# Patient Record
Sex: FEMALE | Race: WHITE | NOT HISPANIC OR LATINO | Employment: OTHER | URBAN - METROPOLITAN AREA
[De-identification: names, ages, dates, MRNs, and addresses within clinical notes are randomized per-mention and may not be internally consistent; named-entity substitution may affect disease eponyms.]

---

## 2017-08-02 ENCOUNTER — OFFICE VISIT (OUTPATIENT)
Dept: INTERNAL MEDICINE | Facility: CLINIC | Age: 72
End: 2017-08-02
Payer: MEDICARE

## 2017-08-02 ENCOUNTER — TELEPHONE (OUTPATIENT)
Dept: FAMILY MEDICINE | Facility: CLINIC | Age: 72
End: 2017-08-02

## 2017-08-02 VITALS
HEART RATE: 83 BPM | OXYGEN SATURATION: 98 % | DIASTOLIC BLOOD PRESSURE: 80 MMHG | WEIGHT: 156 LBS | RESPIRATION RATE: 15 BRPM | TEMPERATURE: 99 F | HEIGHT: 61 IN | SYSTOLIC BLOOD PRESSURE: 130 MMHG | BODY MASS INDEX: 29.45 KG/M2

## 2017-08-02 DIAGNOSIS — F33.0 MAJOR DEPRESSIVE DISORDER, RECURRENT EPISODE, MILD: ICD-10-CM

## 2017-08-02 DIAGNOSIS — I70.0 ATHEROSCLEROSIS OF AORTA: ICD-10-CM

## 2017-08-02 DIAGNOSIS — E78.00 PURE HYPERCHOLESTEROLEMIA: Primary | ICD-10-CM

## 2017-08-02 DIAGNOSIS — Z01.419 WOMEN'S ANNUAL ROUTINE GYNECOLOGICAL EXAMINATION: ICD-10-CM

## 2017-08-02 DIAGNOSIS — I70.8 AORTO-ILIAC ATHEROSCLEROSIS: ICD-10-CM

## 2017-08-02 DIAGNOSIS — M50.30 DDD (DEGENERATIVE DISC DISEASE), CERVICAL: ICD-10-CM

## 2017-08-02 DIAGNOSIS — F41.9 ANXIETY: ICD-10-CM

## 2017-08-02 DIAGNOSIS — R73.03 PREDIABETES: ICD-10-CM

## 2017-08-02 DIAGNOSIS — Z00.00 ENCOUNTER FOR PREVENTIVE HEALTH EXAMINATION: ICD-10-CM

## 2017-08-02 DIAGNOSIS — I77.810 ASCENDING AORTA DILATATION: ICD-10-CM

## 2017-08-02 DIAGNOSIS — E66.09 NON MORBID OBESITY DUE TO EXCESS CALORIES: ICD-10-CM

## 2017-08-02 DIAGNOSIS — I10 ESSENTIAL HYPERTENSION: ICD-10-CM

## 2017-08-02 DIAGNOSIS — Z00.00 ANNUAL PHYSICAL EXAM: Primary | ICD-10-CM

## 2017-08-02 DIAGNOSIS — I70.0 AORTO-ILIAC ATHEROSCLEROSIS: ICD-10-CM

## 2017-08-02 DIAGNOSIS — R41.3 MEMORY CHANGES: ICD-10-CM

## 2017-08-02 PROBLEM — N89.8 VAGINAL DISCHARGE: Status: RESOLVED | Noted: 2017-08-02 | Resolved: 2017-08-02

## 2017-08-02 PROBLEM — N89.8 VAGINAL DISCHARGE: Status: ACTIVE | Noted: 2017-08-02

## 2017-08-02 PROCEDURE — 99499 UNLISTED E&M SERVICE: CPT | Mod: S$GLB,,, | Performed by: NURSE PRACTITIONER

## 2017-08-02 PROCEDURE — G0439 PPPS, SUBSEQ VISIT: HCPCS | Mod: S$GLB,,, | Performed by: NURSE PRACTITIONER

## 2017-08-02 PROCEDURE — 99999 PR PBB SHADOW E&M-EST. PATIENT-LVL V: CPT | Mod: PBBFAC,,, | Performed by: NURSE PRACTITIONER

## 2017-08-02 NOTE — TELEPHONE ENCOUNTER
----- Message from Carolina Jin sent at 8/2/2017 10:07 AM CDT -----  Contact: self/  Doctor appointment and lab have been scheduled.  Please link lab orders to the lab appointment.  Date of doctor appointment:  11/21/17  Physical or EP:  phys  Date of lab appointment:  11/14/17  Comments:

## 2017-08-02 NOTE — Clinical Note
Primary Care Providers: Ilia Gonzalez MD, MD (General)  Your patient was seen today for a HRA visit. Gap(s) in care (HEDIS gaps) have been identified during this visit that require additional testing and possible follow up.  Orders Placed This Encounter     Ambulatory Referral to Gynecology         Referral Priority:Routine         Referral Type:Consultation         Referral Reason:Specialty Services Required         Requested Specialty:Gynecology         Number of Visits Requested:1   . I have included a copy of my visit note; please review the note and feel free to contact me with any questions.   Thank you for allowing me to participate in the care of your patients. Bette Benjamin NP

## 2017-08-02 NOTE — PROGRESS NOTES
"Chikis Epstein presented for a  Medicare AWV and comprehensive Health Risk Assessment today. The following components were reviewed and updated:    · Medical history  · Family History  · Social history  · Allergies and Current Medications  · Health Risk Assessment  · Health Maintenance  · Care Team   External eye MD    ** See Completed Assessments for Annual Wellness Visit within the encounter summary.**       The following assessments were completed:  · Living Situation  · CAGE  · Depression Screening  · Timed Get Up and Go  · Whisper Test  · Cognitive Function Screening  · Nutrition Screening  · ADL Screening  · PAQ Screening    Vitals:    08/02/17 0843   BP: 130/80   Pulse: 83   Resp: 15   Temp: 98.6 °F (37 °C)   TempSrc: Oral   SpO2: 98%   Weight: 70.8 kg (156 lb)   Height: 5' 1" (1.549 m)     Body mass index is 29.48 kg/m².  Physical Exam   Constitutional: She is oriented to person, place, and time. She appears well-developed and well-nourished.   HENT:   Head: Normocephalic and atraumatic.   Cardiovascular: Normal rate, regular rhythm and normal heart sounds.    No murmur heard.  Pulmonary/Chest: Effort normal and breath sounds normal.   Abdominal: Soft. Bowel sounds are normal. There is no tenderness.   Musculoskeletal: She exhibits no edema.   Neurological: She is alert and oriented to person, place, and time.   Skin: Skin is warm and dry.   Psychiatric: She has a normal mood and affect. Her behavior is normal. Judgment and thought content normal.   Nursing note and vitals reviewed.    No memory impairment noted     Diagnoses and health risks identified today and associated recommendations/orders:    1. Pure hypercholesterolemia  Stable- followed by PCP    2. Prediabetes  Stable- followed by PCP    3. Non morbid obesity due to excess calories  CHronic with no recent weight loss.Reviewed current BMI & ideal BMI range. Encouraged weight loss,  diet and exercise. Followed by PCP.    4. Memory changes  Stable- " followed by PCP    5. Major depressive disorder, recurrent episode, mild  Stable- followed by PCP    6. Essential hypertension  Stable- followed by PCP    7. Atherosclerosis of aorta  Stable- followed by PCP    8. Ascending aorta dilatation  Stable- followed by PCP    9. Aorto-iliac atherosclerosis  Stable- followed by PCP    10. Anxiety  Stable- followed by PCP    11. DDD (degenerative disc disease), cervical  Stable- followed by PCP    12. Encounter for preventive health examination  Assessments completed  Preventative health recommendations reviewed     13. Women's annual routine gynecological examination  - Ambulatory Referral to Gynecology            Provided Chikis with a 5-10 year written screening schedule and personal prevention plan. Recommendations were developed using the USPSTF age appropriate recommendations. Education, counseling, and referrals were provided as needed. After Visit Summary printed and given to patient which includes a list of additional screenings\tests needed. DXA due. PPSV23 due. SHe requested to change back to Dr Gonzalez as her PCP. SHe will schedule appt to F/U on abd symptoms, DXA,PPSV23 due. Risk factor modification encouraged: weight loss, exercise & get active in activities with people, GYN referral to further evaluate presence of vag discharge & painful intercourse in past.    Return in about 1 year (around 8/2/2018) for HRA.    Bette Benjamin NP

## 2017-08-08 ENCOUNTER — OFFICE VISIT (OUTPATIENT)
Dept: FAMILY MEDICINE | Facility: CLINIC | Age: 72
End: 2017-08-08
Payer: MEDICARE

## 2017-08-08 VITALS
HEIGHT: 61 IN | WEIGHT: 158.31 LBS | TEMPERATURE: 98 F | SYSTOLIC BLOOD PRESSURE: 129 MMHG | DIASTOLIC BLOOD PRESSURE: 64 MMHG | BODY MASS INDEX: 29.89 KG/M2 | HEART RATE: 80 BPM

## 2017-08-08 DIAGNOSIS — R10.13 EPIGASTRIC PAIN: ICD-10-CM

## 2017-08-08 DIAGNOSIS — R10.11 RUQ PAIN: Primary | ICD-10-CM

## 2017-08-08 PROCEDURE — 1125F AMNT PAIN NOTED PAIN PRSNT: CPT | Mod: S$GLB,,, | Performed by: FAMILY MEDICINE

## 2017-08-08 PROCEDURE — 3008F BODY MASS INDEX DOCD: CPT | Mod: S$GLB,,, | Performed by: FAMILY MEDICINE

## 2017-08-08 PROCEDURE — 3078F DIAST BP <80 MM HG: CPT | Mod: S$GLB,,, | Performed by: FAMILY MEDICINE

## 2017-08-08 PROCEDURE — 99214 OFFICE O/P EST MOD 30 MIN: CPT | Mod: S$GLB,,, | Performed by: FAMILY MEDICINE

## 2017-08-08 PROCEDURE — 1159F MED LIST DOCD IN RCRD: CPT | Mod: S$GLB,,, | Performed by: FAMILY MEDICINE

## 2017-08-08 PROCEDURE — 99999 PR PBB SHADOW E&M-EST. PATIENT-LVL III: CPT | Mod: PBBFAC,,, | Performed by: FAMILY MEDICINE

## 2017-08-08 PROCEDURE — 3074F SYST BP LT 130 MM HG: CPT | Mod: S$GLB,,, | Performed by: FAMILY MEDICINE

## 2017-08-08 RX ORDER — PANTOPRAZOLE SODIUM 40 MG/1
40 TABLET, DELAYED RELEASE ORAL DAILY
Qty: 30 TABLET | Refills: 11 | Status: SHIPPED | OUTPATIENT
Start: 2017-08-08 | End: 2021-03-01 | Stop reason: SDUPTHER

## 2017-08-08 NOTE — PROGRESS NOTES
Subjective:       Patient ID: Chikis Epstein is a 71 y.o. female.    Chief Complaint: GI Problem (having symptoms prior to gallbladder removal)    Disclaimer: This note has been generated using voice-recognition software. There may be typographical errors that have been missed during proof-reading    72 yo presents for evaluation of abdominal distention and recurrent abdominal pain, present for over one year.  She underwent cholecystectomy on 3/16 for history of chronic cholelithiasis.  However, has continued with pain localized to epigastric area as well as RUQ pain, worse with eating.  Symptoms only minimally improved with TUMS.       Review of Systems   Constitutional: Negative for fever and unexpected weight change.   Gastrointestinal: Positive for abdominal distention and abdominal pain. Negative for blood in stool, nausea and vomiting.       Objective:      Physical Exam   Constitutional: She appears well-developed and well-nourished. No distress.   Abdominal: Soft. Bowel sounds are normal. She exhibits no distension and no mass. There is tenderness. There is no rebound.   Tenderness to deep palpation over epigastric area, RUQ, no rebound tenderness       Assessment:       1. RUQ pain    2. Epigastric pain        Plan:        1.  Abdominal US, rule out impacted duct stone  2.  Protonix 40mg qd  3.  Schedule EGD

## 2017-08-10 ENCOUNTER — HOSPITAL ENCOUNTER (OUTPATIENT)
Dept: RADIOLOGY | Facility: HOSPITAL | Age: 72
Discharge: HOME | End: 2017-08-10
Attending: FAMILY MEDICINE
Payer: MEDICARE

## 2017-08-10 DIAGNOSIS — R10.11 RUQ PAIN: ICD-10-CM

## 2017-08-10 PROCEDURE — 76700 US EXAM ABDOM COMPLETE: CPT | Mod: TC

## 2017-08-10 PROCEDURE — 76700 US EXAM ABDOM COMPLETE: CPT | Mod: 26,,, | Performed by: RADIOLOGY

## 2017-08-14 DIAGNOSIS — I10 ESSENTIAL HYPERTENSION: ICD-10-CM

## 2017-08-14 RX ORDER — HYDROCHLOROTHIAZIDE 25 MG/1
TABLET ORAL
Qty: 90 TABLET | Refills: 3 | Status: SHIPPED | OUTPATIENT
Start: 2017-08-14 | End: 2018-07-02 | Stop reason: SDUPTHER

## 2017-08-21 DIAGNOSIS — I10 ESSENTIAL HYPERTENSION: ICD-10-CM

## 2017-08-21 RX ORDER — AMLODIPINE BESYLATE 5 MG/1
TABLET ORAL
Qty: 90 TABLET | Refills: 3 | Status: SHIPPED | OUTPATIENT
Start: 2017-08-21 | End: 2018-08-20 | Stop reason: SDUPTHER

## 2017-08-28 ENCOUNTER — SURGERY (OUTPATIENT)
Age: 72
End: 2017-08-28

## 2017-08-28 ENCOUNTER — HOSPITAL ENCOUNTER (OUTPATIENT)
Facility: HOSPITAL | Age: 72
Discharge: HOME | End: 2017-08-28
Attending: INTERNAL MEDICINE | Admitting: INTERNAL MEDICINE
Payer: MEDICARE

## 2017-08-28 ENCOUNTER — ANESTHESIA (OUTPATIENT)
Dept: ENDOSCOPY | Facility: HOSPITAL | Age: 72
End: 2017-08-28
Payer: MEDICARE

## 2017-08-28 ENCOUNTER — ANESTHESIA EVENT (OUTPATIENT)
Dept: ENDOSCOPY | Facility: HOSPITAL | Age: 72
End: 2017-08-28
Payer: MEDICARE

## 2017-08-28 VITALS
HEART RATE: 66 BPM | SYSTOLIC BLOOD PRESSURE: 133 MMHG | RESPIRATION RATE: 18 BRPM | HEIGHT: 60 IN | WEIGHT: 155 LBS | DIASTOLIC BLOOD PRESSURE: 72 MMHG | OXYGEN SATURATION: 96 % | BODY MASS INDEX: 30.43 KG/M2 | TEMPERATURE: 97 F

## 2017-08-28 VITALS — RESPIRATION RATE: 20 BRPM

## 2017-08-28 DIAGNOSIS — R10.11 RUQ ABDOMINAL PAIN: Primary | ICD-10-CM

## 2017-08-28 PROCEDURE — 43239 EGD BIOPSY SINGLE/MULTIPLE: CPT | Mod: ,,, | Performed by: INTERNAL MEDICINE

## 2017-08-28 PROCEDURE — 27201012 HC FORCEPS, HOT/COLD, DISP: Performed by: INTERNAL MEDICINE

## 2017-08-28 PROCEDURE — 37000009 HC ANESTHESIA EA ADD 15 MINS: Performed by: INTERNAL MEDICINE

## 2017-08-28 PROCEDURE — 88305 TISSUE EXAM BY PATHOLOGIST: CPT | Performed by: PATHOLOGY

## 2017-08-28 PROCEDURE — 43239 EGD BIOPSY SINGLE/MULTIPLE: CPT | Performed by: INTERNAL MEDICINE

## 2017-08-28 PROCEDURE — 25000003 PHARM REV CODE 250: Performed by: INTERNAL MEDICINE

## 2017-08-28 PROCEDURE — 88305 TISSUE EXAM BY PATHOLOGIST: CPT | Mod: 26,,, | Performed by: PATHOLOGY

## 2017-08-28 PROCEDURE — 37000008 HC ANESTHESIA 1ST 15 MINUTES: Performed by: INTERNAL MEDICINE

## 2017-08-28 PROCEDURE — 63600175 PHARM REV CODE 636 W HCPCS: Performed by: NURSE ANESTHETIST, CERTIFIED REGISTERED

## 2017-08-28 PROCEDURE — D9220A PRA ANESTHESIA: Mod: ,,, | Performed by: ANESTHESIOLOGY

## 2017-08-28 RX ORDER — SODIUM CHLORIDE 9 MG/ML
INJECTION, SOLUTION INTRAVENOUS CONTINUOUS
Status: DISCONTINUED | OUTPATIENT
Start: 2017-08-28 | End: 2017-08-28 | Stop reason: HOSPADM

## 2017-08-28 RX ORDER — PROPOFOL 10 MG/ML
VIAL (ML) INTRAVENOUS
Status: DISCONTINUED | OUTPATIENT
Start: 2017-08-28 | End: 2017-08-28

## 2017-08-28 RX ORDER — LIDOCAINE HCL/PF 100 MG/5ML
SYRINGE (ML) INTRAVENOUS
Status: DISCONTINUED | OUTPATIENT
Start: 2017-08-28 | End: 2017-08-28

## 2017-08-28 RX ADMIN — PROPOFOL 50 MG: 10 INJECTION, EMULSION INTRAVENOUS at 12:08

## 2017-08-28 RX ADMIN — LIDOCAINE HYDROCHLORIDE 40 MG: 20 INJECTION, SOLUTION INTRAVENOUS at 12:08

## 2017-08-28 RX ADMIN — SODIUM CHLORIDE: 0.9 INJECTION, SOLUTION INTRAVENOUS at 11:08

## 2017-08-28 RX ADMIN — PROPOFOL 30 MG: 10 INJECTION, EMULSION INTRAVENOUS at 12:08

## 2017-08-28 NOTE — INTERVAL H&P NOTE
Short Stay Endoscopy History and Physical    PCP - Ilia Gonzalez MD    Procedure - EGD  Sedation: GA  ASA - per anesthesia  Mallampati - per anesthesia  History of Anesthesia problems - no  Family history Anesthesia problems -  no     HPI:  This is a 71 y.o. female here for evaluation of : Upper abdominal pain    Reflux - no  Dysphagia - no  Abdominal pain - yes  Diarrhea - no    ROS:  Constitutional: No fevers, chills, No weight loss  ENT: No allergies  CV: No chest pain  Pulm: No cough, No shortness of breath  Ophtho: No vision changes  GI: see HPI    Medical History:  has a past medical history of Anxiety; Aortic atherosclerosis; Back pain; Cataract; Hyperlipidemia (2016); Hypertension; MDD (major depressive disorder); Menopause; Non morbid obesity due to excess calories (2016); Personal history of kidney stones; and Type 2 diabetes mellitus without complication.    Surgical History:  has a past surgical history that includes Colonoscopy (); Kidney stone surgery; Total abdominal hysterectomy w/ bilateral salpingoophorectomy; Cataract extraction, bilateral; Eye surgery; Hysterectomy;  section; and Cholecystectomy.    Family History: family history includes Asthma in her mother; Cancer in her father and sister; Depression in her son and son; Heart disease in her sister.. Otherwise no colon cancer, inflammatory bowel disease, or GI malignancies.    Social History:  reports that she has never smoked. She has never used smokeless tobacco. She reports that she drinks alcohol. She reports that she does not use drugs.    Review of patient's allergies indicates:   Allergen Reactions    Ace inhibitors      Other reaction(s): Swelling       Medications:   Prescriptions Prior to Admission   Medication Sig Dispense Refill Last Dose    amlodipine (NORVASC) 5 MG tablet TAKE 1 TABLET ONE TIME DAILY 90 tablet 3 2017 at Unknown time    cetirizine (ZYRTEC) 10 MG tablet Take 10 mg by mouth as needed  for Allergies.   Past Week at Unknown time    conjugated estrogens (PREMARIN) vaginal cream Use topically as directed 30 g 6 Past Week at Unknown time    fluoxetine (PROZAC) 40 MG capsule TAKE 1 CAPSULE ONE TIME DAILY 90 capsule 3 8/27/2017 at Unknown time    hydrochlorothiazide (HYDRODIURIL) 25 MG tablet TAKE 1 TABLET ONE TIME DAILY 90 tablet 3 8/28/2017 at Unknown time    MULTIVIT-MINERALS/FOLIC ACID (CENTRUM MULTIGUMMIES ORAL) Take 2 each by mouth once daily.   8/27/2017 at Unknown time    pantoprazole (PROTONIX) 40 MG tablet Take 1 tablet (40 mg total) by mouth once daily. 30 tablet 11 8/27/2017 at Unknown time    pravastatin (PRAVACHOL) 40 MG tablet Take 1 tablet (40 mg total) by mouth once daily. 90 tablet 3 8/27/2017 at Unknown time    triamcinolone (NASACORT AQ) 55 mcg nasal inhaler 2 sprays by Nasal route once daily. 1 - 2 Aerosol, Spray Nasal Every day (Patient taking differently: 2 sprays by Nasal route as needed. 1 - 2 Aerosol, Spray Nasal Every day) 49.5 g 3 Past Month at Unknown time    losartan (COZAAR) 50 MG tablet Take 2 tablets (100 mg total) by mouth once daily. (Patient taking differently: Take 50 mg by mouth once daily. ) 60 tablet 11 Taking    naproxen (NAPROSYN) 500 MG tablet Take 500 mg by mouth 2 (two) times daily as needed.    Taking       Objective Findings:    Vital Signs: Per nursing notes.    Physical Exam:  General Appearance: Well appearing in no acute distress  Head:   Normocephalic, without obvious abnormality  Eyes:    No scleral icterus  Airway: Open  Neck: No restriction in mobility  Lungs: CTA bilaterally in anterior and posterior fields, no wheezes, no crackles.  Heart:  Regular rate and rhythm, S1, S2 normal, no murmurs heard  Abdomen: Soft, non tender, non distended      Labs:  Lab Results   Component Value Date    WBC 6.83 10/12/2016    HGB 13.6 10/12/2016    HCT 40.0 10/12/2016     10/12/2016    CHOL 209 (H) 10/12/2016    TRIG 76 10/12/2016    HDL 68  10/12/2016    ALT 17 10/12/2016    AST 26 10/12/2016     10/12/2016    K 3.8 10/12/2016     10/12/2016    CREATININE 0.8 10/12/2016    BUN 11 10/12/2016    CO2 26 10/12/2016    TSH 3.134 10/12/2016    INR 1.0 04/07/2012    HGBA1C 5.9 09/07/2016         I have explained the risks and benefits of endoscopy procedures to the patient including but not limited to bleeding, perforation, infection, and death.    Thank you so much for allowing me to participate in the care of Chikis Ochoa MD

## 2017-08-28 NOTE — H&P (VIEW-ONLY)
Subjective:       Patient ID: Chikis Epstein is a 71 y.o. female.    Chief Complaint: GI Problem (having symptoms prior to gallbladder removal)    Disclaimer: This note has been generated using voice-recognition software. There may be typographical errors that have been missed during proof-reading    70 yo presents for evaluation of abdominal distention and recurrent abdominal pain, present for over one year.  She underwent cholecystectomy on 3/16 for history of chronic cholelithiasis.  However, has continued with pain localized to epigastric area as well as RUQ pain, worse with eating.  Symptoms only minimally improved with TUMS.       Review of Systems   Constitutional: Negative for fever and unexpected weight change.   Gastrointestinal: Positive for abdominal distention and abdominal pain. Negative for blood in stool, nausea and vomiting.       Objective:      Physical Exam   Constitutional: She appears well-developed and well-nourished. No distress.   Abdominal: Soft. Bowel sounds are normal. She exhibits no distension and no mass. There is tenderness. There is no rebound.   Tenderness to deep palpation over epigastric area, RUQ, no rebound tenderness       Assessment:       1. RUQ pain    2. Epigastric pain        Plan:        1.  Abdominal US, rule out impacted duct stone  2.  Protonix 40mg qd  3.  Schedule EGD

## 2017-08-28 NOTE — ANESTHESIA PREPROCEDURE EVALUATION
08/28/2017  Chikis Epstein is a 71 y.o., female.    Anesthesia Evaluation    I have reviewed the Patient Summary Reports.    I have reviewed the Nursing Notes.   I have reviewed the Medications.     Review of Systems  Anesthesia Hx:  No problems with previous Anesthesia  History of prior surgery of interest to airway management or planning: Previous anesthesia: MAC Denies Family Hx of Anesthesia complications.   Denies Personal Hx of Anesthesia complications.   Social:  Non-Smoker, No Alcohol Use    Cardiovascular:   Exercise tolerance: good Hypertension, well controlled hyperlipidemia ECG has been reviewed. 1/2016  Normal sinus rhythm  Nonspecific ST abnormality  Abnormal ECG  When compared with ECG of 31-OCT-2012 13:00,  No significant change was found   Hepatic/GI:   GERD    Musculoskeletal:   Arthritis     Neurological:  Neurology Normal    Psych:   Psychiatric History depression          Physical Exam  General:  Well nourished    Airway/Jaw/Neck:  Airway Findings: Mouth Opening: Normal Tongue: Normal  Mallampati: III  Improves to III with phonation.  TM Distance: Normal, at least 6 cm        Eyes/Ears/Nose:  EYES/EARS/NOSE FINDINGS: Normal   Dental:  Dental Findings:    Chest/Lungs:  Chest/Lungs Clear    Heart/Vascular:  Heart Findings: Normal Heart murmur: negative    Abdomen:  Abdomen Findings: Normal      Mental Status:  Mental Status Findings:  Cooperative, Alert and Oriented         Anesthesia Plan  Type of Anesthesia, risks & benefits discussed:  Anesthesia Type:  general  Patient's Preference: general  Intra-op Monitoring Plan: standard ASA monitors  Intra-op Monitoring Plan Comments:   Post Op Pain Control Plan:   Post Op Pain Control Plan Comments:   Induction:   IV  Beta Blocker:  Patient is not currently on a Beta-Blocker (No further documentation required).       Informed Consent: Patient  understands risks and agrees with Anesthesia plan.  Questions answered. Anesthesia consent signed with patient.  ASA Score: 2     Day of Surgery Review of History & Physical:    H&P update referred to the surgeon.         Ready For Surgery From Anesthesia Perspective.

## 2017-08-28 NOTE — TRANSFER OF CARE
Anesthesia Transfer of Care Note    Patient: Chikis Epstein    Procedure(s) Performed: Procedure(s) (LRB):  ESOPHAGOGASTRODUODENOSCOPY (EGD) (N/A)    Patient location: PACU    Anesthesia Type: general    Transport from OR: Transported from OR on room air with adequate spontaneous ventilation    Post pain: adequate analgesia    Post assessment: no apparent anesthetic complications and tolerated procedure well    Post vital signs: stable    Level of consciousness: awake and alert    Nausea/Vomiting: no nausea/vomiting    Complications: none    Transfer of care protocol was followed      Last vitals: 08/28/17 1254  Visit Vitals  /65   Pulse 59   Temp 98.9   Resp 12   Ht 5' (1.524 m)   Wt 70.3 kg (155 lb)   SpO2 98%   Breastfeeding? No   BMI 30.27 kg/m²

## 2017-08-28 NOTE — PATIENT INSTRUCTIONS
Discharge Summary/Instructions after an Endoscopic Procedure  Patient Name: Chikis Epstein  Patient MRN: [id number]  Patient YOB: 1945  Monday, August 28, 2017  Jed Ochoa MD  RESTRICTIONS:  During your procedure today, you received medications for sedation.  These   medications may affect your judgment, balance and coordination.  Therefore,   for 24 hours, you have the following restrictions:   - DO NOT drive a car, operate machinery, make legal/financial decisions,   sign important papers or drink alcohol.    ACTIVITY:  The following day: return to full activity including work, except no heavy   lifting, straining or running for 3 days if polyps were removed.  DIET:  Eat and drink normally unless instructed otherwise.  TREATMENT FOR COMMON SIDE EFFECTS:  - Mild abdominal pain, belching, bloating or excessive gas: rest, eat   lightly and use a heating pad.  - Sore Throat: treat with throat lozenges and/or gargle with warm salt   water.  SYMPTOMS TO WATCH FOR AND REPORT TO YOUR PHYSICIAN:  1. Abdominal pain or bloating, other than gas cramps.  2. Chest pain.  3. Back pain.  4. Chills or fever occurring within 24 hours after the procedure.  5. Rectal bleeding, which would show as bright red, maroon, or black stools.   (A tablespoon of blood from the rectum is not serious, especially if   hemorrhoids are present.)  6. Vomiting.  7. Weakness or dizziness.  8. Because air was used during the procedure, expelling large amounts of air   from your rectum or belching is normal.  9. If a bowel prep was taken, you may not have a bowel movement for 1-3   days.  This is normal.  GO DIRECTLY TO THE EMERGENCY ROOM IF YOU HAVE ANY OF THE FOLLOWING:   Difficulty breathing  Chills and/or fever over 101 F   Persistent vomiting and/or vomiting blood   Severe abdominal pain   Severe chest pain   Black, tarry stools   Bleeding- more than one tablespoon  Your doctor recommends these additional instructions:  If any biopsies  were taken, your doctorâs clinic will call you in 1 to 2   weeks with any results.  You have a contact number available for emergencies.  The signs and symptoms   of potential delayed complications were discussed with you.  You may return   to normal activities tomorrow.  Written discharge instructions were   provided to you.   You are being discharged to home.   Resume your previous diet.   Continue your present medications.   We are waiting for your pathology results.   Do not take any aspirin, ibuprofen (including Advil, Motrin or Nuprin),   naproxen (including Aleve), or any other non-steroidal anti-inflammatory   drugs.  For questions, problems or results please call your physician - Jed Ochoa MD at Work:  [phone].  [facility], EMERGENCY ROOM PHONE NUMBER: [phone]  IF A COMPLICATION OR EMERGENCY SITUATION ARISES AND YOU ARE UNABLE TO REACH   YOUR PHYSICIAN - GO DIRECTLY TO THE EMERGENCY ROOM.  Jed Ochoa MD  8/28/2017 12:50:01 PM  This report has been verified and signed electronically.

## 2017-08-29 RX ORDER — FLUOXETINE HYDROCHLORIDE 40 MG/1
CAPSULE ORAL
Qty: 90 CAPSULE | Refills: 3 | Status: SHIPPED | OUTPATIENT
Start: 2017-08-29 | End: 2017-11-21 | Stop reason: SDUPTHER

## 2017-08-30 NOTE — ANESTHESIA POSTPROCEDURE EVALUATION
Anesthesia Post Evaluation    Patient: Chikis Epstein    Procedure(s) Performed: Procedure(s) (LRB):  ESOPHAGOGASTRODUODENOSCOPY (EGD) (N/A)    Final Anesthesia Type: general  Patient location during evaluation: GI PACU  Patient participation: Yes- Able to Participate  Level of consciousness: awake and alert, oriented and awake  Post-procedure vital signs: reviewed and stable  Pain management: adequate  Airway patency: patent  PONV status at discharge: No PONV  Anesthetic complications: no      Cardiovascular status: blood pressure returned to baseline and hemodynamically stable  Respiratory status: unassisted, spontaneous ventilation and room air  Hydration status: euvolemic  Follow-up not needed.        Visit Vitals  /72   Pulse 66   Temp 36.1 °C (97 °F)   Resp 18   Ht 5' (1.524 m)   Wt 70.3 kg (155 lb)   SpO2 96%   Breastfeeding? No   BMI 30.27 kg/m²       Pain/Sobia Score: No Data Recorded

## 2017-08-31 ENCOUNTER — TELEPHONE (OUTPATIENT)
Dept: GASTROENTEROLOGY | Facility: CLINIC | Age: 72
End: 2017-08-31

## 2017-08-31 NOTE — TELEPHONE ENCOUNTER
----- Message from Jed Ochoa MD sent at 8/31/2017  2:57 PM CDT -----  Please notify patient, the stomach biopsies did not reveal any H.pylori.

## 2017-09-08 DIAGNOSIS — I70.0 ATHEROSCLEROSIS OF AORTA: ICD-10-CM

## 2017-09-08 RX ORDER — PRAVASTATIN SODIUM 40 MG/1
TABLET ORAL
Qty: 90 TABLET | Refills: 3 | Status: SHIPPED | OUTPATIENT
Start: 2017-09-08 | End: 2018-08-27 | Stop reason: SDUPTHER

## 2017-11-14 ENCOUNTER — LAB VISIT (OUTPATIENT)
Dept: LAB | Facility: HOSPITAL | Age: 72
End: 2017-11-14
Attending: FAMILY MEDICINE
Payer: MEDICARE

## 2017-11-14 DIAGNOSIS — Z00.00 ANNUAL PHYSICAL EXAM: ICD-10-CM

## 2017-11-14 DIAGNOSIS — I10 ESSENTIAL HYPERTENSION: ICD-10-CM

## 2017-11-14 LAB
ALBUMIN SERPL BCP-MCNC: 3.6 G/DL
ALP SERPL-CCNC: 88 U/L
ALT SERPL W/O P-5'-P-CCNC: 17 U/L
ANION GAP SERPL CALC-SCNC: 7 MMOL/L
AST SERPL-CCNC: 26 U/L
BASOPHILS # BLD AUTO: 0.05 K/UL
BASOPHILS NFR BLD: 0.8 %
BILIRUB SERPL-MCNC: 0.5 MG/DL
BUN SERPL-MCNC: 13 MG/DL
CALCIUM SERPL-MCNC: 10.1 MG/DL
CHLORIDE SERPL-SCNC: 102 MMOL/L
CHOLEST SERPL-MCNC: 175 MG/DL
CHOLEST/HDLC SERPL: 3 {RATIO}
CO2 SERPL-SCNC: 31 MMOL/L
CREAT SERPL-MCNC: 0.8 MG/DL
DIFFERENTIAL METHOD: NORMAL
EOSINOPHIL # BLD AUTO: 0.2 K/UL
EOSINOPHIL NFR BLD: 3.5 %
ERYTHROCYTE [DISTWIDTH] IN BLOOD BY AUTOMATED COUNT: 12.5 %
EST. GFR  (AFRICAN AMERICAN): >60 ML/MIN/1.73 M^2
EST. GFR  (NON AFRICAN AMERICAN): >60 ML/MIN/1.73 M^2
GLUCOSE SERPL-MCNC: 114 MG/DL
HCT VFR BLD AUTO: 40.1 %
HDLC SERPL-MCNC: 59 MG/DL
HDLC SERPL: 33.7 %
HGB BLD-MCNC: 13.3 G/DL
IMM GRANULOCYTES # BLD AUTO: 0.03 K/UL
IMM GRANULOCYTES NFR BLD AUTO: 0.5 %
LDLC SERPL CALC-MCNC: 94.6 MG/DL
LYMPHOCYTES # BLD AUTO: 1.7 K/UL
LYMPHOCYTES NFR BLD: 25.1 %
MCH RBC QN AUTO: 30 PG
MCHC RBC AUTO-ENTMCNC: 33.2 G/DL
MCV RBC AUTO: 91 FL
MONOCYTES # BLD AUTO: 0.4 K/UL
MONOCYTES NFR BLD: 6.7 %
NEUTROPHILS # BLD AUTO: 4.2 K/UL
NEUTROPHILS NFR BLD: 63.4 %
NONHDLC SERPL-MCNC: 116 MG/DL
NRBC BLD-RTO: 0 /100 WBC
PLATELET # BLD AUTO: 275 K/UL
PMV BLD AUTO: 10.4 FL
POTASSIUM SERPL-SCNC: 4.2 MMOL/L
PROT SERPL-MCNC: 7 G/DL
RBC # BLD AUTO: 4.43 M/UL
SODIUM SERPL-SCNC: 140 MMOL/L
TRIGL SERPL-MCNC: 107 MG/DL
TSH SERPL DL<=0.005 MIU/L-ACNC: 2.84 UIU/ML
WBC # BLD AUTO: 6.58 K/UL

## 2017-11-14 PROCEDURE — 80061 LIPID PANEL: CPT

## 2017-11-14 PROCEDURE — 36415 COLL VENOUS BLD VENIPUNCTURE: CPT | Mod: PO

## 2017-11-14 PROCEDURE — 80053 COMPREHEN METABOLIC PANEL: CPT

## 2017-11-14 PROCEDURE — 85025 COMPLETE CBC W/AUTO DIFF WBC: CPT

## 2017-11-14 PROCEDURE — 84443 ASSAY THYROID STIM HORMONE: CPT

## 2017-11-21 ENCOUNTER — OFFICE VISIT (OUTPATIENT)
Dept: FAMILY MEDICINE | Facility: CLINIC | Age: 72
End: 2017-11-21
Payer: MEDICARE

## 2017-11-21 VITALS
DIASTOLIC BLOOD PRESSURE: 80 MMHG | SYSTOLIC BLOOD PRESSURE: 120 MMHG | HEIGHT: 61 IN | WEIGHT: 155.44 LBS | HEART RATE: 72 BPM | BODY MASS INDEX: 29.35 KG/M2 | TEMPERATURE: 98 F

## 2017-11-21 DIAGNOSIS — M81.0 OSTEOPOROSIS, UNSPECIFIED OSTEOPOROSIS TYPE, UNSPECIFIED PATHOLOGICAL FRACTURE PRESENCE: ICD-10-CM

## 2017-11-21 DIAGNOSIS — I10 ESSENTIAL HYPERTENSION: ICD-10-CM

## 2017-11-21 DIAGNOSIS — Z00.00 ROUTINE GENERAL MEDICAL EXAMINATION AT A HEALTH CARE FACILITY: Primary | ICD-10-CM

## 2017-11-21 DIAGNOSIS — Z12.39 SCREENING BREAST EXAMINATION: ICD-10-CM

## 2017-11-21 DIAGNOSIS — Z23 NEED FOR PROPHYLACTIC VACCINATION AND INOCULATION AGAINST INFLUENZA: ICD-10-CM

## 2017-11-21 LAB
BILIRUB UR QL STRIP: NEGATIVE
CLARITY UR REFRACT.AUTO: CLEAR
COLOR UR AUTO: NORMAL
GLUCOSE UR QL STRIP: NEGATIVE
HGB UR QL STRIP: NEGATIVE
KETONES UR QL STRIP: NEGATIVE
LEUKOCYTE ESTERASE UR QL STRIP: NEGATIVE
NITRITE UR QL STRIP: NEGATIVE
PH UR STRIP: 8 [PH] (ref 5–8)
PROT UR QL STRIP: NEGATIVE
SP GR UR STRIP: 1 (ref 1–1.03)
URN SPEC COLLECT METH UR: NORMAL
UROBILINOGEN UR STRIP-ACNC: NEGATIVE EU/DL

## 2017-11-21 PROCEDURE — 99999 PR PBB SHADOW E&M-EST. PATIENT-LVL III: CPT | Mod: PBBFAC,,, | Performed by: FAMILY MEDICINE

## 2017-11-21 PROCEDURE — 90662 IIV NO PRSV INCREASED AG IM: CPT | Mod: S$GLB,,, | Performed by: FAMILY MEDICINE

## 2017-11-21 PROCEDURE — 81003 URINALYSIS AUTO W/O SCOPE: CPT

## 2017-11-21 PROCEDURE — 99397 PER PM REEVAL EST PAT 65+ YR: CPT | Mod: S$GLB,,, | Performed by: FAMILY MEDICINE

## 2017-11-21 PROCEDURE — 99499 UNLISTED E&M SERVICE: CPT | Mod: S$GLB,,, | Performed by: FAMILY MEDICINE

## 2017-11-21 PROCEDURE — G0008 ADMIN INFLUENZA VIRUS VAC: HCPCS | Mod: S$GLB,,, | Performed by: FAMILY MEDICINE

## 2017-11-21 RX ORDER — FLUOXETINE HYDROCHLORIDE 20 MG/1
20 CAPSULE ORAL DAILY
Qty: 30 CAPSULE | Refills: 0 | Status: SHIPPED | OUTPATIENT
Start: 2017-11-21 | End: 2018-07-02

## 2017-11-21 NOTE — PROGRESS NOTES
Flu (High dose) vaccine given as ordered. Two patient identifiers used, allergies reviewed, & vaccine verified. Administered to right deltoid. Pt tolerated injection well; no swelling, redness, or bruising noted at injection site. Pt advised to remain in clinic 15 mins following injection for observation.

## 2017-11-21 NOTE — PATIENT INSTRUCTIONS
Start taking Prozac 20mg daily for 3-5 days, then start taking one capsule every other day for 5 days, then stop

## 2017-11-21 NOTE — PROGRESS NOTES
Subjective:       Patient ID: Chikis Epstein is a 72 y.o. female.    Chief Complaint: Annual Exam    Disclaimer: This note has been generated using voice-recognition software. There may be typographical errors that have been missed during proof-reading    71 yo presents today for routine exam, last exam one year ago  Immunizations:  UTD  Colonoscopy:  2014, normal      Review of Systems   Constitutional: Positive for fatigue. Negative for activity change, appetite change, chills, diaphoresis, fever and unexpected weight change.        Recurrent fatigue, ? Related to Prozac, would like to stop medication   HENT: Negative.  Negative for congestion, ear pain, hearing loss, rhinorrhea, sinus pressure, sore throat, tinnitus, trouble swallowing and voice change.    Eyes: Negative.  Negative for photophobia, pain and visual disturbance.   Respiratory: Negative.  Negative for cough, chest tightness and shortness of breath.    Cardiovascular: Negative.  Negative for chest pain, palpitations and leg swelling.   Gastrointestinal: Negative.  Negative for abdominal distention, abdominal pain, blood in stool, constipation, diarrhea, nausea and vomiting.   Genitourinary: Negative.  Negative for difficulty urinating, dysuria, frequency, hematuria and pelvic pain.        History of dyspareunia, vaginal atrophy; has not been using Premarin cream as previously prescribed   Musculoskeletal: Negative.  Negative for arthralgias, back pain, joint swelling, neck pain and neck stiffness.   Skin: Negative for color change, pallor and rash.   Neurological: Negative.  Negative for dizziness, tremors, speech difficulty, weakness, light-headedness, numbness and headaches.   Hematological: Negative for adenopathy. Does not bruise/bleed easily.   Psychiatric/Behavioral: Negative for agitation, confusion, dysphoric mood, hallucinations and sleep disturbance. The patient is not nervous/anxious.        Objective:      Physical Exam   Constitutional:  She is oriented to person, place, and time. She appears well-developed and well-nourished.   HENT:   Right Ear: Tympanic membrane and external ear normal.   Left Ear: Tympanic membrane and external ear normal.   Nose: Nose normal.   Mouth/Throat: Oropharynx is clear and moist.   Eyes: Conjunctivae and EOM are normal. Pupils are equal, round, and reactive to light.   Neck: Normal range of motion. Neck supple. No tracheal deviation present. No thyromegaly present.   Cardiovascular: Normal rate, regular rhythm, normal heart sounds and intact distal pulses.    Pulmonary/Chest: Effort normal. She has no wheezes. She has no rales. She exhibits no tenderness.   Abdominal: Soft. Bowel sounds are normal. She exhibits no distension and no mass. There is no rebound.   Genitourinary: No vaginal discharge found.   Genitourinary Comments: Breasts show no masses or nipple retraction, no axillary adenopathy     Musculoskeletal: Normal range of motion. She exhibits no edema or tenderness.   Lymphadenopathy:     She has no cervical adenopathy.   Neurological: She is alert and oriented to person, place, and time. She has normal reflexes. No cranial nerve deficit. Coordination normal.   Skin: Skin is warm and dry. No rash noted. No erythema.   Psychiatric: She has a normal mood and affect. Her behavior is normal.       Assessment:       1.  Physical exam  2.  Anxiety disorder  3.   Hyperetension    Plan:       1. Labs reviewed with pt  2.  UA, mammogram, BMD  3.  Continue present medications, taper Prozac, restart Premarin cream

## 2017-11-28 ENCOUNTER — HOSPITAL ENCOUNTER (OUTPATIENT)
Dept: RADIOLOGY | Facility: HOSPITAL | Age: 72
Discharge: HOME | End: 2017-11-28
Attending: FAMILY MEDICINE
Payer: MEDICARE

## 2017-11-28 DIAGNOSIS — Z12.39 SCREENING BREAST EXAMINATION: ICD-10-CM

## 2017-11-28 PROCEDURE — 77067 SCR MAMMO BI INCL CAD: CPT | Mod: 26,,, | Performed by: RADIOLOGY

## 2017-11-28 PROCEDURE — 77067 SCR MAMMO BI INCL CAD: CPT | Mod: TC,PO

## 2017-11-28 PROCEDURE — 77063 BREAST TOMOSYNTHESIS BI: CPT | Mod: 26,,, | Performed by: RADIOLOGY

## 2017-12-04 RX ORDER — LOSARTAN POTASSIUM 50 MG/1
TABLET ORAL
Qty: 180 TABLET | Refills: 3 | Status: SHIPPED | OUTPATIENT
Start: 2017-12-04 | End: 2018-11-27

## 2018-01-08 ENCOUNTER — OFFICE VISIT (OUTPATIENT)
Dept: URGENT CARE | Facility: CLINIC | Age: 73
End: 2018-01-08
Payer: MEDICARE

## 2018-01-08 VITALS
BODY MASS INDEX: 30.43 KG/M2 | RESPIRATION RATE: 19 BRPM | OXYGEN SATURATION: 97 % | WEIGHT: 155 LBS | HEART RATE: 75 BPM | HEIGHT: 60 IN | SYSTOLIC BLOOD PRESSURE: 160 MMHG | DIASTOLIC BLOOD PRESSURE: 81 MMHG | TEMPERATURE: 100 F

## 2018-01-08 DIAGNOSIS — J40 BRONCHITIS: Primary | ICD-10-CM

## 2018-01-08 LAB
CTP QC/QA: YES
FLUAV AG NPH QL: NEGATIVE
FLUBV AG NPH QL: NEGATIVE

## 2018-01-08 PROCEDURE — 99213 OFFICE O/P EST LOW 20 MIN: CPT | Mod: 25,S$GLB,, | Performed by: PHYSICIAN ASSISTANT

## 2018-01-08 PROCEDURE — 96372 THER/PROPH/DIAG INJ SC/IM: CPT | Mod: S$GLB,,, | Performed by: FAMILY MEDICINE

## 2018-01-08 PROCEDURE — 87804 INFLUENZA ASSAY W/OPTIC: CPT | Mod: QW,S$GLB,, | Performed by: PHYSICIAN ASSISTANT

## 2018-01-08 RX ORDER — BENZONATATE 100 MG/1
100 CAPSULE ORAL 3 TIMES DAILY PRN
Qty: 30 CAPSULE | Refills: 1 | Status: SHIPPED | OUTPATIENT
Start: 2018-01-08 | End: 2018-07-02

## 2018-01-08 RX ORDER — BETAMETHASONE SODIUM PHOSPHATE AND BETAMETHASONE ACETATE 3; 3 MG/ML; MG/ML
6 INJECTION, SUSPENSION INTRA-ARTICULAR; INTRALESIONAL; INTRAMUSCULAR; SOFT TISSUE
Status: COMPLETED | OUTPATIENT
Start: 2018-01-08 | End: 2018-01-08

## 2018-01-08 RX ORDER — FLUTICASONE PROPIONATE 50 MCG
1 SPRAY, SUSPENSION (ML) NASAL DAILY
Qty: 1 BOTTLE | Refills: 0 | Status: SHIPPED | OUTPATIENT
Start: 2018-01-08 | End: 2024-01-16

## 2018-01-08 RX ORDER — AZITHROMYCIN 250 MG/1
TABLET, FILM COATED ORAL
Qty: 6 TABLET | Refills: 0 | Status: SHIPPED | OUTPATIENT
Start: 2018-01-08 | End: 2018-07-02

## 2018-01-08 RX ADMIN — BETAMETHASONE SODIUM PHOSPHATE AND BETAMETHASONE ACETATE 6 MG: 3; 3 INJECTION, SUSPENSION INTRA-ARTICULAR; INTRALESIONAL; INTRAMUSCULAR; SOFT TISSUE at 01:01

## 2018-01-08 NOTE — PROGRESS NOTES
Subjective:       Patient ID: Chikis Epstein is a 72 y.o. female.    Vitals:  height is 5' (1.524 m) and weight is 70.3 kg (155 lb). Her temperature is 100.3 °F (37.9 °C). Her blood pressure is 160/81 (abnormal) and her pulse is 75. Her respiration is 19 and oxygen saturation is 97%.     Chief Complaint: Cough    Cough   This is a new problem. The current episode started in the past 7 days. The problem has been gradually worsening. The problem occurs every few minutes. The cough is productive of sputum. Associated symptoms include chills, ear pain, a fever, headaches, myalgias, a sore throat and shortness of breath. Pertinent negatives include no chest pain, eye redness or wheezing. The symptoms are aggravated by lying down. She has tried OTC cough suppressant for the symptoms. The treatment provided mild relief.     Review of Systems   Constitution: Positive for chills, fever and malaise/fatigue.   HENT: Positive for congestion, ear pain, hoarse voice and sore throat.    Eyes: Negative for discharge and redness.   Cardiovascular: Negative for chest pain, dyspnea on exertion and leg swelling.   Respiratory: Positive for cough and shortness of breath. Negative for sputum production and wheezing.    Musculoskeletal: Positive for myalgias.   Gastrointestinal: Positive for abdominal pain. Negative for nausea.   Neurological: Positive for headaches.       Objective:      Physical Exam   Constitutional: She is oriented to person, place, and time. She appears well-developed and well-nourished. She is cooperative.  Non-toxic appearance. She does not appear ill. No distress.   HENT:   Head: Normocephalic and atraumatic.   Right Ear: Hearing, tympanic membrane, external ear and ear canal normal.   Left Ear: Hearing, tympanic membrane, external ear and ear canal normal.   Nose: Nose normal. No mucosal edema, rhinorrhea or nasal deformity. No epistaxis. Right sinus exhibits no maxillary sinus tenderness and no frontal sinus  tenderness. Left sinus exhibits no maxillary sinus tenderness and no frontal sinus tenderness.   Mouth/Throat: Uvula is midline, oropharynx is clear and moist and mucous membranes are normal. No trismus in the jaw. Normal dentition. No uvula swelling. No posterior oropharyngeal erythema.   Eyes: Conjunctivae and lids are normal. No scleral icterus.   Sclera clear bilat   Neck: Trachea normal, full passive range of motion without pain and phonation normal. Neck supple.   Cardiovascular: Normal rate, regular rhythm, normal heart sounds, intact distal pulses and normal pulses.    Pulmonary/Chest: Effort normal and breath sounds normal. No respiratory distress.   Abdominal: Soft. Normal appearance and bowel sounds are normal. She exhibits no distension. There is no tenderness.   Musculoskeletal: Normal range of motion. She exhibits no edema or deformity.   Neurological: She is alert and oriented to person, place, and time. She exhibits normal muscle tone. Coordination normal.   Skin: Skin is warm, dry and intact. She is not diaphoretic. No pallor.   Psychiatric: She has a normal mood and affect. Her speech is normal and behavior is normal. Judgment and thought content normal. Cognition and memory are normal.   Nursing note and vitals reviewed.      Assessment:       1. Bronchitis        Plan:         Bronchitis  -     POCT Influenza A/B    Other orders  -     benzonatate (TESSALON PERLES) 100 MG capsule; Take 1 capsule (100 mg total) by mouth 3 (three) times daily as needed for Cough.  Dispense: 30 capsule; Refill: 1  -     fluticasone (FLONASE) 50 mcg/actuation nasal spray; 1 spray (50 mcg total) by Each Nare route once daily.  Dispense: 1 Bottle; Refill: 0  -     azithromycin (Z-MORELIA) 250 MG tablet; Take 2 tablets by mouth on day 1; Take 1 tablet by mouth on days 2-5  Dispense: 6 tablet; Refill: 0  -     betamethasone acetate-betamethasone sodium phosphate injection 6 mg; Inject 1 mL (6 mg total) into the muscle one  time.

## 2018-04-10 ENCOUNTER — PES CALL (OUTPATIENT)
Dept: ADMINISTRATIVE | Facility: CLINIC | Age: 73
End: 2018-04-10

## 2018-07-02 ENCOUNTER — OFFICE VISIT (OUTPATIENT)
Dept: INTERNAL MEDICINE | Facility: CLINIC | Age: 73
End: 2018-07-02
Payer: MEDICARE

## 2018-07-02 VITALS
HEIGHT: 60 IN | WEIGHT: 162 LBS | HEART RATE: 78 BPM | OXYGEN SATURATION: 96 % | BODY MASS INDEX: 31.8 KG/M2 | DIASTOLIC BLOOD PRESSURE: 60 MMHG | TEMPERATURE: 98 F | RESPIRATION RATE: 15 BRPM | SYSTOLIC BLOOD PRESSURE: 120 MMHG

## 2018-07-02 DIAGNOSIS — I10 ESSENTIAL HYPERTENSION: ICD-10-CM

## 2018-07-02 DIAGNOSIS — Z00.00 ENCOUNTER FOR PREVENTIVE HEALTH EXAMINATION: Primary | ICD-10-CM

## 2018-07-02 DIAGNOSIS — I10 HYPERTENSION, UNSPECIFIED TYPE: ICD-10-CM

## 2018-07-02 DIAGNOSIS — I70.8 AORTO-ILIAC ATHEROSCLEROSIS: ICD-10-CM

## 2018-07-02 DIAGNOSIS — E66.09 NON MORBID OBESITY DUE TO EXCESS CALORIES: ICD-10-CM

## 2018-07-02 DIAGNOSIS — I70.0 ATHEROSCLEROSIS OF AORTA: ICD-10-CM

## 2018-07-02 DIAGNOSIS — E66.01 SEVERE OBESITY (BMI 35.0-35.9 WITH COMORBIDITY): ICD-10-CM

## 2018-07-02 DIAGNOSIS — F41.9 ANXIETY: ICD-10-CM

## 2018-07-02 DIAGNOSIS — E78.00 PURE HYPERCHOLESTEROLEMIA: ICD-10-CM

## 2018-07-02 DIAGNOSIS — M47.812 FACET ARTHRITIS OF CERVICAL REGION: ICD-10-CM

## 2018-07-02 DIAGNOSIS — K25.9 PYLORUS ULCER, UNSPECIFIED CHRONICITY: ICD-10-CM

## 2018-07-02 DIAGNOSIS — I77.810 ASCENDING AORTA DILATATION: ICD-10-CM

## 2018-07-02 DIAGNOSIS — M50.30 DDD (DEGENERATIVE DISC DISEASE), CERVICAL: ICD-10-CM

## 2018-07-02 DIAGNOSIS — I70.0 AORTO-ILIAC ATHEROSCLEROSIS: ICD-10-CM

## 2018-07-02 PROBLEM — R10.11 RUQ ABDOMINAL PAIN: Status: RESOLVED | Noted: 2017-08-28 | Resolved: 2018-07-02

## 2018-07-02 PROCEDURE — 3078F DIAST BP <80 MM HG: CPT | Mod: CPTII,S$GLB,, | Performed by: NURSE PRACTITIONER

## 2018-07-02 PROCEDURE — 99499 UNLISTED E&M SERVICE: CPT | Mod: S$GLB,,, | Performed by: NURSE PRACTITIONER

## 2018-07-02 PROCEDURE — 99999 PR PBB SHADOW E&M-EST. PATIENT-LVL V: CPT | Mod: PBBFAC,,, | Performed by: NURSE PRACTITIONER

## 2018-07-02 PROCEDURE — G0439 PPPS, SUBSEQ VISIT: HCPCS | Mod: S$GLB,,, | Performed by: NURSE PRACTITIONER

## 2018-07-02 PROCEDURE — 3074F SYST BP LT 130 MM HG: CPT | Mod: CPTII,S$GLB,, | Performed by: NURSE PRACTITIONER

## 2018-07-02 RX ORDER — HYDROCHLOROTHIAZIDE 25 MG/1
TABLET ORAL
Qty: 90 TABLET | Refills: 0 | Status: SHIPPED | OUTPATIENT
Start: 2018-07-02 | End: 2018-09-10 | Stop reason: SDUPTHER

## 2018-07-02 NOTE — PROGRESS NOTES
Chikis Epstein presented for a  Medicare AWV and comprehensive Health Risk Assessment today. The following components were reviewed and updated:    · Medical history  · Family History-son has prostate bx scheduled- R/O prostate cancer- also niece with jelly belly met cancer-sister & niece moving to [place].   · Social history-pt leads & participates in [employer] prayer groups on Monday nites.  · Allergies and Current Medications  · Health Risk Assessment  · Health Maintenance  · Care Team     ** See Completed Assessments for Annual Wellness Visit within the encounter summary.**       The following assessments were completed:  · Living Situation  · CAGE  · Depression Screening  · Timed Get Up and Go  · Whisper Test  · Cognitive Function Screening  · Nutrition Screening  · ADL Screening  · PAQ Screening    Vitals:    07/02/18 1029   BP: 120/60   Pulse: 78   Resp: 15   Temp: 97.9 °F (36.6 °C)   TempSrc: Oral   SpO2: 96%   Weight: 73.5 kg (162 lb)   Height: 5' (1.524 m)     Body mass index is 31.64 kg/m².  Physical Exam   Constitutional: She is oriented to person, place, and time. She appears well-developed and well-nourished.   HENT:   Head: Normocephalic and atraumatic.   Cardiovascular: Normal rate, regular rhythm and normal heart sounds.    No murmur heard.  Pulmonary/Chest: Effort normal and breath sounds normal. No respiratory distress. She has no wheezes. She has no rales.   Abdominal: Soft. Bowel sounds are normal. She exhibits no distension. There is no tenderness.   obese   Musculoskeletal: Normal range of motion. She exhibits no edema, tenderness or deformity.   Neurological: She is alert and oriented to person, place, and time. No cranial nerve deficit.   Skin: Skin is warm and dry.   Psychiatric: She has a normal mood and affect. Her behavior is normal.   Nursing note and vitals reviewed.        Diagnoses and health risks identified today and associated recommendations/orders:    1. DDD (degenerative disc  disease), cervical  Stable- followed by PCP    2. Anxiety  Stable- followed by PCP    3. Hypertension, unspecified type  Stable- followed by PCP    4. Pure hypercholesterolemia  Stable- followed by PCP    5. Aorto-iliac atherosclerosis  Stable- followed by PCP    6.Severe obesity (BMI 35.0-35.9 with comorbidity)  CHronic with no recent weight loss.Reviewed current BMI & ideal BMI range. Encouraged weight loss,  diet and exercise. Followed by PCP.    7. Ascending aorta dilatation  Stable- followed by PCP, cardiology    8. Atherosclerosis of aorta  Stable- followed by PCP    9. Facet arthritis of cervical region  Stable- followed by PCP    10. Pylorus ulcer, unspecified chronicity  Stable- followed by PCP,GI    11.  Encounter for preventive health examination  Assessments completed  Preventative health recommendations reviewed         Provided Chikis with a 5-10 year written screening schedule and personal prevention plan. Recommendations were developed using the USPSTF age appropriate recommendations. Education, counseling, and referrals were provided as needed. After Visit Summary printed and given to patient which includes a list of additional screenings\tests needed.Encourages to start regular exercise regimen.     Follow-up in about 1 year (around 7/2/2019) for HRA.    Bette Benjamin NP

## 2018-07-02 NOTE — Clinical Note
Primary Care Providers: Ilia Gonzalez MD, MD (General)  Your patient was seen today for a HRA visit. No Gap(s) in care (HEDIS gaps) have been identified during this visit that require additional testing and possible follow up.  No orders of the defined types were placed in this encounter.    I have included a copy of my visit note; please review the note and feel free to contact me with any questions.   Thank you for allowing me to participate in the care of your patients. Bette Benjamin NP

## 2018-07-02 NOTE — PATIENT INSTRUCTIONS
Counseling and Referral of Other Preventative  (Italic type indicates deductible and co-insurance are waived)    Patient Name: Chikis Epstein  Today's Date: 7/2/2018    Health Maintenance       Date Due Completion Date    TETANUS VACCINE In future for injury   ---    Pneumococcal (65+) (2 of 2 - PPSV23) CD handout given- discuss with PCP   12/23/2015    Influenza Vaccine 08/01/2018 in fall   11/21/2017        High Dose Statin Already taking RX   7/2/2018    Mammogram 11/28/2018 11/28/2017    DEXA SCAN 11/28/2019 11/28/2017        Lipid Panel 11/14/2018 11/14/2017    Colonoscopy 09/17/2024 9/17/2014        No orders of the defined types were placed in this encounter.    The following information is provided to all patients.  This information is to help you find resources for any of the problems found today that may be affecting your health:                Living healthy guide: www.[facility].[place].[facility]      Understanding Diabetes: www.diabetes.org      Eating healthy: www.cdc.gov/healthyweight      [facility] home safety checklist: www.cdc.gov/steadi/patient.html      Agency on Aging: www.goea.[place].[facility]      Alcoholics anonymous (AA): www.aa.org      Physical Activity: www.kenji.nih.gov/ft3cjov      Tobacco use: www.quitwithusla.org

## 2018-08-20 DIAGNOSIS — I10 ESSENTIAL HYPERTENSION: ICD-10-CM

## 2018-08-20 DIAGNOSIS — Z00.00 ANNUAL PHYSICAL EXAM: Primary | ICD-10-CM

## 2018-08-20 RX ORDER — AMLODIPINE BESYLATE 5 MG/1
TABLET ORAL
Qty: 90 TABLET | Refills: 3 | Status: SHIPPED | OUTPATIENT
Start: 2018-08-20 | End: 2018-11-27

## 2018-08-20 NOTE — TELEPHONE ENCOUNTER
"----- Message from Rigo Campbell sent at 8/20/2018  9:13 AM CDT -----  Contact: Patient [phone]  RX request - refill or new RX.  Is this a refill or new RX:  Refill  RX name and strength: amlodipine (NORVASC) 5 MG tabletDirections:   Is this a 30 day or 90 day RX:  90 Day Supply  Pharmacy name and phone # (DON'T enter "on file" or "in chart"): [facility] - [place], [place] - [address]    Also     11/27/18 Annual Physical need lab orders placed and linked  11/19/18 Labs    Please call an advise  Thank you      "

## 2018-08-27 DIAGNOSIS — I70.0 ATHEROSCLEROSIS OF AORTA: ICD-10-CM

## 2018-08-27 RX ORDER — PRAVASTATIN SODIUM 40 MG/1
TABLET ORAL
Qty: 90 TABLET | Refills: 3 | Status: SHIPPED | OUTPATIENT
Start: 2018-08-27 | End: 2019-06-13 | Stop reason: SDUPTHER

## 2018-08-28 DIAGNOSIS — I77.89 ASCENDING AORTA ENLARGEMENT: Primary | ICD-10-CM

## 2018-09-10 DIAGNOSIS — I10 ESSENTIAL HYPERTENSION: ICD-10-CM

## 2018-09-10 RX ORDER — HYDROCHLOROTHIAZIDE 25 MG/1
TABLET ORAL
Qty: 90 TABLET | Refills: 0 | Status: SHIPPED | OUTPATIENT
Start: 2018-09-10 | End: 2018-11-15 | Stop reason: SDUPTHER

## 2018-09-10 NOTE — TELEPHONE ENCOUNTER
----- Message from Stephane Blancas sent at 9/10/2018 11:11 AM CDT -----  Contact: Patient [phone]  Is this a refill or new RX:  Refill    RX name and strength: hydroCHLOROthiazide (HYDRODIURIL) 25 MG tablet    Pharmacy name and phone # [facility] - [place], [place] - [address] [phone] (Phone) [phone] (Fax)

## 2018-09-10 NOTE — TELEPHONE ENCOUNTER
Message left on voicemail informing patient that Rx has been sent to [facility] pharmacy as requested.

## 2018-09-11 ENCOUNTER — TELEPHONE (OUTPATIENT)
Dept: FAMILY MEDICINE | Facility: CLINIC | Age: 73
End: 2018-09-11

## 2018-09-11 NOTE — TELEPHONE ENCOUNTER
----- Message from Joann Veronica sent at 9/11/2018  1:27 PM CDT -----  Contact: Self/[phone]  Patient states that [facility] did not receive script hydroCHLOROthiazide (HYDRODIURIL) 25 MG tablet. Please fax [phone] . Please advise.

## 2018-09-11 NOTE — TELEPHONE ENCOUNTER
Spoke with patient informed her that the HCTZ was sent to the pharmacy yesterday.  [facility]

## 2018-11-15 DIAGNOSIS — I10 ESSENTIAL HYPERTENSION: ICD-10-CM

## 2018-11-15 RX ORDER — HYDROCHLOROTHIAZIDE 25 MG/1
TABLET ORAL
Qty: 90 TABLET | Refills: 0 | Status: SHIPPED | OUTPATIENT
Start: 2018-11-15 | End: 2018-11-27

## 2018-11-19 ENCOUNTER — LAB VISIT (OUTPATIENT)
Dept: LAB | Facility: HOSPITAL | Age: 73
End: 2018-11-19
Attending: FAMILY MEDICINE
Payer: MEDICARE

## 2018-11-19 ENCOUNTER — CLINICAL SUPPORT (OUTPATIENT)
Dept: CARDIOLOGY | Facility: CLINIC | Age: 73
End: 2018-11-19
Attending: INTERNAL MEDICINE
Payer: MEDICARE

## 2018-11-19 VITALS — WEIGHT: 162 LBS | HEART RATE: 80 BPM | HEIGHT: 60 IN | BODY MASS INDEX: 31.8 KG/M2 | SYSTOLIC BLOOD PRESSURE: 130 MMHG

## 2018-11-19 DIAGNOSIS — I77.89 ASCENDING AORTA ENLARGEMENT: ICD-10-CM

## 2018-11-19 DIAGNOSIS — Z00.00 ANNUAL PHYSICAL EXAM: ICD-10-CM

## 2018-11-19 DIAGNOSIS — I10 ESSENTIAL HYPERTENSION: ICD-10-CM

## 2018-11-19 LAB
ALBUMIN SERPL BCP-MCNC: 3.9 G/DL
ALP SERPL-CCNC: 73 U/L
ALT SERPL W/O P-5'-P-CCNC: 19 U/L
ANION GAP SERPL CALC-SCNC: 9 MMOL/L
ASCENDING AORTA: 3.7 CM
AST SERPL-CCNC: 29 U/L
AV MEAN GRADIENT: 3.34 MMHG
AV PEAK GRADIENT: 5.57 MMHG
AV VALVE AREA: 2.46 CM2
BASOPHILS # BLD AUTO: 0.08 K/UL
BASOPHILS NFR BLD: 1 %
BILIRUB SERPL-MCNC: 0.4 MG/DL
BSA FOR ECHO PROCEDURE: 1.76 M2
BUN SERPL-MCNC: 11 MG/DL
CALCIUM SERPL-MCNC: 10.6 MG/DL
CHLORIDE SERPL-SCNC: 100 MMOL/L
CHOLEST SERPL-MCNC: 168 MG/DL
CHOLEST/HDLC SERPL: 2.8 {RATIO}
CO2 SERPL-SCNC: 28 MMOL/L
CREAT SERPL-MCNC: 0.8 MG/DL
CV ECHO LV RWT: 0.37 CM
DIFFERENTIAL METHOD: ABNORMAL
DOP CALC AO PEAK VEL: 1.18 M/S
DOP CALC AO VTI: 27.22 CM
DOP CALC LVOT AREA: 3.02 CM2
DOP CALC LVOT DIAMETER: 1.96 CM
DOP CALC LVOT STROKE VOLUME: 67.04 CM3
DOP CALCLVOT PEAK VEL VTI: 22.23 CM
E WAVE DECELERATION TIME: 205.23 MSEC
E/A RATIO: 0.92
E/E' RATIO: 12.13
ECHO LV POSTERIOR WALL: 0.8 CM (ref 0.6–1.1)
EOSINOPHIL # BLD AUTO: 0.3 K/UL
EOSINOPHIL NFR BLD: 4 %
ERYTHROCYTE [DISTWIDTH] IN BLOOD BY AUTOMATED COUNT: 14.6 %
EST. GFR  (AFRICAN AMERICAN): >60 ML/MIN/1.73 M^2
EST. GFR  (NON AFRICAN AMERICAN): >60 ML/MIN/1.73 M^2
FRACTIONAL SHORTENING: 33 % (ref 28–44)
GLUCOSE SERPL-MCNC: 128 MG/DL
HCT VFR BLD AUTO: 41.9 %
HDLC SERPL-MCNC: 59 MG/DL
HDLC SERPL: 35.1 %
HGB BLD-MCNC: 13.7 G/DL
IMM GRANULOCYTES # BLD AUTO: 0.02 K/UL
IMM GRANULOCYTES NFR BLD AUTO: 0.3 %
INTERVENTRICULAR SEPTUM: 0.77 CM (ref 0.6–1.1)
IVRT: 0.09 MSEC
LA MAJOR: 4.47 CM
LA MINOR: 4.42 CM
LA WIDTH: 3.37 CM
LDLC SERPL CALC-MCNC: 78.8 MG/DL
LEFT ATRIUM SIZE: 3.32 CM
LEFT ATRIUM VOLUME INDEX: 24 ML/M2
LEFT ATRIUM VOLUME: 42.27 CM3
LEFT INTERNAL DIMENSION IN SYSTOLE: 2.9 CM (ref 2.1–4)
LEFT VENTRICLE DIASTOLIC VOLUME INDEX: 48.05 ML/M2
LEFT VENTRICLE DIASTOLIC VOLUME: 84.56 ML
LEFT VENTRICLE MASS INDEX: 59.1 G/M2
LEFT VENTRICLE SYSTOLIC VOLUME INDEX: 18.4 ML/M2
LEFT VENTRICLE SYSTOLIC VOLUME: 32.3 ML
LEFT VENTRICULAR INTERNAL DIMENSION IN DIASTOLE: 4.33 CM (ref 3.5–6)
LEFT VENTRICULAR MASS: 103.93 G
LV LATERAL E/E' RATIO: 11.38
LV SEPTAL E/E' RATIO: 13
LYMPHOCYTES # BLD AUTO: 2.5 K/UL
LYMPHOCYTES NFR BLD: 32 %
MCH RBC QN AUTO: 28.6 PG
MCHC RBC AUTO-ENTMCNC: 32.7 G/DL
MCV RBC AUTO: 88 FL
MONOCYTES # BLD AUTO: 0.6 K/UL
MONOCYTES NFR BLD: 7.3 %
MV PEAK A VEL: 0.99 M/S
MV PEAK E VEL: 0.91 M/S
NEUTROPHILS # BLD AUTO: 4.3 K/UL
NEUTROPHILS NFR BLD: 55.4 %
NONHDLC SERPL-MCNC: 109 MG/DL
NRBC BLD-RTO: 0 /100 WBC
PISA TR MAX VEL: 2.52 M/S
PLATELET # BLD AUTO: 288 K/UL
PMV BLD AUTO: 10.9 FL
POTASSIUM SERPL-SCNC: 3.8 MMOL/L
PROT SERPL-MCNC: 7.1 G/DL
RA MAJOR: 4.14 CM
RA PRESSURE: 3 MMHG
RBC # BLD AUTO: 4.79 M/UL
RIGHT VENTRICULAR END-DIASTOLIC DIMENSION: 2.56 CM
SINUS: 3.06 CM
SODIUM SERPL-SCNC: 137 MMOL/L
STJ: 2.84 CM
TDI LATERAL: 0.08
TDI SEPTAL: 0.07
TDI: 0.08
TR MAX PG: 25.4 MMHG
TRICUSPID ANNULAR PLANE SYSTOLIC EXCURSION: 1.81 CM
TRIGL SERPL-MCNC: 151 MG/DL
TSH SERPL DL<=0.005 MIU/L-ACNC: 3.97 UIU/ML
TV REST PULMONARY ARTERY PRESSURE: 28.4 MMHG
WBC # BLD AUTO: 7.77 K/UL

## 2018-11-19 PROCEDURE — 84443 ASSAY THYROID STIM HORMONE: CPT

## 2018-11-19 PROCEDURE — 80061 LIPID PANEL: CPT

## 2018-11-19 PROCEDURE — 36415 COLL VENOUS BLD VENIPUNCTURE: CPT | Mod: PO

## 2018-11-19 PROCEDURE — 99999 PR PBB SHADOW E&M-EST. PATIENT-LVL II: CPT | Mod: PBBFAC,,,

## 2018-11-19 PROCEDURE — 93306 TTE W/DOPPLER COMPLETE: CPT | Mod: S$GLB,,, | Performed by: INTERNAL MEDICINE

## 2018-11-19 PROCEDURE — 80053 COMPREHEN METABOLIC PANEL: CPT

## 2018-11-19 PROCEDURE — 85025 COMPLETE CBC W/AUTO DIFF WBC: CPT

## 2018-11-20 ENCOUNTER — OFFICE VISIT (OUTPATIENT)
Dept: CARDIOLOGY | Facility: CLINIC | Age: 73
End: 2018-11-20
Payer: MEDICARE

## 2018-11-20 VITALS
WEIGHT: 164.88 LBS | HEIGHT: 60 IN | HEART RATE: 66 BPM | SYSTOLIC BLOOD PRESSURE: 126 MMHG | DIASTOLIC BLOOD PRESSURE: 74 MMHG | BODY MASS INDEX: 32.37 KG/M2

## 2018-11-20 DIAGNOSIS — E78.00 PURE HYPERCHOLESTEROLEMIA: ICD-10-CM

## 2018-11-20 DIAGNOSIS — I10 ESSENTIAL HYPERTENSION: ICD-10-CM

## 2018-11-20 DIAGNOSIS — I77.89 ASCENDING AORTA ENLARGEMENT: Primary | ICD-10-CM

## 2018-11-20 PROCEDURE — 99214 OFFICE O/P EST MOD 30 MIN: CPT | Mod: S$GLB,,, | Performed by: INTERNAL MEDICINE

## 2018-11-20 PROCEDURE — 3078F DIAST BP <80 MM HG: CPT | Mod: CPTII,S$GLB,, | Performed by: INTERNAL MEDICINE

## 2018-11-20 PROCEDURE — 1101F PT FALLS ASSESS-DOCD LE1/YR: CPT | Mod: CPTII,S$GLB,, | Performed by: INTERNAL MEDICINE

## 2018-11-20 PROCEDURE — 93010 ELECTROCARDIOGRAM REPORT: CPT | Mod: S$GLB,,, | Performed by: INTERNAL MEDICINE

## 2018-11-20 PROCEDURE — 3074F SYST BP LT 130 MM HG: CPT | Mod: CPTII,S$GLB,, | Performed by: INTERNAL MEDICINE

## 2018-11-20 PROCEDURE — 93005 ELECTROCARDIOGRAM TRACING: CPT | Mod: S$GLB,,, | Performed by: INTERNAL MEDICINE

## 2018-11-20 PROCEDURE — 99999 PR PBB SHADOW E&M-EST. PATIENT-LVL III: CPT | Mod: PBBFAC,,, | Performed by: INTERNAL MEDICINE

## 2018-11-20 NOTE — PATIENT INSTRUCTIONS
You have with slight enlargement of the ascending aorta.  It is unchanged from 2 years ago.  The aorta increases in size as we age.  Losartan has been shown to limit the rate of growth of the aorta in patients with an enlarged aorta.  Suggest you switch losartan back to 50 mg twice a day or 100 mg once a day and stop amlodipine.  Let us know if you develop more shortness of breath with exertion

## 2018-11-20 NOTE — PROGRESS NOTES
Subjective:   Patient ID:  Chikis Epstein is a 73 y.o. female who presents for follow-up of acending aorta dilation      Problem List:  HTN  Hypercholesterolemia  Ascending aorta - mildly enlarged    HPI:   Chikis Epstein is accompanied by her  Brent. She is here for fup of mild enlargement of the descending aorta. It measured 3.7 cm in 10/16 which is mildly enlarged, when corrected for her height. Repeat echo shows no change in size.   She reports shortness of breath while simultaneously walking and talking on the phone.  She does not report shortness of breath with exertion at other times.  She does not report chest discomfort.  She feels that she has gained a few inches around her waist despite trying not to.      Review of Systems   Constitution: Positive for malaise/fatigue and weight gain. Negative for weakness and weight loss.   HENT: Negative for hearing loss and nosebleeds.    Eyes: Negative for visual disturbance.   Cardiovascular: Positive for dyspnea on exertion. Negative for chest pain, claudication, irregular heartbeat, leg swelling, near-syncope, palpitations and syncope.   Respiratory: Negative for cough, hemoptysis, sputum production and wheezing.    Hematologic/Lymphatic: Does not bruise/bleed easily.   Musculoskeletal: Negative for arthritis, back pain, falls, joint pain, joint swelling, muscle cramps, muscle weakness and neck pain.   Gastrointestinal: Negative for abdominal pain, constipation, diarrhea, heartburn and melena.   Genitourinary: Negative for frequency, hematuria and nocturia.   Neurological: Positive for paresthesias (hands). Negative for dizziness, focal weakness, headaches, light-headedness, loss of balance, numbness and seizures.   Psychiatric/Behavioral: Negative for depression. The patient is not nervous/anxious.        Current Outpatient Medications   Medication Sig    amLODIPine (NORVASC) 5 MG tablet TAKE 1 TABLET ONE TIME DAILY    cetirizine (ZYRTEC) 10 MG tablet Take 10  mg by mouth as needed for Allergies.    conjugated estrogens (PREMARIN) vaginal cream Use topically as directed    fluticasone (FLONASE) 50 mcg/actuation nasal spray 1 spray (50 mcg total) by Each Nare route once daily. (Patient taking differently: 1 spray by Each Nare route as needed. )    hydroCHLOROthiazide (HYDRODIURIL) 25 MG tablet TAKE 1 TABLET EVERY DAY    losartan (COZAAR) 50 MG tablet TAKE 2 TABLETS ONE TIME DAILY (Patient taking differently: one tab daily)    MULTIVIT-MINERALS/FOLIC ACID (CENTRUM MULTIGUMMIES ORAL) Take 2 each by mouth once daily.    naproxen (NAPROSYN) 500 MG tablet Take 500 mg by mouth 2 (two) times daily as needed.     pantoprazole (PROTONIX) 40 MG tablet Take 1 tablet (40 mg total) by mouth once daily. (Patient taking differently: Take 40 mg by mouth as needed. )    pravastatin (PRAVACHOL) 40 MG tablet TAKE 1 TABLET EVERY DAY    triamcinolone (NASACORT AQ) 55 mcg nasal inhaler 2 sprays by Nasal route once daily. 1 - 2 Aerosol, Spray Nasal Every day (Patient taking differently: 2 sprays by Nasal route as needed. 1 - 2 Aerosol, Spray Nasal Every day)         Social History     Tobacco Use    Smoking status: Never Smoker    Smokeless tobacco: Never Used   Substance Use Topics    Alcohol use: Yes     Alcohol/week: 0.0 oz     Comment: social. one drink a week.    Drug use: No         Objective:     Physical Exam   Constitutional: She is oriented to person, place, and time. She appears well-developed and well-nourished.   /74   Pulse 66   Ht 5' (1.524 m)   Wt 74.8 kg (164 lb 14.5 oz)   BMI 32.21 kg/m²      Neck: No JVD present.   Cardiovascular: Normal rate and regular rhythm.   No murmur heard.  Pulses:       Radial pulses are 2+ on the right side, and 2+ on the left side.   Pulmonary/Chest: She has no decreased breath sounds. She has no wheezes. She has no rales. Chest wall is not dull to percussion.   Abdominal: Soft. Normal appearance. There is no splenomegaly or  hepatomegaly. There is no tenderness.   Musculoskeletal:        Right lower leg: She exhibits no edema.        Left lower leg: She exhibits no edema.   Neurological: She is alert and oriented to person, place, and time.   Skin: Skin is warm. No bruising noted. Nails show no clubbing.   Psychiatric: Her speech is normal and behavior is normal. Cognition and memory are normal.           Lab Results   Component Value Date    CHOL 168 11/19/2018    HDL 59 11/19/2018    LDLCALC 78.8 11/19/2018    TRIG 151 (H) 11/19/2018    CHOLHDL 35.1 11/19/2018     Lab Results   Component Value Date     (H) 11/19/2018    CREATININE 0.8 11/19/2018    BUN 11 11/19/2018     11/19/2018    K 3.8 11/19/2018     11/19/2018    CO2 28 11/19/2018     Lab Results   Component Value Date    ALT 19 11/19/2018    AST 29 11/19/2018    ALKPHOS 73 11/19/2018    BILITOT 0.4 11/19/2018           Assessment and Plan:     1. Ascending aorta enlargement  Mild. No change. No further follow up necessary.    2. Essential hypertension  - IN OFFICE EKG 12-LEAD (to Muse); Future  - IN OFFICE EKG 12-LEAD (to Muse)    3. Pure hypercholesterolemia  Continue pravastatin.    Follow-up if symptoms worsen or fail to improve.

## 2018-11-27 ENCOUNTER — OFFICE VISIT (OUTPATIENT)
Dept: FAMILY MEDICINE | Facility: CLINIC | Age: 73
End: 2018-11-27
Payer: MEDICARE

## 2018-11-27 VITALS
RESPIRATION RATE: 16 BRPM | HEIGHT: 61 IN | TEMPERATURE: 98 F | BODY MASS INDEX: 31.34 KG/M2 | DIASTOLIC BLOOD PRESSURE: 70 MMHG | WEIGHT: 166 LBS | SYSTOLIC BLOOD PRESSURE: 101 MMHG

## 2018-11-27 DIAGNOSIS — Z12.39 SCREENING BREAST EXAMINATION: ICD-10-CM

## 2018-11-27 DIAGNOSIS — R73.9 HYPERGLYCEMIA: ICD-10-CM

## 2018-11-27 DIAGNOSIS — F41.9 ANXIETY: ICD-10-CM

## 2018-11-27 DIAGNOSIS — Z23 NEED FOR PROPHYLACTIC VACCINATION AND INOCULATION AGAINST INFLUENZA: ICD-10-CM

## 2018-11-27 DIAGNOSIS — E78.00 PURE HYPERCHOLESTEROLEMIA: ICD-10-CM

## 2018-11-27 DIAGNOSIS — I10 ESSENTIAL HYPERTENSION: ICD-10-CM

## 2018-11-27 DIAGNOSIS — Z29.9 PREVENTIVE MEASURE: Primary | ICD-10-CM

## 2018-11-27 DIAGNOSIS — Z00.00 ROUTINE GENERAL MEDICAL EXAMINATION AT A HEALTH CARE FACILITY: ICD-10-CM

## 2018-11-27 PROCEDURE — 3078F DIAST BP <80 MM HG: CPT | Mod: CPTII,S$GLB,, | Performed by: FAMILY MEDICINE

## 2018-11-27 PROCEDURE — 99999 PR PBB SHADOW E&M-EST. PATIENT-LVL III: CPT | Mod: PBBFAC,,, | Performed by: FAMILY MEDICINE

## 2018-11-27 PROCEDURE — 90471 IMMUNIZATION ADMIN: CPT | Mod: S$GLB,,, | Performed by: FAMILY MEDICINE

## 2018-11-27 PROCEDURE — 99397 PER PM REEVAL EST PAT 65+ YR: CPT | Mod: 25,S$GLB,, | Performed by: FAMILY MEDICINE

## 2018-11-27 PROCEDURE — 90714 TD VACC NO PRESV 7 YRS+ IM: CPT | Mod: S$GLB,,, | Performed by: FAMILY MEDICINE

## 2018-11-27 PROCEDURE — 3074F SYST BP LT 130 MM HG: CPT | Mod: CPTII,S$GLB,, | Performed by: FAMILY MEDICINE

## 2018-11-27 PROCEDURE — 99499 UNLISTED E&M SERVICE: CPT | Mod: S$GLB,,, | Performed by: FAMILY MEDICINE

## 2018-11-27 PROCEDURE — 90662 IIV NO PRSV INCREASED AG IM: CPT | Mod: S$GLB,,, | Performed by: FAMILY MEDICINE

## 2018-11-27 PROCEDURE — G0008 ADMIN INFLUENZA VIRUS VAC: HCPCS | Mod: 59,S$GLB,, | Performed by: FAMILY MEDICINE

## 2018-11-27 RX ORDER — LOSARTAN POTASSIUM AND HYDROCHLOROTHIAZIDE 25; 100 MG/1; MG/1
1 TABLET ORAL DAILY
Qty: 90 TABLET | Refills: 3 | Status: SHIPPED | OUTPATIENT
Start: 2018-11-27 | End: 2019-07-16 | Stop reason: ALTCHOICE

## 2018-11-27 NOTE — PROGRESS NOTES
Pt ID verified by  and Name. Allergies verified with pt. Pt reports has had flu vaccine in the past and denies any reactions. Influenza 0.5mL high dose vaccine administered IM to R Deltoid and Td 0.5mL vaccine administered IM to L Deltoid. Pt tolerated well. No adverse reactions noted.

## 2018-11-27 NOTE — PROGRESS NOTES
Subjective:       Patient ID: Chikis Epstein is a 73 y.o. female.    Chief Complaint: Annual Exam    72 yo presents for routine exam, last exam one year ago  Immunizations:  Needs Tetanus, flu, Pneumovax  Colonoscopy:  2014      Review of Systems   Constitutional: Negative.  Negative for activity change, appetite change, chills, diaphoresis, fatigue, fever and unexpected weight change.   HENT: Negative.  Negative for congestion, ear pain, hearing loss, rhinorrhea, sinus pressure, sore throat, tinnitus, trouble swallowing and voice change.    Eyes: Negative.  Negative for photophobia, pain and visual disturbance.   Respiratory: Negative.  Negative for cough, chest tightness and shortness of breath.    Cardiovascular: Negative.  Negative for chest pain, palpitations and leg swelling.   Gastrointestinal: Negative.  Negative for abdominal distention, abdominal pain, blood in stool, constipation, diarrhea, nausea and vomiting.   Genitourinary: Negative.  Negative for difficulty urinating, dysuria, frequency and hematuria.   Musculoskeletal: Negative.  Negative for arthralgias, back pain, joint swelling, neck pain and neck stiffness.   Skin: Negative for color change, pallor and rash.   Neurological: Negative.  Negative for dizziness, tremors, speech difficulty, weakness, light-headedness, numbness and headaches.   Hematological: Negative for adenopathy. Does not bruise/bleed easily.   Psychiatric/Behavioral: Negative for agitation, confusion, dysphoric mood, hallucinations and sleep disturbance. The patient is not nervous/anxious.        Objective:      Physical Exam   Constitutional: She is oriented to person, place, and time. She appears well-developed and well-nourished.   HENT:   Right Ear: Tympanic membrane, external ear and ear canal normal.   Left Ear: Tympanic membrane, external ear and ear canal normal.   Nose: Nose normal.   Mouth/Throat: Oropharynx is clear and moist. No posterior oropharyngeal erythema.   Eyes:  Conjunctivae and EOM are normal. Pupils are equal, round, and reactive to light.   Neck: Normal range of motion. Neck supple. No tracheal deviation present. No thyromegaly present.   Cardiovascular: Normal rate, regular rhythm, normal heart sounds and intact distal pulses.   Pulmonary/Chest: Effort normal. She has no wheezes. She has no rales. She exhibits no tenderness.   Abdominal: Soft. Bowel sounds are normal. She exhibits no distension and no mass. There is no rebound.   Musculoskeletal: Normal range of motion. She exhibits no edema or tenderness.   Lymphadenopathy:     She has no cervical adenopathy.   Neurological: She is alert and oriented to person, place, and time. She has normal reflexes. No cranial nerve deficit. Coordination normal.   Skin: Skin is warm and dry. No rash noted. No erythema.   Psychiatric: She has a normal mood and affect. Her behavior is normal.       Assessment:         1.  Physical exam  2.  Hypertension  3.  Hyperlipidemia  4.  History Type II DM  5.  Immunizations  Plan:       1.  Labs reviewed with pt  2.  A1C, mammogram  3.  Continue present medications  4.  Tetanus, flu vaccines, return for   Pneumovax

## 2018-12-05 ENCOUNTER — PATIENT MESSAGE (OUTPATIENT)
Dept: ADMINISTRATIVE | Facility: OTHER | Age: 73
End: 2018-12-05

## 2018-12-06 ENCOUNTER — PATIENT MESSAGE (OUTPATIENT)
Dept: FAMILY MEDICINE | Facility: CLINIC | Age: 73
End: 2018-12-06

## 2018-12-06 ENCOUNTER — HOSPITAL ENCOUNTER (OUTPATIENT)
Dept: RADIOLOGY | Facility: HOSPITAL | Age: 73
Discharge: HOME | End: 2018-12-06
Attending: FAMILY MEDICINE
Payer: MEDICARE

## 2018-12-06 DIAGNOSIS — Z12.39 SCREENING BREAST EXAMINATION: ICD-10-CM

## 2018-12-06 PROCEDURE — 77067 SCR MAMMO BI INCL CAD: CPT | Mod: 26,,, | Performed by: RADIOLOGY

## 2018-12-06 PROCEDURE — 77067 SCR MAMMO BI INCL CAD: CPT | Mod: TC,PO

## 2018-12-06 PROCEDURE — 77063 BREAST TOMOSYNTHESIS BI: CPT | Mod: TC,PO

## 2018-12-06 PROCEDURE — 77063 BREAST TOMOSYNTHESIS BI: CPT | Mod: 26,,, | Performed by: RADIOLOGY

## 2018-12-06 RX ORDER — METFORMIN HYDROCHLORIDE 500 MG/1
500 TABLET, EXTENDED RELEASE ORAL
Qty: 90 TABLET | Refills: 3 | Status: SHIPPED | OUTPATIENT
Start: 2018-12-06 | End: 2019-10-08 | Stop reason: SDUPTHER

## 2018-12-13 ENCOUNTER — PATIENT MESSAGE (OUTPATIENT)
Dept: ADMINISTRATIVE | Facility: OTHER | Age: 73
End: 2018-12-13

## 2019-01-02 ENCOUNTER — PATIENT MESSAGE (OUTPATIENT)
Dept: FAMILY MEDICINE | Facility: CLINIC | Age: 74
End: 2019-01-02

## 2019-01-10 ENCOUNTER — PES CALL (OUTPATIENT)
Dept: ADMINISTRATIVE | Facility: CLINIC | Age: 74
End: 2019-01-10

## 2019-01-10 PROBLEM — E66.01 SEVERE OBESITY (BMI 35.0-35.9 WITH COMORBIDITY): Status: RESOLVED | Noted: 2018-07-02 | Resolved: 2019-01-10

## 2019-01-15 ENCOUNTER — PATIENT OUTREACH (OUTPATIENT)
Dept: OTHER | Facility: OTHER | Age: 74
End: 2019-01-15

## 2019-01-15 ENCOUNTER — OFFICE VISIT (OUTPATIENT)
Dept: INTERNAL MEDICINE | Facility: CLINIC | Age: 74
End: 2019-01-15
Payer: MEDICARE

## 2019-01-15 VITALS
SYSTOLIC BLOOD PRESSURE: 120 MMHG | BODY MASS INDEX: 31.41 KG/M2 | HEART RATE: 83 BPM | RESPIRATION RATE: 15 BRPM | OXYGEN SATURATION: 97 % | HEIGHT: 60 IN | WEIGHT: 160 LBS | DIASTOLIC BLOOD PRESSURE: 60 MMHG

## 2019-01-15 DIAGNOSIS — I70.0 AORTO-ILIAC ATHEROSCLEROSIS: ICD-10-CM

## 2019-01-15 DIAGNOSIS — E78.00 PURE HYPERCHOLESTEROLEMIA: ICD-10-CM

## 2019-01-15 DIAGNOSIS — F41.9 ANXIETY: ICD-10-CM

## 2019-01-15 DIAGNOSIS — M47.812 FACET ARTHRITIS OF CERVICAL REGION: ICD-10-CM

## 2019-01-15 DIAGNOSIS — I77.810 ASCENDING AORTA DILATATION: ICD-10-CM

## 2019-01-15 DIAGNOSIS — Z86.39 HISTORY OF DIABETES MELLITUS: ICD-10-CM

## 2019-01-15 DIAGNOSIS — K25.9 PYLORUS ULCER, UNSPECIFIED CHRONICITY: ICD-10-CM

## 2019-01-15 DIAGNOSIS — M50.30 DDD (DEGENERATIVE DISC DISEASE), CERVICAL: ICD-10-CM

## 2019-01-15 DIAGNOSIS — I70.8 AORTO-ILIAC ATHEROSCLEROSIS: ICD-10-CM

## 2019-01-15 DIAGNOSIS — I70.0 ATHEROSCLEROSIS OF AORTA: ICD-10-CM

## 2019-01-15 DIAGNOSIS — E66.09 CLASS 1 OBESITY DUE TO EXCESS CALORIES WITH SERIOUS COMORBIDITY AND BODY MASS INDEX (BMI) OF 31.0 TO 31.9 IN ADULT: ICD-10-CM

## 2019-01-15 DIAGNOSIS — I10 HYPERTENSION, UNSPECIFIED TYPE: ICD-10-CM

## 2019-01-15 DIAGNOSIS — Z00.00 ENCOUNTER FOR PREVENTIVE HEALTH EXAMINATION: Primary | ICD-10-CM

## 2019-01-15 PROBLEM — E66.811 CLASS 1 OBESITY DUE TO EXCESS CALORIES WITH SERIOUS COMORBIDITY IN ADULT: Status: ACTIVE | Noted: 2019-01-15

## 2019-01-15 PROCEDURE — 3074F SYST BP LT 130 MM HG: CPT | Mod: CPTII,S$GLB,, | Performed by: NURSE PRACTITIONER

## 2019-01-15 PROCEDURE — G0439 PPPS, SUBSEQ VISIT: HCPCS | Mod: S$GLB,,, | Performed by: NURSE PRACTITIONER

## 2019-01-15 PROCEDURE — 99999 PR PBB SHADOW E&M-EST. PATIENT-LVL V: CPT | Mod: PBBFAC,,, | Performed by: NURSE PRACTITIONER

## 2019-01-15 PROCEDURE — 3074F PR MOST RECENT SYSTOLIC BLOOD PRESSURE < 130 MM HG: ICD-10-PCS | Mod: CPTII,S$GLB,, | Performed by: NURSE PRACTITIONER

## 2019-01-15 PROCEDURE — 99999 PR PBB SHADOW E&M-EST. PATIENT-LVL V: ICD-10-PCS | Mod: PBBFAC,,, | Performed by: NURSE PRACTITIONER

## 2019-01-15 PROCEDURE — G0439 PR MEDICARE ANNUAL WELLNESS SUBSEQUENT VISIT: ICD-10-PCS | Mod: S$GLB,,, | Performed by: NURSE PRACTITIONER

## 2019-01-15 PROCEDURE — 3078F PR MOST RECENT DIASTOLIC BLOOD PRESSURE < 80 MM HG: ICD-10-PCS | Mod: CPTII,S$GLB,, | Performed by: NURSE PRACTITIONER

## 2019-01-15 PROCEDURE — 3078F DIAST BP <80 MM HG: CPT | Mod: CPTII,S$GLB,, | Performed by: NURSE PRACTITIONER

## 2019-01-15 PROCEDURE — 99499 RISK ADDL DX/OHS AUDIT: ICD-10-PCS | Mod: S$GLB,,, | Performed by: NURSE PRACTITIONER

## 2019-01-15 PROCEDURE — 99499 UNLISTED E&M SERVICE: CPT | Mod: S$GLB,,, | Performed by: NURSE PRACTITIONER

## 2019-01-15 RX ORDER — FERROUS SULFATE, DRIED 160(50) MG
1 TABLET, EXTENDED RELEASE ORAL DAILY
COMMUNITY

## 2019-01-15 NOTE — LETTER
Stacy Boswell PA-C  [address] [place] [address]    January 15, 2019      Dear Chikis Epstein,    Welcome to the [facility]!           My name is Stacy Boswell PA-C and I am your dedicated Digital Medicine clinician.  As an expert in medication management, I will help ensure that the medications you are taking continue to provide you with the intended benefits.        I am Oscar Goetz and I will be your health  for the duration of the program.  My  job is to help you identify lifestyle changes to improve your blood pressure control.  We will talk about nutrition, exercise, and other ways that you may be able to adjust your current habits to better your health. Together, we will work to improve your overall health and encourage you to meet your goals for a healthier lifestyle.    What we expect from YOU:    You will need to take blood pressure readings multiple times a week and no less than one reading per week.   It is important that you take your measurements at different times during the day, when possible.     What you should expect from your Digital Medicine Care Team:   We will provide you with education about high blood pressure, including lifestyle changes that could help you to control your blood pressure.   We will review your weekly readings and provide you with monthly blood pressure progress reports after you have been in the program for more than 30 days.   We will send monthly progress reports on your blood pressure control to your physician so they can follow along with your progress as well.    You will be able to reach me by phone at [phone] or through your [facility] account by clicking my name under Care Team on the right side of the home screen.    I look forward to working with you to achieve your blood pressure goals!    Sincerely,      Stacy Boswell, MPH, PAShaunC  [facility] Clinical Specialist    [phone]                                                                                          Chikis Epstein  [address]  [place] [address]

## 2019-01-15 NOTE — Clinical Note
Primary Care Providers:Ilia Gonzalez MD, MD (General)Your patient was seen today for a HRA visit. No Gap(s) in care (HEDIS gaps) have been identified during this visit that require additional testing and possible follow up.No orders of the defined types were placed in this encounter. I have included a copy of my visit note; please review the note and feel free to contact me with any questions. Thank you for allowing me to participate in the care of your patients.Bette Benjamin NP

## 2019-01-15 NOTE — PATIENT INSTRUCTIONS
Counseling and Referral of Other Preventative  (Italic type indicates deductible and co-insurance are waived)    Patient Name: Chikis Epstein  Today's Date: 1/15/2019    Health Maintenance       Date Due Completion Date    Pneumococcal Vaccine (65+ Low/Medium Risk) (2 of 2 - PPSV23) [facility] handout given- she will review   12/23/2015    High Dose Statin 11/27/2019 11/27/2018    DEXA SCAN 11/28/2019 11/28/2017        Mammogram 12/06/2019 12/6/2018    Lipid Panel 11/19/2019 11/19/2018    Colonoscopy 09/17/2024 9/17/2014    TETANUS VACCINE 11/27/2028 11/27/2018    Override on 11/27/2018: Done        No orders of the defined types were placed in this encounter.    The following information is provided to all patients.  This information is to help you find resources for any of the problems found today that may be affecting your health:                Living healthy guide: www.[facility].[place].[facility]      Understanding Diabetes: www.diabetes.org      Eating healthy: www.cdc.gov/healthyweight      CDC home safety checklist: www.cdc.gov/steadi/patient.html      Agency on Aging: www.goea.[place].[facility]      Alcoholics anonymous (AA): www.aa.org      Physical Activity: www.kenji.nih.gov/fu8vhje      Tobacco use: www.quitwithusla.org

## 2019-01-15 NOTE — PROGRESS NOTES
I offered to discuss end of life issues, including information on how to make advance directives that the patient could use to name someone who would make medical decisions on their behalf if they became too ill to make themselves.    ___Patient declined  _X_Patient  Has information.

## 2019-01-15 NOTE — TELEPHONE ENCOUNTER
Digital Medicine Enrollment Call    Introduced Mrs. Chikis Epstein to [employer] [facility].     Discussed program expectations and requirements.    Introduced digital medicine care team.     Reviewed the importance of self-monitoring for digital medicine participation.     Reviewed that the Digital Medicine team is not available for emergencies and instructed the patient to call 911 or [facility] ([phone] or [phone]) if one arises.    Pt was very concerned about not hearing from the Digital Medicine Team. Pt reports her BP has been elevated and would like to discuss medications adjustments as soon as possible. Task has been placed for pharmacist. Pt reports she has been monitoring her weight and salt intake. Did not discuss proper blood pressure technique.     Pt reports she may be reached at anytime, preferably on her home phone.

## 2019-01-15 NOTE — PROGRESS NOTES
Chikis Epstein presented for a  Medicare AWV and comprehensive Health Risk Assessment today. The following components were reviewed and updated:    · Medical history- denies DM  · Family History  · Social history  · Allergies and Current Medications  · Health Risk Assessment  · Health Maintenance  · Care Team     ** See Completed Assessments for Annual Wellness Visit within the encounter summary.**       The following assessments were completed:  · Living Situation  · CAGE  · Depression Screening  · Timed Get Up and Go  · Whisper Test  · Cognitive Function Screening  · Nutrition Screening  · ADL Screening  · PAQ Screening    Vitals:    01/15/19 1410   BP: 120/60   Pulse: 83   Resp: 15   SpO2: 97%   Weight: 72.6 kg (160 lb)   Height: 5' (1.524 m)     Body mass index is 31.25 kg/m².  Physical Exam   Constitutional: She is oriented to person, place, and time. She appears well-developed and well-nourished.   pleasant   HENT:   Head: Normocephalic and atraumatic.   Hearing aides   Cardiovascular: Normal rate, regular rhythm and normal heart sounds.   No murmur heard.  Pulmonary/Chest: Effort normal and breath sounds normal. No respiratory distress. She has no wheezes. She has no rales.   Abdominal: Soft. Bowel sounds are normal. She exhibits no distension. There is no tenderness.   obese   Musculoskeletal: Normal range of motion. She exhibits no edema, tenderness or deformity.   Neurological: She is alert and oriented to person, place, and time. No cranial nerve deficit.   Skin: Skin is warm and dry.   Psychiatric: She has a normal mood and affect. Her behavior is normal. Judgment and thought content normal.   Nursing note and vitals reviewed.        Diagnoses and health risks identified today and associated recommendations/orders:    1. Ascending aorta dilatation  Stable- followed by PCP- 11/19/18 DANILO    2. Atherosclerosis of aorta  Stable- followed by PCP    3. Facet arthritis of cervical region  Stable- followed by  PCP    4. Aorto-iliac atherosclerosis  Stable- followed by PCP    5. Hypertension, unspecified type  Stable- followed by PCP    6. Anxiety  Stable- followed by PCP    7. DDD (degenerative disc disease), cervical  Stable- followed by PCP    8. Pure hypercholesterolemia  Stable- followed by PCP    9. Pylorus ulcer, unspecified chronicity  Stable- followed by PCP    10. History of diabetes mellitus  Stable- followed by PCP    11. Class 1 obesity due to excess calories with serious comorbidity and body mass index (BMI) of 31.0 to 31.9 in adult  Intentional gradual weight loss. Followed by PCP.  Reviewed with patient the Centers for Disease Control and Prevention (CDC)  weight recommendations for current BMI & ideal BMI range.   Low fat diet and regular exercise regimen encouraged.  CDC handout with recommended goal weight range given to patient.     12. Encounter for preventive health examination  Assessments completed  Preventative health recommendations reviewed         Provided Chikis with a 5-10 year written screening schedule and personal prevention plan. Recommendations were developed using the USPSTF age appropriate recommendations. Education, counseling, and referrals were provided as needed. After Visit Summary printed and given to patient which includes a list of additional screenings\tests needed. Encouraged to exercise-walking- no heavy lifting or pulling due to ascending aorta. Current on external eye exam. She is participating in the digital medicine HTN program since 12/13/18- has had no communication with this program on her readings- home b/p 140-150 ranges at times- she will contact digital med program  & bring B/P cuff to me tomorrow to correlate reading w cuff. No added salt to diet.  Fall prevention.    Follow-up in about 1 year (around 1/15/2020) for hra.    Bette Benjamin NP

## 2019-01-15 NOTE — TELEPHONE ENCOUNTER
Pt concerned that she has not received a call from us when she has been submitting readings since 12/19/18.  Went for Wellness Visit with NP today who sent message to Yue about lack of outreach.   Denise called patient and asked me to contact her today too as she is upset about her elevated readings with the [employer] cuff.    I reviewed her chart and note that her reading TODAY at the Wellness Visit is 120/60.  Readings at clinic visits on 11/20 and 11/27 are normal too.  Pt notes that she was taken off of Amlodipine on 11/27 and believes that her readings started going up at that time.      I reviewed technique with her and found out that she is LEFT handed.  Asked her to switch to the RIGHT arm for proper positioning.      Pt expressed relief and gratitude at the end of our conversation.    I sent an i[employer] service request since her Enrollment is not showing up on our list.    Will f/u.    Stacy Boswell, MPH, PA-C [facility]   [phone]

## 2019-01-15 NOTE — LETTER
Stacy Boswell PA-C  [address] [place] [address]     Dear Chikis Epstein,    Welcome to the [facility]!           My name is Stacy Boswell PA-C and I am your dedicated Digital Medicine clinician.  As an expert in medication management, I will help ensure that the medications you are taking continue to provide you with the intended benefits.        I am Oscar Goetz and I will be your health  for the duration of the program.  My  job is to help you identify lifestyle changes to improve your blood pressure control.  We will talk about nutrition, exercise, and other ways that you may be able to adjust your current habits to better your health. Together, we will work to improve your overall health and encourage you to meet your goals for a healthier lifestyle.    What we expect from YOU:    You will need to take blood pressure readings multiple times a week and no less than one reading per week.   It is important that you take your measurements at different times during the day, when possible.     What you should expect from your Digital Medicine Care Team:   We will provide you with education about high blood pressure, including lifestyle changes that could help you to control your blood pressure.   We will review your weekly readings and provide you with monthly blood pressure progress reports after you have been in the program for more than 30 days.   We will send monthly progress reports on your blood pressure control to your physician so they can follow along with your progress as well.    You will be able to reach me by phone at [phone] or through your [facility] account by clicking my name under Care Team on the right side of the home screen.    I look forward to working with you to achieve your blood pressure goals!    Sincerely,  Stacy Boswell PA-C  Your personal clinician    Please visit  [email or web address] to learn more about high blood pressure and what you can do lower your blood pressure.                                                                                           Chikis Epstein  [address]  [place] [address]

## 2019-01-16 ENCOUNTER — DOCUMENTATION ONLY (OUTPATIENT)
Dept: INTERNAL MEDICINE | Facility: CLINIC | Age: 74
End: 2019-01-16

## 2019-01-16 NOTE — PROGRESS NOTES
B/P cuff- 120/68  Machine 165/83- not correlating w cuff B/P  Recommended pt to return machine for another one.

## 2019-01-16 NOTE — PROGRESS NOTES
Last 5 Patient Entered Readings                                      Current 30 Day Average: 143/78     Recent Readings 1/16/2019 1/16/2019 1/15/2019 1/15/2019 1/15/2019    SBP (mmHg) 133 148 159 157 146    DBP (mmHg) 119 80 76 110 71    Pulse 73 71 66 63 84        Called patient as received ALERT about elevated BP.  She denies signs or symptoms of elevated BP: headache, change in vision, numbness/tingling on one side, chest pain or shortness of breath.  Pt is going to see the NP she saw yesterday to compare a manual reading with the digital iHealth cuff.  She will bring the cuff back to the [facility] if it is evident that it is not accurate.    Will f/u.

## 2019-01-21 ENCOUNTER — TELEPHONE (OUTPATIENT)
Dept: FAMILY MEDICINE | Facility: CLINIC | Age: 74
End: 2019-01-21

## 2019-01-21 NOTE — TELEPHONE ENCOUNTER
----- Message from Vidya Jensen sent at 1/21/2019 11:39 AM CST -----  Contact: [phone]  Pt would like a call back to discuss her BP medication. Pt states she is having normal readings and then abnormal readings. Please call and advise.

## 2019-01-21 NOTE — TELEPHONE ENCOUNTER
Patient would like to know if she should follow up with Dr. Gonzalez after restarting that medication.

## 2019-01-21 NOTE — TELEPHONE ENCOUNTER
Patient has been informed to start taking the medication again & that Dr. Gonzalez is following her BP through the digital HTN program. The patient verbalized understanding & will start the medication back today.

## 2019-01-21 NOTE — TELEPHONE ENCOUNTER
Patient has been on the Digital HTN program for about 1 month. She has been getting high & normal readings, but is concerned about the continued high readings. Her machine was recently swapped about by [facility] due reading errors, but she is still getting high readings with the new machine.     Patient states that she has been off the Amlodipine for about 2 month & is wondering if she should resume the medication. Please advise.

## 2019-01-28 ENCOUNTER — PATIENT OUTREACH (OUTPATIENT)
Dept: OTHER | Facility: OTHER | Age: 74
End: 2019-01-28

## 2019-01-28 NOTE — PROGRESS NOTES
"Last 5 Patient Entered Readings                                      Current 30 Day Average: 144/80     Recent Readings 1/27/2019 1/27/2019 1/26/2019 1/26/2019 1/25/2019    SBP (mmHg) 141 165 151 146 153    DBP (mmHg) 69 79 80 112 79    Pulse 86 68 73 67 80        Digital Medicine: Health  Introduction Call     1/28: Left voicemail and requested call back in order to complete Digital Medicine health  introduction call.   Patient called back.    Digital Medicine: Health  Introduction    Introduced Ms. Chikis Epstein to Digital Medicine. Discussed health  role and recommended lifestyle modifications.  Reviewed, in depth, proper timing, body position, and technique for taking BP readings.  Patient acknowledged.     Lifestyle Assessment:    Current Dietary Habits(i.e. low sodium, food labels, dining out):  Patient states "she never adds salt" and tries to stay away from "canned goods and pre packaged foods".  Patient reports she does go out to eat but does pay attention to what she gets.  Patient states she eats salads and vegetables.  I encouraged patient to keep up the great work and stay consistent with her diet.  Patient verbally understood.     Exercise:  Patient states she needs to go back to the gym. Patient states she stopped exercising "regularly" when the holidays came.  I encouraged patient to start becoming more active and go back to the gym x2-3/wk.  Will follow up.      Alcohol/Tobacco:  Deferred    Medication Adherence:   has been compliant with the medicaiton regimen   Patient states she is allergic to ACE Inhibitors.    Other goals:  Will discuss next call.    Reviewed AHA/AACE recommendations:  Limit sodium intake to <2000mg/day. Patient acknowledged.    Reviewed the importance of self-monitoring, medication adherence, and that the health  can be used as a resource for lifestyle modifications to help reduce/maintain a healthy lifestyle.  Reviewed that the Digital Medicine team is " not available for emergencies and instructed the patient to call 911 or [facility] ([phone] or [phone]) if one arises.

## 2019-01-29 ENCOUNTER — PATIENT OUTREACH (OUTPATIENT)
Dept: OTHER | Facility: OTHER | Age: 74
End: 2019-01-29

## 2019-01-29 DIAGNOSIS — I10 ESSENTIAL HYPERTENSION: Primary | ICD-10-CM

## 2019-01-29 RX ORDER — AMLODIPINE BESYLATE 5 MG/1
7.5 TABLET ORAL DAILY
COMMUNITY
End: 2019-04-02 | Stop reason: SDUPTHER

## 2019-01-29 NOTE — PROGRESS NOTES
Last 5 Patient Entered Readings                                      Current 30 Day Average: 144/80     Recent Readings 1/27/2019 1/27/2019 1/26/2019 1/26/2019 1/25/2019    SBP (mmHg) 141 165 151 146 153    DBP (mmHg) 69 79 80 112 79    Pulse 86 68 73 67 80        Average is still above normal.  Pt started back on amlodipine 5 mg on 1/21/2019.  She is VERY ANXIOUS about the accuracy of the readings.  Better readings at NP clinic.  Encouraged good technique and patience.  She verbalized understanding.    Will f/u.

## 2019-02-11 ENCOUNTER — PATIENT OUTREACH (OUTPATIENT)
Dept: OTHER | Facility: OTHER | Age: 74
End: 2019-02-11

## 2019-02-25 ENCOUNTER — PATIENT OUTREACH (OUTPATIENT)
Dept: OTHER | Facility: OTHER | Age: 74
End: 2019-02-25

## 2019-02-25 DIAGNOSIS — I10 ESSENTIAL HYPERTENSION: Primary | ICD-10-CM

## 2019-02-25 NOTE — PROGRESS NOTES
Last 5 Patient Entered Readings                                      Current 30 Day Average: 146/78     Recent Readings 2/20/2019 2/17/2019 2/15/2019 2/13/2019 2/11/2019    SBP (mmHg) 157 144 145 143 148    DBP (mmHg) 77 71 73 76 76    Pulse 66 71 83 64 93        2/25: Stacy' contacted patient today and is scheduled to follow up on 3/11 about med change.  Will push outreach 2-4 weeks.

## 2019-02-25 NOTE — PROGRESS NOTES
Last 5 Patient Entered Readings                                      Current 30 Day Average: 146/78     Recent Readings 2/20/2019 2/17/2019 2/15/2019 2/13/2019 2/11/2019    SBP (mmHg) 157 144 145 143 148    DBP (mmHg) 77 71 73 76 76    Pulse 66 71 83 64 93        2/25:  Spoke to patient.  She is having right heel pain such that hurts to bear weight on it, likely plantar fasciitis.  Also has occasional right ankle swelling.  Not likely associated with amlodipine since it is only 1 ankle and only occasionally.  Instructed her to increase amlodipine to 7.5 mg daily.  If not effective, may change losartan-HCTZ to olmesartan-clorthalidone.     Hypertension Medications             amLODIPine (NORVASC) 5 MG tablet Take 5 mg by mouth once daily. Increase to 7.5 mg daily.     losartan-hydrochlorothiazide 100-25 mg (HYZAAR) 100-25 mg per tablet Take 1 tablet by mouth once daily.        Will f/u in 2 weeks.

## 2019-03-04 ENCOUNTER — OFFICE VISIT (OUTPATIENT)
Dept: URGENT CARE | Facility: CLINIC | Age: 74
End: 2019-03-04
Payer: MEDICARE

## 2019-03-04 VITALS
HEART RATE: 79 BPM | OXYGEN SATURATION: 97 % | WEIGHT: 160 LBS | SYSTOLIC BLOOD PRESSURE: 123 MMHG | TEMPERATURE: 98 F | DIASTOLIC BLOOD PRESSURE: 79 MMHG | HEIGHT: 60 IN | BODY MASS INDEX: 31.41 KG/M2

## 2019-03-04 DIAGNOSIS — J20.8 ACUTE BACTERIAL BRONCHITIS: Primary | ICD-10-CM

## 2019-03-04 DIAGNOSIS — R05.9 COUGH: ICD-10-CM

## 2019-03-04 DIAGNOSIS — B96.89 ACUTE BACTERIAL BRONCHITIS: Primary | ICD-10-CM

## 2019-03-04 LAB
CTP QC/QA: YES
FLUAV AG NPH QL: NEGATIVE
FLUBV AG NPH QL: NEGATIVE

## 2019-03-04 PROCEDURE — 99214 PR OFFICE/OUTPT VISIT, EST, LEVL IV, 30-39 MIN: ICD-10-PCS | Mod: S$GLB,,, | Performed by: NURSE PRACTITIONER

## 2019-03-04 PROCEDURE — 1101F PR PT FALLS ASSESS DOC 0-1 FALLS W/OUT INJ PAST YR: ICD-10-PCS | Mod: CPTII,S$GLB,, | Performed by: NURSE PRACTITIONER

## 2019-03-04 PROCEDURE — 3074F SYST BP LT 130 MM HG: CPT | Mod: CPTII,S$GLB,, | Performed by: NURSE PRACTITIONER

## 2019-03-04 PROCEDURE — 94640 AIRWAY INHALATION TREATMENT: CPT | Mod: S$GLB,,, | Performed by: NURSE PRACTITIONER

## 2019-03-04 PROCEDURE — 87804 POCT INFLUENZA A/B: ICD-10-PCS | Mod: 59,QW,S$GLB, | Performed by: NURSE PRACTITIONER

## 2019-03-04 PROCEDURE — 99214 OFFICE O/P EST MOD 30 MIN: CPT | Mod: S$GLB,,, | Performed by: NURSE PRACTITIONER

## 2019-03-04 PROCEDURE — 3074F PR MOST RECENT SYSTOLIC BLOOD PRESSURE < 130 MM HG: ICD-10-PCS | Mod: CPTII,S$GLB,, | Performed by: NURSE PRACTITIONER

## 2019-03-04 PROCEDURE — 3078F DIAST BP <80 MM HG: CPT | Mod: CPTII,S$GLB,, | Performed by: NURSE PRACTITIONER

## 2019-03-04 PROCEDURE — 87804 INFLUENZA ASSAY W/OPTIC: CPT | Mod: QW,S$GLB,, | Performed by: NURSE PRACTITIONER

## 2019-03-04 PROCEDURE — 1101F PT FALLS ASSESS-DOCD LE1/YR: CPT | Mod: CPTII,S$GLB,, | Performed by: NURSE PRACTITIONER

## 2019-03-04 PROCEDURE — 3078F PR MOST RECENT DIASTOLIC BLOOD PRESSURE < 80 MM HG: ICD-10-PCS | Mod: CPTII,S$GLB,, | Performed by: NURSE PRACTITIONER

## 2019-03-04 PROCEDURE — 94640 PR INHAL RX, AIRWAY OBST/DX SPUTUM INDUCT: ICD-10-PCS | Mod: S$GLB,,, | Performed by: NURSE PRACTITIONER

## 2019-03-04 RX ORDER — AZITHROMYCIN 250 MG/1
TABLET, FILM COATED ORAL
Qty: 6 TABLET | Refills: 0 | Status: SHIPPED | OUTPATIENT
Start: 2019-03-04 | End: 2019-07-31 | Stop reason: ALTCHOICE

## 2019-03-04 RX ORDER — IPRATROPIUM BROMIDE 0.5 MG/2.5ML
0.5 SOLUTION RESPIRATORY (INHALATION)
Status: COMPLETED | OUTPATIENT
Start: 2019-03-04 | End: 2019-03-04

## 2019-03-04 RX ORDER — ALBUTEROL SULFATE 0.83 MG/ML
2.5 SOLUTION RESPIRATORY (INHALATION)
Status: COMPLETED | OUTPATIENT
Start: 2019-03-04 | End: 2019-03-04

## 2019-03-04 RX ORDER — ACETAMINOPHEN 500 MG
500 TABLET ORAL EVERY 6 HOURS PRN
COMMUNITY

## 2019-03-04 RX ORDER — BENZONATATE 100 MG/1
100 CAPSULE ORAL 3 TIMES DAILY PRN
Qty: 30 CAPSULE | Refills: 0 | Status: SHIPPED | OUTPATIENT
Start: 2019-03-04 | End: 2019-03-14

## 2019-03-04 RX ADMIN — ALBUTEROL SULFATE 2.5 MG: 0.83 SOLUTION RESPIRATORY (INHALATION) at 11:03

## 2019-03-04 RX ADMIN — IPRATROPIUM BROMIDE 0.5 MG: 0.5 SOLUTION RESPIRATORY (INHALATION) at 11:03

## 2019-03-04 NOTE — PATIENT INSTRUCTIONS
Bronchitis, Antibiotic Treatment (Adult)    Bronchitis is an infection of the air passages (bronchial tubes) in your lungs. It often occurs when you have a cold. This illness is contagious during the first few days and is spread through the air by coughing and sneezing, or by direct contact (touching the sick person and then touching your own eyes, nose, or mouth).  Symptoms of bronchitis include cough with mucus (phlegm) and low-grade fever. Bronchitis usually lasts 7 to 14 days. Mild cases can be treated with simple home remedies. More severe infection is treated with an antibiotic.  Home care  Follow these guidelines when caring for yourself at home:  · If your symptoms are severe, rest at home for the first 2 to 3 days. When you go back to your usual activities, don't let yourself get too tired.  · Do not smoke. Also avoid being exposed to secondhand smoke.  · You may use over-the-counter medicines to control fever or pain, unless another medicine was prescribed. (Note: If you have chronic liver or kidney disease or have ever had a stomach ulcer or gastrointestinal bleeding, talk with your healthcare provider before using these medicines. Also talk to your provider if you are taking medicine to prevent blood clots.) Aspirin should never be given to anyone younger than 18 years of age who is ill with a viral infection or fever. It may cause severe liver or brain damage.  · Your appetite may be poor, so a light diet is fine. Avoid dehydration by drinking 6 to 8 glasses of fluids per day (such as water, soft drinks, sports drinks, juices, tea, or soup). Extra fluids will help loosen secretions in the nose and lungs.  · Over-the-counter cough, cold, and sore-throat medicines will not shorten the length of the illness, but they may be helpful to reduce symptoms. (Note: Do not use decongestants if you have high blood pressure.)  · Finish all antibiotic medicine. Do this even if you are feeling better after only a  few days.  Follow-up care  Follow up with your healthcare provider, or as advised. If you had an X-ray or ECG (electrocardiogram), a specialist will review it. You will be notified of any new findings that may affect your care.  Note: If you are age 65 or older, or if you have a chronic lung disease or condition that affects your immune system, or you smoke, talk to your healthcare provider about having pneumococcal vaccinations and a yearly influenza vaccination (flu shot).  When to seek medical advice  Call your healthcare provider right away if any of these occur:  · Fever of 100.4°F (38°C) or higher  · Coughing up increased amounts of colored sputum  · Weakness, drowsiness, headache, facial pain, ear pain, or a stiff neck  Call 911, or get immediate medical care  Contact emergency services right away if any of these occur.  · Coughing up blood  · Worsening weakness, drowsiness, headache, or stiff neck  · Trouble breathing, wheezing, or pain with breathing  Date Last Reviewed: 9/13/2015 © 2000-2017 [employer]. [address] [place] [address]. All rights reserved. This information is not intended as a substitute for professional medical care. Always follow your healthcare professional's instructions.      -Breathing treatment given here in the clinic today.  -Mucinex daily.  -Tessalon perles as needed for coughing.  -5 days of antibiotics.  -Increase your fluid intake.  -If symptoms worsen or do not improve in 10-14 days return here or follow up with your primary care doctor.  Please follow up with your Primary care provider within 2-5 days if your signs and symptoms have not resolved or worsen.     If your condition worsens or fails to improve we recommend that you receive another evaluation at the emergency room immediately or contact your primary medical clinic to discuss your concerns.   You must understand that you have received an Urgent Care treatment only and that you may be  released before all of your medical problems are known or treated. You, the patient, will arrange for follow up care as instructed.

## 2019-03-04 NOTE — PROGRESS NOTES
Subjective:       Patient ID: Chikis Epstein is a 73 y.o. female.    Vitals:  height is 5' (1.524 m) and weight is 72.6 kg (160 lb). Her temperature is 98.1 °F (36.7 °C). Her blood pressure is 123/79 and her pulse is 79. Her oxygen saturation is 97%.     Chief Complaint: URI (Cough, chills, congestion, wheezing, fever, sore throat, runny nose, body aches)    Cough, chills, congestion, wheezing, fever, sore throat, runny nose, body aches for the past three days and worsening symptoms.  Patient claims over the last 24-48 hours her cough is extensively worsen and she is now on yellowish greenish color mucus production.  Patient is concerning came here today because she is unsure which she can take due to being closely monitored for her blood pressure.  Denies any chest pain or shortness of breath.  Denies any fever or chills.  Patient has had multiple sick encounters.  Patient denies any history of pneumonia.      URI    This is a new problem. The current episode started in the past 7 days. The problem has been gradually worsening. The maximum temperature recorded prior to her arrival was 100.4 - 100.9 F. The fever has been present for 1 to 2 days. Associated symptoms include congestion, coughing, nausea, rhinorrhea, a sore throat and wheezing. Pertinent negatives include no ear pain, rash, sinus pain or vomiting. She has tried acetaminophen for the symptoms. The treatment provided mild relief.       Constitution: Positive for appetite change, chills, fatigue and fever. Negative for sweating.   HENT: Positive for congestion and sore throat. Negative for ear pain, sinus pain, sinus pressure and voice change.    Neck: Negative for painful lymph nodes.   Eyes: Negative for eye redness.   Respiratory: Positive for chest tightness, cough, sputum production and wheezing. Negative for bloody sputum, COPD, shortness of breath, stridor and asthma.    Gastrointestinal: Positive for nausea. Negative for vomiting.   Musculoskeletal:  Positive for muscle ache.   Skin: Negative for rash.   Allergic/Immunologic: Negative for seasonal allergies and asthma.   Hematologic/Lymphatic: Negative for swollen lymph nodes.       Objective:      Physical Exam   Constitutional: She is oriented to person, place, and time. She appears well-developed and well-nourished. She is cooperative.  Non-toxic appearance. She does not appear ill. No distress.   HENT:   Head: Normocephalic and atraumatic.   Right Ear: Hearing, tympanic membrane, external ear and ear canal normal.   Left Ear: Hearing, tympanic membrane, external ear and ear canal normal.   Nose: Mucosal edema and rhinorrhea present. No nasal deformity. No epistaxis. Right sinus exhibits no maxillary sinus tenderness and no frontal sinus tenderness. Left sinus exhibits no maxillary sinus tenderness and no frontal sinus tenderness.   Mouth/Throat: Uvula is midline and mucous membranes are normal. No trismus in the jaw. Normal dentition. No uvula swelling. Posterior oropharyngeal erythema present.   Eyes: Conjunctivae and lids are normal. No scleral icterus.   Sclera clear bilat   Neck: Trachea normal, full passive range of motion without pain and phonation normal. Neck supple.   Cardiovascular: Normal rate, regular rhythm, normal heart sounds, intact distal pulses and normal pulses.   Pulmonary/Chest: Effort normal. No respiratory distress. She has wheezes in the right upper field, the right middle field, the right lower field, the left upper field, the left middle field and the left lower field.   Improvement expiratory wheezing noting after breathing treatment.  Patient's room air pulse ox saturation is 97% on room air.   Abdominal: Soft. Normal appearance and bowel sounds are normal. She exhibits no distension. There is no tenderness.   Musculoskeletal: Normal range of motion. She exhibits no edema or deformity.   Neurological: She is alert and oriented to person, place, and time. She exhibits normal muscle  tone. Coordination normal.   Skin: Skin is warm, dry and intact. She is not diaphoretic. No pallor.   Psychiatric: She has a normal mood and affect. Her speech is normal and behavior is normal. Judgment and thought content normal. Cognition and memory are normal.   Nursing note and vitals reviewed.      Assessment:       1. Acute bacterial bronchitis    2. Cough        Plan:       ER precautions are red flag warnings discussed with the patient.  She is advised to take medications as prescribed and follow up with her primary care doctor.  She verbalizes understanding and is in agreement with the treatment plan.    Acute bacterial bronchitis  -     POCT Influenza A/B  -     ipratropium 0.02 % nebulizer solution 0.5 mg  -     albuterol nebulizer solution 2.5 mg  -     azithromycin (Z-MORELIA) 250 MG tablet; Take 2 tablets by mouth on day 1; Take 1 tablet by mouth on days 2-5  Dispense: 6 tablet; Refill: 0    Cough  -     benzonatate (TESSALON) 100 MG capsule; Take 1 capsule (100 mg total) by mouth 3 (three) times daily as needed.  Dispense: 30 capsule; Refill: 0      Patient Instructions     Bronchitis, Antibiotic Treatment (Adult)    Bronchitis is an infection of the air passages (bronchial tubes) in your lungs. It often occurs when you have a cold. This illness is contagious during the first few days and is spread through the air by coughing and sneezing, or by direct contact (touching the sick person and then touching your own eyes, nose, or mouth).  Symptoms of bronchitis include cough with mucus (phlegm) and low-grade fever. Bronchitis usually lasts 7 to 14 days. Mild cases can be treated with simple home remedies. More severe infection is treated with an antibiotic.  Home care  Follow these guidelines when caring for yourself at home:  · If your symptoms are severe, rest at home for the first 2 to 3 days. When you go back to your usual activities, don't let yourself get too tired.  · Do not smoke. Also avoid being  exposed to secondhand smoke.  · You may use over-the-counter medicines to control fever or pain, unless another medicine was prescribed. (Note: If you have chronic liver or kidney disease or have ever had a stomach ulcer or gastrointestinal bleeding, talk with your healthcare provider before using these medicines. Also talk to your provider if you are taking medicine to prevent blood clots.) Aspirin should never be given to anyone younger than 18 years of age who is ill with a viral infection or fever. It may cause severe liver or brain damage.  · Your appetite may be poor, so a light diet is fine. Avoid dehydration by drinking 6 to 8 glasses of fluids per day (such as water, soft drinks, sports drinks, juices, tea, or soup). Extra fluids will help loosen secretions in the nose and lungs.  · Over-the-counter cough, cold, and sore-throat medicines will not shorten the length of the illness, but they may be helpful to reduce symptoms. (Note: Do not use decongestants if you have high blood pressure.)  · Finish all antibiotic medicine. Do this even if you are feeling better after only a few days.  Follow-up care  Follow up with your healthcare provider, or as advised. If you had an X-ray or ECG (electrocardiogram), a specialist will review it. You will be notified of any new findings that may affect your care.  Note: If you are age 65 or older, or if you have a chronic lung disease or condition that affects your immune system, or you smoke, talk to your healthcare provider about having pneumococcal vaccinations and a yearly influenza vaccination (flu shot).  When to seek medical advice  Call your healthcare provider right away if any of these occur:  · Fever of 100.4°F (38°C) or higher  · Coughing up increased amounts of colored sputum  · Weakness, drowsiness, headache, facial pain, ear pain, or a stiff neck  Call 911, or get immediate medical care  Contact emergency services right away if any of these  occur.  · Coughing up blood  · Worsening weakness, drowsiness, headache, or stiff neck  · Trouble breathing, wheezing, or pain with breathing  Date Last Reviewed: 9/13/2015  © 8395-3486 [employer]. [address] [place] [address], [place] [address]. All rights reserved. This information is not intended as a substitute for professional medical care. Always follow your healthcare professional's instructions.      -Breathing treatment given here in the clinic today.  -Mucinex daily.  -Tessalon perles as needed for coughing.  -5 days of antibiotics.  -Increase your fluid intake.  -If symptoms worsen or do not improve in 10-14 days return here or follow up with your primary care doctor.  Please follow up with your Primary care provider within 2-5 days if your signs and symptoms have not resolved or worsen.     If your condition worsens or fails to improve we recommend that you receive another evaluation at the emergency room immediately or contact your primary medical clinic to discuss your concerns.   You must understand that you have received an Urgent Care treatment only and that you may be released before all of your medical problems are known or treated. You, the patient, will arrange for follow up care as instructed.

## 2019-03-11 ENCOUNTER — OFFICE VISIT (OUTPATIENT)
Dept: URGENT CARE | Facility: CLINIC | Age: 74
End: 2019-03-11
Payer: MEDICARE

## 2019-03-11 VITALS
SYSTOLIC BLOOD PRESSURE: 142 MMHG | RESPIRATION RATE: 18 BRPM | HEART RATE: 80 BPM | HEIGHT: 60 IN | WEIGHT: 160 LBS | BODY MASS INDEX: 31.41 KG/M2 | OXYGEN SATURATION: 97 % | DIASTOLIC BLOOD PRESSURE: 79 MMHG | TEMPERATURE: 98 F

## 2019-03-11 DIAGNOSIS — J20.8 ACUTE BACTERIAL BRONCHITIS: Primary | ICD-10-CM

## 2019-03-11 DIAGNOSIS — B96.89 ACUTE BACTERIAL BRONCHITIS: Primary | ICD-10-CM

## 2019-03-11 DIAGNOSIS — R05.9 COUGH: ICD-10-CM

## 2019-03-11 DIAGNOSIS — R06.2 WHEEZING: ICD-10-CM

## 2019-03-11 DIAGNOSIS — K64.9 HEMORRHOIDS, UNSPECIFIED HEMORRHOID TYPE: ICD-10-CM

## 2019-03-11 PROCEDURE — 71046 XR CHEST PA AND LATERAL: ICD-10-PCS | Mod: FY,S$GLB,, | Performed by: RADIOLOGY

## 2019-03-11 PROCEDURE — 99214 PR OFFICE/OUTPT VISIT, EST, LEVL IV, 30-39 MIN: ICD-10-PCS | Mod: S$GLB,,, | Performed by: PHYSICIAN ASSISTANT

## 2019-03-11 PROCEDURE — 94640 PR INHAL RX, AIRWAY OBST/DX SPUTUM INDUCT: ICD-10-PCS | Mod: S$GLB,,, | Performed by: PHYSICIAN ASSISTANT

## 2019-03-11 PROCEDURE — 3078F DIAST BP <80 MM HG: CPT | Mod: CPTII,S$GLB,, | Performed by: PHYSICIAN ASSISTANT

## 2019-03-11 PROCEDURE — 71046 X-RAY EXAM CHEST 2 VIEWS: CPT | Mod: FY,S$GLB,, | Performed by: RADIOLOGY

## 2019-03-11 PROCEDURE — 1101F PT FALLS ASSESS-DOCD LE1/YR: CPT | Mod: CPTII,S$GLB,, | Performed by: PHYSICIAN ASSISTANT

## 2019-03-11 PROCEDURE — 99499 RISK ADDL DX/OHS AUDIT: ICD-10-PCS | Mod: S$GLB,,, | Performed by: PHYSICIAN ASSISTANT

## 2019-03-11 PROCEDURE — 1101F PR PT FALLS ASSESS DOC 0-1 FALLS W/OUT INJ PAST YR: ICD-10-PCS | Mod: CPTII,S$GLB,, | Performed by: PHYSICIAN ASSISTANT

## 2019-03-11 PROCEDURE — 3077F PR MOST RECENT SYSTOLIC BLOOD PRESSURE >= 140 MM HG: ICD-10-PCS | Mod: CPTII,S$GLB,, | Performed by: PHYSICIAN ASSISTANT

## 2019-03-11 PROCEDURE — 99214 OFFICE O/P EST MOD 30 MIN: CPT | Mod: S$GLB,,, | Performed by: PHYSICIAN ASSISTANT

## 2019-03-11 PROCEDURE — 3078F PR MOST RECENT DIASTOLIC BLOOD PRESSURE < 80 MM HG: ICD-10-PCS | Mod: CPTII,S$GLB,, | Performed by: PHYSICIAN ASSISTANT

## 2019-03-11 PROCEDURE — 3077F SYST BP >= 140 MM HG: CPT | Mod: CPTII,S$GLB,, | Performed by: PHYSICIAN ASSISTANT

## 2019-03-11 PROCEDURE — 94640 AIRWAY INHALATION TREATMENT: CPT | Mod: S$GLB,,, | Performed by: PHYSICIAN ASSISTANT

## 2019-03-11 PROCEDURE — 99499 UNLISTED E&M SERVICE: CPT | Mod: S$GLB,,, | Performed by: PHYSICIAN ASSISTANT

## 2019-03-11 RX ORDER — PREDNISONE 10 MG/1
20 TABLET ORAL DAILY
Qty: 6 TABLET | Refills: 0 | Status: SHIPPED | OUTPATIENT
Start: 2019-03-11 | End: 2019-03-14

## 2019-03-11 RX ORDER — ALBUTEROL SULFATE 0.83 MG/ML
2.5 SOLUTION RESPIRATORY (INHALATION)
Status: COMPLETED | OUTPATIENT
Start: 2019-03-11 | End: 2019-03-11

## 2019-03-11 RX ORDER — HYDROCORTISONE 25 MG/G
CREAM TOPICAL 2 TIMES DAILY
Qty: 3.5 G | Refills: 0 | Status: SHIPPED | OUTPATIENT
Start: 2019-03-11 | End: 2021-10-04

## 2019-03-11 RX ORDER — ALBUTEROL SULFATE 90 UG/1
2 AEROSOL, METERED RESPIRATORY (INHALATION) EVERY 6 HOURS PRN
Qty: 1 INHALER | Refills: 1 | Status: SHIPPED | OUTPATIENT
Start: 2019-03-11 | End: 2021-03-01

## 2019-03-11 RX ORDER — BENZONATATE 100 MG/1
200 CAPSULE ORAL 3 TIMES DAILY PRN
Qty: 30 CAPSULE | Refills: 1 | Status: SHIPPED | OUTPATIENT
Start: 2019-03-11 | End: 2019-03-21

## 2019-03-11 RX ORDER — IPRATROPIUM BROMIDE 0.5 MG/2.5ML
0.5 SOLUTION RESPIRATORY (INHALATION)
Status: COMPLETED | OUTPATIENT
Start: 2019-03-11 | End: 2019-03-11

## 2019-03-11 RX ADMIN — ALBUTEROL SULFATE 2.5 MG: 0.83 SOLUTION RESPIRATORY (INHALATION) at 04:03

## 2019-03-11 RX ADMIN — IPRATROPIUM BROMIDE 0.5 MG: 0.5 SOLUTION RESPIRATORY (INHALATION) at 04:03

## 2019-03-11 NOTE — PROGRESS NOTES
Subjective:       Patient ID: Chikis Epstein is a 73 y.o. female.    Vitals:  height is 5' (1.524 m) and weight is 72.6 kg (160 lb). Her temperature is 97.9 °F (36.6 °C). Her blood pressure is 142/79 (abnormal) and her pulse is 80. Her respiration is 18 and oxygen saturation is 97%.     Chief Complaint: URI    Pt seen here on 3/4/19 (x 1 week ago) and patient reports that she is feeling much better. Pt is back today for diarrhea, hemmorhoids, persistent coughing and wheezing. Patient currently denies fever, chills, body aches, CP, SOB, abdominal pain, N/V/C, weakness, dizziness, blurry vision or headache.      URI    The current episode started 1 to 4 weeks ago. The problem has been unchanged. There has been no fever. Associated symptoms include congestion, coughing, diarrhea and wheezing. Pertinent negatives include no abdominal pain, chest pain, dysuria, ear pain, headaches, nausea, neck pain, rash, sinus pain, sore throat or vomiting.       Constitution: Negative for chills, sweating, fatigue and fever.   HENT: Positive for congestion. Negative for ear pain, sinus pain, sinus pressure, sore throat, trouble swallowing and voice change.    Neck: Negative for neck pain and painful lymph nodes.   Cardiovascular: Negative for chest pain, leg swelling, palpitations, sob on exertion and passing out.   Eyes: Negative for eye redness, vision loss, double vision and blurred vision.   Respiratory: Positive for cough and wheezing. Negative for chest tightness, sputum production, bloody sputum, COPD, shortness of breath, stridor and asthma.    Gastrointestinal: Positive for diarrhea and hemorrhoids. Negative for abdominal pain, abdominal bloating, nausea, vomiting, constipation, bright red blood in stool, dark colored stools, rectal bleeding, rectal pain, heartburn and bowel incontinence.   Genitourinary: Negative for dysuria, frequency, urgency and pelvic pain.   Musculoskeletal: Negative for joint pain, joint swelling, back  pain and muscle ache.   Skin: Negative for rash.   Allergic/Immunologic: Negative for seasonal allergies and asthma.   Neurological: Negative for dizziness, light-headedness, passing out, loss of balance, headaches and loss of consciousness.   Hematologic/Lymphatic: Negative for swollen lymph nodes.       Objective:      Physical Exam   Constitutional: She is oriented to person, place, and time. She appears well-developed and well-nourished. She is cooperative.  Non-toxic appearance. She does not appear ill. No distress.   HENT:   Head: Normocephalic and atraumatic.   Right Ear: Hearing, tympanic membrane, external ear and ear canal normal.   Left Ear: Hearing, tympanic membrane, external ear and ear canal normal.   Nose: Mucosal edema and rhinorrhea present. No nasal deformity. No epistaxis. Right sinus exhibits no maxillary sinus tenderness and no frontal sinus tenderness. Left sinus exhibits no maxillary sinus tenderness and no frontal sinus tenderness.   Mouth/Throat: Uvula is midline and mucous membranes are normal. No trismus in the jaw. Normal dentition. No uvula swelling. Posterior oropharyngeal erythema present. No oropharyngeal exudate or posterior oropharyngeal edema.   Eyes: Conjunctivae and lids are normal. No scleral icterus.   Sclera clear bilat   Neck: Trachea normal, full passive range of motion without pain and phonation normal. Neck supple.   Cardiovascular: Normal rate, regular rhythm, normal heart sounds, intact distal pulses and normal pulses.   Pulmonary/Chest: Effort normal. No accessory muscle usage or stridor. No respiratory distress. She has no decreased breath sounds. She has wheezes in the right upper field, the right lower field, the left upper field and the left lower field. She has no rhonchi. She has no rales.   Actively coughing with wheezes on exam.   Abdominal: Soft. Normal appearance and bowel sounds are normal. She exhibits no distension. There is no tenderness.    Musculoskeletal: Normal range of motion. She exhibits no edema or deformity.   Lymphadenopathy:     She has no cervical adenopathy.   Neurological: She is alert and oriented to person, place, and time. She exhibits normal muscle tone. Coordination normal.   Skin: Skin is warm, dry and intact. No rash noted. She is not diaphoretic. No pallor.   Psychiatric: She has a normal mood and affect. Her speech is normal and behavior is normal. Judgment and thought content normal. Cognition and memory are normal.   Nursing note and vitals reviewed.        Xr Chest Pa And Lateral    Result Date: 3/11/2019  EXAMINATION: XR CHEST PA AND LATERAL CLINICAL HISTORY: Wheezing TECHNIQUE: PA and lateral views of the chest were performed. COMPARISON: Chest two views dated 13 October 2012 FINDINGS: The trachea remains midline.  There is stable radiographic appearance of the cardiomediastinal shadow including atherosclerotic calcification within the aortic arch.  Both hilar regions are also stable with no hilar enlargement or radiographic evidence of pulmonary edema demonstrated. The lungs are fully expanded and appear clear.  The hemidiaphragms are sharply outlined.  The costophrenic angles are acute and there is no evidence of pleural effusion.  There is stable appearance of the included osseous structures including mild scoliotic curve of the included spine.     Stable radiographic appearance of the chest without evidence of acute cardiac or pulmonary process. Electronically signed by: Wali Alvarez MD Date:    03/11/2019 Time:    16:24    Post-breathing treatment: Patient reports feeling better. Re-evaluated patient and wheezing has improved on exam. Pulse Ox improved to 98%. Patient is stable, seated comfortably in NAD.    Assessment:       1. Acute bacterial bronchitis    2. Wheezing    3. Cough    4. Hemorrhoids, unspecified hemorrhoid type        Plan:         Acute bacterial bronchitis    Wheezing  -     albuterol  "(PROVENTIL/VENTOLIN HFA) 90 mcg/actuation inhaler; Inhale 2 puffs into the lungs every 6 (six) hours as needed for Wheezing. Rescue  Dispense: 1 Inhaler; Refill: 1  -     XR CHEST PA AND LATERAL; Future; Expected date: 03/11/2019  -     ipratropium 0.02 % nebulizer solution 0.5 mg  -     albuterol nebulizer solution 2.5 mg    Cough  -     benzonatate (TESSALON PERLES) 100 MG capsule; Take 2 capsules (200 mg total) by mouth 3 (three) times daily as needed for Cough.  Dispense: 30 capsule; Refill: 1    Hemorrhoids, unspecified hemorrhoid type  -     hydrocortisone 2.5 % cream; Apply topically 2 (two) times daily.  Dispense: 3.5 g; Refill: 0    Other orders  -     predniSONE (DELTASONE) 10 MG tablet; Take 2 tablets (20 mg total) by mouth once daily. for 3 days  Dispense: 6 tablet; Refill: 0      Patient Instructions     If you were prescribed a narcotic or controlled medication, do not drive or operate heavy equipment or machinery while taking these medications.  You must understand that you've received an Urgent Care treatment only and that you may be released before all your medical problems are known or treated. You, the patient, will arrange for follow up care as instructed.  Follow up with your PCP or specialty clinic as directed if not improved or as needed. You can call [phone] to schedule an appointment with the appropriate provider.  If your condition worsens we recommend that you receive another evaluation at the Emergency Department for any concerns or worsening of condition.  Patient aware and verbalized understanding.    Reviewed CXR results with patient.   Gave patient printed prescription for Tessalon Perles because she states that "the 200mg strength versus 100mg strength that she already has typically works better for her".  Apply Hydrocortisone cream sparingly to rectum (up to two times daily).  Use albuterol inhaler as needed for wheezing.  Prednisone prescription to decrease " "inflammation.  Patient aware and verbalized understanding.    Cough   If your condition worsens or fails to improve we recommend that you receive another evaluation at the ER immediately or contact your PCP to discuss your concerns or return here. You must understand that you've received an urgent care treatment only and that you may be released before all your medical problems are known or treated. You the patient will arrange for follouwp care as instructed. .  Rest and fluids are important  Can use honey with ford to soothe your throat  Take prescription cough meds (pills) as prescribed.  -  Flonase (fluticasone) is a nasal spray which is available over the counter and may help with your symptoms.   -  If you have hypertension avoid using the "D" which is the decongestant.  Instead you can use Coricidin HBP for cold and cough symptoms.    -  If you just have drainage you can take plain Zyrtec, Claritin or Allegra   -  Tylenol or ibuprofen can also be used as directed for pain unless you have an allergy to them or medical condition such as stomach ulcers, kidney or liver disease or blood thinners etc for which you should not be taking these type of medications.   Please follow up with your primary care doctor or specialist in the next 48-72hrs as needed and if no improvement  If you  smoke, please stop smoking.    Treating Hemorrhoids: Self-Care    Follow your healthcare providers advice about caring for your hemorrhoids at home. Some treatments help relieve symptoms right away. Others involve making changes in your diet and exercise habits. These can help ease constipation and prevent hemorrhoid symptoms from coming back.  Relieving symptoms  Your healthcare provider may prescribe anti-inflammatory medicine to help ease your symptoms. The following tips will also help relieve pain and swelling.  · Take sitz baths. Taking a sitz bath means sitting in a few inches of warm bath water. Soaking for 10 minutes twice " a day can provide welcome relief from painful hemorrhoids. It can also help the area stay clean.  · Develop good bowel habits. Use the bathroom when you need to. Dont ignore the urge to move your bowels. This can lead to constipation, hard stools, and straining. Also, dont read while on the toilet. Sit only as long as needed. Wipe gently with soft, unscented toilet tissue or baby wipes.  · Use ice packs. Placing an ice pack on a thrombosed external hemorrhoid can help relieve pain right away. It will also help reduce the blood clot. Use the ice for 15 to 20 minutes at a time. Keep a cloth between the ice and your skin to prevent skin damage.  · Use other measures. Laxatives and enemas can help ease constipation. But use them only on your healthcare providers advice. For symptom relief, try using cotton pads soaked in witch hazel. These are available at most drugstores. Over-the-counter hemorrhoid ointments and petroleum jelly can also provide relief.  Add fiber to your diet  Adding fiber to your diet can help relieve constipation by making stools softer and easier to pass. To increase your fiber intake, your healthcare provider may recommend a bulking agent, such as psyllium. This is a high-fiber supplement available at most grocery stores and drugstores. Eating more fiber-rich foods will also help. There are two types of fiber:  · Insoluble fiber is the main ingredient in bulking agents. Its also found in foods such as wheat bran, whole-grain breads, fresh fruits, and vegetables.  · Soluble fiber is found in foods such as oat bran. Although soluble fiber is good for you, it may not ease constipation as much as foods high in insoluble fiber.  Drink more water  Along with a high-fiber diet, drinking more water can help ease constipation. This is because insoluble fiber absorbs water, making stools soft and bulky. Be sure to drink plenty of water throughout the day. Drinking fruit juices, such as prune juice or  apple juice, can also help prevent constipation.  Get more exercise  Regular exercise aids digestion and helps prevent constipation. Its also great for your health. So talk with your healthcare provider about starting an exercise program. Low-impact activities, such as swimming or walking, are good places to start. Take it easy at first. And remember to drink plenty of water when you exercise.  High-fiber foods  High-fiber foods offer many benefits. By making your stools softer, they help heal and prevent swollen hemorrhoids. They may also help reduce the risk of colon and rectal cancer. Best of all, theyre usually low in calories and taste great. Here are some examples of fiber-rich foods.  · Whole grains, such as wheat bran, corn bran, and brown rice.  · Vegetables, especially carrots, broccoli, cabbage, and peas.  · Fruits, such as apples, bananas, raisins, peaches, and pears.  · Nuts and legumes, especially peanuts, lentils, and kidney beans.  Easy ways to add fiber  The tips below offer some simple ways to add more high-fiber foods to your meals.  · Start your day with a high-fiber breakfast. Eat a wheat bran cereal along with a sliced banana. Or, try peanut butter on whole-wheat toast.  · Eat carrot sticks for snacks. Theyre easy to prepare, taste great, and are low in calories.  · Use whole-grain breads instead of white bread for sandwiches.  · Eat fruits for treats. Try an apple and some raisins instead of a candy bar.   Date Last Reviewed: 7/1/2016  © 0656-2775 [employer]. [address] [place] [address], [place] [address]. All rights reserved. This information is not intended as a substitute for professional medical care. Always follow your healthcare professional's instructions.    What Is Acute Bronchitis?  Acute bronchitis is when the airways in your lungs (bronchial tubes) become red and swollen (inflamed). It is usually caused by a viral infection. But it can also occur because of a bacteria  or allergen. Symptoms include a cough that produces yellow or greenish mucus and can last for days or sometimes weeks.  Inside healthy lungs    Air travels in and out of the lungs through the airways. The linings of these airways produce sticky mucus. This mucus traps particles that enter the lungs. Tiny structures called cilia then sweep the particles out of the airways.     Healthy airway: Airways are normally open. Air moves in and out easily.      Healthy cilia: Tiny, hairlike cilia sweep mucus and particles up and out of the airways.   Lungs with bronchitis  Bronchitis often occurs with a cold or the flu virus. The airways become inflamed (red and swollen). There is a deep hacking cough from the extra mucus. Other symptoms may include:  · Wheezing  · Chest discomfort  · Shortness of breath  · Mild fever  A second infection, this time due to bacteria, may then occur. And airways irritated by allergens or smoke are more likely to get infected.        Inflamed airway: Inflammation and extra mucus narrow the airway, causing shortness of breath.      Impaired cilia: Extra mucus impairs cilia, causing congestion and wheezing. Smoking makes the problem worse.   Making a diagnosis  A physical exam, health history, and certain tests help your healthcare provider make the diagnosis.  Health history  Your healthcare provider will ask you about your symptoms.  The exam  Your provider listens to your chest for signs of congestion. He or she may also check your ears, nose, and throat.  Possible tests  · A sputum test for bacteria. This requires a sample of mucus from your lungs.  · A nasal or throat swab. This tests to see if you have a bacterial infection.  · A chest X-ray. This is done if your healthcare provider thinks you have pneumonia.  · Tests to check for an underlying condition. Other tests may be done to check for things such as allergies, asthma, or COPD (chronic obstructive pulmonary disease). You may need to see  a specialist for more lung function testing.  Treating a cough  The main treatment for bronchitis is easing symptoms. Avoiding smoke, allergens, and other things that trigger coughing can often help. If the infection is bacterial, you may be given antibiotics. During the illness, it's important to get plenty of sleep. To ease symptoms:  · Dont smoke. Also avoid secondhand smoke.  · Use a humidifier. Or try breathing in steam from a hot shower. This may help loosen mucus.  · Drink a lot of water and juice. They can soothe the throat and may help thin mucus.  · Sit up or use extra pillows when in bed. This helps to lessen coughing and congestion.  · Ask your provider about using medicine. Ask about using cough medicine, pain and fever medicine, or a decongestant.  Antibiotics  Most cases of bronchitis are caused by cold or flu viruses. They dont need antibiotics to treat them, even if your mucus is thick and green or yellow. Antibiotics dont treat viral illness and antibiotics have not been shown to have any benefit in cases of acute bronchitis. Taking antibiotics when they are not needed increases your risk of getting an infection later that is antibiotic-resistant. Antibiotics can also cause severe cases of diarrhea that require other antibiotics to treat.  It is important that you accept your healthcare provider's opinion to not use antibiotics. Your provider will prescribe antibiotics if the infection is caused by bacteria. If they are prescribed:  · Take all of the medicine. Take the medicine until it is used up, even if symptoms have improved. If you dont, the bronchitis may come back.  · Take the medicines as directed. For instance, some medicines should be taken with food.  · Ask about side effects. Ask your provider or pharmacist what side effects are common, and what to do about them.  Follow-up care  You should see your provider again in 2 to 3 weeks. By this time, symptoms should have improved. An  infection that lasts longer may mean you have a more serious problem.  Prevention  · Avoid tobacco smoke. If you smoke, quit. Stay away from smoky places. Ask friends and family not to smoke around you, or in your home or car.  · Get checked for allergies.  · Ask your provider about getting a yearly flu shot. Also ask about pneumococcal or pneumonia shots.  · Wash your hands often. This helps reduce the chance of picking up viruses that cause colds and flu.  Call your healthcare provider if:  · Symptoms worsen, or you have new symptoms  · Breathing problems worsen or  become severe  · Symptoms dont get better within a week, or within 3 days of taking antibiotics   Date Last Reviewed: 2/1/2017  © 0365-3606 [employer]. [address] [place] [address], [place] [address]. All rights reserved. This information is not intended as a substitute for professional medical care. Always follow your healthcare professional's instructions.

## 2019-03-11 NOTE — PATIENT INSTRUCTIONS
"If you were prescribed a narcotic or controlled medication, do not drive or operate heavy equipment or machinery while taking these medications.  You must understand that you've received an Urgent Care treatment only and that you may be released before all your medical problems are known or treated. You, the patient, will arrange for follow up care as instructed.  Follow up with your PCP or specialty clinic as directed if not improved or as needed. You can call [phone] to schedule an appointment with the appropriate provider.  If your condition worsens we recommend that you receive another evaluation at the Emergency Department for any concerns or worsening of condition.  Patient aware and verbalized understanding.    Reviewed CXR results with patient.   Gave patient printed prescription for Tessalon Perles because she states that "the 200mg strength versus 100mg strength that she already has typically works better for her".  Apply Hydrocortisone cream sparingly to rectum (up to two times daily).  Use albuterol inhaler as needed for wheezing.  Prednisone prescription to decrease inflammation.  Patient aware and verbalized understanding.    Cough   If your condition worsens or fails to improve we recommend that you receive another evaluation at the ER immediately or contact your PCP to discuss your concerns or return here. You must understand that you've received an urgent care treatment only and that you may be released before all your medical problems are known or treated. You the patient will arrange for follouwp care as instructed. .  Rest and fluids are important  Can use honey with ford to soothe your throat  Take prescription cough meds (pills) as prescribed.  -  Flonase (fluticasone) is a nasal spray which is available over the counter and may help with your symptoms.   -  If you have hypertension avoid using the "D" which is the decongestant.  Instead you can use Coricidin HBP for cold and cough " symptoms.    -  If you just have drainage you can take plain Zyrtec, Claritin or Allegra   -  Tylenol or ibuprofen can also be used as directed for pain unless you have an allergy to them or medical condition such as stomach ulcers, kidney or liver disease or blood thinners etc for which you should not be taking these type of medications.   Please follow up with your primary care doctor or specialist in the next 48-72hrs as needed and if no improvement  If you  smoke, please stop smoking.    Treating Hemorrhoids: Self-Care    Follow your healthcare providers advice about caring for your hemorrhoids at home. Some treatments help relieve symptoms right away. Others involve making changes in your diet and exercise habits. These can help ease constipation and prevent hemorrhoid symptoms from coming back.  Relieving symptoms  Your healthcare provider may prescribe anti-inflammatory medicine to help ease your symptoms. The following tips will also help relieve pain and swelling.  · Take sitz baths. Taking a sitz bath means sitting in a few inches of warm bath water. Soaking for 10 minutes twice a day can provide welcome relief from painful hemorrhoids. It can also help the area stay clean.  · Develop good bowel habits. Use the bathroom when you need to. Dont ignore the urge to move your bowels. This can lead to constipation, hard stools, and straining. Also, dont read while on the toilet. Sit only as long as needed. Wipe gently with soft, unscented toilet tissue or baby wipes.  · Use ice packs. Placing an ice pack on a thrombosed external hemorrhoid can help relieve pain right away. It will also help reduce the blood clot. Use the ice for 15 to 20 minutes at a time. Keep a cloth between the ice and your skin to prevent skin damage.  · Use other measures. Laxatives and enemas can help ease constipation. But use them only on your healthcare providers advice. For symptom relief, try using cotton pads soaked in witch  melanie. These are available at most drugstores. Over-the-counter hemorrhoid ointments and petroleum jelly can also provide relief.  Add fiber to your diet  Adding fiber to your diet can help relieve constipation by making stools softer and easier to pass. To increase your fiber intake, your healthcare provider may recommend a bulking agent, such as psyllium. This is a high-fiber supplement available at most grocery stores and drugstores. Eating more fiber-rich foods will also help. There are two types of fiber:  · Insoluble fiber is the main ingredient in bulking agents. Its also found in foods such as wheat bran, whole-grain breads, fresh fruits, and vegetables.  · Soluble fiber is found in foods such as oat bran. Although soluble fiber is good for you, it may not ease constipation as much as foods high in insoluble fiber.  Drink more water  Along with a high-fiber diet, drinking more water can help ease constipation. This is because insoluble fiber absorbs water, making stools soft and bulky. Be sure to drink plenty of water throughout the day. Drinking fruit juices, such as prune juice or apple juice, can also help prevent constipation.  Get more exercise  Regular exercise aids digestion and helps prevent constipation. Its also great for your health. So talk with your healthcare provider about starting an exercise program. Low-impact activities, such as swimming or walking, are good places to start. Take it easy at first. And remember to drink plenty of water when you exercise.  High-fiber foods  High-fiber foods offer many benefits. By making your stools softer, they help heal and prevent swollen hemorrhoids. They may also help reduce the risk of colon and rectal cancer. Best of all, theyre usually low in calories and taste great. Here are some examples of fiber-rich foods.  · Whole grains, such as wheat bran, corn bran, and brown rice.  · Vegetables, especially carrots, broccoli, cabbage, and  peas.  · Fruits, such as apples, bananas, raisins, peaches, and pears.  · Nuts and legumes, especially peanuts, lentils, and kidney beans.  Easy ways to add fiber  The tips below offer some simple ways to add more high-fiber foods to your meals.  · Start your day with a high-fiber breakfast. Eat a wheat bran cereal along with a sliced banana. Or, try peanut butter on whole-wheat toast.  · Eat carrot sticks for snacks. Theyre easy to prepare, taste great, and are low in calories.  · Use whole-grain breads instead of white bread for sandwiches.  · Eat fruits for treats. Try an apple and some raisins instead of a candy bar.   Date Last Reviewed: 7/1/2016  © 0251-6435 [employer]. [address] [place] [address]. All rights reserved. This information is not intended as a substitute for professional medical care. Always follow your healthcare professional's instructions.    What Is Acute Bronchitis?  Acute bronchitis is when the airways in your lungs (bronchial tubes) become red and swollen (inflamed). It is usually caused by a viral infection. But it can also occur because of a bacteria or allergen. Symptoms include a cough that produces yellow or greenish mucus and can last for days or sometimes weeks.  Inside healthy lungs    Air travels in and out of the lungs through the airways. The linings of these airways produce sticky mucus. This mucus traps particles that enter the lungs. Tiny structures called cilia then sweep the particles out of the airways.     Healthy airway: Airways are normally open. Air moves in and out easily.      Healthy cilia: Tiny, hairlike cilia sweep mucus and particles up and out of the airways.   Lungs with bronchitis  Bronchitis often occurs with a cold or the flu virus. The airways become inflamed (red and swollen). There is a deep hacking cough from the extra mucus. Other symptoms may include:  · Wheezing  · Chest discomfort  · Shortness of breath  · Mild fever  A  second infection, this time due to bacteria, may then occur. And airways irritated by allergens or smoke are more likely to get infected.        Inflamed airway: Inflammation and extra mucus narrow the airway, causing shortness of breath.      Impaired cilia: Extra mucus impairs cilia, causing congestion and wheezing. Smoking makes the problem worse.   Making a diagnosis  A physical exam, health history, and certain tests help your healthcare provider make the diagnosis.  Health history  Your healthcare provider will ask you about your symptoms.  The exam  Your provider listens to your chest for signs of congestion. He or she may also check your ears, nose, and throat.  Possible tests  · A sputum test for bacteria. This requires a sample of mucus from your lungs.  · A nasal or throat swab. This tests to see if you have a bacterial infection.  · A chest X-ray. This is done if your healthcare provider thinks you have pneumonia.  · Tests to check for an underlying condition. Other tests may be done to check for things such as allergies, asthma, or COPD (chronic obstructive pulmonary disease). You may need to see a specialist for more lung function testing.  Treating a cough  The main treatment for bronchitis is easing symptoms. Avoiding smoke, allergens, and other things that trigger coughing can often help. If the infection is bacterial, you may be given antibiotics. During the illness, it's important to get plenty of sleep. To ease symptoms:  · Dont smoke. Also avoid secondhand smoke.  · Use a humidifier. Or try breathing in steam from a hot shower. This may help loosen mucus.  · Drink a lot of water and juice. They can soothe the throat and may help thin mucus.  · Sit up or use extra pillows when in bed. This helps to lessen coughing and congestion.  · Ask your provider about using medicine. Ask about using cough medicine, pain and fever medicine, or a decongestant.  Antibiotics  Most cases of bronchitis are caused  by cold or flu viruses. They dont need antibiotics to treat them, even if your mucus is thick and green or yellow. Antibiotics dont treat viral illness and antibiotics have not been shown to have any benefit in cases of acute bronchitis. Taking antibiotics when they are not needed increases your risk of getting an infection later that is antibiotic-resistant. Antibiotics can also cause severe cases of diarrhea that require other antibiotics to treat.  It is important that you accept your healthcare provider's opinion to not use antibiotics. Your provider will prescribe antibiotics if the infection is caused by bacteria. If they are prescribed:  · Take all of the medicine. Take the medicine until it is used up, even if symptoms have improved. If you dont, the bronchitis may come back.  · Take the medicines as directed. For instance, some medicines should be taken with food.  · Ask about side effects. Ask your provider or pharmacist what side effects are common, and what to do about them.  Follow-up care  You should see your provider again in 2 to 3 weeks. By this time, symptoms should have improved. An infection that lasts longer may mean you have a more serious problem.  Prevention  · Avoid tobacco smoke. If you smoke, quit. Stay away from smoky places. Ask friends and family not to smoke around you, or in your home or car.  · Get checked for allergies.  · Ask your provider about getting a yearly flu shot. Also ask about pneumococcal or pneumonia shots.  · Wash your hands often. This helps reduce the chance of picking up viruses that cause colds and flu.  Call your healthcare provider if:  · Symptoms worsen, or you have new symptoms  · Breathing problems worsen or  become severe  · Symptoms dont get better within a week, or within 3 days of taking antibiotics   Date Last Reviewed: 2/1/2017  © 0116-9931 [employer]. [address] [place] [address], [place] [address]. All rights reserved. This information is  not intended as a substitute for professional medical care. Always follow your healthcare professional's instructions.

## 2019-03-12 ENCOUNTER — PATIENT OUTREACH (OUTPATIENT)
Dept: OTHER | Facility: OTHER | Age: 74
End: 2019-03-12

## 2019-03-12 DIAGNOSIS — I10 ESSENTIAL HYPERTENSION: Primary | ICD-10-CM

## 2019-03-12 NOTE — PROGRESS NOTES
Last 5 Patient Entered Readings                                      Current 30 Day Average: 144/74     Recent Readings 3/12/2019 3/8/2019 3/7/2019 3/3/2019 3/3/2019    SBP (mmHg) 146 135 130 157 130    DBP (mmHg) 69 74 74 72 74    Pulse 93 77 58 74 85          3/12: Spoke to patient. She is acutely ill at present. No med changes planned. Will f/u and consider increasing Amlodipine to 10 mg if needed.      Hypertension Medications             amLODIPine (NORVASC) 5 MG tablet Take 7.5 mg by mouth once daily.     losartan-hydrochlorothiazide 100-25 mg (HYZAAR) 100-25 mg per tablet Take 1 tablet by mouth once daily.

## 2019-03-18 ENCOUNTER — PATIENT OUTREACH (OUTPATIENT)
Dept: OTHER | Facility: OTHER | Age: 74
End: 2019-03-18

## 2019-03-18 NOTE — PROGRESS NOTES
Last 5 Patient Entered Readings                                      Current 30 Day Average: 143/73     Recent Readings 3/14/2019 3/12/2019 3/8/2019 3/7/2019 3/3/2019    SBP (mmHg) 142 146 135 130 157    DBP (mmHg) 75 69 74 74 72    Pulse 66 93 77 58 74          Digital Medicine: Health  Follow Up    3/18: Left voicemail to follow up with Ms. Chikis Epstein.  Current BP average 143/73 mmHg is not at goal, <130/80 mmHg.

## 2019-03-20 NOTE — PROGRESS NOTES
"Last 5 Patient Entered Readings                                      Current 30 Day Average: 143/74     Recent Readings 3/20/2019 3/14/2019 3/12/2019 3/8/2019 3/7/2019    SBP (mmHg) 126 142 146 135 130    DBP (mmHg) 75 75 69 74 74    Pulse 80 66 93 77 58        Digital Medicine: Health  Follow Up    Patient reports she has been sick for two and a half weeks.  Patient reports she is starting to feel better.   Encounter consisted of talking about patient "working the [place]" and her son going through radiation and sister having mouth cancer.  Patient is grateful for her care team monitoring her and states "she is comfortable knowing that we are here for her".      Lifestyle Modifications:    1.Dietary Modifications (Sodium intake <2,000mg/day, food labels, dining out):   Patient reports she has been watching her diet and especially has been watching her sugars.      2.Physical Activity:   Patient reports there always seems to be something that gets in the way of her going to her "free gym" whether it be sickness, back pain, or family problems.     3.Medication Therapy:   Patient has been compliant with the medication regimen.  Confirmed that patient started new medication regimen as prescribed by Digital Medicine Clinician.    4.Patient has the following medication side effects/concerns: None  (Frequency/Alleviating factors/Precipitating factors, etc.)     Follow up with Ms. Chikis Epstein completed. No further questions or concerns. Will continue to follow up to achieve health goals.  Patient is currently not at goal, 143/74 mmHg does exceed <130/80 mmHg.      "

## 2019-04-01 ENCOUNTER — TELEPHONE (OUTPATIENT)
Dept: FAMILY MEDICINE | Facility: CLINIC | Age: 74
End: 2019-04-01

## 2019-04-01 DIAGNOSIS — Z86.39 HISTORY OF DIABETES MELLITUS: Primary | ICD-10-CM

## 2019-04-01 NOTE — TELEPHONE ENCOUNTER
----- Message from Sindy Tariq sent at 4/1/2019 10:54 AM CDT -----  Contact: pt [phone]  Pt called in regards to having labs done for 3 month follow up.  Please advise

## 2019-04-02 ENCOUNTER — PATIENT OUTREACH (OUTPATIENT)
Dept: OTHER | Facility: OTHER | Age: 74
End: 2019-04-02
Payer: MEDICARE

## 2019-04-02 DIAGNOSIS — I10 ESSENTIAL HYPERTENSION: Primary | ICD-10-CM

## 2019-04-02 PROCEDURE — 99499 UNLISTED E&M SERVICE: CPT | Mod: ,,, | Performed by: PHYSICIAN ASSISTANT

## 2019-04-02 PROCEDURE — 99499 RISK ADDL DX/OHS AUDIT: ICD-10-PCS | Mod: ,,, | Performed by: PHYSICIAN ASSISTANT

## 2019-04-02 RX ORDER — AMLODIPINE BESYLATE 10 MG/1
10 TABLET ORAL DAILY
Qty: 90 TABLET | Refills: 3 | Status: SHIPPED | OUTPATIENT
Start: 2019-04-02 | End: 2021-02-03 | Stop reason: SDUPTHER

## 2019-04-02 NOTE — PROGRESS NOTES
Last 5 Patient Entered Readings                                      Current 30 Day Average: 139/75     Recent Readings 4/1/2019 3/29/2019 3/28/2019 3/28/2019 3/27/2019    SBP (mmHg) 145 137 144 161 137    DBP (mmHg) 78 79 78 87 71    Pulse 67 83 87 95 64          4/2: Spoke to patient.  Average is above goal.  Pt has sister with end-stage cancer and son being treated for prostate cancer.  Denies symptoms of elevated blood pressure: severe HA, change in vision or speech, numbness/tingling/weakness on one side of body, CP, SOB.     Increasing amlodipine to 10 mg daily.  She had been well-controlled on amlodipine in the past and she requested being taken off of it.  Restarted lower dose in January and titrating up.      Hypertension Medications             amLODIPine (NORVASC) 10 MG tablet Take 1 tablet (10 mg total) by mouth once daily.    losartan-hydrochlorothiazide 100-25 mg (HYZAAR) 100-25 mg per tablet Take 1 tablet by mouth once daily.        F/U in 2 weeks.

## 2019-04-08 ENCOUNTER — LAB VISIT (OUTPATIENT)
Dept: LAB | Facility: HOSPITAL | Age: 74
End: 2019-04-08
Attending: FAMILY MEDICINE
Payer: MEDICARE

## 2019-04-08 DIAGNOSIS — Z86.39 HISTORY OF DIABETES MELLITUS: ICD-10-CM

## 2019-04-08 LAB
ANION GAP SERPL CALC-SCNC: 9 MMOL/L (ref 8–16)
BUN SERPL-MCNC: 13 MG/DL (ref 8–23)
CALCIUM SERPL-MCNC: 11 MG/DL (ref 8.7–10.5)
CHLORIDE SERPL-SCNC: 101 MMOL/L (ref 95–110)
CO2 SERPL-SCNC: 29 MMOL/L (ref 23–29)
CREAT SERPL-MCNC: 0.8 MG/DL (ref 0.5–1.4)
EST. GFR  (AFRICAN AMERICAN): >60 ML/MIN/1.73 M^2
EST. GFR  (NON AFRICAN AMERICAN): >60 ML/MIN/1.73 M^2
ESTIMATED AVG GLUCOSE: 134 MG/DL (ref 68–131)
GLUCOSE SERPL-MCNC: 85 MG/DL (ref 70–110)
HBA1C MFR BLD HPLC: 6.3 % (ref 4–5.6)
POTASSIUM SERPL-SCNC: 3.8 MMOL/L (ref 3.5–5.1)
SODIUM SERPL-SCNC: 139 MMOL/L (ref 136–145)

## 2019-04-08 PROCEDURE — 83036 HEMOGLOBIN GLYCOSYLATED A1C: CPT | Mod: HCNC

## 2019-04-08 PROCEDURE — 80048 BASIC METABOLIC PNL TOTAL CA: CPT | Mod: HCNC

## 2019-04-08 PROCEDURE — 36415 COLL VENOUS BLD VENIPUNCTURE: CPT | Mod: HCNC,PO

## 2019-04-08 RX ORDER — AMITRIPTYLINE HYDROCHLORIDE 10 MG/1
10 TABLET, FILM COATED ORAL NIGHTLY
Qty: 90 TABLET | Refills: 3 | Status: SHIPPED | OUTPATIENT
Start: 2019-04-08 | End: 2021-03-01

## 2019-04-16 ENCOUNTER — PATIENT OUTREACH (OUTPATIENT)
Dept: OTHER | Facility: OTHER | Age: 74
End: 2019-04-16

## 2019-04-16 NOTE — PROGRESS NOTES
Last 5 Patient Entered Readings                                      Current 30 Day Average: 137/77     Recent Readings 4/10/2019 4/6/2019 4/4/2019 4/1/2019 3/29/2019    SBP (mmHg) 130 141 144 145 137    DBP (mmHg) 78 78 80 78 79    Pulse 72 86 66 67 83          4/17: Spoke to patient.  She states she is doing better.  Sister (65 years old) is still refusing treatment for her cancer in [place]. Her son is doing well with his radiation treatments for prostate cancer.  She confirms that she is taking increased dose of Amlodipine.  Reading 4/10 was good. Going on relaxing trip on 4/22 - 28 - family reunion.  States that she will bring her cuff but may not take any readings there.      Hypertension Medications             amLODIPine (NORVASC) 10 MG tablet Take 1 tablet (10 mg total) by mouth once daily.    losartan-hydrochlorothiazide 100-25 mg (HYZAAR) 100-25 mg per tablet Take 1 tablet by mouth once daily.

## 2019-04-24 NOTE — PROGRESS NOTES
Last 5 Patient Entered Readings                                      Current 30 Day Average: 141/78     Recent Readings 4/19/2019 4/14/2019 4/10/2019 4/6/2019 4/4/2019    SBP (mmHg) 149 146 130 141 144    DBP (mmHg) 77 76 78 78 80    Pulse 64 69 72 86 66        4/24: Per PA note, patient is on vacation from 4/22-4/28.  Will push outreach 2-3 weeks.

## 2019-04-30 ENCOUNTER — PATIENT OUTREACH (OUTPATIENT)
Dept: OTHER | Facility: OTHER | Age: 74
End: 2019-04-30

## 2019-04-30 DIAGNOSIS — I10 ESSENTIAL HYPERTENSION: Primary | ICD-10-CM

## 2019-04-30 RX ORDER — HYDROCHLOROTHIAZIDE 25 MG/1
25 TABLET ORAL DAILY PRN
COMMUNITY
End: 2019-07-31 | Stop reason: ALTCHOICE

## 2019-04-30 NOTE — PROGRESS NOTES
Last 5 Patient Entered Readings                                      Current 30 Day Average: 144/78     Recent Readings 4/30/2019 4/29/2019 4/19/2019 4/14/2019 4/10/2019    SBP (mmHg) 147 148 149 146 130    DBP (mmHg) 82 75 77 76 78    Pulse 70 83 64 69 72          4/30: Patient called me.  She just returned from [place] where she took no readings.  Had significant leg swelling while there and took extra HCTZ 25 mg a few days.  Readings higher now as she anticipates sister arriving at her home to stay for a few days before she moves back to [place].  Pt is anxious about her visit. Denies symptoms of elevated blood pressure: severe HA, change in vision or speech, numbness/tingling/weakness on one side of body, CP, SOB.       Hypertension Medications             amLODIPine (NORVASC) 10 MG tablet Take 1 tablet (10 mg total) by mouth once daily.    losartan-hydrochlorothiazide 100-25 mg (HYZAAR) 100-25 mg per tablet Take 1 tablet by mouth once daily.

## 2019-05-15 ENCOUNTER — PATIENT OUTREACH (OUTPATIENT)
Dept: OTHER | Facility: OTHER | Age: 74
End: 2019-05-15

## 2019-05-15 NOTE — PROGRESS NOTES
Last 5 Patient Entered Readings                                      Current 30 Day Average: 141/77     Recent Readings 5/13/2019 5/6/2019 5/3/2019 4/30/2019 4/29/2019    SBP (mmHg) 148 130 122 147 148    DBP (mmHg) 75 76 78 82 75    Pulse 77 76 84 70 83        5/15: PA spoke with patient today is schedule to follow up regarding changing some medications.  Will push outreach back.

## 2019-05-15 NOTE — PROGRESS NOTES
Last 5 Patient Entered Readings                                      Current 30 Day Average: 141/77     Recent Readings 5/13/2019 5/6/2019 5/3/2019 4/30/2019 4/29/2019    SBP (mmHg) 148 130 122 147 148    DBP (mmHg) 75 76 78 82 75    Pulse 77 76 84 70 83          5/15: Spoke to patient.  She is extremely anxious about her sister with cancer living in [place] now. She will be going out there to help get her settled with hospice and private sitters.  The sister's daughter, the patient's niece, is the primary caregiver now and the patient is worried about her.  Can switch losartan/HCTZ to ??valsartan and chlorthalidone for better control but patient not in right frame of mind to consider that today.      Hypertension Medications             amLODIPine (NORVASC) 10 MG tablet Take 1 tablet (10 mg total) by mouth once daily.    hydroCHLOROthiazide (HYDRODIURIL) 25 MG tablet Take 25 mg by mouth daily as needed for Other. Takes as needed for edema    losartan-hydrochlorothiazide 100-25 mg (HYZAAR) 100-25 mg per tablet Take 1 tablet by mouth once daily.

## 2019-06-07 ENCOUNTER — PATIENT OUTREACH (OUTPATIENT)
Dept: OTHER | Facility: OTHER | Age: 74
End: 2019-06-07

## 2019-06-07 NOTE — PROGRESS NOTES
"Last 5 Patient Entered Readings                                      Current 30 Day Average: 138/74     Recent Readings 6/5/2019 5/29/2019 5/26/2019 5/23/2019 5/13/2019    SBP (mmHg) 141 137 135 128 148    DBP (mmHg) 72 71 72 78 75    Pulse 63 89 67 86 77        Digital Medicine: Health  Follow Up    Patient reports "things have been a lot more comfortable" and she is in a better place now.    Patient reports she was on the road in April and May and things have settled down and everything has been arranged with her sister.  Patient reports her son was also in radiation and everything has settled down with that as well.    Gave encouragement to patient.     Lifestyle Modifications:    1.Dietary Modifications (Sodium intake <2,000mg/day, food labels, dining out):   Deferred    2.Physical Activity:   Deferred    3.Medication Therapy:   Patient has been compliant with the medication regimen.    4.Patient has the following medication side effects/concerns: None  (Frequency/Alleviating factors/Precipitating factors, etc.)     Follow up with Ms. Chikis Epstein completed. No further questions or concerns. Will continue to follow up to achieve health goals.  Patient is currently not at goal, 138/74 mmHg does exceed <130/80 mmHg.      "

## 2019-06-13 DIAGNOSIS — I70.0 ATHEROSCLEROSIS OF AORTA: ICD-10-CM

## 2019-06-13 RX ORDER — PRAVASTATIN SODIUM 40 MG/1
TABLET ORAL
Qty: 90 TABLET | Refills: 3 | Status: SHIPPED | OUTPATIENT
Start: 2019-06-13 | End: 2020-07-30 | Stop reason: SDUPTHER

## 2019-06-21 ENCOUNTER — PATIENT OUTREACH (OUTPATIENT)
Dept: OTHER | Facility: OTHER | Age: 74
End: 2019-06-21

## 2019-06-21 DIAGNOSIS — I10 ESSENTIAL HYPERTENSION: Primary | ICD-10-CM

## 2019-06-21 RX ORDER — CANDESARTAN 16 MG/1
16 TABLET ORAL DAILY
Qty: 14 TABLET | Refills: 0 | Status: SHIPPED | OUTPATIENT
Start: 2019-06-21 | End: 2019-07-02 | Stop reason: SDUPTHER

## 2019-06-21 NOTE — PROGRESS NOTES
Last 5 Patient Entered Readings                                      Current 30 Day Average: 138/74     Recent Readings 6/19/2019 6/11/2019 6/5/2019 5/29/2019 5/26/2019    SBP (mmHg) 145 140 141 137 135    DBP (mmHg) 72 76 72 71 72    Pulse 64 68 63 89 67          6/21: Spoke to patient.  She states that her sister is still on hospice but pt is back home.    Not at goal and pt is frustrated bc she feels she is monitoring her diet carefully.  Trial of Candesartan 16 mg daily to replace Losartan/HCTZ.  Last labs showed hypercalcemia so using caution with diuretics.      Hypertension Medications             amLODIPine (NORVASC) 10 MG tablet Take 1 tablet (10 mg total) by mouth once daily.    hydroCHLOROthiazide (HYDRODIURIL) 25 MG tablet Take 25 mg by mouth daily as needed for Other. Takes as needed for edema    losartan-hydrochlorothiazide 100-25 mg (HYZAAR) 100-25 mg per tablet Take 1 tablet by mouth once daily. HOLD while starting candesartan.

## 2019-07-02 ENCOUNTER — PATIENT OUTREACH (OUTPATIENT)
Dept: OTHER | Facility: OTHER | Age: 74
End: 2019-07-02

## 2019-07-02 DIAGNOSIS — I10 ESSENTIAL HYPERTENSION: Primary | ICD-10-CM

## 2019-07-02 RX ORDER — CANDESARTAN 16 MG/1
16 TABLET ORAL DAILY
Qty: 14 TABLET | Refills: 0 | Status: SHIPPED | OUTPATIENT
Start: 2019-07-02 | End: 2019-07-16 | Stop reason: ALTCHOICE

## 2019-07-02 NOTE — PROGRESS NOTES
Last 5 Patient Entered Readings                                      Current 30 Day Average: 141/75     Recent Readings 7/1/2019 6/29/2019 6/29/2019 6/29/2019 6/26/2019    SBP (mmHg) 138 139 149 168 155    DBP (mmHg) 78 77 75 81 72    Pulse 65 70 60 63 63          7/2: Spoke to patient. States doing OK with Candesartan except that her ankles started swelling without the HCTZ.  When she resumed that, pressure is decreasing.  Pt c/o fatigue in her legs with any physical exertion.  She has a dx of aorto-iliac atherosclerosis.  I advised her to contact her PCP for an appt for him to evaluate her.  She states that her SBP is in the 1teens after exercising at the gym.     Can increase to 32 mg daily for better BP control.     Hypertension Medications             amLODIPine (NORVASC) 10 MG tablet Take 1 tablet (10 mg total) by mouth once daily.    candesartan (ATACAND) 16 MG tablet Take 1 tablet (16 mg total) by mouth once daily. for 14 days    hydroCHLOROthiazide (HYDRODIURIL) 25 MG tablet Take 25 mg by mouth daily as needed for Other. Takes as needed for edema    losartan-hydrochlorothiazide 100-25 mg (HYZAAR) 100-25 mg per tablet Take 1 tablet by mouth once daily.

## 2019-07-16 ENCOUNTER — PATIENT OUTREACH (OUTPATIENT)
Dept: OTHER | Facility: OTHER | Age: 74
End: 2019-07-16

## 2019-07-16 DIAGNOSIS — I10 ESSENTIAL HYPERTENSION: ICD-10-CM

## 2019-07-16 RX ORDER — CANDESARTAN CILEXETIL AND HYDROCHLOROTHIAZIDE 16; 12.5 MG/1; MG/1
1 TABLET ORAL DAILY
Qty: 90 TABLET | Refills: 3 | Status: SHIPPED | OUTPATIENT
Start: 2019-07-16 | End: 2019-07-25

## 2019-07-16 NOTE — PROGRESS NOTES
Last 5 Patient Entered Readings                                      Current 30 Day Average: 140/74     Recent Readings 7/15/2019 7/12/2019 7/9/2019 7/5/2019 7/1/2019    SBP (mmHg) 123 145 145 139 138    DBP (mmHg) 67 80 74 68 78    Pulse 81 88 59 67 65          7/16: Spoke to patient.  She states her emotions affect her BP. Her younger sister is dying of cancer and son being treated for prostate cancer.  Average and readings improved with Candesartan so sent Rx for Candesartan + HCTZ to [facility]. She will continue to use the extra HCTZ as needed for significant edema.      Hypertension Medications             amLODIPine (NORVASC) 10 MG tablet Take 1 tablet (10 mg total) by mouth once daily.    candesartan-hydrochlorothiazide (ATACAND HCT) 16-12.5 mg per tablet Take 1 tablet by mouth once daily.    hydroCHLOROthiazide (HYDRODIURIL) 25 MG tablet Take 25 mg by mouth daily as needed for Other. Takes as needed for edema

## 2019-07-22 ENCOUNTER — TELEPHONE (OUTPATIENT)
Dept: PRIMARY CARE CLINIC | Facility: CLINIC | Age: 74
End: 2019-07-22

## 2019-07-22 NOTE — TELEPHONE ENCOUNTER
----- Message from Archana Carreno sent at 7/22/2019  2:51 PM CDT -----  Contact: self   Pharmacy is calling to clarify an RX.  RX name:  candesartan-hydrochlorothiazide (ATACAND HCT) 16-12.5 mg   What do they need to clarify:  [facility] mail order pharmacy is waiting for the doctor's approval. Fax number is [phone]  Comments:

## 2019-07-22 NOTE — TELEPHONE ENCOUNTER
"Spoke with [facility].  Waiting on a response from authorizing provider Stacy Boswell.  Ok to dispense Candesartan/HCTZ with allergy info on file [employer] "swelling".  "

## 2019-07-23 ENCOUNTER — TELEPHONE (OUTPATIENT)
Dept: PRIMARY CARE CLINIC | Facility: CLINIC | Age: 74
End: 2019-07-23

## 2019-07-23 RX ORDER — HYDROCHLOROTHIAZIDE 12.5 MG/1
12.5 CAPSULE ORAL DAILY
Qty: 90 CAPSULE | Refills: 3 | Status: SHIPPED | OUTPATIENT
Start: 2019-07-23 | End: 2019-07-25

## 2019-07-23 RX ORDER — CANDESARTAN 16 MG/1
16 TABLET ORAL DAILY
Qty: 90 TABLET | Refills: 3 | Status: SHIPPED | OUTPATIENT
Start: 2019-07-23 | End: 2019-07-25

## 2019-07-23 NOTE — TELEPHONE ENCOUNTER
Pt has spoken with insurance.  Ibersartan/HCTZ 150/12.5 mg covered or separate RXs for Candersartan 16 mg and HCTZ 12.5 mg. Separate Rxs would be a zero copay for 90 days.

## 2019-07-23 NOTE — TELEPHONE ENCOUNTER
----- Message from Archana Carreno sent at 7/23/2019 10:20 AM CDT -----  Contact: self   Patient is calling about her Rx for the candesartan-hydrochlorothiazide (ATACAND HCT) 16-12.5 mg per tablet. Patient states this medication is in a higher tier and she was charged over $130 for the prescription. Please call & advise

## 2019-07-25 RX ORDER — VALSARTAN AND HYDROCHLOROTHIAZIDE 80; 12.5 MG/1; MG/1
1 TABLET, FILM COATED ORAL DAILY
Qty: 90 TABLET | Refills: 3 | Status: SHIPPED | OUTPATIENT
Start: 2019-07-25 | End: 2019-09-30

## 2019-07-25 NOTE — TELEPHONE ENCOUNTER
Rec'd call from April with Andrea [phone] Ext [phone]. Pt was confused.   Any form of Candesartan  will still be $131 copay.    Pt will pay zero copay for:    Ibersartan/HCTZ (combination)  Valsartan/HCTZ (conbination)  Or  Separate RXs for Olmesartan and HCTZ

## 2019-07-31 ENCOUNTER — OFFICE VISIT (OUTPATIENT)
Dept: PRIMARY CARE CLINIC | Facility: CLINIC | Age: 74
End: 2019-07-31
Payer: MEDICARE

## 2019-07-31 VITALS
TEMPERATURE: 99 F | SYSTOLIC BLOOD PRESSURE: 118 MMHG | WEIGHT: 157.63 LBS | DIASTOLIC BLOOD PRESSURE: 70 MMHG | HEIGHT: 60 IN | BODY MASS INDEX: 30.95 KG/M2 | RESPIRATION RATE: 16 BRPM

## 2019-07-31 DIAGNOSIS — F41.1 GAD (GENERALIZED ANXIETY DISORDER): Primary | ICD-10-CM

## 2019-07-31 PROCEDURE — 99214 OFFICE O/P EST MOD 30 MIN: CPT | Mod: HCNC,S$GLB,, | Performed by: FAMILY MEDICINE

## 2019-07-31 PROCEDURE — 3074F SYST BP LT 130 MM HG: CPT | Mod: HCNC,CPTII,S$GLB, | Performed by: FAMILY MEDICINE

## 2019-07-31 PROCEDURE — 99214 PR OFFICE/OUTPT VISIT, EST, LEVL IV, 30-39 MIN: ICD-10-PCS | Mod: HCNC,S$GLB,, | Performed by: FAMILY MEDICINE

## 2019-07-31 PROCEDURE — 3078F PR MOST RECENT DIASTOLIC BLOOD PRESSURE < 80 MM HG: ICD-10-PCS | Mod: HCNC,CPTII,S$GLB, | Performed by: FAMILY MEDICINE

## 2019-07-31 PROCEDURE — 99999 PR PBB SHADOW E&M-EST. PATIENT-LVL III: CPT | Mod: PBBFAC,HCNC,, | Performed by: FAMILY MEDICINE

## 2019-07-31 PROCEDURE — 3074F PR MOST RECENT SYSTOLIC BLOOD PRESSURE < 130 MM HG: ICD-10-PCS | Mod: HCNC,CPTII,S$GLB, | Performed by: FAMILY MEDICINE

## 2019-07-31 PROCEDURE — 99999 PR PBB SHADOW E&M-EST. PATIENT-LVL III: ICD-10-PCS | Mod: PBBFAC,HCNC,, | Performed by: FAMILY MEDICINE

## 2019-07-31 PROCEDURE — 3078F DIAST BP <80 MM HG: CPT | Mod: HCNC,CPTII,S$GLB, | Performed by: FAMILY MEDICINE

## 2019-07-31 PROCEDURE — 1101F PR PT FALLS ASSESS DOC 0-1 FALLS W/OUT INJ PAST YR: ICD-10-PCS | Mod: HCNC,CPTII,S$GLB, | Performed by: FAMILY MEDICINE

## 2019-07-31 PROCEDURE — 1101F PT FALLS ASSESS-DOCD LE1/YR: CPT | Mod: HCNC,CPTII,S$GLB, | Performed by: FAMILY MEDICINE

## 2019-07-31 RX ORDER — FLUOXETINE HYDROCHLORIDE 40 MG/1
40 CAPSULE ORAL DAILY
Qty: 90 CAPSULE | Refills: 3 | Status: SHIPPED | OUTPATIENT
Start: 2019-07-31 | End: 2020-06-05 | Stop reason: SDUPTHER

## 2019-07-31 NOTE — PROGRESS NOTES
Subjective:       Patient ID: Chikis Epstein is a 73 y.o. female.    Chief Complaint: Emotional Dysregulation (anxiety) and Hypertension    72 yo presents with  today for discussion of recurrent symptoms of anxiety.  She had done well in the past while on Fluoxetine 40mg daily, but stopped medication about one year ago.  Recently, multiple family problems, including sister dying of cancer, son diagnosed with prostate cancer and recent death of her dog have increased anxiety symptoms.  She denies symptoms of depression.  No suicidal ideation.  She has had a history of insomnia, but this is managed with prn [facility]    Review of Systems   Constitutional: Negative for activity change, fatigue and unexpected weight change.   Psychiatric/Behavioral: Negative for agitation, behavioral problems, confusion, decreased concentration, dysphoric mood, hallucinations, self-injury, sleep disturbance and suicidal ideas. The patient is nervous/anxious.        Objective:      Physical Exam   Constitutional: She appears well-developed and well-nourished. No distress.   Cardiovascular: Normal rate and regular rhythm.   No murmur heard.  Pulmonary/Chest: Effort normal and breath sounds normal. She has no wheezes. She has no rales.   Psychiatric: Her speech is normal and behavior is normal. Judgment and thought content normal. Her mood appears anxious. Her affect is not angry, not labile and not inappropriate. Cognition and memory are normal. She does not exhibit a depressed mood. She expresses no suicidal ideation. She expresses no suicidal plans.       Assessment:       ALEXANDER   Plan:       1.  Restart Prozac 40mg qd  2.  F/u 3-4 weeks

## 2019-08-02 ENCOUNTER — PATIENT OUTREACH (OUTPATIENT)
Dept: OTHER | Facility: OTHER | Age: 74
End: 2019-08-02

## 2019-08-02 NOTE — PROGRESS NOTES
Last 5 Patient Entered Readings                                      Current 30 Day Average: 138/74     Recent Readings 8/1/2019 7/30/2019 7/30/2019 7/23/2019 7/19/2019    SBP (mmHg) 132 146 146 140 131    DBP (mmHg) 67 78 78 76 74    Pulse 87 69 69 70 82        Digital Medicine: Health  Follow Up    Patient reports she has been going through a lot of emotions lately.  Patient reports she had to put her dog down last week.  Patient reports she has been working with her doctor.  Patient was grateful for the call.     Lifestyle Modifications:    1.Dietary Modifications (Sodium intake <2,000mg/day, food labels, dining out):   Deferred    2.Physical Activity:   Deferred    3.Medication Therapy:   Patient has been compliant with the medication regimen.  Confirmed patient has started new medication regimen.  Patient reports she is taking Valsartan in place of Candesartan due to cost of medication.     4.Patient has the following medication side effects/concerns: None  (Frequency/Alleviating factors/Precipitating factors, etc.)     Follow up with Ms. Chikis Epstein completed. No further questions or concerns. Will continue to follow up to achieve health goals.  Patient is currently not at goal, 138/74 mmHg does exceed <130/80 mmHg.

## 2019-08-14 ENCOUNTER — PATIENT OUTREACH (OUTPATIENT)
Dept: ADMINISTRATIVE | Facility: HOSPITAL | Age: 74
End: 2019-08-14

## 2019-08-27 ENCOUNTER — PATIENT OUTREACH (OUTPATIENT)
Dept: OTHER | Facility: OTHER | Age: 74
End: 2019-08-27
Payer: MEDICARE

## 2019-08-27 PROCEDURE — 99499 RISK ADDL DX/OHS AUDIT: ICD-10-PCS | Mod: ,,, | Performed by: PHYSICIAN ASSISTANT

## 2019-08-27 PROCEDURE — 99499 UNLISTED E&M SERVICE: CPT | Mod: ,,, | Performed by: PHYSICIAN ASSISTANT

## 2019-08-27 NOTE — PROGRESS NOTES
Last 5 Patient Entered Readings                                      Current 30 Day Average: 145/76     Recent Readings 2019    SBP (mmHg) 143 155 155 166 166    DBP (mmHg) 75 75 75 85 80    Pulse 63 63 63 65 64          : Spoke to pt. Not at goal.  Notes that sister  of cancer (was on hospice) and had to put her 14 yo dog down.  Good news though that her son who was treated for prostate cancer is cancer free!  No med changes today as pt still grieving her losses.  Seeing Dr. Gonzalez tomorrow for f/u after starting Prozac.  F/u again in 4-6 weeks.      Hypertension Medications             amLODIPine (NORVASC) 10 MG tablet Take 1 tablet (10 mg total) by mouth once daily.    valsartan-hydrochlorothiazide (DIOVAN-HCT) 80-12.5 mg per tablet Take 1 tablet by mouth once daily.

## 2019-08-28 ENCOUNTER — PATIENT MESSAGE (OUTPATIENT)
Dept: ADMINISTRATIVE | Facility: OTHER | Age: 74
End: 2019-08-28

## 2019-08-28 ENCOUNTER — OFFICE VISIT (OUTPATIENT)
Dept: PRIMARY CARE CLINIC | Facility: CLINIC | Age: 74
End: 2019-08-28
Payer: MEDICARE

## 2019-08-28 VITALS
HEIGHT: 60 IN | WEIGHT: 155.19 LBS | HEART RATE: 72 BPM | DIASTOLIC BLOOD PRESSURE: 70 MMHG | BODY MASS INDEX: 30.47 KG/M2 | SYSTOLIC BLOOD PRESSURE: 100 MMHG | TEMPERATURE: 99 F

## 2019-08-28 DIAGNOSIS — F41.9 ANXIETY: ICD-10-CM

## 2019-08-28 DIAGNOSIS — I10 ESSENTIAL HYPERTENSION: Primary | ICD-10-CM

## 2019-08-28 DIAGNOSIS — F43.23 ADJUSTMENT DISORDER WITH MIXED ANXIETY AND DEPRESSED MOOD: ICD-10-CM

## 2019-08-28 PROCEDURE — 99999 PR PBB SHADOW E&M-EST. PATIENT-LVL III: CPT | Mod: PBBFAC,HCNC,, | Performed by: FAMILY MEDICINE

## 2019-08-28 PROCEDURE — 99999 PR PBB SHADOW E&M-EST. PATIENT-LVL III: ICD-10-PCS | Mod: PBBFAC,HCNC,, | Performed by: FAMILY MEDICINE

## 2019-08-28 PROCEDURE — 1101F PR PT FALLS ASSESS DOC 0-1 FALLS W/OUT INJ PAST YR: ICD-10-PCS | Mod: HCNC,CPTII,S$GLB, | Performed by: FAMILY MEDICINE

## 2019-08-28 PROCEDURE — 99214 OFFICE O/P EST MOD 30 MIN: CPT | Mod: HCNC,S$GLB,, | Performed by: FAMILY MEDICINE

## 2019-08-28 PROCEDURE — 1101F PT FALLS ASSESS-DOCD LE1/YR: CPT | Mod: HCNC,CPTII,S$GLB, | Performed by: FAMILY MEDICINE

## 2019-08-28 PROCEDURE — 3078F PR MOST RECENT DIASTOLIC BLOOD PRESSURE < 80 MM HG: ICD-10-PCS | Mod: HCNC,CPTII,S$GLB, | Performed by: FAMILY MEDICINE

## 2019-08-28 PROCEDURE — 3078F DIAST BP <80 MM HG: CPT | Mod: HCNC,CPTII,S$GLB, | Performed by: FAMILY MEDICINE

## 2019-08-28 PROCEDURE — 99214 PR OFFICE/OUTPT VISIT, EST, LEVL IV, 30-39 MIN: ICD-10-PCS | Mod: HCNC,S$GLB,, | Performed by: FAMILY MEDICINE

## 2019-08-28 PROCEDURE — 3074F SYST BP LT 130 MM HG: CPT | Mod: HCNC,CPTII,S$GLB, | Performed by: FAMILY MEDICINE

## 2019-08-28 PROCEDURE — 3074F PR MOST RECENT SYSTOLIC BLOOD PRESSURE < 130 MM HG: ICD-10-PCS | Mod: HCNC,CPTII,S$GLB, | Performed by: FAMILY MEDICINE

## 2019-08-28 RX ORDER — LORAZEPAM 0.5 MG/1
0.5 TABLET ORAL EVERY 12 HOURS PRN
Qty: 30 TABLET | Refills: 1 | Status: SHIPPED | OUTPATIENT
Start: 2019-08-28 | End: 2021-03-01 | Stop reason: SDUPTHER

## 2019-08-28 NOTE — PROGRESS NOTES
Subjective:       Patient ID: Chikis Epstein is a 73 y.o. female.    Chief Complaint: Medication Management and Sinus Problem (congestion)    74 yo presents today with  for follow up of anxiety disorder.  She was last seen about 3 weeks and started Prozac, 40mg dose without any noted side effects.  However, they had to travel to [place] to attend to pt's sister who was terminally ill and  this past week. She has had frequent crying spells and anxiety in spite of the Prozac, although she thinks it may have helped prior to her sister's demise.    She is also here for follow up of hypertension after changing medications.  systolics have been persistently elevated     Review of Systems   Constitutional: Negative for activity change, fatigue and unexpected weight change.   Respiratory: Negative for chest tightness and shortness of breath.    Cardiovascular: Negative for chest pain, palpitations and leg swelling.   Neurological: Negative for dizziness, weakness, light-headedness and headaches.   Psychiatric/Behavioral: Positive for sleep disturbance. Negative for agitation, confusion, self-injury and suicidal ideas. The patient is nervous/anxious. The patient is not hyperactive.        Objective:      Physical Exam   Constitutional: She appears well-developed and well-nourished. No distress.   Neck: Normal range of motion. No JVD present. No thyromegaly present.   Cardiovascular: Normal rate and regular rhythm.   No murmur heard.  Pulmonary/Chest: Effort normal and breath sounds normal. She has no wheezes. She has no rales.   Musculoskeletal: She exhibits no edema.   Lymphadenopathy:     She has no cervical adenopathy.   Psychiatric: Her speech is normal and behavior is normal. Judgment and thought content normal. Her mood appears anxious. Cognition and memory are normal. She exhibits a depressed mood.       Assessment:       1.  Hypertension  2.  ALEXANDER  3.  Situational disturbance  Plan:       1.  Continue  present dose of prozac  2.  Short course of Ativan prn  3.  BP in office today is normal, pt to bring in cuff for calibration  4.  F/u one month

## 2019-09-16 ENCOUNTER — PATIENT OUTREACH (OUTPATIENT)
Dept: ADMINISTRATIVE | Facility: HOSPITAL | Age: 74
End: 2019-09-16

## 2019-09-20 DIAGNOSIS — N95.2 ATROPHIC VAGINITIS: ICD-10-CM

## 2019-09-20 NOTE — TELEPHONE ENCOUNTER
----- Message from Karmen Hilario sent at 9/20/2019  9:18 AM CDT -----  Contact: Patient [phone]  Prescription Request:     Name of medication: conjugated estrogens (PREMARIN) vaginal cream    Reason for request: Refill    Pharmacy: [facility] - [facility] [id number]    Please advise.    Thank You

## 2019-09-23 ENCOUNTER — TELEPHONE (OUTPATIENT)
Dept: PRIMARY CARE CLINIC | Facility: CLINIC | Age: 74
End: 2019-09-23

## 2019-09-23 NOTE — TELEPHONE ENCOUNTER
Called pt and confirmed that prescription for Premarin frequency is once a week.  Called and gave prescription frequency to Pharmacy. Pharmacy will process prescription refill.

## 2019-09-26 ENCOUNTER — PATIENT OUTREACH (OUTPATIENT)
Dept: OTHER | Facility: OTHER | Age: 74
End: 2019-09-26

## 2019-09-26 NOTE — PROGRESS NOTES
Digital Medicine: Clinician Follow-Up    : LM for patient. BP was 100/70 at PCP office on .  Got a new cuff and readings improving.     : Pt called to report that she saw her PCP today. Readings between [facility] cuff and their BP machine were > 30 points different (systolic).  PCP suggested that she go back to [facility] for exchange but she refuses as that would be her 4th machine.  She has an [employer] cuff at home that she will use for comparison for next 2 weeks.     The history is provided by the patient. No  was used.     Follow Up  Follow-up reason(s): reading review      Readings are trending up   Medication Change: new med  PCP increased Valsartan to 160 mg and d/c'ed HCTZ.                   Medication Adherence:   She misses doses: never        INTERVENTION(S)  encouragement/support    PCP stopped HCTZ and increased Valsartan.  Pt started on fluoxetine for anxiety and depression, worse since sister  of cancer.        There are no preventive care reminders to display for this patient.    Last 5 Patient Entered Readings                                      Current 30 Day Average: 147/74     Recent Readings 2019    SBP (mmHg) 174 161 159 133 149    DBP (mmHg) 71 73 76 73 72    Pulse 69 62 64 74 77           Hypertension Medications             amLODIPine (NORVASC) 10 MG tablet Take 1 tablet (10 mg total) by mouth once daily.    valsartan (DIOVAN) 160 MG tablet Take 1 tablet (160 mg total) by mouth once daily.

## 2019-09-30 ENCOUNTER — OFFICE VISIT (OUTPATIENT)
Dept: PRIMARY CARE CLINIC | Facility: CLINIC | Age: 74
End: 2019-09-30
Payer: MEDICARE

## 2019-09-30 VITALS
BODY MASS INDEX: 29.86 KG/M2 | SYSTOLIC BLOOD PRESSURE: 140 MMHG | WEIGHT: 152.13 LBS | TEMPERATURE: 99 F | HEIGHT: 60 IN | HEART RATE: 69 BPM | DIASTOLIC BLOOD PRESSURE: 63 MMHG

## 2019-09-30 DIAGNOSIS — I10 ESSENTIAL HYPERTENSION: Primary | ICD-10-CM

## 2019-09-30 DIAGNOSIS — F41.9 ANXIETY: ICD-10-CM

## 2019-09-30 PROCEDURE — 99999 PR PBB SHADOW E&M-EST. PATIENT-LVL III: ICD-10-PCS | Mod: PBBFAC,HCNC,, | Performed by: FAMILY MEDICINE

## 2019-09-30 PROCEDURE — 3078F PR MOST RECENT DIASTOLIC BLOOD PRESSURE < 80 MM HG: ICD-10-PCS | Mod: HCNC,CPTII,S$GLB, | Performed by: FAMILY MEDICINE

## 2019-09-30 PROCEDURE — 99999 PR PBB SHADOW E&M-EST. PATIENT-LVL III: CPT | Mod: PBBFAC,HCNC,, | Performed by: FAMILY MEDICINE

## 2019-09-30 PROCEDURE — 99214 OFFICE O/P EST MOD 30 MIN: CPT | Mod: HCNC,S$GLB,, | Performed by: FAMILY MEDICINE

## 2019-09-30 PROCEDURE — 3078F DIAST BP <80 MM HG: CPT | Mod: HCNC,CPTII,S$GLB, | Performed by: FAMILY MEDICINE

## 2019-09-30 PROCEDURE — 3077F PR MOST RECENT SYSTOLIC BLOOD PRESSURE >= 140 MM HG: ICD-10-PCS | Mod: HCNC,CPTII,S$GLB, | Performed by: FAMILY MEDICINE

## 2019-09-30 PROCEDURE — 99214 PR OFFICE/OUTPT VISIT, EST, LEVL IV, 30-39 MIN: ICD-10-PCS | Mod: HCNC,S$GLB,, | Performed by: FAMILY MEDICINE

## 2019-09-30 PROCEDURE — 3077F SYST BP >= 140 MM HG: CPT | Mod: HCNC,CPTII,S$GLB, | Performed by: FAMILY MEDICINE

## 2019-09-30 PROCEDURE — 1101F PR PT FALLS ASSESS DOC 0-1 FALLS W/OUT INJ PAST YR: ICD-10-PCS | Mod: HCNC,CPTII,S$GLB, | Performed by: FAMILY MEDICINE

## 2019-09-30 PROCEDURE — 1101F PT FALLS ASSESS-DOCD LE1/YR: CPT | Mod: HCNC,CPTII,S$GLB, | Performed by: FAMILY MEDICINE

## 2019-09-30 RX ORDER — VALSARTAN 160 MG/1
160 TABLET ORAL DAILY
Qty: 90 TABLET | Refills: 3 | Status: SHIPPED | OUTPATIENT
Start: 2019-09-30 | End: 2020-04-29 | Stop reason: SDUPTHER

## 2019-09-30 NOTE — PROGRESS NOTES
Subjective:       Patient ID: Chikis Epstein is a 73 y.o. female.    Chief Complaint: Hypertension    74 yo presents today for follow up of anxiety disorder and hypertension.  She is doing well on present dose of Prozac without side effects.  Has not needed Ativan  She is also here for f/u of hypertension.  Brought cuff today from our Digital [facility] Program.  Testing of cuff shows significant discrepancy in systolic pressure, hers shows systolic of 174, with our cuff 136, normal diastolics    Review of Systems   Constitutional: Negative for activity change, fatigue and unexpected weight change.   Respiratory: Negative for chest tightness, shortness of breath and wheezing.    Cardiovascular: Negative for chest pain, palpitations and leg swelling.   Neurological: Positive for dizziness.        Occasional postural dizziness, last few minutes then resolves   Psychiatric/Behavioral: Negative for agitation, behavioral problems, confusion, dysphoric mood and sleep disturbance. The patient is nervous/anxious.        Objective:      Physical Exam   Constitutional: She appears well-developed and well-nourished. No distress.   Neck: Normal range of motion. No JVD present. No thyromegaly present.   Cardiovascular: Normal rate and regular rhythm.   No murmur heard.  Pulmonary/Chest: Effort normal and breath sounds normal. She has no wheezes. She has no rales.   Musculoskeletal: She exhibits no edema.   Lymphadenopathy:     She has no cervical adenopathy.   Psychiatric: She has a normal mood and affect. Her behavior is normal. Judgment and thought content normal.       Assessment:       1.  Hypertension  2.  anxiety  Plan:       1.  She will need new BP equipment  2.  Stop Valsartan/HCT  3.  Continue Amlodipine  4.  Valsartan 160mg  5.  F/u one month

## 2019-10-04 ENCOUNTER — PATIENT OUTREACH (OUTPATIENT)
Dept: OTHER | Facility: OTHER | Age: 74
End: 2019-10-04

## 2019-10-04 NOTE — PROGRESS NOTES
"Digital Medicine: Health  Follow-Up    Patient reports things have "finally calmed down" in her life.  Patient reports she is "feeling ok".  Patient is waiting on her new medication that is on the way.         Follow Up  Follow-up reason(s): reading review    Patient reports she will be comparing readings at home with her [facility] cuff.         Diet:   Patient reports eating or drinking the following: fresh vegetables    Patient reports she continues "to eat her veggies".      INTERVENTION(S)  encouragement/support      There are no preventive care reminders to display for this patient.    Last 5 Patient Entered Readings                                      Current 30 Day Average: 150/73     Recent Readings 9/30/2019 9/29/2019 9/29/2019 9/21/2019 9/16/2019    SBP (mmHg) 174 161 159 133 149    DBP (mmHg) 71 73 76 73 72    Pulse 69 62 64 74 77                "

## 2019-10-08 RX ORDER — METFORMIN HYDROCHLORIDE 500 MG/1
500 TABLET, EXTENDED RELEASE ORAL
Qty: 90 TABLET | Refills: 3 | Status: SHIPPED | OUTPATIENT
Start: 2019-10-08 | End: 2020-11-04 | Stop reason: SDUPTHER

## 2019-11-07 ENCOUNTER — PATIENT OUTREACH (OUTPATIENT)
Dept: OTHER | Facility: OTHER | Age: 74
End: 2019-11-07

## 2019-11-11 ENCOUNTER — PATIENT OUTREACH (OUTPATIENT)
Dept: OTHER | Facility: OTHER | Age: 74
End: 2019-11-11

## 2019-11-11 DIAGNOSIS — I10 ESSENTIAL HYPERTENSION: Primary | ICD-10-CM

## 2019-11-11 NOTE — PROGRESS NOTES
"Digital Medicine: Clinician Follow-Up    11/11: Spoke to patient. Readings are fluctuating.  States that she feels "ok" but is still grieving the loss of her sister to cancer.      The history is provided by the patient.     Follow Up  Follow-up reason(s): reading review      Readings are trending up       INTERVENTION(S)  encouragement/support    PLAN  additional monitoring needed    Pt still grieving the loss of her sister to cancer.  States that she feels like a "blob" at times.  States that reading are always lower with her Omron cuff.  Has replaced [facility] cuff 3 or 4 times so not likely to do that again.  Will enter readings in manually.        There are no preventive care reminders to display for this patient.    Last 5 Patient Entered Readings                                      Current 30 Day Average: 143/78     Recent Readings 11/11/2019 11/11/2019 10/22/2019 10/18/2019 10/8/2019    SBP (mmHg) 148 159 137 145 145    DBP (mmHg) 77 80 77 78 68    Pulse 64 66 70 66 72             Hypertension Medications             amLODIPine (NORVASC) 10 MG tablet Take 1 tablet (10 mg total) by mouth once daily.    valsartan (DIOVAN) 160 MG tablet Take 1 tablet (160 mg total) by mouth once daily.                             Medication Adherence Screening   She misses doses: never      "

## 2019-11-29 NOTE — PROGRESS NOTES
"Digital Medicine: Health  Follow-Up    Patient reports she was doing "very well" and has just been busy.  Patient reports she has her annual check up in a few weeks.   Patient denies any other concerns at this time.           Follow Up  Follow-up reason(s): reading review      Readings are missing.   patient reminder needed.  Patient reports she has been busy and has "been feeling good" so she has not been feeling like she needed to take as many readings.  Patient reports she will submit a reading later tonight.  Patient knows to submit regular weekly readings.        INTERVENTION(S)  encouragement/support and denied further coaching    PLAN  additional monitoring needed and continue monitoring      There are no preventive care reminders to display for this patient.    Last 5 Patient Entered Readings                                      Current 30 Day Average: 146/78     Recent Readings 11/20/2019 11/11/2019 11/11/2019 10/22/2019 10/18/2019    SBP (mmHg) 143 148 159 137 145    DBP (mmHg) 78 77 80 77 78    Pulse 80 64 66 70 66        "

## 2019-12-10 ENCOUNTER — OFFICE VISIT (OUTPATIENT)
Dept: PRIMARY CARE CLINIC | Facility: CLINIC | Age: 74
End: 2019-12-10
Payer: MEDICARE

## 2019-12-10 ENCOUNTER — IMMUNIZATION (OUTPATIENT)
Dept: PHARMACY | Facility: CLINIC | Age: 74
End: 2019-12-10

## 2019-12-10 ENCOUNTER — IMMUNIZATION (OUTPATIENT)
Dept: PHARMACY | Facility: CLINIC | Age: 74
End: 2019-12-10
Payer: MEDICARE

## 2019-12-10 VITALS
TEMPERATURE: 99 F | DIASTOLIC BLOOD PRESSURE: 70 MMHG | SYSTOLIC BLOOD PRESSURE: 134 MMHG | HEART RATE: 76 BPM | HEIGHT: 60 IN | BODY MASS INDEX: 26.15 KG/M2 | WEIGHT: 133.19 LBS

## 2019-12-10 DIAGNOSIS — E78.00 PURE HYPERCHOLESTEROLEMIA: ICD-10-CM

## 2019-12-10 DIAGNOSIS — R73.03 PRE-DIABETES: ICD-10-CM

## 2019-12-10 DIAGNOSIS — Z12.31 SCREENING MAMMOGRAM FOR HIGH-RISK PATIENT: ICD-10-CM

## 2019-12-10 DIAGNOSIS — I10 ESSENTIAL HYPERTENSION: ICD-10-CM

## 2019-12-10 DIAGNOSIS — Z00.00 ROUTINE GENERAL MEDICAL EXAMINATION AT A HEALTH CARE FACILITY: Primary | ICD-10-CM

## 2019-12-10 PROCEDURE — 3078F PR MOST RECENT DIASTOLIC BLOOD PRESSURE < 80 MM HG: ICD-10-PCS | Mod: HCNC,CPTII,S$GLB, | Performed by: FAMILY MEDICINE

## 2019-12-10 PROCEDURE — 99999 PR PBB SHADOW E&M-EST. PATIENT-LVL III: ICD-10-PCS | Mod: PBBFAC,HCNC,, | Performed by: FAMILY MEDICINE

## 2019-12-10 PROCEDURE — 99499 UNLISTED E&M SERVICE: CPT | Mod: S$GLB,,, | Performed by: FAMILY MEDICINE

## 2019-12-10 PROCEDURE — 3075F SYST BP GE 130 - 139MM HG: CPT | Mod: HCNC,CPTII,S$GLB, | Performed by: FAMILY MEDICINE

## 2019-12-10 PROCEDURE — 3075F PR MOST RECENT SYSTOLIC BLOOD PRESS GE 130-139MM HG: ICD-10-PCS | Mod: HCNC,CPTII,S$GLB, | Performed by: FAMILY MEDICINE

## 2019-12-10 PROCEDURE — 99397 PR PREVENTIVE VISIT,EST,65 & OVER: ICD-10-PCS | Mod: HCNC,S$GLB,, | Performed by: FAMILY MEDICINE

## 2019-12-10 PROCEDURE — 99999 PR PBB SHADOW E&M-EST. PATIENT-LVL III: CPT | Mod: PBBFAC,HCNC,, | Performed by: FAMILY MEDICINE

## 2019-12-10 PROCEDURE — 99499 RISK ADDL DX/OHS AUDIT: ICD-10-PCS | Mod: S$GLB,,, | Performed by: FAMILY MEDICINE

## 2019-12-10 PROCEDURE — 99397 PER PM REEVAL EST PAT 65+ YR: CPT | Mod: HCNC,S$GLB,, | Performed by: FAMILY MEDICINE

## 2019-12-10 PROCEDURE — 3078F DIAST BP <80 MM HG: CPT | Mod: HCNC,CPTII,S$GLB, | Performed by: FAMILY MEDICINE

## 2019-12-10 NOTE — PROGRESS NOTES
Subjective:       Patient ID: Chikis Epstein is a 74 y.o. female.    Chief Complaint: Annual Exam    75 yo presents for routine exam, no complaints.  Under increased stress recently, family member (cousin) has terminal cancer    Review of Systems   Constitutional: Negative.  Negative for activity change, appetite change, chills, diaphoresis, fatigue, fever and unexpected weight change.   HENT: Negative.  Negative for congestion, ear pain, hearing loss, rhinorrhea, sinus pressure, sore throat, tinnitus, trouble swallowing and voice change.    Eyes: Negative.  Negative for photophobia, pain and visual disturbance.   Respiratory: Negative.  Negative for cough, chest tightness and shortness of breath.    Cardiovascular: Negative.  Negative for chest pain, palpitations and leg swelling.   Gastrointestinal: Negative.  Negative for abdominal distention, abdominal pain, blood in stool, constipation, diarrhea, nausea and vomiting.   Endocrine: Negative for polydipsia, polyphagia and polyuria.   Genitourinary: Negative.  Negative for difficulty urinating, dysuria, frequency, hematuria, menstrual problem, pelvic pain, vaginal bleeding and vaginal discharge.   Musculoskeletal: Negative.  Negative for arthralgias, back pain, joint swelling, neck pain and neck stiffness.   Skin: Negative for color change, pallor and rash.   Neurological: Negative.  Negative for dizziness, tremors, speech difficulty, weakness, light-headedness, numbness and headaches.   Hematological: Negative for adenopathy. Does not bruise/bleed easily.   Psychiatric/Behavioral: Negative for agitation, confusion, dysphoric mood, hallucinations and sleep disturbance. The patient is not nervous/anxious.        Objective:      Physical Exam   Constitutional: She is oriented to person, place, and time. She appears well-developed and well-nourished.   HENT:   Right Ear: Tympanic membrane, external ear and ear canal normal.   Left Ear: Tympanic membrane, external ear  and ear canal normal.   Nose: Nose normal.   Mouth/Throat: Oropharynx is clear and moist. No posterior oropharyngeal erythema.   Eyes: Pupils are equal, round, and reactive to light. Conjunctivae and EOM are normal.   Neck: Normal range of motion. Neck supple. No tracheal deviation present. No thyromegaly present.   Cardiovascular: Normal rate, regular rhythm, normal heart sounds and intact distal pulses.   Pulmonary/Chest: Effort normal. She has no wheezes. She has no rales. She exhibits no tenderness.   Abdominal: Soft. Bowel sounds are normal. She exhibits no distension and no mass. There is no rebound.   Musculoskeletal: Normal range of motion. She exhibits no edema or tenderness.   Lymphadenopathy:     She has no cervical adenopathy.   Neurological: She is alert and oriented to person, place, and time. She has normal reflexes. No cranial nerve deficit. Coordination normal.   Skin: Skin is warm and dry. No rash noted. No erythema.   Psychiatric: She has a normal mood and affect. Her behavior is normal.       Assessment:       1. Routine general medical examination at a health care facility    2. Pre-diabetes    3. Essential hypertension    4. Pure hypercholesterolemia    5. Screening mammogram for high-risk patient        Plan:       1.  Return for fasting labs  2.  Mammogram  3.  Fluzone, Pneumovax, Shingrix  4.  Continue present medications

## 2019-12-13 ENCOUNTER — LAB VISIT (OUTPATIENT)
Dept: LAB | Facility: HOSPITAL | Age: 74
End: 2019-12-13
Attending: FAMILY MEDICINE
Payer: MEDICARE

## 2019-12-13 DIAGNOSIS — R73.03 PRE-DIABETES: ICD-10-CM

## 2019-12-13 DIAGNOSIS — E78.00 PURE HYPERCHOLESTEROLEMIA: ICD-10-CM

## 2019-12-13 DIAGNOSIS — I10 ESSENTIAL HYPERTENSION: ICD-10-CM

## 2019-12-13 LAB
ALBUMIN SERPL BCP-MCNC: 4 G/DL (ref 3.5–5.2)
ALP SERPL-CCNC: 86 U/L (ref 55–135)
ALT SERPL W/O P-5'-P-CCNC: 24 U/L (ref 10–44)
ANION GAP SERPL CALC-SCNC: 11 MMOL/L (ref 8–16)
AST SERPL-CCNC: 33 U/L (ref 10–40)
BASOPHILS # BLD AUTO: 0.08 K/UL (ref 0–0.2)
BASOPHILS NFR BLD: 1.1 % (ref 0–1.9)
BILIRUB SERPL-MCNC: 0.4 MG/DL (ref 0.1–1)
BUN SERPL-MCNC: 15 MG/DL (ref 8–23)
CALCIUM SERPL-MCNC: 10.2 MG/DL (ref 8.7–10.5)
CHLORIDE SERPL-SCNC: 105 MMOL/L (ref 95–110)
CHOLEST SERPL-MCNC: 180 MG/DL (ref 120–199)
CHOLEST/HDLC SERPL: 2.6 {RATIO} (ref 2–5)
CO2 SERPL-SCNC: 24 MMOL/L (ref 23–29)
CREAT SERPL-MCNC: 0.8 MG/DL (ref 0.5–1.4)
DIFFERENTIAL METHOD: NORMAL
EOSINOPHIL # BLD AUTO: 0.2 K/UL (ref 0–0.5)
EOSINOPHIL NFR BLD: 2.4 % (ref 0–8)
ERYTHROCYTE [DISTWIDTH] IN BLOOD BY AUTOMATED COUNT: 12.7 % (ref 11.5–14.5)
EST. GFR  (AFRICAN AMERICAN): >60 ML/MIN/1.73 M^2
EST. GFR  (NON AFRICAN AMERICAN): >60 ML/MIN/1.73 M^2
ESTIMATED AVG GLUCOSE: 126 MG/DL (ref 68–131)
GLUCOSE SERPL-MCNC: 111 MG/DL (ref 70–110)
HBA1C MFR BLD HPLC: 6 % (ref 4–5.6)
HCT VFR BLD AUTO: 41.3 % (ref 37–48.5)
HDLC SERPL-MCNC: 69 MG/DL (ref 40–75)
HDLC SERPL: 38.3 % (ref 20–50)
HGB BLD-MCNC: 13.3 G/DL (ref 12–16)
IMM GRANULOCYTES # BLD AUTO: 0.03 K/UL (ref 0–0.04)
IMM GRANULOCYTES NFR BLD AUTO: 0.4 % (ref 0–0.5)
LDLC SERPL CALC-MCNC: 94.8 MG/DL (ref 63–159)
LYMPHOCYTES # BLD AUTO: 1.5 K/UL (ref 1–4.8)
LYMPHOCYTES NFR BLD: 21.3 % (ref 18–48)
MCH RBC QN AUTO: 30.1 PG (ref 27–31)
MCHC RBC AUTO-ENTMCNC: 32.2 G/DL (ref 32–36)
MCV RBC AUTO: 93 FL (ref 82–98)
MONOCYTES # BLD AUTO: 0.5 K/UL (ref 0.3–1)
MONOCYTES NFR BLD: 7 % (ref 4–15)
NEUTROPHILS # BLD AUTO: 4.8 K/UL (ref 1.8–7.7)
NEUTROPHILS NFR BLD: 67.8 % (ref 38–73)
NONHDLC SERPL-MCNC: 111 MG/DL
NRBC BLD-RTO: 0 /100 WBC
PLATELET # BLD AUTO: 288 K/UL (ref 150–350)
PMV BLD AUTO: 10.8 FL (ref 9.2–12.9)
POTASSIUM SERPL-SCNC: 4.3 MMOL/L (ref 3.5–5.1)
PROT SERPL-MCNC: 7.3 G/DL (ref 6–8.4)
RBC # BLD AUTO: 4.42 M/UL (ref 4–5.4)
SODIUM SERPL-SCNC: 140 MMOL/L (ref 136–145)
TRIGL SERPL-MCNC: 81 MG/DL (ref 30–150)
TSH SERPL DL<=0.005 MIU/L-ACNC: 2.34 UIU/ML (ref 0.4–4)
WBC # BLD AUTO: 7.12 K/UL (ref 3.9–12.7)

## 2019-12-13 PROCEDURE — 80061 LIPID PANEL: CPT | Mod: HCNC

## 2019-12-13 PROCEDURE — 83036 HEMOGLOBIN GLYCOSYLATED A1C: CPT | Mod: HCNC

## 2019-12-13 PROCEDURE — 84443 ASSAY THYROID STIM HORMONE: CPT | Mod: HCNC

## 2019-12-13 PROCEDURE — 80053 COMPREHEN METABOLIC PANEL: CPT | Mod: HCNC

## 2019-12-13 PROCEDURE — 85025 COMPLETE CBC W/AUTO DIFF WBC: CPT | Mod: HCNC

## 2019-12-13 PROCEDURE — 36415 COLL VENOUS BLD VENIPUNCTURE: CPT | Mod: HCNC,PO

## 2019-12-27 ENCOUNTER — HOSPITAL ENCOUNTER (OUTPATIENT)
Dept: RADIOLOGY | Facility: HOSPITAL | Age: 74
Discharge: HOME | End: 2019-12-27
Attending: FAMILY MEDICINE
Payer: MEDICARE

## 2019-12-27 DIAGNOSIS — Z12.31 SCREENING MAMMOGRAM FOR HIGH-RISK PATIENT: ICD-10-CM

## 2019-12-27 PROCEDURE — 77067 SCR MAMMO BI INCL CAD: CPT | Mod: 26,HCNC,, | Performed by: RADIOLOGY

## 2019-12-27 PROCEDURE — 77063 MAMMO DIGITAL SCREENING BILAT WITH TOMOSYNTHESIS_CAD: ICD-10-PCS | Mod: 26,HCNC,, | Performed by: RADIOLOGY

## 2019-12-27 PROCEDURE — 77067 SCR MAMMO BI INCL CAD: CPT | Mod: TC,HCNC,PO

## 2019-12-27 PROCEDURE — 77063 BREAST TOMOSYNTHESIS BI: CPT | Mod: 26,HCNC,, | Performed by: RADIOLOGY

## 2019-12-27 PROCEDURE — 77067 MAMMO DIGITAL SCREENING BILAT WITH TOMOSYNTHESIS_CAD: ICD-10-PCS | Mod: 26,HCNC,, | Performed by: RADIOLOGY

## 2020-01-09 NOTE — PROGRESS NOTES
"01/09/2020  Reviewed chart.  Pt notes in her Comments that she is "out of town" 1/5 through 1/10 or 1/12.  Postpone my call 1 week.  Saw MD on 12/10 and BP was 134/70 there.  Notes when she uses [facility] cuff that readings are always lower.    Last 5 Patient Entered Readings                                      Current 30 Day Average: 147/72     Recent Readings 1/4/2020 12/30/2019 12/28/2019 12/28/2019 12/22/2019    SBP (mmHg) 141 147 143 148 154    DBP (mmHg) 65 71 71 75 75    Pulse 74 78 63 65 82          "

## 2020-01-16 ENCOUNTER — PATIENT OUTREACH (OUTPATIENT)
Dept: OTHER | Facility: OTHER | Age: 75
End: 2020-01-16

## 2020-01-16 NOTE — PROGRESS NOTES
"Digital Medicine: Clinician Follow-Up    2020  Spoke to patient.  States that she had a "rough road" these past few months: several deaths, surgeries in family.  Traveled to [facility] to visit the niece of her sister who . That niece had surgery for a cancerous tumor which was removed and report is that she is cancer-free.  Patient is recovering from all of this but still has a cousin on hospice who is expected to die this week.  She is the wife of her cousin she considered her brother who  last month.      The history is provided by the patient. No  was used.     Follow Up  Follow-up reason(s): reading review          INTERVENTION(S)  encouragement/support    PLAN  continue monitoring    No med changes today.  Pt uses Omron cuff too.  Can increase Valsartan in future if needed.          There are no preventive care reminders to display for this patient.    Last 5 Patient Entered Readings                                      Current 30 Day Average: 140/70     Recent Readings 1/15/2020 2020 2020 2019 2019    SBP (mmHg) 141 111 141 147 143    DBP (mmHg) 66 66 65 71 71    Pulse 79 92 74 78 63             Hypertension Medications             amLODIPine (NORVASC) 10 MG tablet Take 1 tablet (10 mg total) by mouth once daily.    valsartan (DIOVAN) 160 MG tablet Take 1 tablet (160 mg total) by mouth once daily.                 Screenings  "

## 2020-02-13 ENCOUNTER — PES CALL (OUTPATIENT)
Dept: ADMINISTRATIVE | Facility: CLINIC | Age: 75
End: 2020-02-13

## 2020-02-14 ENCOUNTER — PATIENT OUTREACH (OUTPATIENT)
Dept: OTHER | Facility: OTHER | Age: 75
End: 2020-02-14

## 2020-02-28 NOTE — PROGRESS NOTES
"Digital Medicine: Health  Follow-Up    Patient denies any other concerns at this time.           Follow Up  Follow-up reason(s): reading review      Readings are trending up due to lifestyle change.    Patient attributes recent elevation to family being in town for [place] and has not been as strict on her diet.  Patient reports she has been "feeling good" though and her stress level is finally back down from all of her family problems from last year.       INTERVENTION(S)  recommended diet modifications and encouragement/support    PLAN  continue monitoring      There are no preventive care reminders to display for this patient.    Last 5 Patient Entered Readings                                      Current 30 Day Average: 156/77     Recent Readings 2/24/2020 2/13/2020 2/13/2020 1/15/2020 1/13/2020    SBP (mmHg) 164 147 150 141 111    DBP (mmHg) 81 74 75 66 66    Pulse 66 73 72 79 92            Diet Screening       Patient reports her dietary changes have "changed a bit" and she has not been as strict.  Encouraged patient to continue to focus on salt restriction and start getting back into her routine.     "

## 2020-03-13 ENCOUNTER — PATIENT OUTREACH (OUTPATIENT)
Dept: OTHER | Facility: OTHER | Age: 75
End: 2020-03-13

## 2020-03-13 NOTE — PROGRESS NOTES
"Digital Medicine: Clinician Follow-Up    03/13/2020  Spoke to patient and asked for more readings.  She states she is feeling "fine" so she is not worried about the blood pressure.  Has Annual Wellness Check-up on 3/17 and will bring cuff in for [id number] comparison.  Also has Omron cuff at home and uses that at times.      The history is provided by the patient. No  was used.     Follow Up  Follow-up reason(s): reading review      Readings are missing.   patient reminder needed.      INTERVENTION(S)  encouragement/support    PLAN  additional monitoring needed    Pt feels she is in a "good place" so not ready for medication change.  Will review her chart in 2 weeks and determine when f/u needed.            Last 5 Patient Entered Readings                                      Current 30 Day Average: 151/74     Recent Readings 3/5/2020 3/5/2020 2/24/2020 2/13/2020 2/13/2020    SBP (mmHg) 141 141 164 147 150    DBP (mmHg) 70 69 81 74 75    Pulse 69 74 66 73 72             Hypertension Medications             amLODIPine (NORVASC) 10 MG tablet Take 1 tablet (10 mg total) by mouth once daily.    valsartan (DIOVAN) 160 MG tablet Take 1 tablet (160 mg total) by mouth once daily.                             Medication Adherence Screening   She did not miss a dose this month.    "

## 2020-03-13 NOTE — PROGRESS NOTES
Digital Medicine: Clinician Follow-Up    3/13/2020          Intervention/Plan    There are no preventive care reminders to display for this patient.    Last 5 Patient Entered Readings                                      Current 30 Day Average: 151/74     Recent Readings 3/5/2020 3/5/2020 2/24/2020 2/13/2020 2/13/2020    SBP (mmHg) 141 141 164 147 150    DBP (mmHg) 70 69 81 74 75    Pulse 69 74 66 73 72             Hypertension Medications             amLODIPine (NORVASC) 10 MG tablet Take 1 tablet (10 mg total) by mouth once daily.    valsartan (DIOVAN) 160 MG tablet Take 1 tablet (160 mg total) by mouth once daily.                 Screenings   Mother's Blood Type:  B+ Infant's Blood Type: B+/Ciara negative. T bili 5 on 10/11 and phototherapy initiated.     10/17 T bili 5.5 down from 6.4   10/18 decreased bili to 5.2 on single phototherapy; below recommended light level. Phototherapy discontinued.  10/19 T/D bili 6.3/0.5, slight rebound. Borderline.   10/22 T/D bili 6.6/0.8 stable on full feeds.  Plan: Follow clinically.

## 2020-03-16 ENCOUNTER — TELEPHONE (OUTPATIENT)
Dept: INTERNAL MEDICINE | Facility: CLINIC | Age: 75
End: 2020-03-16

## 2020-03-17 ENCOUNTER — DOCUMENTATION ONLY (OUTPATIENT)
Dept: INTERNAL MEDICINE | Facility: CLINIC | Age: 75
End: 2020-03-17

## 2020-03-17 NOTE — PROGRESS NOTES
Patient rescheduled her appt & her 's appt due to corona virus concerns- requested several months in future to reschedule.

## 2020-03-27 NOTE — PROGRESS NOTES
03/27/2020  Reviewed chart.  Pt's 3/17 clinic appt cancelled bc of COVID-19.  Average slightly improved.  Can increase Valsartan to 320 if needed.      Last 5 Patient Entered Readings                                      Current 30 Day Average: 146/73     Recent Readings 3/25/2020 3/25/2020 3/20/2020 3/13/2020 3/13/2020    SBP (mmHg) 142 153 161 140 157    DBP (mmHg) 76 77 75 65 76    Pulse 69 71 65 65 65

## 2020-04-15 ENCOUNTER — PATIENT OUTREACH (OUTPATIENT)
Dept: OTHER | Facility: OTHER | Age: 75
End: 2020-04-15

## 2020-04-15 NOTE — PROGRESS NOTES
Digital Medicine: Clinician Follow-Up    04/15/2020  LM for pt. Ask if she will increase Valsartan to BID dosing. Pt returned my call.  States that she is feeling tired and wonders whether her BP medication is contributing to it.  I explained that her readings do not suggest that she should feel sluggish.  Agrees to take an extra half of Valsartan 160 (80 mg) daily.      The history is provided by the patient. No  was used.     Follow Up  Follow-up reason(s): reading review      Readings are trending up       INTERVENTION(S)  recommended med change and encouragement/support    PLAN  patient amenable to changes and continue monitoring    Pt agrees to take an extra 80 mg of Valsartan nightly.           Last 5 Patient Entered Readings                                      Current 30 Day Average: 149/74     Recent Readings 4/8/2020 4/8/2020 4/1/2020 3/25/2020 3/25/2020    SBP (mmHg) 151 162 143 142 153    DBP (mmHg) 69 74 72 76 77    Pulse 72 69 70 69 71             Hypertension Medications             amLODIPine (NORVASC) 10 MG tablet Take 1 tablet (10 mg total) by mouth once daily.    valsartan (DIOVAN) 160 MG tablet Take 1 tablet (160 mg total) by mouth once daily.                             Medication Adherence Screening   She did not miss a dose this month.

## 2020-04-29 ENCOUNTER — PATIENT OUTREACH (OUTPATIENT)
Dept: OTHER | Facility: OTHER | Age: 75
End: 2020-04-29

## 2020-04-29 DIAGNOSIS — I10 ESSENTIAL HYPERTENSION: Primary | ICD-10-CM

## 2020-04-29 RX ORDER — VALSARTAN 160 MG/1
240 TABLET ORAL DAILY
Qty: 135 TABLET | Refills: 3 | Status: SHIPPED | OUTPATIENT
Start: 2020-04-29 | End: 2021-02-15

## 2020-04-29 NOTE — PROGRESS NOTES
Digital Medicine: Clinician Follow-Up    04/29/2020 Spoke to patient whose average and readings improved on increased dose of Valsartan. Pt gets better readings with Omron cuff so entering those manually. States not feeling any more sluggish since med increase.  States all family is well.      The history is provided by the patient. No  was used.     Follow Up  Follow-up reason(s): reading review and medication change follow-up      Readings are trending down   Patient started new medication.    Is patient tolerating med change?:  Yes      INTERVENTION(S)  encouragement/support    PLAN  continue monitoring    Pt will continue on Valsartan 160 qAM and 80 qPM.  Updated Medication List for [employer] refill.  Check chart in 1 month.           Last 5 Patient Entered Readings                                      Current 30 Day Average: 145/73     Recent Readings 4/28/2020 4/28/2020 4/25/2020 4/22/2020 4/20/2020    SBP (mmHg) 134 157 155 146 147    DBP (mmHg) 74 68 78 73 77    Pulse 69 75 68 63 63             Hypertension Medications             amLODIPine (NORVASC) 10 MG tablet Take 1 tablet (10 mg total) by mouth once daily.    valsartan (DIOVAN) 160 MG tablet Take 1 tablet (160 mg total) by mouth once daily.                             Medication Adherence Screening   She did not miss a dose this month.

## 2020-05-07 ENCOUNTER — PATIENT OUTREACH (OUTPATIENT)
Dept: OTHER | Facility: OTHER | Age: 75
End: 2020-05-07

## 2020-05-07 NOTE — PROGRESS NOTES
"Digital Medicine: Health  Follow-Up    Patient reports "she is anxious to get out".  Encouraged patient to continue to follow the government orders.  Patient denies any other concerns at this time.           Follow Up  Follow-up reason(s): reading review      Patient reports she is doing "well overall".       INTERVENTION(S)  encouragement/support and denied further coaching    PLAN  continue monitoring      There are no preventive care reminders to display for this patient.    Last 5 Patient Entered Readings                                      Current 30 Day Average: 145/73     Recent Readings 5/3/2020 4/28/2020 4/28/2020 4/25/2020 4/22/2020    SBP (mmHg) 144 134 157 155 146    DBP (mmHg) 73 74 68 78 73    Pulse 65 69 75 68 63            Diet Screening   No change to diet.      Physical Activity Screening   No change to exercise routine.        Medication Adherence Screening       Confirmed that patient started new medication regimen as prescribed by Digital Medicine Clinician.      "

## 2020-05-26 ENCOUNTER — IMMUNIZATION (OUTPATIENT)
Dept: PHARMACY | Facility: CLINIC | Age: 75
End: 2020-05-26
Payer: MEDICARE

## 2020-05-26 NOTE — PROGRESS NOTES
05/26/2020  Reviewed chart.  Average improved. Postpone my f/u call.      Last 5 Patient Entered Readings                                      Current 30 Day Average: 142/71     Recent Readings 5/20/2020 5/13/2020 5/9/2020 5/3/2020 4/28/2020    SBP (mmHg) 149 138 147 144 134    DBP (mmHg) 72 70 71 73 74    Pulse 66 80 81 65 69

## 2020-06-22 ENCOUNTER — PATIENT OUTREACH (OUTPATIENT)
Dept: OTHER | Facility: OTHER | Age: 75
End: 2020-06-22

## 2020-06-22 NOTE — PROGRESS NOTES
Digital Medicine: Clinician Follow-Up    06/22/2020 Spoke to patient.  Average close to goal.  States having occasional night sweats which she has not had in the past.  She agrees to increase Valsartan to 1 whole tablet twice daily.  She states that she would like to take a trip this summer but is still leery about traveling bc of Covid 19.      The history is provided by the patient. No  was used.   Follow Up  Follow-up reason(s): reading review and medication change      Readings are trending down   Medication Change: dose increase        INTERVENTION(S)  recommended med change and encouragement/support    PLAN  patient amenable to changes and continue monitoring    Spent a lot of time listening to patient and offered encouragement.  Will do 2 week trial of increased Valsartan.            Last 5 Patient Entered Readings                                      Current 30 Day Average: 135/71     Recent Readings 6/17/2020 6/8/2020 5/31/2020 5/20/2020 5/13/2020    SBP (mmHg) 132 139 134 149 138    DBP (mmHg) 72 74 67 72 70    Pulse 74 74 72 66 80             Hypertension Medications             amLODIPine (NORVASC) 10 MG tablet Take 1 tablet (10 mg total) by mouth once daily.    valsartan (DIOVAN) 160 MG tablet Take 1.5 tablets (240 mg total) by mouth once daily. 1 tablet qAM and 1/2 tablet qPM.  *Increase to 1 tablet twice daily.                              Medication Adherence Screening   She did not miss a dose this month.

## 2020-07-01 ENCOUNTER — OFFICE VISIT (OUTPATIENT)
Dept: INTERNAL MEDICINE | Facility: CLINIC | Age: 75
End: 2020-07-01
Payer: MEDICARE

## 2020-07-01 VITALS
HEIGHT: 60 IN | BODY MASS INDEX: 30.64 KG/M2 | SYSTOLIC BLOOD PRESSURE: 122 MMHG | RESPIRATION RATE: 15 BRPM | OXYGEN SATURATION: 97 % | WEIGHT: 156.06 LBS | HEART RATE: 76 BPM | DIASTOLIC BLOOD PRESSURE: 63 MMHG

## 2020-07-01 DIAGNOSIS — I70.0 AORTO-ILIAC ATHEROSCLEROSIS: ICD-10-CM

## 2020-07-01 DIAGNOSIS — Z13.31 POSITIVE DEPRESSION SCREENING: ICD-10-CM

## 2020-07-01 DIAGNOSIS — Z00.00 ENCOUNTER FOR PREVENTIVE HEALTH EXAMINATION: Primary | ICD-10-CM

## 2020-07-01 DIAGNOSIS — H90.3 SENSORINEURAL HEARING LOSS (SNHL) OF BOTH EARS: ICD-10-CM

## 2020-07-01 DIAGNOSIS — M50.30 DDD (DEGENERATIVE DISC DISEASE), CERVICAL: ICD-10-CM

## 2020-07-01 DIAGNOSIS — M46.92 UNSPECIFIED INFLAMMATORY SPONDYLOPATHY, CERVICAL REGION: ICD-10-CM

## 2020-07-01 DIAGNOSIS — I77.810 ASCENDING AORTA DILATATION: ICD-10-CM

## 2020-07-01 DIAGNOSIS — I70.8 AORTO-ILIAC ATHEROSCLEROSIS: ICD-10-CM

## 2020-07-01 DIAGNOSIS — Z86.39 HISTORY OF DIABETES MELLITUS: ICD-10-CM

## 2020-07-01 DIAGNOSIS — I10 HYPERTENSION, UNSPECIFIED TYPE: ICD-10-CM

## 2020-07-01 DIAGNOSIS — I70.0 ATHEROSCLEROSIS OF AORTA: ICD-10-CM

## 2020-07-01 DIAGNOSIS — R26.9 ABNORMALITY OF GAIT AND MOBILITY: ICD-10-CM

## 2020-07-01 DIAGNOSIS — M47.812 FACET ARTHRITIS OF CERVICAL REGION: ICD-10-CM

## 2020-07-01 DIAGNOSIS — F41.9 ANXIETY: ICD-10-CM

## 2020-07-01 DIAGNOSIS — K25.9 PYLORUS ULCER, UNSPECIFIED CHRONICITY: ICD-10-CM

## 2020-07-01 DIAGNOSIS — E78.00 PURE HYPERCHOLESTEROLEMIA: ICD-10-CM

## 2020-07-01 DIAGNOSIS — E66.09 CLASS 1 OBESITY DUE TO EXCESS CALORIES WITH SERIOUS COMORBIDITY AND BODY MASS INDEX (BMI) OF 30.0 TO 30.9 IN ADULT: ICD-10-CM

## 2020-07-01 PROCEDURE — 99999 PR PBB SHADOW E&M-EST. PATIENT-LVL V: ICD-10-PCS | Mod: PBBFAC,HCNC,, | Performed by: NURSE PRACTITIONER

## 2020-07-01 PROCEDURE — G0439 PR MEDICARE ANNUAL WELLNESS SUBSEQUENT VISIT: ICD-10-PCS | Mod: HCNC,S$GLB,, | Performed by: NURSE PRACTITIONER

## 2020-07-01 PROCEDURE — 3074F SYST BP LT 130 MM HG: CPT | Mod: HCNC,CPTII,S$GLB, | Performed by: NURSE PRACTITIONER

## 2020-07-01 PROCEDURE — 99999 PR PBB SHADOW E&M-EST. PATIENT-LVL V: CPT | Mod: PBBFAC,HCNC,, | Performed by: NURSE PRACTITIONER

## 2020-07-01 PROCEDURE — 3078F PR MOST RECENT DIASTOLIC BLOOD PRESSURE < 80 MM HG: ICD-10-PCS | Mod: HCNC,CPTII,S$GLB, | Performed by: NURSE PRACTITIONER

## 2020-07-01 PROCEDURE — G0439 PPPS, SUBSEQ VISIT: HCPCS | Mod: HCNC,S$GLB,, | Performed by: NURSE PRACTITIONER

## 2020-07-01 PROCEDURE — 3078F DIAST BP <80 MM HG: CPT | Mod: HCNC,CPTII,S$GLB, | Performed by: NURSE PRACTITIONER

## 2020-07-01 PROCEDURE — 99499 RISK ADDL DX/OHS AUDIT: ICD-10-PCS | Mod: HCNC,S$GLB,, | Performed by: NURSE PRACTITIONER

## 2020-07-01 PROCEDURE — 3074F PR MOST RECENT SYSTOLIC BLOOD PRESSURE < 130 MM HG: ICD-10-PCS | Mod: HCNC,CPTII,S$GLB, | Performed by: NURSE PRACTITIONER

## 2020-07-01 PROCEDURE — 99499 UNLISTED E&M SERVICE: CPT | Mod: HCNC,S$GLB,, | Performed by: NURSE PRACTITIONER

## 2020-07-01 NOTE — PATIENT INSTRUCTIONS
Counseling and Referral of Other Preventative  (Italic type indicates deductible and co-insurance are waived)    Patient Name: Chikis Epstein  Today's Date: 7/1/2020    Health Maintenance       Date Due Completion Date    Pneumococcal Vaccine (65+ Low/Medium Risk) (2 of 2 - PPSV23) deferred-covid 19 pandemic 12/23/2015    Influenza Vaccine (1) 09/01/2020   12/10/2019 (Done)    Override on 12/10/2019: Done    DEXA SCAN 11/28/2020 11/28/2017        High Dose Statin 12/10/2020   12/10/2019    Mammogram 12/27/2020 12/27/2019    Colorectal Cancer Screening 09/17/2024 9/17/2014    Lipid Panel 12/13/2020 12/13/2019    TETANUS VACCINE 11/27/2028 11/27/2018    Override on 11/27/2018: Done      Annual eye- due    Cardiology appt- or PCP- eevaluate B/P & dilation /2 d echo    Digital B/P  - cuff 156/88  Manual B/P cuff 124/68- not correlating- return for new digital cuff    Positive depression screen- follow up with PCP                No orders of the defined types were placed in this encounter.    The following information is provided to all patients.  This information is to help you find resources for any of the problems found today that may be affecting your health:                Living healthy guide: www.[facility].[place].gov      Understanding Diabetes: www.diabetes.org      Eating healthy: www.cdc.gov/healthyweight      CDC home safety checklist: www.cdc.gov/steadi/patient.html      Agency on Aging: www.goea.[place].gov      Alcoholics anonymous (AA): www.aa.org      Physical Activity: www.kenji.nih.gov/ia0luha      Tobacco use: www.quitwithusla.org

## 2020-07-01 NOTE — PROGRESS NOTES
Chikis Epstein presented for a  Medicare AWV and comprehensive Health Risk Assessment today. The following components were reviewed and updated:    · Medical history  · Family History  · Social history  · Allergies and Current Medications  · Health Risk Assessment  · Health Maintenance  · Care Team     ** See Completed Assessments for Annual Wellness Visit within the encounter summary.**       The following assessments were completed:  · Living Situation  · CAGE  · Depression Screening  · Timed Get Up and Go  · Whisper Test  · Cognitive Function Screening  · Nutrition Screening  · ADL Screening  · PAQ Screening    Vitals:    20 1206   BP: 122/63   BP Location: Right arm   Patient Position: Sitting   BP Method: Medium (Manual)   Pulse: 76   Resp: 15   SpO2: 97%   Weight: 70.8 kg (156 lb 1.4 oz)   Height: 5' (1.524 m)     Body mass index is 30.48 kg/m².  Physical Exam  Vitals signs and nursing note reviewed.   Constitutional:       Appearance: She is well-developed.   HENT:      Head: Normocephalic.      Ears:      Comments: Hearing aides  Cardiovascular:      Rate and Rhythm: Normal rate and regular rhythm.      Heart sounds: Normal heart sounds. No murmur.   Pulmonary:      Effort: Pulmonary effort is normal.      Breath sounds: Normal breath sounds.   Abdominal:      General: Bowel sounds are normal.      Palpations: Abdomen is soft. There is no mass.      Comments: obese   Musculoskeletal: Normal range of motion.   Neurological:      Mental Status: She is alert and oriented to person, place, and time.      Motor: No abnormal muscle tone.           Lab Results   Component Value Date    HGBA1C 6.0 (H) 2019    CHOL 180 2019    HDL 69 2019    LDLCALC 94.8 2019    TRIG 81 2019    CHOLHDL 38.3 2019      Results for orders placed in visit on 17   DXA Bone Density Spine And Hip    Narrative : 1945 ORDERING PHYSICIAN: Area,L LOCATION: [facility]    HISTORY: 72  y/o female with no hx of fractures. She had surgical menopause at 39y/o. She is taking Calcium and 1,000 units of Vit D supplements. She has a hx of Kidney Stones. She does not exercise or smoke.    TECHNIQUE: Bone Mineral Density performed using Hologic Horizon A (125132C) reveals good positioning of lumbar spine and hip.    BONE MINERAL DENSITY RESULTS:  Lumbar Spine: Lumbar bone mineral density L1-L4 is 0.984g/cm2, which is a t-score of -0.6. The z-score is 1.7.    Total Hip: The total hip bone mineral density is 0.946g/cm2.  The t-score is 0.0, and the z-score is 1.6.  Femoral neck BMD is 0.779g/cm2 and the t-score is -0.6.    COMPARISONS:  Date   Location  BMD  T-score  01/20/12  L-spine  1.048  0.0     Total Hip  1.002  0.5    Impression  Normal BMD of lumbar spine and hip.  Decrease in hip (-5.6%) and lumbar spine (-6.1%) BMD since prior study.          Recommendations:  1) Adequate calcium and Vitamin D therapy  2) Appropriate exercise  3) Consider repeat BMD in 4 years    EXPLANATION OF RESULTS:  The t-score compares this results to the bone density of a 25 year old of the same gender. The z-score compares this result to the average bone density to people of the same age and gender. The amounts indicate the number of standard deviations above or below the mean.  * Osteoporosis is generally defined as having a t-score between less than -2.5.  * Osteopenia is generally defined as having a t-score between -1 and -2.5.  * The normal range is generally defined as having a t-score between -1 to 1.      Electronically signed by: MELANIA GRANDE MD  Date:     12/04/17  Time:    08:11      Results for orders placed during the hospital encounter of 09/03/14   Mammo Digital Screening Bilat with CAD    Narrative The present examination has been compared to prior imaging studies.    BILATERAL MAMMOGRAM FINDINGS:  The breast tissue is heterogeneously dense.  This may lower the sensitivity of  mammography.    No  significant abnormalities are seen.  There are no significant changes from  the prior study.    These images  were processed to produce digital images analyzed for potential  abnormalities.      Impression There is no mammographic evidence of malignancy.    Mammogram in 1 year is recommended.    ACR BI-RADS Category 1: Negative      VADIM STOREY M.D.  09/03/2014       Diagnoses and health risks identified today and associated recommendations/orders:    1. History of diabetes mellitus  Stable- followed by PCP    2. Pylorus ulcer, unspecified chronicity  Stable- followed by PCP    3. Facet arthritis of cervical region  Stable- followed by PCP    4. Atherosclerosis of aorta  Stable- followed by PCP    5. Ascending aorta dilatation  Stable- followed by PCP-last 2 d echo 2018    6. Aorto-iliac atherosclerosis  Stable- followed by PCP    7. Pure hypercholesterolemia  Stable- followed by PCP    8. DDD (degenerative disc disease), cervical  Stable- followed by PCP    9. Anxiety  Stable- followed by PCP    10. Hypertension, unspecified type  Stable- followed by PCP, HTN digital med program    11. Unspecified inflammatory spondylopathy, cervical region  Stable- followed by PCP    12. Abnormality of gait and mobility  Stable- followed by PCP    13. Encounter for preventive health examination  Here for Health Risk Assessment/Annual Wellness Visit.  Health maintenance reviewed and updated. Follow up in one year.       14. Class 1 obesity due to excess calories with serious comorbidity and body mass index (BMI) of 30.0 to 30.9 in adult  Chronic. Followed by PCP.   Centers for Disease Control and Prevention (CDC)  weight recommendations for current BMI & ideal BMI range discussed with patient.  Recommended  Low fat DM diet, regular exercise encouraged q d. covid 19 prevention during pandemic- home exercise regimen preferred.    15. Positive depression screening  Stable- followed by PCP-    Provided Chikis with a 5-10 year  written screening schedule and personal prevention plan. Recommendations were developed using the USPSTF age appropriate recommendations. Education, counseling, and referrals were provided as needed. After Visit Summary printed and given to patient which includes a list of additional screenings\tests needed. + depression screen- referred back to PCP fro reevaluation-states has had deaths in family, covid 19 etc. Denies suicide thoughts. Here w . Digital B/P cuff reading higher than manual in clinic today: digital 156/88 my manual B/P 124/68- not correlating with digital med cuff. Advised pt to call digital med dept to get new cuff. CLAIRE to diet. Last 2 d echo in 2018 for ascending aorta dilation- recommended F.U with PCP or cards.C/o not feeling right- asked her to keep daily diary of symptoms & discuss w PCP.  Fall & covid 19 prevention  precautions. Does not monitor blod sugar at home.    Follow up in about 1 year (around 7/1/2021) for PCP.    Bette Benjamin NP I offered to discuss end of life issues, including information on how to make advance directives that the patient could use to name someone who would make medical decisions on their behalf if they became too ill to make themselves.    ___Patient declined  _X_Patient is interested, I provided paper work and offered to discuss.

## 2020-07-06 ENCOUNTER — PATIENT OUTREACH (OUTPATIENT)
Dept: OTHER | Facility: OTHER | Age: 75
End: 2020-07-06

## 2020-07-06 NOTE — PROGRESS NOTES
Digital Medicine: Clinician Follow-Up    07/06/2020 Spoke to patient.  Reading on 7/1 at PCP office was 122/63.  She resumed the previous Valsartan dosing of 1 tablet in the morning and 1/2 tablet in the evening.  States still feeling occasionally sluggish. Asked her to take BP reading at that time.     The history is provided by the patient. No  was used.   Follow Up  Follow-up reason(s): reading review and medication change follow-up      Readings are trending up due to [employer] does not give accurate readings.        INTERVENTION(S)  encouragement/support    PLAN  continue monitoring    Asked pt to take readings with Omron cuff at home and enter manually. She states she will do that.  F/U in 1 month.           Last 5 Patient Entered Readings                                      Current 30 Day Average: 143/76     Recent Readings 7/1/2020 6/29/2020 6/29/2020 6/24/2020 6/24/2020    SBP (mmHg) 156 147 148 142 134    DBP (mmHg) 88 76 74 72 73    Pulse 72 70 70 77 67             Hypertension Medications             amLODIPine (NORVASC) 10 MG tablet Take 1 tablet (10 mg total) by mouth once daily.    valsartan (DIOVAN) 160 MG tablet Take 1.5 tablets (240 mg total) by mouth once daily. 1 tablet qAM and 1/2 tablet qPM.                             Medication Adherence Screening   She did not miss a dose this month.

## 2020-07-28 ENCOUNTER — PATIENT OUTREACH (OUTPATIENT)
Dept: ADMINISTRATIVE | Facility: OTHER | Age: 75
End: 2020-07-28

## 2020-07-28 NOTE — PROGRESS NOTES
Requested updates within Care Everywhere.  Patient's chart was reviewed for overdue MARILIN topics.  Immunizations reconciled.    Orders placed:n/a  Tasked appts:n/a  Labs Linked:n/a

## 2020-07-30 DIAGNOSIS — I70.0 ATHEROSCLEROSIS OF AORTA: ICD-10-CM

## 2020-07-30 RX ORDER — PRAVASTATIN SODIUM 40 MG/1
40 TABLET ORAL DAILY
Qty: 90 TABLET | Refills: 3 | Status: SHIPPED | OUTPATIENT
Start: 2020-07-30 | End: 2021-06-25

## 2020-08-03 ENCOUNTER — PATIENT OUTREACH (OUTPATIENT)
Dept: OTHER | Facility: OTHER | Age: 75
End: 2020-08-03

## 2020-08-03 NOTE — PROGRESS NOTES
Digital Medicine: Health  Follow-Up    The history is provided by the patient.                   Diet-no change to diet    No change to diet.      Additional diet details:    Physical Activity-no change to routine  No change to exercise routine.     Medication Adherence-Medication adherence was assessed.        Substance, Sleep, Stress-No change  stress-  Details:  Intervention(s):    Sleep-  Details:  Intervention(s):    Alcohol -  Details:  Intervention(s):    Tobacco-  Details:  Intervention(s):        PLAN  Continue current diet/physical activity routine:    Patient did not express questions or concerns and patient has contact information if needed.    Explained the importance of self-monitoring and medication adherence. Encouraged the patient to communicate with their health  for lifestyle modifications to help improve or maintain a healthy lifestyle.        There are no preventive care reminders to display for this patient.    Last 5 Patient Entered Readings                                      Current 30 Day Average: 133/70     Recent Readings 7/25/2020 7/23/2020 7/8/2020 7/1/2020 6/29/2020    SBP (mmHg) 125 135 140 156 147    DBP (mmHg) 66 69 76 88 76    Pulse 84 77 60 72 70

## 2020-08-03 NOTE — PROGRESS NOTES
Digital Medicine: Clinician Follow-Up    08/03/2020 Spoke to patient at goal for Program. States feeling sluggish at times.  Changing goal for BP to 140/90 as this is a recurrent complaint for her.  States she was diagnosed with glaucoma but getting a second opinion.     The history is provided by the patient.   Follow-up reason(s): routine follow up.     Readings are trending down     Patient is experiencing signs/symptoms of hypotension.  Patient is not experiencing signs/symptoms of hypertension.   feels sluggish at lower readings         Last 5 Patient Entered Readings                                      Current 30 Day Average: 133/70     Recent Readings 7/25/2020 7/23/2020 7/8/2020 7/1/2020 6/29/2020    SBP (mmHg) 125 135 140 156 147    DBP (mmHg) 66 69 76 88 76    Pulse 84 77 60 72 70             Screenings    ASSESSMENT(S)  Patients BP average is 133/70 mmHg, which is at goal. Patient's BP goal is less than or equal to 130/80 per 2017 ACC/AHA Hypertension Guidelines.      PLAN  Continue current therapy:    Patient did not express questions or concerns and patient has contact information if needed.      Reminded patient that [facility] is no longer her Health  and that Tomi will contact her in the next few weeks.            Hypertension Medications             amLODIPine (NORVASC) 10 MG tablet Take 1 tablet (10 mg total) by mouth once daily.    valsartan (DIOVAN) 160 MG tablet Take 1.5 tablets (240 mg total) by mouth once daily. 1 tablet qAM and 1/2 tablet qPM.

## 2020-08-25 ENCOUNTER — OFFICE VISIT (OUTPATIENT)
Dept: OPTOMETRY | Facility: CLINIC | Age: 75
End: 2020-08-25
Payer: MEDICARE

## 2020-08-25 DIAGNOSIS — H40.023 OPEN ANGLE WITH BORDERLINE FINDINGS AND HIGH GLAUCOMA RISK IN BOTH EYES: Primary | ICD-10-CM

## 2020-08-25 PROCEDURE — 99999 PR PBB SHADOW E&M-EST. PATIENT-LVL III: ICD-10-PCS | Mod: PBBFAC,HCNC,, | Performed by: OPTOMETRIST

## 2020-08-25 PROCEDURE — 99999 PR PBB SHADOW E&M-EST. PATIENT-LVL III: CPT | Mod: PBBFAC,HCNC,, | Performed by: OPTOMETRIST

## 2020-08-25 PROCEDURE — 92004 COMPRE OPH EXAM NEW PT 1/>: CPT | Mod: HCNC,S$GLB,, | Performed by: OPTOMETRIST

## 2020-08-25 PROCEDURE — 92004 PR EYE EXAM, NEW PATIENT,COMPREHESV: ICD-10-PCS | Mod: HCNC,S$GLB,, | Performed by: OPTOMETRIST

## 2020-08-25 NOTE — PROGRESS NOTES
HPI     Patient was seen at [facility] 07/2020. And was told to get second   opinion of glaucoma. Patient states father was Glaucoma Suspect.   patient states January 2020 experienced severe Headaches.      Last edited by Hayder Galvan on 8/25/2020  2:20 PM. (History)            Assessment /Plan     For exam results, see Encounter Report.    Open angle with borderline findings and high glaucoma risk in both eyes      1. IOP high normal today, large CD ratio, possibly father with glaucoma, high iop pre cat surgery, referred by Dr Campos, RTC for HVf(24-2)colt std and OCT of rnfl, pachy and gonio.

## 2020-10-01 ENCOUNTER — CLINICAL SUPPORT (OUTPATIENT)
Dept: URGENT CARE | Facility: CLINIC | Age: 75
End: 2020-10-01
Payer: MEDICARE

## 2020-10-01 VITALS — OXYGEN SATURATION: 97 % | TEMPERATURE: 97 F | HEART RATE: 87 BPM

## 2020-10-01 PROCEDURE — 90694 FLU VACCINE - QUADRIVALENT - ADJUVANTED: ICD-10-PCS | Mod: S$GLB,,, | Performed by: INTERNAL MEDICINE

## 2020-10-01 PROCEDURE — 90694 VACC AIIV4 NO PRSRV 0.5ML IM: CPT | Mod: S$GLB,,, | Performed by: INTERNAL MEDICINE

## 2020-10-01 PROCEDURE — G0008 ADMIN INFLUENZA VIRUS VAC: HCPCS | Mod: S$GLB,,, | Performed by: INTERNAL MEDICINE

## 2020-10-01 PROCEDURE — G0008 FLU VACCINE - QUADRIVALENT - ADJUVANTED: ICD-10-PCS | Mod: S$GLB,,, | Performed by: INTERNAL MEDICINE

## 2020-10-04 ENCOUNTER — PATIENT OUTREACH (OUTPATIENT)
Dept: ADMINISTRATIVE | Facility: OTHER | Age: 75
End: 2020-10-04

## 2020-10-04 NOTE — PROGRESS NOTES
Health Maintenance Due   Topic Date Due    Pneumococcal Vaccine (65+ Low/Medium Risk) (2 of 2 - PPSV23) 12/23/2016     Updates were requested from care everywhere.  Chart was reviewed for overdue Proactive [facility] Encounters (MARILIN) topics (CRS, Breast Cancer Screening, Eye exam)  Health Maintenance has been updated.  LINKS immunization registry triggered.  Immunizations were reconciled.

## 2020-10-05 ENCOUNTER — OFFICE VISIT (OUTPATIENT)
Dept: OPTOMETRY | Facility: CLINIC | Age: 75
End: 2020-10-05
Payer: MEDICARE

## 2020-10-05 ENCOUNTER — CLINICAL SUPPORT (OUTPATIENT)
Dept: OPHTHALMOLOGY | Facility: CLINIC | Age: 75
End: 2020-10-05
Payer: MEDICARE

## 2020-10-05 DIAGNOSIS — H40.1231 LOW-TENSION GLAUCOMA OF BOTH EYES, MILD STAGE: Primary | ICD-10-CM

## 2020-10-05 DIAGNOSIS — H40.023 OPEN ANGLE WITH BORDERLINE FINDINGS AND HIGH GLAUCOMA RISK IN BOTH EYES: ICD-10-CM

## 2020-10-05 PROCEDURE — 92012 PR EYE EXAM, EST PATIENT,INTERMED: ICD-10-PCS | Mod: HCNC,S$GLB,, | Performed by: OPTOMETRIST

## 2020-10-05 PROCEDURE — 99999 PR PBB SHADOW E&M-EST. PATIENT-LVL III: CPT | Mod: PBBFAC,HCNC,, | Performed by: OPTOMETRIST

## 2020-10-05 PROCEDURE — 99999 PR PBB SHADOW E&M-EST. PATIENT-LVL III: ICD-10-PCS | Mod: PBBFAC,HCNC,, | Performed by: OPTOMETRIST

## 2020-10-05 PROCEDURE — 76514 PR  US, EYE, FOR CORNEAL THICKNESS: ICD-10-PCS | Mod: HCNC,S$GLB,, | Performed by: OPTOMETRIST

## 2020-10-05 PROCEDURE — 76514 ECHO EXAM OF EYE THICKNESS: CPT | Mod: HCNC,S$GLB,, | Performed by: OPTOMETRIST

## 2020-10-05 PROCEDURE — 92012 INTRM OPH EXAM EST PATIENT: CPT | Mod: HCNC,S$GLB,, | Performed by: OPTOMETRIST

## 2020-10-05 RX ORDER — LATANOPROST 50 UG/ML
1 SOLUTION/ DROPS OPHTHALMIC NIGHTLY
Qty: 2.5 ML | Refills: 1 | Status: SHIPPED | OUTPATIENT
Start: 2020-10-05 | End: 2020-10-05

## 2020-10-05 NOTE — PROGRESS NOTES
OCT DONE OU     24-2 sf done ou     Rel & Fix =  Good ou      Coop =     Good     PATIENT DENIES ANY ALLERGIES TO LATEX/ADHESIVES AT THIS TIME    JTHOMAS    NO MRX    USED AGE CORRECTION LENS FOR TEST

## 2020-10-05 NOTE — PROGRESS NOTES
DAVID ORTEGA 08/20  Here for HVF,OCT pachymetry and gonio today.  Patient hasn't   noticed any vision changes. Not using any drops.  Will treat based on tests and iop    Last edited by Serjio Ye, OD on 10/5/2020  4:14 PM. (History)            Assessment /Plan     For exam results, see Encounter Report.    Low-tension glaucoma of both eyes, mild stage  -     Discontinue: latanoprost 0.005 % ophthalmic solution; Place 1 drop into both eyes every evening.  Dispense: 2.5 mL; Refill: 1      1. Start Latanaprost qhs ou, hvf normal oct with rnfl loss sup and nasal, pachy normal, possible father was glaucoma suspect. RTC 1 month iop ck.

## 2020-10-14 ENCOUNTER — PATIENT OUTREACH (OUTPATIENT)
Dept: OTHER | Facility: OTHER | Age: 75
End: 2020-10-14

## 2020-10-14 NOTE — PROGRESS NOTES
Digital Medicine: Health  Follow-Up    The history is provided by the patient.             Reason for review: Blood pressure at goal    Patient needs assistance troubleshooting: patient reminder needed.    Additional Follow-up details: Patient reports that she is currently on her way to [place] for vacation. Putting the patient on hiatus on until she returns home.            Diet-Not assessed          Physical Activity-Not assessed    Medication Adherence-Medication Adherence not addressed.      Substance, Sleep, Stress-Not assessed      Continue current diet/physical activity routine.       Addressed patient questions and patient has my contact information if needed prior to next outreach.   Explained the importance of self-monitoring and medication adherence. Encouraged the patient to communicate with their health  for lifestyle modifications to help improve or maintain a healthy lifestyle.               There are no preventive care reminders to display for this patient.      Last 5 Patient Entered Readings                                      Current 30 Day Average: 132/68     Recent Readings 10/7/2020 9/22/2020 9/15/2020 9/7/2020 9/2/2020    SBP (mmHg) 128 131 138 139 132    DBP (mmHg) 69 66 69 72 74    Pulse 65 86 71 73 72

## 2020-10-26 ENCOUNTER — PATIENT OUTREACH (OUTPATIENT)
Dept: OTHER | Facility: OTHER | Age: 75
End: 2020-10-26

## 2020-10-26 NOTE — PROGRESS NOTES
Digital Medicine: Clinician Follow-Up    10/26/2020 Spoke to patient who states that she will submit a reading today.  got back from her 10 day trip ([place], [place], [place]) this weekend and has not stopped to take a reading.   feeling GREAT!  Had a [age] yesterday and was so filled with yonathan, she had tears in her eyes.      The history is provided by the patient.   Follow-up reason(s): routine follow up.     Hypertension  Patient needs assistance troubleshooting: was on vacation.    Patient is not experiencing signs/symptoms of hypotension.  Patient is not experiencing signs/symptoms of hypertension.            Last 5 Patient Entered Readings                                      Current 30 Day Average: 128/69     Recent Readings 10/7/2020 9/22/2020 9/15/2020 9/7/2020 9/2/2020    SBP (mmHg) 128 131 138 139 132    DBP (mmHg) 69 66 69 72 74    Pulse 65 86 71 73 72                             Medication Adherence-Medication adherence was assessed.          ASSESSMENT(S)  Patients BP average is 128/69 mmHg, which is at goal. Patient's BP goal is less than or equal to 140/90.    Hypertension Plan  Additional monitoring needed.  Continue current therapy.  Will check chart in 1 month and contact patient if needed.      Patient verbalizes understanding.               Hypertension Medications             amLODIPine (NORVASC) 10 MG tablet Take 1 tablet (10 mg total) by mouth once daily.    valsartan (DIOVAN) 160 MG tablet Take 1.5 tablets (240 mg total) by mouth once daily. 1 tablet qAM and 1/2 tablet qPM.

## 2020-11-04 ENCOUNTER — PATIENT OUTREACH (OUTPATIENT)
Dept: ADMINISTRATIVE | Facility: OTHER | Age: 75
End: 2020-11-04

## 2020-11-05 ENCOUNTER — OFFICE VISIT (OUTPATIENT)
Dept: OTOLARYNGOLOGY | Facility: CLINIC | Age: 75
End: 2020-11-05
Payer: MEDICARE

## 2020-11-05 VITALS
WEIGHT: 154.31 LBS | HEART RATE: 69 BPM | DIASTOLIC BLOOD PRESSURE: 70 MMHG | HEIGHT: 60 IN | TEMPERATURE: 98 F | SYSTOLIC BLOOD PRESSURE: 139 MMHG | BODY MASS INDEX: 30.29 KG/M2

## 2020-11-05 DIAGNOSIS — Z97.4 WEARS HEARING AID: ICD-10-CM

## 2020-11-05 DIAGNOSIS — H90.3 SENSORINEURAL HEARING LOSS (SNHL) OF BOTH EARS: Primary | ICD-10-CM

## 2020-11-05 DIAGNOSIS — T16.2XXA FOREIGN BODY OF LEFT EAR, INITIAL ENCOUNTER: ICD-10-CM

## 2020-11-05 PROCEDURE — 99203 PR OFFICE/OUTPT VISIT, NEW, LEVL III, 30-44 MIN: ICD-10-PCS | Mod: 25,HCNC,S$GLB, | Performed by: OTOLARYNGOLOGY

## 2020-11-05 PROCEDURE — 1126F PR PAIN SEVERITY QUANTIFIED, NO PAIN PRESENT: ICD-10-PCS | Mod: HCNC,S$GLB,, | Performed by: OTOLARYNGOLOGY

## 2020-11-05 PROCEDURE — 99999 PR PBB SHADOW E&M-EST. PATIENT-LVL V: ICD-10-PCS | Mod: PBBFAC,HCNC,, | Performed by: OTOLARYNGOLOGY

## 2020-11-05 PROCEDURE — 99203 OFFICE O/P NEW LOW 30 MIN: CPT | Mod: 25,HCNC,S$GLB, | Performed by: OTOLARYNGOLOGY

## 2020-11-05 PROCEDURE — 3075F SYST BP GE 130 - 139MM HG: CPT | Mod: HCNC,CPTII,S$GLB, | Performed by: OTOLARYNGOLOGY

## 2020-11-05 PROCEDURE — 3075F PR MOST RECENT SYSTOLIC BLOOD PRESS GE 130-139MM HG: ICD-10-PCS | Mod: HCNC,CPTII,S$GLB, | Performed by: OTOLARYNGOLOGY

## 2020-11-05 PROCEDURE — 69200 PR REMV EXT CANAL FOREIGN BODY: ICD-10-PCS | Mod: HCNC,LT,S$GLB, | Performed by: OTOLARYNGOLOGY

## 2020-11-05 PROCEDURE — 99999 PR PBB SHADOW E&M-EST. PATIENT-LVL V: CPT | Mod: PBBFAC,HCNC,, | Performed by: OTOLARYNGOLOGY

## 2020-11-05 PROCEDURE — 1159F MED LIST DOCD IN RCRD: CPT | Mod: HCNC,S$GLB,, | Performed by: OTOLARYNGOLOGY

## 2020-11-05 PROCEDURE — 1101F PR PT FALLS ASSESS DOC 0-1 FALLS W/OUT INJ PAST YR: ICD-10-PCS | Mod: HCNC,CPTII,S$GLB, | Performed by: OTOLARYNGOLOGY

## 2020-11-05 PROCEDURE — 3078F DIAST BP <80 MM HG: CPT | Mod: HCNC,CPTII,S$GLB, | Performed by: OTOLARYNGOLOGY

## 2020-11-05 PROCEDURE — 1101F PT FALLS ASSESS-DOCD LE1/YR: CPT | Mod: HCNC,CPTII,S$GLB, | Performed by: OTOLARYNGOLOGY

## 2020-11-05 PROCEDURE — 1126F AMNT PAIN NOTED NONE PRSNT: CPT | Mod: HCNC,S$GLB,, | Performed by: OTOLARYNGOLOGY

## 2020-11-05 PROCEDURE — 1159F PR MEDICATION LIST DOCUMENTED IN MEDICAL RECORD: ICD-10-PCS | Mod: HCNC,S$GLB,, | Performed by: OTOLARYNGOLOGY

## 2020-11-05 PROCEDURE — 3078F PR MOST RECENT DIASTOLIC BLOOD PRESSURE < 80 MM HG: ICD-10-PCS | Mod: HCNC,CPTII,S$GLB, | Performed by: OTOLARYNGOLOGY

## 2020-11-05 PROCEDURE — 69200 CLEAR OUTER EAR CANAL: CPT | Mod: HCNC,LT,S$GLB, | Performed by: OTOLARYNGOLOGY

## 2020-11-05 NOTE — PROGRESS NOTES
Otolaryngology Clinic Note    Chikis Epstein  Encounter Date: 11/5/2020   YOB: 1945  Referring Physician: Ilia Gonzalez Md  [address] [place] [address]   PCP: Ilia Gonzalez MD    Chief Complaint:   Chief Complaint   Patient presents with    Ear Fullness     patient has part of her hearing aid fiilter stuck on her left ear canal        HPI: Chikis Epstein is a 75 y.o. female here with left ear foreign body Has part of the filter stuck in left ear canal. Has hearing aids bilaterally that she has had for about 5 years now. Went to get adjusted and was told there was the piece right up against the drum. Denies otalgia or otorrhea. Was using qtips and told to stop since may have pushed piece further.    Review of Systems   Constitutional: Negative for chills, fever and weight loss.   HENT: Positive for hearing loss. Negative for ear discharge and ear pain.    Eyes: Negative for blurred vision and double vision.   Respiratory: Negative for cough and shortness of breath.    Cardiovascular: Negative for chest pain and palpitations.   Gastrointestinal: Negative for nausea and vomiting.   Musculoskeletal: Negative for joint pain and neck pain.   Skin: Negative for rash.   Neurological: Negative for dizziness, focal weakness and headaches.   Psychiatric/Behavioral: Negative for depression. The patient is not nervous/anxious.         Review of patient's allergies indicates:   Allergen Reactions    Ace inhibitors      Other reaction(s): Swelling       Past Medical History:   Diagnosis Date    Anxiety     Aortic atherosclerosis     CXR 10/13/12    Arthritis     Back pain     Cataract     Hyperlipidemia 1/27/2016    Hypertension     MDD (major depressive disorder)     Menopause     Non morbid obesity due to excess calories 12/29/2016    Personal history of kidney stones     Type 2 diabetes mellitus without complication        Past Surgical History:   Procedure Laterality Date     CATARACT EXTRACTION, BILATERAL       SECTION      CHOLECYSTECTOMY      COLONOSCOPY      normal    EYE SURGERY      HYSTERECTOMY      KIDNEY STONE SURGERY      TOTAL ABDOMINAL HYSTERECTOMY W/ BILATERAL SALPINGOOPHORECTOMY         Social History     Socioeconomic History    Marital status:      Spouse name: Not on file    Number of children: Not on file    Years of education: Not on file    Highest education level: Not on file   Occupational History    Not on file   Social Needs    Financial resource strain: Not on file    Food insecurity     Worry: Not on file     Inability: Not on file    Transportation needs     Medical: Not on file     Non-medical: Not on file   Tobacco Use    Smoking status: Never Smoker    Smokeless tobacco: Never Used   Substance and Sexual Activity    Alcohol use: Yes     Alcohol/week: 0.0 standard drinks     Comment: social. one drink a week.    Drug use: No    Sexual activity: Yes     Partners: Male   Lifestyle    Physical activity     Days per week: Not on file     Minutes per session: Not on file    Stress: Not on file   Relationships    Social connections     Talks on phone: Not on file     Gets together: Not on file     Attends Caodaism service: Not on file     Active member of club or organization: Not on file     Attends meetings of clubs or organizations: Not on file     Relationship status: Not on file   Other Topics Concern    Not on file   Social History Narrative    Not on file       Family History   Problem Relation Age of Onset    Asthma Mother     Cancer Father         lymphoma    Lymphoma Father     Cancer Sister         breast    Breast cancer Sister     Depression Son     Heart disease Sister     Depression Son     Diabetes Son     Prostate cancer Son     Ovarian cancer Other        Outpatient Encounter Medications as of 2020   Medication Sig Dispense Refill    acetaminophen (TYLENOL) 500 MG tablet Take 500 mg by  mouth every 6 (six) hours as needed for Pain.      albuterol (PROVENTIL/VENTOLIN HFA) 90 mcg/actuation inhaler Inhale 2 puffs into the lungs every 6 (six) hours as needed for Wheezing. Rescue 1 Inhaler 1    amLODIPine (NORVASC) 10 MG tablet Take 1 tablet (10 mg total) by mouth once daily. 90 tablet 3    calcium-vitamin D3 (CALCIUM 500 + D) 500 mg(1,250mg) -200 unit per tablet Take 1 tablet by mouth once daily.      cetirizine (ZYRTEC) 10 MG tablet Take 10 mg by mouth as needed for Allergies.      conjugated estrogens (PREMARIN) vaginal cream Use topically as directed 30 g 6    FLUoxetine 40 MG capsule TAKE 1 CAPSULE EVERY DAY 90 capsule 3    hydrocortisone 2.5 % cream Apply topically 2 (two) times daily. 3.5 g 0    latanoprost 0.005 % ophthalmic solution INSTILL 1 DROP IN BOTH EYES EVERY EVENING 7.5 mL 1    LORazepam (ATIVAN) 0.5 MG tablet Take 1 tablet (0.5 mg total) by mouth every 12 (twelve) hours as needed for Anxiety. 30 tablet 1    metFORMIN (GLUCOPHAGE-XR) 500 MG ER 24hr tablet TAKE 1 TABLET (500 MG TOTAL) BY MOUTH DAILY WITH BREAKFAST. 90 tablet 3    MULTIVIT-MINERALS/FOLIC ACID (CENTRUM MULTIGUMMIES ORAL) Take 2 each by mouth once daily.      naproxen (NAPROSYN) 500 MG tablet Take 500 mg by mouth 2 (two) times daily as needed.       pravastatin (PRAVACHOL) 40 MG tablet Take 1 tablet (40 mg total) by mouth once daily. 90 tablet 3    triamcinolone (NASACORT AQ) 55 mcg nasal inhaler 2 sprays by Nasal route once daily. 1 - 2 Aerosol, Spray Nasal Every day (Patient taking differently: 2 sprays by Nasal route as needed. 1 - 2 Aerosol, Spray Nasal Every day) 49.5 g 3    valsartan (DIOVAN) 160 MG tablet Take 1.5 tablets (240 mg total) by mouth once daily. 1 tablet qAM and 1/2 tablet qPM. 135 tablet 3    varicella-zoster gE-AS01B, PF, (SHINGRIX) 50 mcg/0.5 mL injection Inject into the muscle. 1 each 1    amitriptyline (ELAVIL) 10 MG tablet Take 1 tablet (10 mg total) by mouth every evening. 90  tablet 3    pantoprazole (PROTONIX) 40 MG tablet Take 1 tablet (40 mg total) by mouth once daily. (Patient taking differently: Take 40 mg by mouth as needed. ) 30 tablet 11    [DISCONTINUED] metFORMIN (GLUCOPHAGE-XR) 500 MG 24 hr tablet TAKE 1 TABLET (500 MG TOTAL) BY MOUTH DAILY WITH BREAKFAST. 90 tablet 3     No facility-administered encounter medications on file as of 11/5/2020.        Physical Exam:    Vitals:    11/05/20 1528   BP: 139/70   Pulse: 69   Temp: 97.8 °F (36.6 °C)       Constitutional  General Appearance: well nourished, well-developed, alert, oriented, in no acute distress  Communication: ability, understanding, voice quality normal  Head and Face  Inspection: normocephalic, atraumatic, no scars, lesions or masses    Palpation: no stepoffs, sinus tenderness or masses  Parotid glands: no masses, stones, swelling or tenderness  Eyes  Ocular Motility / Alignment: normal alignment, motility, no proptosis, enophthalmus or nystagmus  Conjunctiva: not injected  Eyelids: no entropion or ectropion, no edema  Ears  Hearing: speech reception thresholds grossly normal  External Ears: no auricle lesions, non-tender, mobile to palpation  Otoscopy:  Right Ear: no tympanic membrane lesions, perforations, or effusion, normal EAC  Left Ear: white hearing aid piece inferiorly up against drum - see procedure note  Nose  External Nose: no lesions, tenderness, trauma or deformity  Oral Cavity / Oropharynx  Lips: upper and lower lips pink and moist  Gingiva: healthy  Oral Mucosa: moist, no mucosal lesions  Floor of Mouth: normal, no lesions  Tongue: moist, normal mobility, no lesions  Palate: soft and hard palates without lesions or ulcers  Oropharynx: tonsils and walls without erythema, exudate, lesions, fullness or obstruction  Neck  Inspection and Palpation: no erythema, induration, emphysema, tenderness or masses  Larynx and Trachea: normal position and mobility   Thyroid: no tenderness, enlargement or  nodules  Submandibular Glands: no masses or tenderness  Lymphatic:  Anterior, Posterior, Submandibular, Submental, Supraclavicular: no lymphadenopathy present  Chest / Respiratory  Chest: no stridor or retractions, normal effort and expansion  Cardiovascular:  Pulses: 2+ radial pulses, regular rhythm and rate  Auscultation: deferred  Neurological  Cranial Nerves: grossly intact  General: no focal deficits  Psychiatric  Orientation: oriented to time, place and person  Mood and Affect: no depression, anxiety or agitation  Extremities  Inspection: moves all extremities well    Procedures:  Procedure: Removal of foreign body    Indications:  left ear canal foreign body    Anesthesia: None    Complications: None    Examination of the bilateral ear canals under the microscope reveals left ear foreign body - white filter of hearing aid. A right angle was used to pull back foreign body from up against ear drum. Then an alligator was used to remove the foreign body from the ear canal.The tympanic membrane was adequately visible after removal. No perforations. No effusions. Patient tolerated the procedure well    Pertinent Data:  ? LABS:   ? AUDIO:    ? PATH:  ? CULTURE:    I personally reviewed the following pertinent data at today's visit: Audiogram. Interpretation by me - old audio with mild to severe SNHL      Imaging:   ? XRAY:  ? Ultrasound:  ? CT Scan:  ? MRI Scan:  ? PET/CT Scan:    I personally reviewed the following images:    Miscellaneous:     [place] Sleepiness Scale-     Reflux Symptom Index (RSI) -       Summary of Outside Records:      Assessment and Plan:  Chikis Epstein is a 75 y.o. female with     Sensorineural hearing loss (SNHL) of both ears    Foreign body of left ear, initial encounter  -     Ambulatory referral/consult to ENT    Wears hearing aid       Left foreign body removed. Patient tolerated well. No complications. Continue hearing aids and annual monitoring. RTC as needed        RASHAWN sunshine  Lauréal, MD [facility]

## 2020-11-05 NOTE — LETTER
November 5, 2020      Ilia Gonzalez MD  [address] [facility] [address]           [facility]hin[facility]  [address]  [facility] [address]  Phone: [phone]  Fax: [phone]          Patient: Chikis Epstein   MR Number: [id number]   YOB: 1945   Date of Visit: 11/5/2020       Dear Dr. Ilia Gonzalez:    Thank you for referring Chikis Epstein to me for evaluation. Attached you will find relevant portions of my assessment and plan of care.    If you have questions, please do not hesitate to call me. I look forward to following Chikis Epstein along with you.    Sincerely,    Yakelin Block MD    Enclosure  CC:  No Recipients    If you would like to receive this communication electronically, please contact [email or web address] or [phone] to request more information on [employer] access.    For providers and/or their staff who would like to refer a patient to [facility], please contact us through our one-stop-shop provider referral line, [facility], at [phone].    If you feel you have received this communication in error or would no longer like to receive these types of communications, please e-mail [email or web address]

## 2020-11-05 NOTE — PROCEDURES
Procedure: Removal of foreign body    Indications:  left ear canal foreign body    Anesthesia: None    Complications: None    Examination of the bilateral ear canals under the microscope reveals left ear foreign body - white filter of hearing aid. A right angle was used to pull back foreign body from up against ear drum. Then an alligator was used to remove the foreign body from the ear canal.The tympanic membrane was adequately visible after removal. No perforations. No effusions. Patient tolerated the procedure well

## 2020-11-06 ENCOUNTER — OFFICE VISIT (OUTPATIENT)
Dept: OPTOMETRY | Facility: CLINIC | Age: 75
End: 2020-11-06
Payer: MEDICARE

## 2020-11-06 DIAGNOSIS — H40.1231 LOW-TENSION GLAUCOMA OF BOTH EYES, MILD STAGE: ICD-10-CM

## 2020-11-06 PROCEDURE — 92012 INTRM OPH EXAM EST PATIENT: CPT | Mod: HCNC,S$GLB,, | Performed by: OPTOMETRIST

## 2020-11-06 PROCEDURE — 92012 PR EYE EXAM, EST PATIENT,INTERMED: ICD-10-PCS | Mod: HCNC,S$GLB,, | Performed by: OPTOMETRIST

## 2020-11-06 PROCEDURE — 99999 PR PBB SHADOW E&M-EST. PATIENT-LVL III: ICD-10-PCS | Mod: PBBFAC,HCNC,, | Performed by: OPTOMETRIST

## 2020-11-06 PROCEDURE — 99999 PR PBB SHADOW E&M-EST. PATIENT-LVL III: CPT | Mod: PBBFAC,HCNC,, | Performed by: OPTOMETRIST

## 2020-11-06 RX ORDER — LATANOPROST 50 UG/ML
1 SOLUTION/ DROPS OPHTHALMIC NIGHTLY
Qty: 7.5 ML | Refills: 3 | Status: SHIPPED | OUTPATIENT
Start: 2020-11-06 | End: 2021-04-19 | Stop reason: SDUPTHER

## 2020-11-06 NOTE — PROGRESS NOTES
DAVID ORTEGA 10/05/20  Here for 1 month IOP check after starting Latanoprost OU Q   HS, last used last night.  Hasn't noticed any vision changes since last   exam.    Last edited by June Waldrop on 11/6/2020  2:37 PM. (History)            Assessment /Plan     For exam results, see Encounter Report.    Low-tension glaucoma of both eyes, mild stage  -     latanoprost 0.005 % ophthalmic solution; Place 1 drop into both eyes every evening.  Dispense: 7.5 mL; Refill: 3      1. IOP slightly lower on drop, cont Latanaprost qhs OU,  hvf normal oct with rnfl loss sup and nasal, pachy normal, possible father was glaucoma suspect. RTC 3 month iop ck. Target 12-14.

## 2020-11-11 ENCOUNTER — PATIENT OUTREACH (OUTPATIENT)
Dept: OTHER | Facility: OTHER | Age: 75
End: 2020-11-11

## 2020-11-23 NOTE — PROGRESS NOTES
11/23/2020 Reviewed chart.  Patient submitting weekly readings and at goal for Program.  /70 on 11/5 at Clinic appointment.    Last 5 Patient Entered Readings                                      Current 30 Day Average: 131/70     Recent Readings 11/17/2020 11/11/2020 11/3/2020 10/29/2020 10/27/2020    SBP (mmHg) 129 134 135 133 124    DBP (mmHg) 61 70 76 67 74    Pulse 76 61 78 76 78

## 2021-01-15 ENCOUNTER — IMMUNIZATION (OUTPATIENT)
Dept: INTERNAL MEDICINE | Facility: CLINIC | Age: 76
End: 2021-01-15
Payer: MEDICARE

## 2021-01-15 DIAGNOSIS — Z23 NEED FOR VACCINATION: Primary | ICD-10-CM

## 2021-01-15 PROCEDURE — 91300 COVID-19, MRNA, LNP-S, PF, 30 MCG/0.3 ML DOSE VACCINE: CPT | Mod: PBBFAC | Performed by: INTERNAL MEDICINE

## 2021-02-05 ENCOUNTER — IMMUNIZATION (OUTPATIENT)
Dept: INTERNAL MEDICINE | Facility: CLINIC | Age: 76
End: 2021-02-05
Payer: MEDICARE

## 2021-02-05 DIAGNOSIS — Z23 NEED FOR VACCINATION: Primary | ICD-10-CM

## 2021-02-05 PROCEDURE — 91300 COVID-19, MRNA, LNP-S, PF, 30 MCG/0.3 ML DOSE VACCINE: CPT | Mod: PBBFAC | Performed by: INTERNAL MEDICINE

## 2021-02-05 PROCEDURE — 0002A COVID-19, MRNA, LNP-S, PF, 30 MCG/0.3 ML DOSE VACCINE: CPT | Mod: PBBFAC | Performed by: INTERNAL MEDICINE

## 2021-02-11 ENCOUNTER — PATIENT OUTREACH (OUTPATIENT)
Dept: ADMINISTRATIVE | Facility: OTHER | Age: 76
End: 2021-02-11

## 2021-02-12 ENCOUNTER — OFFICE VISIT (OUTPATIENT)
Dept: OPTOMETRY | Facility: CLINIC | Age: 76
End: 2021-02-12
Payer: MEDICARE

## 2021-02-12 DIAGNOSIS — H40.1231 LOW-TENSION GLAUCOMA OF BOTH EYES, MILD STAGE: Primary | ICD-10-CM

## 2021-02-12 DIAGNOSIS — H04.123 BILATERAL DRY EYES: ICD-10-CM

## 2021-02-12 PROCEDURE — 99999 PR PBB SHADOW E&M-EST. PATIENT-LVL II: CPT | Mod: PBBFAC,HCNC,, | Performed by: OPTOMETRIST

## 2021-02-12 PROCEDURE — 1126F PR PAIN SEVERITY QUANTIFIED, NO PAIN PRESENT: ICD-10-PCS | Mod: HCNC,S$GLB,, | Performed by: OPTOMETRIST

## 2021-02-12 PROCEDURE — 92012 PR EYE EXAM, EST PATIENT,INTERMED: ICD-10-PCS | Mod: HCNC,S$GLB,, | Performed by: OPTOMETRIST

## 2021-02-12 PROCEDURE — 1101F PR PT FALLS ASSESS DOC 0-1 FALLS W/OUT INJ PAST YR: ICD-10-PCS | Mod: HCNC,CPTII,S$GLB, | Performed by: OPTOMETRIST

## 2021-02-12 PROCEDURE — 1126F AMNT PAIN NOTED NONE PRSNT: CPT | Mod: HCNC,S$GLB,, | Performed by: OPTOMETRIST

## 2021-02-12 PROCEDURE — 99999 PR PBB SHADOW E&M-EST. PATIENT-LVL II: ICD-10-PCS | Mod: PBBFAC,HCNC,, | Performed by: OPTOMETRIST

## 2021-02-12 PROCEDURE — 1101F PT FALLS ASSESS-DOCD LE1/YR: CPT | Mod: HCNC,CPTII,S$GLB, | Performed by: OPTOMETRIST

## 2021-02-12 PROCEDURE — 3288F FALL RISK ASSESSMENT DOCD: CPT | Mod: HCNC,CPTII,S$GLB, | Performed by: OPTOMETRIST

## 2021-02-12 PROCEDURE — 92012 INTRM OPH EXAM EST PATIENT: CPT | Mod: HCNC,S$GLB,, | Performed by: OPTOMETRIST

## 2021-02-12 PROCEDURE — 3288F PR FALLS RISK ASSESSMENT DOCUMENTED: ICD-10-PCS | Mod: HCNC,CPTII,S$GLB, | Performed by: OPTOMETRIST

## 2021-02-24 ENCOUNTER — PES CALL (OUTPATIENT)
Dept: ADMINISTRATIVE | Facility: CLINIC | Age: 76
End: 2021-02-24

## 2021-02-28 PROCEDURE — 99457 PR MONITORING, PHYSIOL PARAM, REMOTE, 1ST 20 MINS, PER MONTH: ICD-10-PCS | Mod: S$GLB,,, | Performed by: FAMILY MEDICINE

## 2021-02-28 PROCEDURE — 99457 RPM TX MGMT 1ST 20 MIN: CPT | Mod: S$GLB,,, | Performed by: FAMILY MEDICINE

## 2021-03-01 ENCOUNTER — OFFICE VISIT (OUTPATIENT)
Dept: INTERNAL MEDICINE | Facility: CLINIC | Age: 76
End: 2021-03-01
Payer: MEDICARE

## 2021-03-01 ENCOUNTER — HOSPITAL ENCOUNTER (OUTPATIENT)
Dept: RADIOLOGY | Facility: HOSPITAL | Age: 76
Discharge: HOME | End: 2021-03-01
Attending: INTERNAL MEDICINE
Payer: MEDICARE

## 2021-03-01 VITALS
SYSTOLIC BLOOD PRESSURE: 128 MMHG | HEIGHT: 60 IN | BODY MASS INDEX: 30.69 KG/M2 | WEIGHT: 156.31 LBS | HEART RATE: 60 BPM | TEMPERATURE: 98 F | DIASTOLIC BLOOD PRESSURE: 68 MMHG

## 2021-03-01 DIAGNOSIS — K21.9 GASTROESOPHAGEAL REFLUX DISEASE, UNSPECIFIED WHETHER ESOPHAGITIS PRESENT: ICD-10-CM

## 2021-03-01 DIAGNOSIS — Z12.31 ENCOUNTER FOR SCREENING MAMMOGRAM FOR BREAST CANCER: ICD-10-CM

## 2021-03-01 DIAGNOSIS — Z00.00 ANNUAL PHYSICAL EXAM: Primary | ICD-10-CM

## 2021-03-01 DIAGNOSIS — E78.00 HYPERCHOLESTEREMIA: ICD-10-CM

## 2021-03-01 DIAGNOSIS — I10 HYPERTENSION, UNSPECIFIED TYPE: ICD-10-CM

## 2021-03-01 DIAGNOSIS — F41.9 ANXIETY: ICD-10-CM

## 2021-03-01 DIAGNOSIS — R73.03 PREDIABETES: ICD-10-CM

## 2021-03-01 PROCEDURE — 77063 MAMMO DIGITAL SCREENING BILAT WITH TOMO: ICD-10-PCS | Mod: 26,,, | Performed by: RADIOLOGY

## 2021-03-01 PROCEDURE — 99397 PER PM REEVAL EST PAT 65+ YR: CPT | Mod: S$GLB,,, | Performed by: INTERNAL MEDICINE

## 2021-03-01 PROCEDURE — 99999 PR PBB SHADOW E&M-EST. PATIENT-LVL IV: ICD-10-PCS | Mod: PBBFAC,,, | Performed by: INTERNAL MEDICINE

## 2021-03-01 PROCEDURE — 77067 MAMMO DIGITAL SCREENING BILAT WITH TOMO: ICD-10-PCS | Mod: 26,,, | Performed by: RADIOLOGY

## 2021-03-01 PROCEDURE — 77067 SCR MAMMO BI INCL CAD: CPT | Mod: TC,PO

## 2021-03-01 PROCEDURE — 1126F PR PAIN SEVERITY QUANTIFIED, NO PAIN PRESENT: ICD-10-PCS | Mod: S$GLB,,, | Performed by: INTERNAL MEDICINE

## 2021-03-01 PROCEDURE — 3078F DIAST BP <80 MM HG: CPT | Mod: CPTII,S$GLB,, | Performed by: INTERNAL MEDICINE

## 2021-03-01 PROCEDURE — 3288F PR FALLS RISK ASSESSMENT DOCUMENTED: ICD-10-PCS | Mod: CPTII,S$GLB,, | Performed by: INTERNAL MEDICINE

## 2021-03-01 PROCEDURE — 99397 PR PREVENTIVE VISIT,EST,65 & OVER: ICD-10-PCS | Mod: S$GLB,,, | Performed by: INTERNAL MEDICINE

## 2021-03-01 PROCEDURE — 99999 PR PBB SHADOW E&M-EST. PATIENT-LVL IV: CPT | Mod: PBBFAC,,, | Performed by: INTERNAL MEDICINE

## 2021-03-01 PROCEDURE — 3074F SYST BP LT 130 MM HG: CPT | Mod: CPTII,S$GLB,, | Performed by: INTERNAL MEDICINE

## 2021-03-01 PROCEDURE — 77063 BREAST TOMOSYNTHESIS BI: CPT | Mod: 26,,, | Performed by: RADIOLOGY

## 2021-03-01 PROCEDURE — 3288F FALL RISK ASSESSMENT DOCD: CPT | Mod: CPTII,S$GLB,, | Performed by: INTERNAL MEDICINE

## 2021-03-01 PROCEDURE — 3074F PR MOST RECENT SYSTOLIC BLOOD PRESSURE < 130 MM HG: ICD-10-PCS | Mod: CPTII,S$GLB,, | Performed by: INTERNAL MEDICINE

## 2021-03-01 PROCEDURE — 1101F PR PT FALLS ASSESS DOC 0-1 FALLS W/OUT INJ PAST YR: ICD-10-PCS | Mod: CPTII,S$GLB,, | Performed by: INTERNAL MEDICINE

## 2021-03-01 PROCEDURE — 77067 SCR MAMMO BI INCL CAD: CPT | Mod: 26,,, | Performed by: RADIOLOGY

## 2021-03-01 PROCEDURE — 1126F AMNT PAIN NOTED NONE PRSNT: CPT | Mod: S$GLB,,, | Performed by: INTERNAL MEDICINE

## 2021-03-01 PROCEDURE — 3078F PR MOST RECENT DIASTOLIC BLOOD PRESSURE < 80 MM HG: ICD-10-PCS | Mod: CPTII,S$GLB,, | Performed by: INTERNAL MEDICINE

## 2021-03-01 PROCEDURE — 1101F PT FALLS ASSESS-DOCD LE1/YR: CPT | Mod: CPTII,S$GLB,, | Performed by: INTERNAL MEDICINE

## 2021-03-01 RX ORDER — VALSARTAN 320 MG/1
320 TABLET ORAL DAILY
Qty: 90 TABLET | Refills: 3 | Status: SHIPPED | OUTPATIENT
Start: 2021-03-01 | End: 2021-11-26

## 2021-03-01 RX ORDER — LORAZEPAM 0.5 MG/1
0.5 TABLET ORAL EVERY 12 HOURS PRN
Qty: 30 TABLET | Refills: 0 | Status: SHIPPED | OUTPATIENT
Start: 2021-03-01 | End: 2023-10-04

## 2021-03-01 RX ORDER — BUPROPION HYDROCHLORIDE 150 MG/1
150 TABLET, EXTENDED RELEASE ORAL 2 TIMES DAILY
Qty: 60 TABLET | Refills: 1 | Status: SHIPPED | OUTPATIENT
Start: 2021-03-01 | End: 2021-04-01

## 2021-03-01 RX ORDER — PANTOPRAZOLE SODIUM 40 MG/1
40 TABLET, DELAYED RELEASE ORAL DAILY
Qty: 90 TABLET | Refills: 1 | Status: SHIPPED | OUTPATIENT
Start: 2021-03-01 | End: 2021-07-07

## 2021-03-01 NOTE — PROGRESS NOTES
Last 5 Patient Entered Readings                                      Current 30 Day Average: 145/81     Recent Readings 2/10/2019 2/6/2019 2/4/2019 2/1/2019 2/1/2019    SBP (mmHg) 142 135 151 141 161    DBP (mmHg) 75 66 84 77 78    Pulse 76 65 68 72 69        Pt wants more time for amlodipine 5 mg to work before increasing dose or making other changes.  Denies symptoms of elevated blood pressure: severe HA, change in vision or speech, numbness/tingling/weakness on one side of body, CP, SOB.     Hypertension Medications             amLODIPine (NORVASC) 5 MG tablet Take 5 mg by mouth once daily.    losartan-hydrochlorothiazide 100-25 mg (HYZAAR) 100-25 mg per tablet Take 1 tablet by mouth once daily.             no

## 2021-03-03 ENCOUNTER — PATIENT MESSAGE (OUTPATIENT)
Dept: PRIMARY CARE CLINIC | Facility: CLINIC | Age: 76
End: 2021-03-03

## 2021-03-11 ENCOUNTER — OFFICE VISIT (OUTPATIENT)
Dept: INTERNAL MEDICINE | Facility: CLINIC | Age: 76
End: 2021-03-11
Payer: MEDICARE

## 2021-03-11 VITALS
DIASTOLIC BLOOD PRESSURE: 60 MMHG | BODY MASS INDEX: 30.23 KG/M2 | WEIGHT: 154 LBS | RESPIRATION RATE: 15 BRPM | HEART RATE: 79 BPM | HEIGHT: 60 IN | OXYGEN SATURATION: 97 % | SYSTOLIC BLOOD PRESSURE: 130 MMHG

## 2021-03-11 DIAGNOSIS — M50.30 DDD (DEGENERATIVE DISC DISEASE), CERVICAL: ICD-10-CM

## 2021-03-11 DIAGNOSIS — Z74.09 OTHER REDUCED MOBILITY: ICD-10-CM

## 2021-03-11 DIAGNOSIS — E78.00 PURE HYPERCHOLESTEROLEMIA: ICD-10-CM

## 2021-03-11 DIAGNOSIS — Z91.81 RISK FOR FALLS: ICD-10-CM

## 2021-03-11 DIAGNOSIS — K21.9 GASTROESOPHAGEAL REFLUX DISEASE, UNSPECIFIED WHETHER ESOPHAGITIS PRESENT: ICD-10-CM

## 2021-03-11 DIAGNOSIS — Z00.00 ENCOUNTER FOR PREVENTIVE HEALTH EXAMINATION: ICD-10-CM

## 2021-03-11 DIAGNOSIS — I70.0 ATHEROSCLEROSIS OF AORTA: ICD-10-CM

## 2021-03-11 DIAGNOSIS — Z13.820 SCREENING FOR OSTEOPOROSIS: Primary | ICD-10-CM

## 2021-03-11 DIAGNOSIS — M46.92 UNSPECIFIED INFLAMMATORY SPONDYLOPATHY, CERVICAL REGION: ICD-10-CM

## 2021-03-11 DIAGNOSIS — M47.812 FACET ARTHRITIS OF CERVICAL REGION: ICD-10-CM

## 2021-03-11 DIAGNOSIS — E78.00 HYPERCHOLESTEREMIA: ICD-10-CM

## 2021-03-11 DIAGNOSIS — I70.0 AORTO-ILIAC ATHEROSCLEROSIS: ICD-10-CM

## 2021-03-11 DIAGNOSIS — M85.89 OTHER SPECIFIED DISORDERS OF BONE DENSITY AND STRUCTURE, MULTIPLE SITES: ICD-10-CM

## 2021-03-11 DIAGNOSIS — R73.03 PREDIABETES: ICD-10-CM

## 2021-03-11 DIAGNOSIS — I70.8 AORTO-ILIAC ATHEROSCLEROSIS: ICD-10-CM

## 2021-03-11 DIAGNOSIS — F41.9 ANXIETY: ICD-10-CM

## 2021-03-11 DIAGNOSIS — H90.3 SENSORINEURAL HEARING LOSS (SNHL) OF BOTH EARS: ICD-10-CM

## 2021-03-11 DIAGNOSIS — I77.810 ASCENDING AORTA DILATATION: ICD-10-CM

## 2021-03-11 DIAGNOSIS — E66.09 CLASS 1 OBESITY DUE TO EXCESS CALORIES WITH SERIOUS COMORBIDITY AND BODY MASS INDEX (BMI) OF 30.0 TO 30.9 IN ADULT: ICD-10-CM

## 2021-03-11 DIAGNOSIS — K25.9 PYLORUS ULCER, UNSPECIFIED CHRONICITY: ICD-10-CM

## 2021-03-11 DIAGNOSIS — I10 HYPERTENSION, UNSPECIFIED TYPE: ICD-10-CM

## 2021-03-11 DIAGNOSIS — R29.890 LOSS OF HEIGHT: ICD-10-CM

## 2021-03-11 DIAGNOSIS — R26.81 GAIT INSTABILITY: ICD-10-CM

## 2021-03-11 PROBLEM — Z86.39 HISTORY OF DIABETES MELLITUS: Status: RESOLVED | Noted: 2019-01-15 | Resolved: 2021-03-11

## 2021-03-11 PROBLEM — Z13.31 POSITIVE DEPRESSION SCREENING: Status: RESOLVED | Noted: 2020-07-01 | Resolved: 2021-03-11

## 2021-03-11 PROCEDURE — 1158F PR ADVANCE CARE PLANNING DISCUSS DOCUMENTED IN MEDICAL RECORD: ICD-10-PCS | Mod: S$GLB,,, | Performed by: NURSE PRACTITIONER

## 2021-03-11 PROCEDURE — G0439 PPPS, SUBSEQ VISIT: HCPCS | Mod: S$GLB,,, | Performed by: NURSE PRACTITIONER

## 2021-03-11 PROCEDURE — 3288F FALL RISK ASSESSMENT DOCD: CPT | Mod: CPTII,S$GLB,, | Performed by: NURSE PRACTITIONER

## 2021-03-11 PROCEDURE — 3078F PR MOST RECENT DIASTOLIC BLOOD PRESSURE < 80 MM HG: ICD-10-PCS | Mod: CPTII,S$GLB,, | Performed by: NURSE PRACTITIONER

## 2021-03-11 PROCEDURE — 1126F AMNT PAIN NOTED NONE PRSNT: CPT | Mod: S$GLB,,, | Performed by: NURSE PRACTITIONER

## 2021-03-11 PROCEDURE — 3075F SYST BP GE 130 - 139MM HG: CPT | Mod: CPTII,S$GLB,, | Performed by: NURSE PRACTITIONER

## 2021-03-11 PROCEDURE — 99999 PR PBB SHADOW E&M-EST. PATIENT-LVL V: CPT | Mod: PBBFAC,,, | Performed by: NURSE PRACTITIONER

## 2021-03-11 PROCEDURE — 3078F DIAST BP <80 MM HG: CPT | Mod: CPTII,S$GLB,, | Performed by: NURSE PRACTITIONER

## 2021-03-11 PROCEDURE — 99999 PR PBB SHADOW E&M-EST. PATIENT-LVL V: ICD-10-PCS | Mod: PBBFAC,,, | Performed by: NURSE PRACTITIONER

## 2021-03-11 PROCEDURE — G0439 PR MEDICARE ANNUAL WELLNESS SUBSEQUENT VISIT: ICD-10-PCS | Mod: S$GLB,,, | Performed by: NURSE PRACTITIONER

## 2021-03-11 PROCEDURE — 1158F ADVNC CARE PLAN TLK DOCD: CPT | Mod: S$GLB,,, | Performed by: NURSE PRACTITIONER

## 2021-03-11 PROCEDURE — 1101F PR PT FALLS ASSESS DOC 0-1 FALLS W/OUT INJ PAST YR: ICD-10-PCS | Mod: CPTII,S$GLB,, | Performed by: NURSE PRACTITIONER

## 2021-03-11 PROCEDURE — 99499 RISK ADDL DX/OHS AUDIT: ICD-10-PCS | Mod: S$GLB,,, | Performed by: NURSE PRACTITIONER

## 2021-03-11 PROCEDURE — 1101F PT FALLS ASSESS-DOCD LE1/YR: CPT | Mod: CPTII,S$GLB,, | Performed by: NURSE PRACTITIONER

## 2021-03-11 PROCEDURE — 1126F PR PAIN SEVERITY QUANTIFIED, NO PAIN PRESENT: ICD-10-PCS | Mod: S$GLB,,, | Performed by: NURSE PRACTITIONER

## 2021-03-11 PROCEDURE — 99499 UNLISTED E&M SERVICE: CPT | Mod: S$GLB,,, | Performed by: NURSE PRACTITIONER

## 2021-03-11 PROCEDURE — 3075F PR MOST RECENT SYSTOLIC BLOOD PRESS GE 130-139MM HG: ICD-10-PCS | Mod: CPTII,S$GLB,, | Performed by: NURSE PRACTITIONER

## 2021-03-11 PROCEDURE — 3288F PR FALLS RISK ASSESSMENT DOCUMENTED: ICD-10-PCS | Mod: CPTII,S$GLB,, | Performed by: NURSE PRACTITIONER

## 2021-03-18 ENCOUNTER — PATIENT MESSAGE (OUTPATIENT)
Dept: RESEARCH | Facility: HOSPITAL | Age: 76
End: 2021-03-18

## 2021-03-26 ENCOUNTER — PATIENT MESSAGE (OUTPATIENT)
Dept: RESEARCH | Facility: HOSPITAL | Age: 76
End: 2021-03-26

## 2021-04-01 ENCOUNTER — OFFICE VISIT (OUTPATIENT)
Dept: INTERNAL MEDICINE | Facility: CLINIC | Age: 76
End: 2021-04-01
Payer: MEDICARE

## 2021-04-01 VITALS
HEIGHT: 60 IN | SYSTOLIC BLOOD PRESSURE: 138 MMHG | WEIGHT: 159.19 LBS | BODY MASS INDEX: 31.25 KG/M2 | DIASTOLIC BLOOD PRESSURE: 78 MMHG | TEMPERATURE: 98 F | OXYGEN SATURATION: 98 % | HEART RATE: 78 BPM

## 2021-04-01 DIAGNOSIS — F41.9 ANXIETY: Primary | ICD-10-CM

## 2021-04-01 PROCEDURE — 3288F PR FALLS RISK ASSESSMENT DOCUMENTED: ICD-10-PCS | Mod: CPTII,S$GLB,, | Performed by: INTERNAL MEDICINE

## 2021-04-01 PROCEDURE — 1126F PR PAIN SEVERITY QUANTIFIED, NO PAIN PRESENT: ICD-10-PCS | Mod: S$GLB,,, | Performed by: INTERNAL MEDICINE

## 2021-04-01 PROCEDURE — 1159F MED LIST DOCD IN RCRD: CPT | Mod: S$GLB,,, | Performed by: INTERNAL MEDICINE

## 2021-04-01 PROCEDURE — 99999 PR PBB SHADOW E&M-EST. PATIENT-LVL IV: CPT | Mod: PBBFAC,,, | Performed by: INTERNAL MEDICINE

## 2021-04-01 PROCEDURE — 1126F AMNT PAIN NOTED NONE PRSNT: CPT | Mod: S$GLB,,, | Performed by: INTERNAL MEDICINE

## 2021-04-01 PROCEDURE — 3288F FALL RISK ASSESSMENT DOCD: CPT | Mod: CPTII,S$GLB,, | Performed by: INTERNAL MEDICINE

## 2021-04-01 PROCEDURE — 99213 OFFICE O/P EST LOW 20 MIN: CPT | Mod: S$GLB,,, | Performed by: INTERNAL MEDICINE

## 2021-04-01 PROCEDURE — 3078F PR MOST RECENT DIASTOLIC BLOOD PRESSURE < 80 MM HG: ICD-10-PCS | Mod: CPTII,S$GLB,, | Performed by: INTERNAL MEDICINE

## 2021-04-01 PROCEDURE — 99213 PR OFFICE/OUTPT VISIT, EST, LEVL III, 20-29 MIN: ICD-10-PCS | Mod: S$GLB,,, | Performed by: INTERNAL MEDICINE

## 2021-04-01 PROCEDURE — 3075F PR MOST RECENT SYSTOLIC BLOOD PRESS GE 130-139MM HG: ICD-10-PCS | Mod: CPTII,S$GLB,, | Performed by: INTERNAL MEDICINE

## 2021-04-01 PROCEDURE — 3075F SYST BP GE 130 - 139MM HG: CPT | Mod: CPTII,S$GLB,, | Performed by: INTERNAL MEDICINE

## 2021-04-01 PROCEDURE — 1101F PT FALLS ASSESS-DOCD LE1/YR: CPT | Mod: CPTII,S$GLB,, | Performed by: INTERNAL MEDICINE

## 2021-04-01 PROCEDURE — 1101F PR PT FALLS ASSESS DOC 0-1 FALLS W/OUT INJ PAST YR: ICD-10-PCS | Mod: CPTII,S$GLB,, | Performed by: INTERNAL MEDICINE

## 2021-04-01 PROCEDURE — 3078F DIAST BP <80 MM HG: CPT | Mod: CPTII,S$GLB,, | Performed by: INTERNAL MEDICINE

## 2021-04-01 PROCEDURE — 1159F PR MEDICATION LIST DOCUMENTED IN MEDICAL RECORD: ICD-10-PCS | Mod: S$GLB,,, | Performed by: INTERNAL MEDICINE

## 2021-04-01 PROCEDURE — 99999 PR PBB SHADOW E&M-EST. PATIENT-LVL IV: ICD-10-PCS | Mod: PBBFAC,,, | Performed by: INTERNAL MEDICINE

## 2021-04-01 RX ORDER — BUPROPION HYDROCHLORIDE 300 MG/1
300 TABLET ORAL DAILY
Qty: 90 TABLET | Refills: 3 | Status: SHIPPED | OUTPATIENT
Start: 2021-04-01 | End: 2022-02-14

## 2021-04-13 ENCOUNTER — APPOINTMENT (OUTPATIENT)
Dept: RADIOLOGY | Facility: CLINIC | Age: 76
End: 2021-04-13
Attending: NURSE PRACTITIONER
Payer: MEDICARE

## 2021-04-13 DIAGNOSIS — R29.890 LOSS OF HEIGHT: ICD-10-CM

## 2021-04-13 DIAGNOSIS — M85.89 OTHER SPECIFIED DISORDERS OF BONE DENSITY AND STRUCTURE, MULTIPLE SITES: ICD-10-CM

## 2021-04-13 DIAGNOSIS — Z13.820 SCREENING FOR OSTEOPOROSIS: ICD-10-CM

## 2021-04-13 PROCEDURE — 77080 DXA BONE DENSITY AXIAL: CPT | Mod: 26,,, | Performed by: INTERNAL MEDICINE

## 2021-04-13 PROCEDURE — 77080 DXA BONE DENSITY AXIAL: CPT | Mod: TC,PO

## 2021-04-13 PROCEDURE — 77080 DEXA BONE DENSITY SPINE HIP: ICD-10-PCS | Mod: 26,,, | Performed by: INTERNAL MEDICINE

## 2021-04-16 ENCOUNTER — TELEPHONE (OUTPATIENT)
Dept: INTERNAL MEDICINE | Facility: CLINIC | Age: 76
End: 2021-04-16

## 2021-04-19 DIAGNOSIS — H40.1231 LOW-TENSION GLAUCOMA OF BOTH EYES, MILD STAGE: ICD-10-CM

## 2021-04-19 RX ORDER — LATANOPROST 50 UG/ML
1 SOLUTION/ DROPS OPHTHALMIC NIGHTLY
Qty: 7.5 ML | Refills: 3 | Status: SHIPPED | OUTPATIENT
Start: 2021-04-19 | End: 2022-01-24

## 2021-06-14 ENCOUNTER — OFFICE VISIT (OUTPATIENT)
Dept: OPTOMETRY | Facility: CLINIC | Age: 76
End: 2021-06-14
Payer: MEDICARE

## 2021-06-14 DIAGNOSIS — H40.1231 LOW-TENSION GLAUCOMA OF BOTH EYES, MILD STAGE: Primary | ICD-10-CM

## 2021-06-14 DIAGNOSIS — H04.123 BILATERAL DRY EYES: ICD-10-CM

## 2021-06-14 PROCEDURE — 92012 PR EYE EXAM, EST PATIENT,INTERMED: ICD-10-PCS | Mod: S$GLB,,, | Performed by: OPTOMETRIST

## 2021-06-14 PROCEDURE — 1101F PT FALLS ASSESS-DOCD LE1/YR: CPT | Mod: CPTII,S$GLB,, | Performed by: OPTOMETRIST

## 2021-06-14 PROCEDURE — 99999 PR PBB SHADOW E&M-EST. PATIENT-LVL III: ICD-10-PCS | Mod: PBBFAC,,, | Performed by: OPTOMETRIST

## 2021-06-14 PROCEDURE — 92012 INTRM OPH EXAM EST PATIENT: CPT | Mod: S$GLB,,, | Performed by: OPTOMETRIST

## 2021-06-14 PROCEDURE — 3288F FALL RISK ASSESSMENT DOCD: CPT | Mod: CPTII,S$GLB,, | Performed by: OPTOMETRIST

## 2021-06-14 PROCEDURE — 99999 PR PBB SHADOW E&M-EST. PATIENT-LVL III: CPT | Mod: PBBFAC,,, | Performed by: OPTOMETRIST

## 2021-06-14 PROCEDURE — 1126F AMNT PAIN NOTED NONE PRSNT: CPT | Mod: S$GLB,,, | Performed by: OPTOMETRIST

## 2021-06-14 PROCEDURE — 1101F PR PT FALLS ASSESS DOC 0-1 FALLS W/OUT INJ PAST YR: ICD-10-PCS | Mod: CPTII,S$GLB,, | Performed by: OPTOMETRIST

## 2021-06-14 PROCEDURE — 3288F PR FALLS RISK ASSESSMENT DOCUMENTED: ICD-10-PCS | Mod: CPTII,S$GLB,, | Performed by: OPTOMETRIST

## 2021-06-14 PROCEDURE — 1126F PR PAIN SEVERITY QUANTIFIED, NO PAIN PRESENT: ICD-10-PCS | Mod: S$GLB,,, | Performed by: OPTOMETRIST

## 2021-06-14 RX ORDER — FLUOXETINE HYDROCHLORIDE 40 MG/1
CAPSULE ORAL
COMMUNITY
Start: 2021-04-02 | End: 2021-10-04

## 2021-07-05 DIAGNOSIS — K21.9 GASTROESOPHAGEAL REFLUX DISEASE, UNSPECIFIED WHETHER ESOPHAGITIS PRESENT: ICD-10-CM

## 2021-07-07 RX ORDER — PANTOPRAZOLE SODIUM 40 MG/1
TABLET, DELAYED RELEASE ORAL
Qty: 90 TABLET | Refills: 2 | Status: SHIPPED | OUTPATIENT
Start: 2021-07-07 | End: 2022-02-09

## 2021-09-22 ENCOUNTER — TELEPHONE (OUTPATIENT)
Dept: OPHTHALMOLOGY | Facility: CLINIC | Age: 76
End: 2021-09-22

## 2021-09-22 ENCOUNTER — TELEPHONE (OUTPATIENT)
Dept: INTERNAL MEDICINE | Facility: CLINIC | Age: 76
End: 2021-09-22

## 2021-09-22 DIAGNOSIS — R73.03 PREDIABETES: ICD-10-CM

## 2021-09-22 DIAGNOSIS — I10 HYPERTENSION, UNSPECIFIED TYPE: Primary | ICD-10-CM

## 2021-09-24 RX ORDER — METFORMIN HYDROCHLORIDE 500 MG/1
500 TABLET, EXTENDED RELEASE ORAL
Qty: 90 TABLET | Refills: 3 | Status: SHIPPED | OUTPATIENT
Start: 2021-09-24 | End: 2022-08-05

## 2021-09-29 ENCOUNTER — LAB VISIT (OUTPATIENT)
Dept: LAB | Facility: HOSPITAL | Age: 76
End: 2021-09-29
Attending: INTERNAL MEDICINE
Payer: MEDICARE

## 2021-09-29 DIAGNOSIS — R73.03 PREDIABETES: ICD-10-CM

## 2021-09-29 DIAGNOSIS — I10 HYPERTENSION, UNSPECIFIED TYPE: ICD-10-CM

## 2021-09-29 LAB
ALBUMIN SERPL BCP-MCNC: 4.1 G/DL (ref 3.5–5.2)
ALP SERPL-CCNC: 76 U/L (ref 55–135)
ALT SERPL W/O P-5'-P-CCNC: 25 U/L (ref 10–44)
ANION GAP SERPL CALC-SCNC: 13 MMOL/L (ref 8–16)
AST SERPL-CCNC: 29 U/L (ref 10–40)
BASOPHILS # BLD AUTO: 0.06 K/UL (ref 0–0.2)
BASOPHILS NFR BLD: 1 % (ref 0–1.9)
BILIRUB SERPL-MCNC: 0.5 MG/DL (ref 0.1–1)
BUN SERPL-MCNC: 13 MG/DL (ref 8–23)
CALCIUM SERPL-MCNC: 10.7 MG/DL (ref 8.7–10.5)
CHLORIDE SERPL-SCNC: 99 MMOL/L (ref 95–110)
CO2 SERPL-SCNC: 25 MMOL/L (ref 23–29)
CREAT SERPL-MCNC: 0.8 MG/DL (ref 0.5–1.4)
DIFFERENTIAL METHOD: NORMAL
EOSINOPHIL # BLD AUTO: 0.2 K/UL (ref 0–0.5)
EOSINOPHIL NFR BLD: 3.9 % (ref 0–8)
ERYTHROCYTE [DISTWIDTH] IN BLOOD BY AUTOMATED COUNT: 13.1 % (ref 11.5–14.5)
EST. GFR  (AFRICAN AMERICAN): >60 ML/MIN/1.73 M^2
EST. GFR  (NON AFRICAN AMERICAN): >60 ML/MIN/1.73 M^2
ESTIMATED AVG GLUCOSE: 131 MG/DL (ref 68–131)
GLUCOSE SERPL-MCNC: 114 MG/DL (ref 70–110)
HBA1C MFR BLD: 6.2 % (ref 4–5.6)
HCT VFR BLD AUTO: 39.2 % (ref 37–48.5)
HGB BLD-MCNC: 13 G/DL (ref 12–16)
IMM GRANULOCYTES # BLD AUTO: 0.02 K/UL (ref 0–0.04)
IMM GRANULOCYTES NFR BLD AUTO: 0.3 % (ref 0–0.5)
LYMPHOCYTES # BLD AUTO: 2 K/UL (ref 1–4.8)
LYMPHOCYTES NFR BLD: 33 % (ref 18–48)
MCH RBC QN AUTO: 29.9 PG (ref 27–31)
MCHC RBC AUTO-ENTMCNC: 33.2 G/DL (ref 32–36)
MCV RBC AUTO: 90 FL (ref 82–98)
MONOCYTES # BLD AUTO: 0.5 K/UL (ref 0.3–1)
MONOCYTES NFR BLD: 8 % (ref 4–15)
NEUTROPHILS # BLD AUTO: 3.3 K/UL (ref 1.8–7.7)
NEUTROPHILS NFR BLD: 53.8 % (ref 38–73)
NRBC BLD-RTO: 0 /100 WBC
PLATELET # BLD AUTO: 272 K/UL (ref 150–450)
PMV BLD AUTO: 10.6 FL (ref 9.2–12.9)
POTASSIUM SERPL-SCNC: 4.1 MMOL/L (ref 3.5–5.1)
PROT SERPL-MCNC: 7 G/DL (ref 6–8.4)
RBC # BLD AUTO: 4.35 M/UL (ref 4–5.4)
SODIUM SERPL-SCNC: 137 MMOL/L (ref 136–145)
WBC # BLD AUTO: 6.13 K/UL (ref 3.9–12.7)

## 2021-09-29 PROCEDURE — 80053 COMPREHEN METABOLIC PANEL: CPT | Performed by: INTERNAL MEDICINE

## 2021-09-29 PROCEDURE — 83036 HEMOGLOBIN GLYCOSYLATED A1C: CPT | Performed by: INTERNAL MEDICINE

## 2021-09-29 PROCEDURE — 85025 COMPLETE CBC W/AUTO DIFF WBC: CPT | Performed by: INTERNAL MEDICINE

## 2021-09-29 PROCEDURE — 36415 COLL VENOUS BLD VENIPUNCTURE: CPT | Mod: PO | Performed by: INTERNAL MEDICINE

## 2021-10-04 ENCOUNTER — OFFICE VISIT (OUTPATIENT)
Dept: INTERNAL MEDICINE | Facility: CLINIC | Age: 76
End: 2021-10-04
Payer: MEDICARE

## 2021-10-04 VITALS
HEIGHT: 60 IN | OXYGEN SATURATION: 96 % | TEMPERATURE: 98 F | WEIGHT: 160.94 LBS | BODY MASS INDEX: 31.6 KG/M2 | DIASTOLIC BLOOD PRESSURE: 78 MMHG | RESPIRATION RATE: 17 BRPM | SYSTOLIC BLOOD PRESSURE: 138 MMHG | HEART RATE: 77 BPM

## 2021-10-04 DIAGNOSIS — I10 HYPERTENSION, UNSPECIFIED TYPE: Primary | ICD-10-CM

## 2021-10-04 DIAGNOSIS — F41.9 ANXIETY: ICD-10-CM

## 2021-10-04 DIAGNOSIS — E78.00 HYPERCHOLESTEREMIA: ICD-10-CM

## 2021-10-04 DIAGNOSIS — K21.9 GASTROESOPHAGEAL REFLUX DISEASE, UNSPECIFIED WHETHER ESOPHAGITIS PRESENT: ICD-10-CM

## 2021-10-04 DIAGNOSIS — R73.03 PREDIABETES: ICD-10-CM

## 2021-10-04 PROCEDURE — 1160F PR REVIEW ALL MEDS BY PRESCRIBER/CLIN PHARMACIST DOCUMENTED: ICD-10-PCS | Mod: CPTII,S$GLB,, | Performed by: INTERNAL MEDICINE

## 2021-10-04 PROCEDURE — 1159F PR MEDICATION LIST DOCUMENTED IN MEDICAL RECORD: ICD-10-PCS | Mod: CPTII,S$GLB,, | Performed by: INTERNAL MEDICINE

## 2021-10-04 PROCEDURE — 1159F MED LIST DOCD IN RCRD: CPT | Mod: CPTII,S$GLB,, | Performed by: INTERNAL MEDICINE

## 2021-10-04 PROCEDURE — 3288F PR FALLS RISK ASSESSMENT DOCUMENTED: ICD-10-PCS | Mod: CPTII,S$GLB,, | Performed by: INTERNAL MEDICINE

## 2021-10-04 PROCEDURE — 3075F SYST BP GE 130 - 139MM HG: CPT | Mod: CPTII,S$GLB,, | Performed by: INTERNAL MEDICINE

## 2021-10-04 PROCEDURE — G0008 ADMIN INFLUENZA VIRUS VAC: HCPCS | Mod: S$GLB,,, | Performed by: INTERNAL MEDICINE

## 2021-10-04 PROCEDURE — 99499 UNLISTED E&M SERVICE: CPT | Mod: S$GLB,,, | Performed by: INTERNAL MEDICINE

## 2021-10-04 PROCEDURE — 99999 PR PBB SHADOW E&M-EST. PATIENT-LVL IV: ICD-10-PCS | Mod: PBBFAC,,, | Performed by: INTERNAL MEDICINE

## 2021-10-04 PROCEDURE — G0008 FLU VACCINE - QUADRIVALENT - ADJUVANTED: ICD-10-PCS | Mod: S$GLB,,, | Performed by: INTERNAL MEDICINE

## 2021-10-04 PROCEDURE — 3075F PR MOST RECENT SYSTOLIC BLOOD PRESS GE 130-139MM HG: ICD-10-PCS | Mod: CPTII,S$GLB,, | Performed by: INTERNAL MEDICINE

## 2021-10-04 PROCEDURE — 3078F PR MOST RECENT DIASTOLIC BLOOD PRESSURE < 80 MM HG: ICD-10-PCS | Mod: CPTII,S$GLB,, | Performed by: INTERNAL MEDICINE

## 2021-10-04 PROCEDURE — 1126F AMNT PAIN NOTED NONE PRSNT: CPT | Mod: CPTII,S$GLB,, | Performed by: INTERNAL MEDICINE

## 2021-10-04 PROCEDURE — 3288F FALL RISK ASSESSMENT DOCD: CPT | Mod: CPTII,S$GLB,, | Performed by: INTERNAL MEDICINE

## 2021-10-04 PROCEDURE — 99499 RISK ADDL DX/OHS AUDIT: ICD-10-PCS | Mod: S$GLB,,, | Performed by: INTERNAL MEDICINE

## 2021-10-04 PROCEDURE — 90694 VACC AIIV4 NO PRSRV 0.5ML IM: CPT | Mod: S$GLB,,, | Performed by: INTERNAL MEDICINE

## 2021-10-04 PROCEDURE — 99999 PR PBB SHADOW E&M-EST. PATIENT-LVL IV: CPT | Mod: PBBFAC,,, | Performed by: INTERNAL MEDICINE

## 2021-10-04 PROCEDURE — 90694 FLU VACCINE - QUADRIVALENT - ADJUVANTED: ICD-10-PCS | Mod: S$GLB,,, | Performed by: INTERNAL MEDICINE

## 2021-10-04 PROCEDURE — 99214 PR OFFICE/OUTPT VISIT, EST, LEVL IV, 30-39 MIN: ICD-10-PCS | Mod: 25,S$GLB,, | Performed by: INTERNAL MEDICINE

## 2021-10-04 PROCEDURE — 1126F PR PAIN SEVERITY QUANTIFIED, NO PAIN PRESENT: ICD-10-PCS | Mod: CPTII,S$GLB,, | Performed by: INTERNAL MEDICINE

## 2021-10-04 PROCEDURE — 1160F RVW MEDS BY RX/DR IN RCRD: CPT | Mod: CPTII,S$GLB,, | Performed by: INTERNAL MEDICINE

## 2021-10-04 PROCEDURE — 99214 OFFICE O/P EST MOD 30 MIN: CPT | Mod: 25,S$GLB,, | Performed by: INTERNAL MEDICINE

## 2021-10-04 PROCEDURE — 1101F PR PT FALLS ASSESS DOC 0-1 FALLS W/OUT INJ PAST YR: ICD-10-PCS | Mod: CPTII,S$GLB,, | Performed by: INTERNAL MEDICINE

## 2021-10-04 PROCEDURE — 1101F PT FALLS ASSESS-DOCD LE1/YR: CPT | Mod: CPTII,S$GLB,, | Performed by: INTERNAL MEDICINE

## 2021-10-04 PROCEDURE — 3044F PR MOST RECENT HEMOGLOBIN A1C LEVEL <7.0%: ICD-10-PCS | Mod: CPTII,S$GLB,, | Performed by: INTERNAL MEDICINE

## 2021-10-04 PROCEDURE — 3078F DIAST BP <80 MM HG: CPT | Mod: CPTII,S$GLB,, | Performed by: INTERNAL MEDICINE

## 2021-10-04 PROCEDURE — 3044F HG A1C LEVEL LT 7.0%: CPT | Mod: CPTII,S$GLB,, | Performed by: INTERNAL MEDICINE

## 2021-11-07 ENCOUNTER — PATIENT OUTREACH (OUTPATIENT)
Dept: ADMINISTRATIVE | Facility: OTHER | Age: 76
End: 2021-11-07
Payer: MEDICARE

## 2021-11-08 DIAGNOSIS — I10 ESSENTIAL HYPERTENSION: ICD-10-CM

## 2021-11-09 ENCOUNTER — PATIENT MESSAGE (OUTPATIENT)
Dept: OPTOMETRY | Facility: CLINIC | Age: 76
End: 2021-11-09
Payer: MEDICARE

## 2021-11-09 ENCOUNTER — IMMUNIZATION (OUTPATIENT)
Dept: INTERNAL MEDICINE | Facility: CLINIC | Age: 76
End: 2021-11-09
Payer: MEDICARE

## 2021-11-09 DIAGNOSIS — Z23 NEED FOR VACCINATION: Primary | ICD-10-CM

## 2021-11-09 PROCEDURE — 0004A COVID-19, MRNA, LNP-S, PF, 30 MCG/0.3 ML DOSE VACCINE: CPT | Mod: HCNC,CV19,PBBFAC | Performed by: INTERNAL MEDICINE

## 2021-11-10 RX ORDER — AMLODIPINE BESYLATE 10 MG/1
10 TABLET ORAL DAILY
Qty: 90 TABLET | Refills: 3 | Status: SHIPPED | OUTPATIENT
Start: 2021-11-10 | End: 2022-08-31

## 2021-11-16 ENCOUNTER — OFFICE VISIT (OUTPATIENT)
Dept: OPTOMETRY | Facility: CLINIC | Age: 76
End: 2021-11-16
Payer: MEDICARE

## 2021-11-16 DIAGNOSIS — H40.1231 LOW-TENSION GLAUCOMA OF BOTH EYES, MILD STAGE: Primary | ICD-10-CM

## 2021-11-16 DIAGNOSIS — H04.123 BILATERAL DRY EYES: ICD-10-CM

## 2021-11-16 PROCEDURE — 1160F RVW MEDS BY RX/DR IN RCRD: CPT | Mod: HCNC,CPTII,S$GLB, | Performed by: OPTOMETRIST

## 2021-11-16 PROCEDURE — 99999 PR PBB SHADOW E&M-EST. PATIENT-LVL II: CPT | Mod: PBBFAC,HCNC,, | Performed by: OPTOMETRIST

## 2021-11-16 PROCEDURE — 92012 INTRM OPH EXAM EST PATIENT: CPT | Mod: HCNC,S$GLB,, | Performed by: OPTOMETRIST

## 2021-11-16 PROCEDURE — 1160F PR REVIEW ALL MEDS BY PRESCRIBER/CLIN PHARMACIST DOCUMENTED: ICD-10-PCS | Mod: HCNC,CPTII,S$GLB, | Performed by: OPTOMETRIST

## 2021-11-16 PROCEDURE — 1159F MED LIST DOCD IN RCRD: CPT | Mod: HCNC,CPTII,S$GLB, | Performed by: OPTOMETRIST

## 2021-11-16 PROCEDURE — 1159F PR MEDICATION LIST DOCUMENTED IN MEDICAL RECORD: ICD-10-PCS | Mod: HCNC,CPTII,S$GLB, | Performed by: OPTOMETRIST

## 2021-11-16 PROCEDURE — 1126F AMNT PAIN NOTED NONE PRSNT: CPT | Mod: HCNC,CPTII,S$GLB, | Performed by: OPTOMETRIST

## 2021-11-16 PROCEDURE — 99999 PR PBB SHADOW E&M-EST. PATIENT-LVL II: ICD-10-PCS | Mod: PBBFAC,HCNC,, | Performed by: OPTOMETRIST

## 2021-11-16 PROCEDURE — 1126F PR PAIN SEVERITY QUANTIFIED, NO PAIN PRESENT: ICD-10-PCS | Mod: HCNC,CPTII,S$GLB, | Performed by: OPTOMETRIST

## 2021-11-16 PROCEDURE — 92012 PR EYE EXAM, EST PATIENT,INTERMED: ICD-10-PCS | Mod: HCNC,S$GLB,, | Performed by: OPTOMETRIST

## 2021-11-26 RX ORDER — VALSARTAN 320 MG/1
TABLET ORAL
Qty: 90 TABLET | Refills: 3 | Status: SHIPPED | OUTPATIENT
Start: 2021-11-26 | End: 2022-11-02

## 2022-01-31 PROCEDURE — 99457 RPM TX MGMT 1ST 20 MIN: CPT | Mod: S$GLB,,, | Performed by: INTERNAL MEDICINE

## 2022-01-31 PROCEDURE — 99457 PR MONITORING, PHYSIOL PARAM, REMOTE, 1ST 20 MINS, PER MONTH: ICD-10-PCS | Mod: S$GLB,,, | Performed by: INTERNAL MEDICINE

## 2022-02-03 NOTE — TELEPHONE ENCOUNTER
Care Due:                  Date            Visit Type   Department     Provider  --------------------------------------------------------------------------------                                ESTABLISHED   MET INTERNAL  Last Visit: 10-      PATIENT      MEDICINE       Cornell Garcia                              EP -                              PRIMARY      [facility] INTERNAL  Next Visit: 04-      CARE ([facility])   MEDICINE       Cornell Garcia                                                            Last  Test          Frequency    Reason                     Performed    Due Date  --------------------------------------------------------------------------------    HBA1C.......  6 months...  metFORMIN................  09- 03-    Lipid Panel.  12 months..  pravastatin..............  Not Found    Overdue    Powered by [employer] Reference number: [id number].   2/03/2022 2:07:18 AM CST

## 2022-02-14 RX ORDER — BUPROPION HYDROCHLORIDE 300 MG/1
300 TABLET ORAL DAILY
Qty: 90 TABLET | Refills: 2 | Status: SHIPPED | OUTPATIENT
Start: 2022-02-14 | End: 2022-09-21

## 2022-02-14 NOTE — TELEPHONE ENCOUNTER
Refill Authorization Note   Chikis Epstein  is requesting a refill authorization.  Brief Assessment and Rationale for Refill:  Approve     Medication Therapy Plan:       Medication Reconciliation Completed: No   Comments:   --->Care Gap information included below if applicable.   Orders Placed This Encounter    buPROPion (WELLBUTRIN XL) 300 MG 24 hr tablet      Requested Prescriptions   Signed Prescriptions Disp Refills    buPROPion (WELLBUTRIN XL) 300 MG 24 hr tablet 90 tablet 2     Sig: TAKE 1 TABLET (300 MG TOTAL) BY MOUTH ONCE DAILY.       Psychiatry: Antidepressants - bupropion Passed - 2/3/2022  2:06 AM        Passed - Patient is at least 18 years old        Passed - Last BP in normal range within 360 days     BP Readings from Last 1 Encounters:   10/04/21 138/78               Passed - Valid encounter within last 15 months     Recent Visits  Date Type Provider Dept   10/04/21 Office Visit Cornell Garcia DO [facility]   04/01/21 Office Visit Cornell Garcia DO [facility]   03/01/21 Office Visit Cornell Garcia DO [facility]   Showing recent visits within past 720 days and meeting all other requirements  Future Appointments  No visits were found meeting these conditions.  Showing future appointments within next 150 days and meeting all other requirements      Future Appointments              In 1 month [facility], [facility] [place]    In 1 month SPECIMEN, [facility] [place]    In 1 month Cornell Garcia DO [facility], [place]                    Appointments  past 12m or future 3m with PCP    Date Provider   Last Visit   10/4/2021 Cornell Garcia DO   Next Visit   2/9/2022 Cornell Garcia DO   ED visits in past 90 days: 0     Note composed:5:08 PM 02/14/2022

## 2022-03-30 ENCOUNTER — LAB VISIT (OUTPATIENT)
Dept: LAB | Facility: HOSPITAL | Age: 77
End: 2022-03-30
Attending: INTERNAL MEDICINE
Payer: MEDICARE

## 2022-03-30 DIAGNOSIS — I10 HYPERTENSION, UNSPECIFIED TYPE: ICD-10-CM

## 2022-03-30 DIAGNOSIS — R73.03 PREDIABETES: ICD-10-CM

## 2022-03-30 LAB
ALBUMIN SERPL BCP-MCNC: 4.1 G/DL (ref 3.5–5.2)
ALP SERPL-CCNC: 77 U/L (ref 55–135)
ALT SERPL W/O P-5'-P-CCNC: 19 U/L (ref 10–44)
ANION GAP SERPL CALC-SCNC: 12 MMOL/L (ref 8–16)
AST SERPL-CCNC: 23 U/L (ref 10–40)
BASOPHILS # BLD AUTO: 0.05 K/UL (ref 0–0.2)
BASOPHILS NFR BLD: 0.6 % (ref 0–1.9)
BILIRUB SERPL-MCNC: 0.4 MG/DL (ref 0.1–1)
BUN SERPL-MCNC: 13 MG/DL (ref 8–23)
CALCIUM SERPL-MCNC: 10.5 MG/DL (ref 8.7–10.5)
CHLORIDE SERPL-SCNC: 103 MMOL/L (ref 95–110)
CHOLEST SERPL-MCNC: 168 MG/DL (ref 120–199)
CHOLEST/HDLC SERPL: 2.8 {RATIO} (ref 2–5)
CO2 SERPL-SCNC: 27 MMOL/L (ref 23–29)
CREAT SERPL-MCNC: 0.8 MG/DL (ref 0.5–1.4)
DIFFERENTIAL METHOD: NORMAL
EOSINOPHIL # BLD AUTO: 0.2 K/UL (ref 0–0.5)
EOSINOPHIL NFR BLD: 2.7 % (ref 0–8)
ERYTHROCYTE [DISTWIDTH] IN BLOOD BY AUTOMATED COUNT: 13.2 % (ref 11.5–14.5)
EST. GFR  (AFRICAN AMERICAN): >60 ML/MIN/1.73 M^2
EST. GFR  (NON AFRICAN AMERICAN): >60 ML/MIN/1.73 M^2
ESTIMATED AVG GLUCOSE: 131 MG/DL (ref 68–131)
GLUCOSE SERPL-MCNC: 132 MG/DL (ref 70–110)
HBA1C MFR BLD: 6.2 % (ref 4–5.6)
HCT VFR BLD AUTO: 41.5 % (ref 37–48.5)
HDLC SERPL-MCNC: 61 MG/DL (ref 40–75)
HDLC SERPL: 36.3 % (ref 20–50)
HGB BLD-MCNC: 13.5 G/DL (ref 12–16)
IMM GRANULOCYTES # BLD AUTO: 0.02 K/UL (ref 0–0.04)
IMM GRANULOCYTES NFR BLD AUTO: 0.2 % (ref 0–0.5)
LDLC SERPL CALC-MCNC: 85 MG/DL (ref 63–159)
LYMPHOCYTES # BLD AUTO: 1.5 K/UL (ref 1–4.8)
LYMPHOCYTES NFR BLD: 18.2 % (ref 18–48)
MCH RBC QN AUTO: 30.3 PG (ref 27–31)
MCHC RBC AUTO-ENTMCNC: 32.5 G/DL (ref 32–36)
MCV RBC AUTO: 93 FL (ref 82–98)
MONOCYTES # BLD AUTO: 0.5 K/UL (ref 0.3–1)
MONOCYTES NFR BLD: 6.6 % (ref 4–15)
NEUTROPHILS # BLD AUTO: 5.9 K/UL (ref 1.8–7.7)
NEUTROPHILS NFR BLD: 71.7 % (ref 38–73)
NONHDLC SERPL-MCNC: 107 MG/DL
NRBC BLD-RTO: 0 /100 WBC
PLATELET # BLD AUTO: 266 K/UL (ref 150–450)
PMV BLD AUTO: 11.1 FL (ref 9.2–12.9)
POTASSIUM SERPL-SCNC: 4.3 MMOL/L (ref 3.5–5.1)
PROT SERPL-MCNC: 6.9 G/DL (ref 6–8.4)
RBC # BLD AUTO: 4.46 M/UL (ref 4–5.4)
SODIUM SERPL-SCNC: 142 MMOL/L (ref 136–145)
T4 FREE SERPL-MCNC: 0.82 NG/DL (ref 0.71–1.51)
TRIGL SERPL-MCNC: 110 MG/DL (ref 30–150)
TSH SERPL DL<=0.005 MIU/L-ACNC: 4.48 UIU/ML (ref 0.4–4)
WBC # BLD AUTO: 8.22 K/UL (ref 3.9–12.7)

## 2022-03-30 PROCEDURE — 85025 COMPLETE CBC W/AUTO DIFF WBC: CPT | Performed by: INTERNAL MEDICINE

## 2022-03-30 PROCEDURE — 84443 ASSAY THYROID STIM HORMONE: CPT | Performed by: INTERNAL MEDICINE

## 2022-03-30 PROCEDURE — 80061 LIPID PANEL: CPT | Performed by: INTERNAL MEDICINE

## 2022-03-30 PROCEDURE — 84439 ASSAY OF FREE THYROXINE: CPT | Performed by: INTERNAL MEDICINE

## 2022-03-30 PROCEDURE — 80053 COMPREHEN METABOLIC PANEL: CPT | Performed by: INTERNAL MEDICINE

## 2022-03-30 PROCEDURE — 83036 HEMOGLOBIN GLYCOSYLATED A1C: CPT | Performed by: INTERNAL MEDICINE

## 2022-03-30 PROCEDURE — 36415 COLL VENOUS BLD VENIPUNCTURE: CPT | Mod: PO | Performed by: INTERNAL MEDICINE

## 2022-04-04 ENCOUNTER — OFFICE VISIT (OUTPATIENT)
Dept: INTERNAL MEDICINE | Facility: CLINIC | Age: 77
End: 2022-04-04
Payer: MEDICARE

## 2022-04-04 ENCOUNTER — HOSPITAL ENCOUNTER (OUTPATIENT)
Dept: RADIOLOGY | Facility: HOSPITAL | Age: 77
Discharge: HOME | End: 2022-04-04
Attending: INTERNAL MEDICINE
Payer: MEDICARE

## 2022-04-04 VITALS
WEIGHT: 156.44 LBS | SYSTOLIC BLOOD PRESSURE: 138 MMHG | RESPIRATION RATE: 14 BRPM | HEART RATE: 86 BPM | BODY MASS INDEX: 30.71 KG/M2 | TEMPERATURE: 98 F | DIASTOLIC BLOOD PRESSURE: 82 MMHG | OXYGEN SATURATION: 97 % | HEIGHT: 60 IN

## 2022-04-04 DIAGNOSIS — I10 HYPERTENSION, UNSPECIFIED TYPE: Primary | ICD-10-CM

## 2022-04-04 DIAGNOSIS — J06.9 UPPER RESPIRATORY TRACT INFECTION, UNSPECIFIED TYPE: ICD-10-CM

## 2022-04-04 DIAGNOSIS — R53.83 FATIGUE, UNSPECIFIED TYPE: ICD-10-CM

## 2022-04-04 DIAGNOSIS — M46.92 UNSPECIFIED INFLAMMATORY SPONDYLOPATHY, CERVICAL REGION: ICD-10-CM

## 2022-04-04 DIAGNOSIS — E78.00 PURE HYPERCHOLESTEROLEMIA: ICD-10-CM

## 2022-04-04 DIAGNOSIS — I70.0 ATHEROSCLEROSIS OF AORTA: ICD-10-CM

## 2022-04-04 DIAGNOSIS — M47.812 OSTEOARTHRITIS OF CERVICAL SPINE, UNSPECIFIED SPINAL OSTEOARTHRITIS COMPLICATION STATUS: ICD-10-CM

## 2022-04-04 DIAGNOSIS — F41.9 ANXIETY: ICD-10-CM

## 2022-04-04 DIAGNOSIS — K21.9 GASTROESOPHAGEAL REFLUX DISEASE, UNSPECIFIED WHETHER ESOPHAGITIS PRESENT: ICD-10-CM

## 2022-04-04 DIAGNOSIS — Z00.00 ANNUAL PHYSICAL EXAM: Primary | ICD-10-CM

## 2022-04-04 DIAGNOSIS — Z00.00 ANNUAL PHYSICAL EXAM: ICD-10-CM

## 2022-04-04 DIAGNOSIS — R05.9 COUGH: ICD-10-CM

## 2022-04-04 DIAGNOSIS — I10 PRIMARY HYPERTENSION: ICD-10-CM

## 2022-04-04 LAB
INFLUENZA A, MOLECULAR: NEGATIVE
INFLUENZA B, MOLECULAR: NEGATIVE
SPECIMEN SOURCE: NORMAL

## 2022-04-04 PROCEDURE — 1125F AMNT PAIN NOTED PAIN PRSNT: CPT | Mod: CPTII,S$GLB,, | Performed by: INTERNAL MEDICINE

## 2022-04-04 PROCEDURE — 3079F PR MOST RECENT DIASTOLIC BLOOD PRESSURE 80-89 MM HG: ICD-10-PCS | Mod: CPTII,S$GLB,, | Performed by: INTERNAL MEDICINE

## 2022-04-04 PROCEDURE — 1159F MED LIST DOCD IN RCRD: CPT | Mod: CPTII,S$GLB,, | Performed by: INTERNAL MEDICINE

## 2022-04-04 PROCEDURE — 99499 UNLISTED E&M SERVICE: CPT | Mod: S$GLB,,, | Performed by: INTERNAL MEDICINE

## 2022-04-04 PROCEDURE — 1159F PR MEDICATION LIST DOCUMENTED IN MEDICAL RECORD: ICD-10-PCS | Mod: CPTII,S$GLB,, | Performed by: INTERNAL MEDICINE

## 2022-04-04 PROCEDURE — 71046 X-RAY EXAM CHEST 2 VIEWS: CPT | Mod: 26,,, | Performed by: RADIOLOGY

## 2022-04-04 PROCEDURE — 99999 PR PBB SHADOW E&M-EST. PATIENT-LVL V: ICD-10-PCS | Mod: PBBFAC,,, | Performed by: INTERNAL MEDICINE

## 2022-04-04 PROCEDURE — 1160F RVW MEDS BY RX/DR IN RCRD: CPT | Mod: CPTII,S$GLB,, | Performed by: INTERNAL MEDICINE

## 2022-04-04 PROCEDURE — 3288F PR FALLS RISK ASSESSMENT DOCUMENTED: ICD-10-PCS | Mod: CPTII,S$GLB,, | Performed by: INTERNAL MEDICINE

## 2022-04-04 PROCEDURE — 3075F SYST BP GE 130 - 139MM HG: CPT | Mod: CPTII,S$GLB,, | Performed by: INTERNAL MEDICINE

## 2022-04-04 PROCEDURE — 71046 XR CHEST PA AND LATERAL: ICD-10-PCS | Mod: 26,,, | Performed by: RADIOLOGY

## 2022-04-04 PROCEDURE — 1101F PR PT FALLS ASSESS DOC 0-1 FALLS W/OUT INJ PAST YR: ICD-10-PCS | Mod: CPTII,S$GLB,, | Performed by: INTERNAL MEDICINE

## 2022-04-04 PROCEDURE — 71046 X-RAY EXAM CHEST 2 VIEWS: CPT | Mod: TC,PO

## 2022-04-04 PROCEDURE — 99999 PR PBB SHADOW E&M-EST. PATIENT-LVL V: CPT | Mod: PBBFAC,,, | Performed by: INTERNAL MEDICINE

## 2022-04-04 PROCEDURE — U0003 INFECTIOUS AGENT DETECTION BY NUCLEIC ACID (DNA OR RNA); SEVERE ACUTE RESPIRATORY SYNDROME CORONAVIRUS 2 (SARS-COV-2) (CORONAVIRUS DISEASE [COVID-19]), AMPLIFIED PROBE TECHNIQUE, MAKING USE OF HIGH THROUGHPUT TECHNOLOGIES AS DESCRIBED BY CMS-2020-01-R: HCPCS | Performed by: INTERNAL MEDICINE

## 2022-04-04 PROCEDURE — 99397 PER PM REEVAL EST PAT 65+ YR: CPT | Mod: S$GLB,,, | Performed by: INTERNAL MEDICINE

## 2022-04-04 PROCEDURE — U0005 INFEC AGEN DETEC AMPLI PROBE: HCPCS | Performed by: INTERNAL MEDICINE

## 2022-04-04 PROCEDURE — 99397 PR PREVENTIVE VISIT,EST,65 & OVER: ICD-10-PCS | Mod: S$GLB,,, | Performed by: INTERNAL MEDICINE

## 2022-04-04 PROCEDURE — 1125F PR PAIN SEVERITY QUANTIFIED, PAIN PRESENT: ICD-10-PCS | Mod: CPTII,S$GLB,, | Performed by: INTERNAL MEDICINE

## 2022-04-04 PROCEDURE — 1160F PR REVIEW ALL MEDS BY PRESCRIBER/CLIN PHARMACIST DOCUMENTED: ICD-10-PCS | Mod: CPTII,S$GLB,, | Performed by: INTERNAL MEDICINE

## 2022-04-04 PROCEDURE — 3075F PR MOST RECENT SYSTOLIC BLOOD PRESS GE 130-139MM HG: ICD-10-PCS | Mod: CPTII,S$GLB,, | Performed by: INTERNAL MEDICINE

## 2022-04-04 PROCEDURE — 87502 INFLUENZA DNA AMP PROBE: CPT | Performed by: INTERNAL MEDICINE

## 2022-04-04 PROCEDURE — 1101F PT FALLS ASSESS-DOCD LE1/YR: CPT | Mod: CPTII,S$GLB,, | Performed by: INTERNAL MEDICINE

## 2022-04-04 PROCEDURE — 3288F FALL RISK ASSESSMENT DOCD: CPT | Mod: CPTII,S$GLB,, | Performed by: INTERNAL MEDICINE

## 2022-04-04 PROCEDURE — 3079F DIAST BP 80-89 MM HG: CPT | Mod: CPTII,S$GLB,, | Performed by: INTERNAL MEDICINE

## 2022-04-04 PROCEDURE — 99499 RISK ADDL DX/OHS AUDIT: ICD-10-PCS | Mod: S$GLB,,, | Performed by: INTERNAL MEDICINE

## 2022-04-04 RX ORDER — METHYLPREDNISOLONE 4 MG/1
TABLET ORAL
Qty: 1 EACH | Refills: 0 | Status: SHIPPED | OUTPATIENT
Start: 2022-04-04 | End: 2022-09-01

## 2022-04-04 RX ORDER — METHOCARBAMOL 750 MG/1
750 TABLET, FILM COATED ORAL 3 TIMES DAILY PRN
Qty: 40 TABLET | Refills: 0 | Status: SHIPPED | OUTPATIENT
Start: 2022-04-04 | End: 2022-04-14

## 2022-04-04 NOTE — PROGRESS NOTES
Subjective:       Patient ID: Chikis Epstein is a 76 y.o. female.    Chief Complaint: Annual Exam and Cough (PM into AM/4-5 days )    HPI   76 y.o. Female here for annual exam. Dry cough x 5 days with PND. No fevers/chills, wheezing/SOB.      Vaccines: Influenza (); Tetanus (); Pneumovax (next visit); Shingrix ()  Eye exam:   Mammogram:   Gyn exam: declined  Colonoscopy:   DEXA: (NL)     Exercise: no  Diet: regular       Past Medical History:  No date: Anxiety  No date: Aortic atherosclerosis      Comment:  CXR 10/13/12  No date: Arthritis  No date: Back pain  No date: Cataract  2016: Hyperlipidemia  No date: Hypertension  No date: MDD (major depressive disorder)  No date: Menopause  2016: Non morbid obesity due to excess calories  No date: Personal history of kidney stones  No date: Type 2 diabetes mellitus without complication  Past Surgical History:  No date: CATARACT EXTRACTION, BILATERAL  No date:  SECTION  No date: CHOLECYSTECTOMY  : COLONOSCOPY      Comment:  normal  No date: EYE SURGERY  No date: HYSTERECTOMY  No date: KIDNEY STONE SURGERY  No date: TOTAL ABDOMINAL HYSTERECTOMY W/ BILATERAL SALPINGOOPHORECTOMY  Social History    Socioeconomic History      Marital status:       Spouse name: Not on file      Number of children: Not on file      Years of education: Not on file      Highest education level: Not on file    Occupational History      Occupation: Retired    Tobacco Use      Smoking status: Never Smoker      Smokeless tobacco: Never Used    Substance and Sexual Activity      Alcohol use: Yes        Alcohol/week: 0.0 standard drinks        Comment: social. one drink a week.      Drug use: No      Sexual activity: Yes        Partners: Male    Other Topics      Concerns:        Not on file    Social History Narrative      Not on file     Social Determinants of Health  Financial Resource Strain:       Difficulty of Paying Living Expenses:   Food  Insecurity:       Worried About Running Out of Food in the Last Year:       Ran Out of Food in the Last Year:   Transportation Needs:       Lack of Transportation (Medical):       Lack of Transportation (Non-Medical):   Physical Activity:       Days of Exercise per Week:       Minutes of Exercise per Session:   Stress:       Feeling of Stress :   Social Connections:       Frequency of Communication with Friends and Family:       Frequency of Social Gatherings with Friends and Family:       Attends [employer] Services:       Active Member of Clubs or Organizations:       Attends Club or Organization Meetings:       Marital Status:   Review of patient's allergies indicates:   -- Ace inhibitors     --  Other reaction(s): Swelling  Review of Systems   Constitutional: Negative for activity change, appetite change, chills, diaphoresis, fatigue, fever and unexpected weight change.   HENT: Positive for postnasal drip. Negative for nasal congestion, mouth sores, rhinorrhea, sinus pressure/congestion, sneezing, sore throat, trouble swallowing and voice change.    Eyes: Negative for pain, discharge and visual disturbance.   Respiratory: Positive for cough. Negative for shortness of breath and wheezing.    Cardiovascular: Negative for chest pain, palpitations and leg swelling.   Gastrointestinal: Negative for abdominal pain, blood in stool, constipation, diarrhea, nausea and vomiting.   Endocrine: Negative for cold intolerance and heat intolerance.   Genitourinary: Negative for difficulty urinating, dysuria, frequency, hematuria and urgency.   Musculoskeletal: Positive for neck pain. Negative for arthralgias and myalgias.   Integumentary:  Negative for rash and wound.   Allergic/Immunologic: Negative for environmental allergies and food allergies.   Neurological: Negative for dizziness, tremors, seizures, syncope, weakness, light-headedness and headaches.   Hematological: Negative for adenopathy. Does not bruise/bleed easily.    Psychiatric/Behavioral: Negative for confusion, self-injury, sleep disturbance and suicidal ideas. The patient is nervous/anxious.          Objective:      Physical Exam  Vitals and nursing note reviewed.   Constitutional:       General: She is not in acute distress.     Appearance: She is well-developed. She is not diaphoretic.   HENT:      Head: Normocephalic and atraumatic.      Right Ear: External ear normal.      Left Ear: External ear normal.      Nose: Nose normal.      Mouth/Throat:      Pharynx: No oropharyngeal exudate.   Eyes:      General: No scleral icterus.        Right eye: No discharge.         Left eye: No discharge.      Conjunctiva/sclera: Conjunctivae normal.      Pupils: Pupils are equal, round, and reactive to light.   Neck:      Thyroid: No thyromegaly.      Vascular: No JVD.   Cardiovascular:      Rate and Rhythm: Normal rate and regular rhythm.      Heart sounds: Normal heart sounds. No murmur heard.  Pulmonary:      Effort: Pulmonary effort is normal. No respiratory distress.      Breath sounds: Normal breath sounds. No wheezing or rales.   Chest:      Chest wall: No tenderness.   Abdominal:      General: Bowel sounds are normal. There is no distension.      Palpations: Abdomen is soft.      Tenderness: There is no abdominal tenderness. There is no guarding.   Musculoskeletal:      Cervical back: Neck supple. Spasms and tenderness present.   Lymphadenopathy:      Cervical: No cervical adenopathy.   Skin:     General: Skin is warm and dry.      Coloration: Skin is not pale.      Findings: No rash.   Neurological:      Mental Status: She is alert and oriented to person, place, and time.   Psychiatric:         Judgment: Judgment normal.         Assessment:       Problem List Items Addressed This Visit        Psychiatric    Anxiety       Cardiac/Vascular    Pure hypercholesterolemia    Hypertension    Atherosclerosis of aorta       GI    Gastroesophageal reflux disease       Orthopedic     Unspecified inflammatory spondylopathy, cervical region      Other Visit Diagnoses     Annual physical exam    -  Primary    Osteoarthritis of cervical spine, unspecified spinal osteoarthritis complication status        Relevant Orders    Ambulatory referral/consult to Physical/Occupational Therapy    Upper respiratory tract infection, unspecified type        Relevant Orders    Influenza A & B by Molecular (Completed)    X-Ray Chest PA And Lateral (Completed)    Fatigue, unspecified type        Relevant Orders    TSH    T4, Free    THYROID PEROXIDASE ANTIBODY    Cough        Relevant Orders    COVID-19 Routine Screening (Completed)          Plan:    Blood work reviewed with pt     Prediabetes- last A1C of 6.2(3/22)<--6.2(9/21)<--6.0(12/19)       Continue Metformin 500 mg qam      HTN- stable on current meds      HLD- continue statin      GERD- stable on PPI      Anxiety- stable on Wellbutrin  mg daily       DJD cervical spine- Rx Medrol pack/Robaxin    -referral to PT      URI- CXR and test for COVID/Influenza     F/u in 6 months

## 2022-04-05 LAB
SARS-COV-2 RNA RESP QL NAA+PROBE: NOT DETECTED
SARS-COV-2- CYCLE NUMBER: NORMAL

## 2022-04-13 ENCOUNTER — CLINICAL SUPPORT (OUTPATIENT)
Dept: REHABILITATION | Facility: HOSPITAL | Age: 77
End: 2022-04-13
Attending: INTERNAL MEDICINE
Payer: MEDICARE

## 2022-04-13 DIAGNOSIS — M47.812 OSTEOARTHRITIS OF CERVICAL SPINE, UNSPECIFIED SPINAL OSTEOARTHRITIS COMPLICATION STATUS: ICD-10-CM

## 2022-04-13 DIAGNOSIS — M54.2 PAINFUL CERVICAL ROM: ICD-10-CM

## 2022-04-13 DIAGNOSIS — M54.2 NECK PAIN: ICD-10-CM

## 2022-04-13 PROCEDURE — 97110 THERAPEUTIC EXERCISES: CPT

## 2022-04-13 PROCEDURE — 97161 PT EVAL LOW COMPLEX 20 MIN: CPT

## 2022-04-13 NOTE — PLAN OF CARE
[facility]   Physical Therapy Initial Evaluation      Date: 4/13/2022   Name: Chikis Epstein  Clinic Number: [id number]    Therapy Diagnosis:   Encounter Diagnoses   Name Primary?    Osteoarthritis of cervical spine, unspecified spinal osteoarthritis complication status     Neck pain     Painful cervical ROM      Physician: Cornell Garcia, *    Physician Orders: PT Eval and Treat   Medical Diagnosis from Referral: M47.812 (ICD-10-CM) - Osteoarthritis of cervical spine, unspecified spinal osteoarthritis complication status  Evaluation Date: 4/13/2022  Authorization Period Expiration: TBD  Plan of Care Expiration: 7/8/2022  Progress Note Due: 5/13/2022  Visit # / Visits authorized: 1/ 1   FOTO: 1/5    Precautions: Standard     Time In: 100  Time Out: 145  Total Appointment Time (timed & untimed codes): 45 minutes      SUBJECTIVE     Date of onset: 3-4 weeks ago    History of current condition - Chikis reports: having neck pain that extends to her mid back toward the end of the day. Patient reports she has no true mechanism of injury when this started. Patient reports she feels the best in the morning after she starts moving around, but toward the end of the day and night time is when she feels her symptoms the most. Patient reports leaning over and bending the neck forward for a long time bothers her neck. Patient reports she was on a steroid recently which has helped, but is starting to wear off. Patient reports she does a lot of reading which her neck will be bent forward. Patient reports she has trouble finding a comfortable position when sleeping and will feel the best in the recliner. Patient denies pain down the arms     Trauma- No  Bowel/Bladder Disturbances- No  History of Cancer- No  Constant Pain- No   Night Pain- No  Headaches- Yes  Drop Attacks/Dizzy/Diplopia/Dysphagia/Dysarthria- No      Falls: No    Imaging, none:     Prior Therapy: Yes for L neck and shoulder years  ago  Social History:  lives with their spouse  Occupation: Retired   Prior Level of Function: Independent with ADLs  Current Level of Function: Independent with ADLs, but has to take breaks when her pain starts     Pain:  Current 2/10, worst 6/10, best 1/10   Location: bilateral neck  .   Description: Aching  Aggravating Factors: Moving a lot and toward the end of the day.   Easing Factors: heating pad and rest    Patients goals: To reduce neck pain     Medical History:   Past Medical History:   Diagnosis Date    Anxiety     Aortic atherosclerosis     CXR 10/13/12    Arthritis     Back pain     Cataract     Hyperlipidemia 2016    Hypertension     MDD (major depressive disorder)     Menopause     Non morbid obesity due to excess calories 2016    Personal history of kidney stones     Type 2 diabetes mellitus without complication        Surgical History:   Chikis Epstein  has a past surgical history that includes Colonoscopy (); Kidney stone surgery; Total abdominal hysterectomy w/ bilateral salpingoophorectomy; Cataract extraction, bilateral; Eye surgery; Hysterectomy;  section; and Cholecystectomy.    Medications:   Chikis has a current medication list which includes the following prescription(s): acetaminophen, amlodipine, bupropion, calcium-vitamin d3, cetirizine, conjugated estrogens, latanoprost, lorazepam, metformin, methocarbamol, methylprednisolone, multivit-minerals/folic acid, naproxen sodium, pantoprazole, pravastatin, triamcinolone, and valsartan.    Allergies:   Review of patient's allergies indicates:   Allergen Reactions    Ace inhibitors      Other reaction(s): Swelling          OBJECTIVE     Cervical AROM: Pain/Dysfunction with Movement:   Flexion: 55 degrees    Extension: 60 degrees Discomfort on R cervical spine   Right side bendin degrees    Left side bendin degrees    Right rotation: 40 degrees Tightness on R C-Spine   Left rotation: 55 degrees       Myotome Right Left Pain/Dysfunction with Movement   C4- Shoulder Elevation 4/5 4/5    C5- Shoulder Abduction 4/5 4/5    C6- Wrist Extension 4/5 4/5    C7- Elbow Extension 4/5 4/5    C8- Phalangeal Abduction 4/5 4/5    T1- 5th Finger Abduction 4/5 4/5      Posture Assessment: Slumped posture     Spurling's Test: Positive on R with extension    Distraction Test: Positive for relief         Limitation/Restriction for FOTO Cervical Survey    Therapist reviewed FOTO scores for Chikis Epstein on 4/13/2022.   FOTO documents entered into [employer] - see Media section.    Limitation Score: NA%         TREATMENT     Total Treatment time (time-based codes) separate from Evaluation: 10 minutes      Chikis received the treatments listed below:        manual therapy techniques: Manual traction and stretching were applied to the: cervical spine for 10 minutes, including:    Cervical distraction   Upper Trap Stretch         PATIENT EDUCATION AND HOME EXERCISES     Education provided:   - Purpose of PT      ASSESSMENT     Chikis is a 76 y.o. female referred to outpatient Physical Therapy with a medical diagnosis of OA of Cervical Spine. Patient presents with a 3-4 week history of neck pain with no true JOSESITO. Patient presents with decreased cervical range of motion and increased pain. Patient tolerated manual therapy today which included cervical distraction and UT stretching to address cervical tightness. Patient had a good response to Manual Therapy with no adverse effects.     Patient prognosis is Fair.   Patient will benefit from skilled outpatient Physical Therapy to address the deficits stated above and in the chart below, provide patient /family education, and to maximize patientt's level of independence.     Plan of care discussed with patient: Yes  Patient's spiritual, cultural and educational needs considered and patient is agreeable to the plan of care and goals as stated below:     Anticipated Barriers for therapy:  None    Medical Necessity is demonstrated by the following  History  Co-morbidities and personal factors that may impact the plan of care Co-morbidities:   See prior medical history    Personal Factors:   no deficits     high   Examination  Body Structures and Functions, activity limitations and participation restrictions that may impact the plan of care Body Regions:   neck    Body Systems:    ROM    Participation Restrictions:   None    Activity limitations:   Learning and applying knowledge  no deficits    General Tasks and Commands  no deficits    Communication  no deficits    Mobility  no deficits    Self care  no deficits    Domestic Life  no deficits    Interactions/Relationships  no deficits    Life Areas  no deficits    Community and Social Life  recreation and leisure         moderate   Clinical Presentation stable and uncomplicated low   Decision Making/ Complexity Score: low     Goals:  Short Term Goals (3 Weeks):   1. Pt will be independent with HEP to supplement PT in improving pain free cervical mobility  2. Pt will improve cervical AROM 5 deg in all abnormla planes to improve cervical mobility for driving  3. Pt will demonstrate improved sitting posture to decrease pain experienced in head and neck.  Long Term Goals (6 Weeks):   1. Pt will improve FOTO to </=NA% limitation to improve perceived limitation with changing and maintaining mobility.  2. Pt will improve cervical AROM to WNL in all planes to improve cervical mobility for driving   3. Pt will improve UE MMTs 1/2 grade in all planes to improve strength for lifting and carrying tasks.  4. Pt will report no pain with lifting 10 lbs to promote physical activity.   5. Pt will report no pain with cervical AROM in all planes to promote QOL.      PLAN   Plan of care Certification: 4/13/2022 to 7/8/2022.    Outpatient Physical Therapy 2 times weekly for 6 weeks to include the following interventions: Cervical/Lumbar Traction, Manual Therapy, Moist  Heat/ Ice, Neuromuscular Re-ed, Patient Education, Self Care, Therapeutic Activities, Therapeutic Exercise and FDN.     Jose Newton, PT      I CERTIFY THE NEED FOR THESE SERVICES FURNISHED UNDER THIS PLAN OF TREATMENT AND WHILE UNDER MY CARE   Physician's comments:     Physician's Signature: ___________________________________________________

## 2022-04-26 ENCOUNTER — CLINICAL SUPPORT (OUTPATIENT)
Dept: REHABILITATION | Facility: HOSPITAL | Age: 77
End: 2022-04-26
Attending: INTERNAL MEDICINE
Payer: MEDICARE

## 2022-04-26 DIAGNOSIS — M54.2 PAINFUL CERVICAL ROM: ICD-10-CM

## 2022-04-26 DIAGNOSIS — M54.2 NECK PAIN: Primary | ICD-10-CM

## 2022-04-26 PROCEDURE — 97110 THERAPEUTIC EXERCISES: CPT

## 2022-04-26 PROCEDURE — 97140 MANUAL THERAPY 1/> REGIONS: CPT

## 2022-04-26 NOTE — PROGRESS NOTES
[facility]   Physical Therapy Treatment Note     Name: Chikis Epstein  Clinic Number: [id number]    Therapy Diagnosis:   Encounter Diagnoses   Name Primary?    Neck pain Yes    Painful cervical ROM      Physician: Cornell Garcia, *    Visit Date: 4/26/2022    Physician Orders: PT Eval and Treat   Medical Diagnosis from Referral: M47.812 (ICD-10-CM) - Osteoarthritis of cervical spine, unspecified spinal osteoarthritis complication status  Evaluation Date: 4/13/2022  Authorization Period Expiration: TBD  Plan of Care Expiration: 7/8/2022  Progress Note Due: 5/13/2022  Visit # / Visits authorized: 1/ 1 , 1/20  FOTO: 2/5     Precautions: Standard       PTA Visit #: 0/5     Time In: 513  Time Out: 601  Total Billable Time: 38 minutes    SUBJECTIVE     Pt reports: she is feeling good today with minimal pain. Patient thinks the stretches helped.   She was compliant with home exercise program.      Pain: 0/10  Location: bilateral neck      OBJECTIVE     Objective Measures updated at progress report unless specified.     Treatment     Chikis received the treatments listed below:      therapeutic exercises to develop strength, endurance, ROM, flexibility and posture for 37 minutes including:    UBE 3/3  UT Stretch 30 sec x 4 (head to the L)   Levator Stretch   - 30 sec x 3  Chin Retractions 15x for 5 seconds  Scapular Retractions   - 20x     Heat on C spine     manual therapy techniques:  were applied to the: cervical spine for 8 minutes, including:    Cervical distraction   Upper Trap Stretch           Patient Education and Home Exercises     Home Exercises Provided and Patient Education Provided     Education provided:   - HEP NEXT     Written Home Exercises Provided: yes. Exercises were reviewed and Chikis was able to demonstrate them prior to the end of the session.  Chikis demonstrated good  understanding of the education provided. See EMR under Patient Instructions for exercises provided  during therapy sessions    ASSESSMENT     Patient presents to PT for initial follow up for cervical pain. Patient was very tense throughout the exercises and needed cueing to relax. Patient was easily fatigued with UBE and could only tolerate 2/2 today. Patient needed cueing as well for cervical stretching to maintain appropriate form. Give HEP Next visit if she had no adverse effects from previous session.     Chikis Is progressing well towards her goals.   Pt prognosis is Fair.     Pt will continue to benefit from skilled outpatient physical therapy to address the deficits listed in the problem list box on initial evaluation, provide pt/family education and to maximize pt's level of independence in the home and community environment.     Pt's spiritual, cultural and educational needs considered and pt agreeable to plan of care and goals.     Anticipated barriers to physical therapy: None    Goals:   Short Term Goals (3 Weeks):   1. Pt will be independent with HEP to supplement PT in improving pain free cervical mobility  2. Pt will improve cervical AROM 5 deg in all abnormla planes to improve cervical mobility for driving  3. Pt will demonstrate improved sitting posture to decrease pain experienced in head and neck.  Long Term Goals (6 Weeks):   1. Pt will improve FOTO to </=NA% limitation to improve perceived limitation with changing and maintaining mobility.  2. Pt will improve cervical AROM to WNL in all planes to improve cervical mobility for driving   3. Pt will improve UE MMTs 1/2 grade in all planes to improve strength for lifting and carrying tasks.  4. Pt will report no pain with lifting 10 lbs to promote physical activity.   5. Pt will report no pain with cervical AROM in all planes to promote QOL.        PLAN   Plan of care Certification: 4/13/2022 to 7/8/2022.     Outpatient Physical Therapy 2 times weekly for 6 weeks to include the following interventions: Cervical/Lumbar Traction, Manual Therapy, Moist  Heat/ Ice, Neuromuscular Re-ed, Patient Education, Self Care, Therapeutic Activities, Therapeutic Exercise and FDN.        Jose Newton, PT

## 2022-04-28 ENCOUNTER — CLINICAL SUPPORT (OUTPATIENT)
Dept: REHABILITATION | Facility: HOSPITAL | Age: 77
End: 2022-04-28
Attending: INTERNAL MEDICINE
Payer: MEDICARE

## 2022-04-28 DIAGNOSIS — M54.2 PAINFUL CERVICAL ROM: ICD-10-CM

## 2022-04-28 DIAGNOSIS — M54.2 NECK PAIN: Primary | ICD-10-CM

## 2022-04-28 PROCEDURE — 97110 THERAPEUTIC EXERCISES: CPT | Mod: CQ

## 2022-04-28 PROCEDURE — 97140 MANUAL THERAPY 1/> REGIONS: CPT | Mod: CQ

## 2022-04-28 NOTE — PROGRESS NOTES
[facility]   Physical Therapy Treatment Note     Name: Chikis Epstein  Clinic Number: [id number]    Therapy Diagnosis:   Encounter Diagnoses   Name Primary?    Neck pain Yes    Painful cervical ROM      Physician: Cornell Garcia, *    Visit Date: 4/28/2022    Physician Orders: PT Eval and Treat   Medical Diagnosis from Referral: M47.812 (ICD-10-CM) - Osteoarthritis of cervical spine, unspecified spinal osteoarthritis complication status  Evaluation Date: 4/13/2022  Authorization Period Expiration: TBD  Plan of Care Expiration: 7/8/2022  Progress Note Due: 5/13/2022  Visit # / Visits authorized: 1/ 1 , 2/20  FOTO: 2/5     Precautions: Standard       PTA Visit #: 1/5     Time In: 1000  Time Out: 1045  Total Billable Time: 45 minutes    SUBJECTIVE     Pt reports: not a bad day, she holds a lot of tension in her shoulders due to anxiety.  She was compliant with home exercise program.  Response to previous treatment: soreness from home exercise program     Pain: 0/10  Location: bilateral neck      OBJECTIVE     Objective Measures updated at progress report unless specified.     Treatment     Chikis received the treatments listed below:      therapeutic exercises to develop strength, endurance, ROM, flexibility and posture for 37 minutes including:    UBE 3 fwd/2 bkwd (fatigue)  UT Stretch 30 sec x 3 B  Levator Stretch B  - 30 sec x 3  Chin Retractions 15x for 5 seconds  Scapular Retractions   - 20x   Posterior shld rolls x 20    Heat on C spine 0 min post treatment.    manual therapy techniques: were applied to the: cervical spine for 8 minutes, including:  STM to R cervical paraspinals, UT, LS  Cervical distraction Not performed   Upper Trap Stretch Not performed           Patient Education and Home Exercises     Home Exercises Provided and Patient Education Provided     Education provided:   - HEP NEXT     Written Home Exercises Provided: yes. Exercises were reviewed and Chikis was able to  demonstrate them prior to the end of the session.  Chikis demonstrated good  understanding of the education provided. See EMR under Patient Instructions for exercises provided during therapy sessions    ASSESSMENT     Patient presents to PT for cervical pain. Patient was very anxious t/o session but was able to relax during manual therapy session. Patient was easily fatigued with UBE and could only tolerate 3/2 today. Patient needed cueing as well for cervical stretching to maintain appropriate form. Pt with good response to manual STM, as she felt much better upon leaving.    Chikis Is progressing well towards her goals.   Pt prognosis is Fair.     Pt will continue to benefit from skilled outpatient physical therapy to address the deficits listed in the problem list box on initial evaluation, provide pt/family education and to maximize pt's level of independence in the home and community environment.     Pt's spiritual, cultural and educational needs considered and pt agreeable to plan of care and goals.     Anticipated barriers to physical therapy: None    Goals:   Short Term Goals (3 Weeks):   1. Pt will be independent with HEP to supplement PT in improving pain free cervical mobility  2. Pt will improve cervical AROM 5 deg in all abnormla planes to improve cervical mobility for driving  3. Pt will demonstrate improved sitting posture to decrease pain experienced in head and neck.  Long Term Goals (6 Weeks):   1. Pt will improve FOTO to </=NA% limitation to improve perceived limitation with changing and maintaining mobility.  2. Pt will improve cervical AROM to WNL in all planes to improve cervical mobility for driving   3. Pt will improve UE MMTs 1/2 grade in all planes to improve strength for lifting and carrying tasks.  4. Pt will report no pain with lifting 10 lbs to promote physical activity.   5. Pt will report no pain with cervical AROM in all planes to promote QOL.        PLAN   Plan of care Certification:  4/13/2022 to 7/8/2022.     Outpatient Physical Therapy 2 times weekly for 6 weeks to include the following interventions: Cervical/Lumbar Traction, Manual Therapy, Moist Heat/ Ice, Neuromuscular Re-ed, Patient Education, Self Care, Therapeutic Activities, Therapeutic Exercise and FDN.        Terrence Hurtado, PTA

## 2022-05-03 ENCOUNTER — CLINICAL SUPPORT (OUTPATIENT)
Dept: REHABILITATION | Facility: HOSPITAL | Age: 77
End: 2022-05-03
Attending: INTERNAL MEDICINE
Payer: MEDICARE

## 2022-05-03 DIAGNOSIS — M54.2 PAINFUL CERVICAL ROM: ICD-10-CM

## 2022-05-03 DIAGNOSIS — M54.2 NECK PAIN: Primary | ICD-10-CM

## 2022-05-03 PROCEDURE — 97110 THERAPEUTIC EXERCISES: CPT

## 2022-05-03 PROCEDURE — 97140 MANUAL THERAPY 1/> REGIONS: CPT

## 2022-05-03 NOTE — PROGRESS NOTES
[facility]   Physical Therapy Treatment Note     Name: Chikis Epstein  Clinic Number: [id number]    Therapy Diagnosis:   Encounter Diagnoses   Name Primary?    Neck pain Yes    Painful cervical ROM      Physician: Cornell Garcia, *    Visit Date: 5/3/2022    Physician Orders: PT Eval and Treat   Medical Diagnosis from Referral: M47.812 (ICD-10-CM) - Osteoarthritis of cervical spine, unspecified spinal osteoarthritis complication status  Evaluation Date: 4/13/2022  Authorization Period Expiration: TBD  Plan of Care Expiration: 7/8/2022  Progress Note Due: 5/13/2022  Visit # / Visits authorized: 1/ 1 , 3/20  FOTO: 4/5     Precautions: Standard       PTA Visit #: 0/5     Time In: 1115  Time Out: 1200  Total Billable Time: 45 minutes    SUBJECTIVE     Pt reports: her symptoms are primarily worse in the morning. Patient reports she is just a little stiff today, but nothing severe  She was compliant with home exercise program.  Response to previous treatment: soreness from home exercise program     Pain: 1/10  Location: bilateral neck      OBJECTIVE     Objective Measures updated at progress report unless specified.     Treatment     Chikis received the treatments listed below:      therapeutic exercises to develop strength, endurance, ROM, flexibility and posture for 35 minutes including:    UBE 3 fwd/2 bkwd (fatigue)  UT Stretch 30 sec x 3 B  Levator Stretch B  - 30 sec x 3  Mid Trap Stretch 10 sec x 10  Scapular Retractions   - 10x   Brugger Green TB   - 2x10   Chin Retractions 20x for 5 seconds      Heat on C spine 0 min post treatment.    manual therapy techniques: were applied to the: cervical spine for 10 minutes, including:    STM to R cervical paraspinals, UT, LS  Cervical distraction    Upper Trap Stretch     Massage Gun            Patient Education and Home Exercises     Home Exercises Provided and Patient Education Provided     Education provided:   - HEP NEXT     Written Home  Exercises Provided: yes. Exercises were reviewed and Chikis was able to demonstrate them prior to the end of the session.  Chikis demonstrated good  understanding of the education provided. See EMR under Patient Instructions for exercises provided during therapy sessions    ASSESSMENT     Patient presents to PT with usual complaints of neck stiffness. Patient was progressed with brugger, mid trap stretch, and massage gun. Patient had a great response to the massage gun and reported less symptoms after the session ended. Continue to progress with massage gun if symptoms improved.       Chikis Is progressing well towards her goals.   Pt prognosis is Fair.     Pt will continue to benefit from skilled outpatient physical therapy to address the deficits listed in the problem list box on initial evaluation, provide pt/family education and to maximize pt's level of independence in the home and community environment.     Pt's spiritual, cultural and educational needs considered and pt agreeable to plan of care and goals.     Anticipated barriers to physical therapy: None    Goals:   Short Term Goals (3 Weeks):   1. Pt will be independent with HEP to supplement PT in improving pain free cervical mobility  2. Pt will improve cervical AROM 5 deg in all abnormla planes to improve cervical mobility for driving  3. Pt will demonstrate improved sitting posture to decrease pain experienced in head and neck.  Long Term Goals (6 Weeks):   1. Pt will improve FOTO to </=NA% limitation to improve perceived limitation with changing and maintaining mobility.  2. Pt will improve cervical AROM to WNL in all planes to improve cervical mobility for driving   3. Pt will improve UE MMTs 1/2 grade in all planes to improve strength for lifting and carrying tasks.  4. Pt will report no pain with lifting 10 lbs to promote physical activity.   5. Pt will report no pain with cervical AROM in all planes to promote QOL.        PLAN   Plan of care  Certification: 4/13/2022 to 7/8/2022.     Outpatient Physical Therapy 2 times weekly for 6 weeks to include the following interventions: Cervical/Lumbar Traction, Manual Therapy, Moist Heat/ Ice, Neuromuscular Re-ed, Patient Education, Self Care, Therapeutic Activities, Therapeutic Exercise and FDN.        Jose Newton, PT

## 2022-05-05 ENCOUNTER — CLINICAL SUPPORT (OUTPATIENT)
Dept: REHABILITATION | Facility: HOSPITAL | Age: 77
End: 2022-05-05
Attending: INTERNAL MEDICINE
Payer: MEDICARE

## 2022-05-05 DIAGNOSIS — M54.2 PAINFUL CERVICAL ROM: ICD-10-CM

## 2022-05-05 DIAGNOSIS — M54.2 NECK PAIN: Primary | ICD-10-CM

## 2022-05-05 PROCEDURE — 97140 MANUAL THERAPY 1/> REGIONS: CPT | Mod: CQ

## 2022-05-05 PROCEDURE — 97110 THERAPEUTIC EXERCISES: CPT | Mod: CQ

## 2022-05-05 NOTE — PROGRESS NOTES
[facility]   Physical Therapy Treatment Note     Name: Chikis Epstein  Clinic Number: [id number]    Therapy Diagnosis:   Encounter Diagnoses   Name Primary?    Neck pain Yes    Painful cervical ROM      Physician: Cornell Garcia, *    Visit Date: 5/5/2022    Physician Orders: PT Eval and Treat   Medical Diagnosis from Referral: M47.812 (ICD-10-CM) - Osteoarthritis of cervical spine, unspecified spinal osteoarthritis complication status  Evaluation Date: 4/13/2022  Authorization Period Expiration: TBD  Plan of Care Expiration: 7/8/2022  Progress Note Due: 5/13/2022  Visit # / Visits authorized: 1/ 1 , 3/20  FOTO: 4/5     Precautions: Standard       PTA Visit #: 1/5     Time In: 1000  Time Out: 1045  Total Billable Time: 45 minutes    SUBJECTIVE     Pt reports: last three nights have been pain free.  She was partially compliant with home exercise program.  Response to previous treatment: feeling better    Pain: 1/10  Location: bilateral neck      OBJECTIVE     Objective Measures updated at progress report unless specified.     Treatment     Chikis received the treatments listed below:      therapeutic exercises to develop strength, endurance, ROM, flexibility and posture for 35 minutes including:    UBE 3 fwd/2 bkwd (fatigue)  UT Stretch 30 sec x 3 B  Levator Stretch B  - 30 sec x 3  Mid Trap Stretch 10 sec x 10  Scapular Retractions   - 10x   Brugger Green TB   - 2x10   Chin Retractions 20x for 5 seconds      Heat on C spine 0 min post treatment.    manual therapy techniques: were applied to the: cervical spine for 10 minutes, including:    STM to R cervical paraspinals, UT, LS  Cervical distraction  Not performed   Upper Trap Stretch Not performed     Massage Gun            Patient Education and Home Exercises     Home Exercises Provided and Patient Education Provided     Education provided:   - HOME EXERCISE PROGRAM issued    Written Home Exercises Provided: yes. Exercises were  reviewed and Chikis was able to demonstrate them prior to the end of the session.  Chikis demonstrated good  understanding of the education provided. See EMR under Patient Instructions for exercises provided during therapy sessions    ASSESSMENT     Patient presents to PT with no c/o of cervical pain or stiffness. Patient requires min cueing for therex initiation. Patient had a great response to the massage gun and reported less symptoms after the session ended. Continue to progress with massage gun if symptoms improved.       Chikis Is progressing well towards her goals.   Pt prognosis is Fair.     Pt will continue to benefit from skilled outpatient physical therapy to address the deficits listed in the problem list box on initial evaluation, provide pt/family education and to maximize pt's level of independence in the home and community environment.     Pt's spiritual, cultural and educational needs considered and pt agreeable to plan of care and goals.     Anticipated barriers to physical therapy: None    Goals:   Short Term Goals (3 Weeks):   1. Pt will be independent with HEP to supplement PT in improving pain free cervical mobility  2. Pt will improve cervical AROM 5 deg in all abnormla planes to improve cervical mobility for driving  3. Pt will demonstrate improved sitting posture to decrease pain experienced in head and neck.  Long Term Goals (6 Weeks):   1. Pt will improve FOTO to </=NA% limitation to improve perceived limitation with changing and maintaining mobility.  2. Pt will improve cervical AROM to WNL in all planes to improve cervical mobility for driving   3. Pt will improve UE MMTs 1/2 grade in all planes to improve strength for lifting and carrying tasks.  4. Pt will report no pain with lifting 10 lbs to promote physical activity.   5. Pt will report no pain with cervical AROM in all planes to promote QOL.        PLAN   Plan of care Certification: 4/13/2022 to 7/8/2022.     Outpatient Physical Therapy  2 times weekly for 6 weeks to include the following interventions: Cervical/Lumbar Traction, Manual Therapy, Moist Heat/ Ice, Neuromuscular Re-ed, Patient Education, Self Care, Therapeutic Activities, Therapeutic Exercise and FDN.        Terrence Hurtado, PTA

## 2022-05-10 ENCOUNTER — DOCUMENTATION ONLY (OUTPATIENT)
Dept: REHABILITATION | Facility: HOSPITAL | Age: 77
End: 2022-05-10

## 2022-05-10 ENCOUNTER — CLINICAL SUPPORT (OUTPATIENT)
Dept: REHABILITATION | Facility: HOSPITAL | Age: 77
End: 2022-05-10
Attending: INTERNAL MEDICINE
Payer: MEDICARE

## 2022-05-10 DIAGNOSIS — M54.2 PAINFUL CERVICAL ROM: ICD-10-CM

## 2022-05-10 DIAGNOSIS — M54.2 NECK PAIN: Primary | ICD-10-CM

## 2022-05-10 PROCEDURE — 97140 MANUAL THERAPY 1/> REGIONS: CPT | Mod: CQ

## 2022-05-10 NOTE — PROGRESS NOTES
PT/PTA met face to face to discuss pt's treatment plan and progress towards established goals. Pt will be seen by a physical therapist minimally every 6th visit or every 30 days.    Terrence Hurtado PTA      Face to Face PTA Conference performed with Carol Licona PTA and Terrence Hurtado PTA regarding patient's current status, overall progress, and plan of care.    Jose Newton, PT

## 2022-05-10 NOTE — PROGRESS NOTES
[facility]   Physical Therapy Treatment Note     Name: Chikis Epstein  Clinic Number: [id number]    Therapy Diagnosis:   Encounter Diagnoses   Name Primary?    Neck pain Yes    Painful cervical ROM      Physician: Cornell Garcia, *    Visit Date: 5/10/2022    Physician Orders: PT Eval and Treat   Medical Diagnosis from Referral: M47.812 (ICD-10-CM) - Osteoarthritis of cervical spine, unspecified spinal osteoarthritis complication status  Evaluation Date: 4/13/2022  Authorization Period Expiration: TBD  Plan of Care Expiration: 7/8/2022  Progress Note Due: 5/13/2022  Visit # / Visits authorized: 1/ 1 , 3/20  FOTO: 4/5     Precautions: Standard       PTA Visit #: 2/5     Time In: 955  Time Out: 1045  Total Billable Time: 50 minutes    SUBJECTIVE     Pt reports: she has been having R neck pain since yesterday. She took a couple of Tylenol last night it helped a little. The pain is back again today. She is not sure how or what she may have done to bring it on.  She was partially compliant with home exercise program.  Response to previous treatment: feeling better    Pain: 6/10  Location: bilateral neck      OBJECTIVE     Objective Measures updated at progress report unless specified.     Treatment     Chikis received the treatments listed below:      therapeutic exercises to develop strength, endurance, ROM, flexibility and posture for 0 minutes including:    UBE 3 fwd/2 bkwd (fatigue)  UT Stretch 30 sec x 3 B  Levator Stretch B  - 30 sec x 3  Mid Trap Stretch 10 sec x 10  Scapular Retractions   - 10x   Brugger Green TB   - 2x10   Chin Retractions 20x for 5 seconds    Heat on R cervical and UT 10 min pre treatment.    manual therapy techniques: were applied to the: cervical spine for 40 minutes, including:    STM to R cervical paraspinals, UT, LS, dynamic cupping, percussion gun, trigger point therapy  Cervical distraction  Not performed   Upper Trap Stretch Not performed       Patient  Education and Home Exercises     Home Exercises Provided and Patient Education Provided     Education provided:   - HOME EXERCISE PROGRAM issued    Written Home Exercises Provided: yes. Exercises were reviewed and Chikis was able to demonstrate them prior to the end of the session.  Chikis demonstrated good  understanding of the education provided. See EMR under Patient Instructions for exercises provided during therapy sessions    ASSESSMENT     Pt presents with inc'd symptoms today which begin yesterday evening prior to her rosary group. Exercises were deferred this visit due to pain levels. Session consisted of STM to ease symptoms. Chikis presents with hypertonicity within R trapezius. Pain was reproduced t/o R cervical musculature. Trigger point therapy to trapezius attempted with minimal relief. Pt advised not to perform home exercise program until pain and discomfort normalizes.      Chikis Is progressing well towards her goals.   Pt prognosis is Fair.     Pt will continue to benefit from skilled outpatient physical therapy to address the deficits listed in the problem list box on initial evaluation, provide pt/family education and to maximize pt's level of independence in the home and community environment.     Pt's spiritual, cultural and educational needs considered and pt agreeable to plan of care and goals.     Anticipated barriers to physical therapy: None    Goals:   Short Term Goals (3 Weeks):   1. Pt will be independent with HEP to supplement PT in improving pain free cervical mobility  2. Pt will improve cervical AROM 5 deg in all abnormla planes to improve cervical mobility for driving  3. Pt will demonstrate improved sitting posture to decrease pain experienced in head and neck.  Long Term Goals (6 Weeks):   1. Pt will improve FOTO to </=NA% limitation to improve perceived limitation with changing and maintaining mobility.  2. Pt will improve cervical AROM to WNL in all planes to improve cervical mobility  for driving   3. Pt will improve UE MMTs 1/2 grade in all planes to improve strength for lifting and carrying tasks.  4. Pt will report no pain with lifting 10 lbs to promote physical activity.   5. Pt will report no pain with cervical AROM in all planes to promote QOL.        PLAN   Plan of care Certification: 4/13/2022 to 7/8/2022.     Outpatient Physical Therapy 2 times weekly for 6 weeks to include the following interventions: Cervical/Lumbar Traction, Manual Therapy, Moist Heat/ Ice, Neuromuscular Re-ed, Patient Education, Self Care, Therapeutic Activities, Therapeutic Exercise and FDN.        Terrence Hurtado, PTA

## 2022-05-12 ENCOUNTER — CLINICAL SUPPORT (OUTPATIENT)
Dept: REHABILITATION | Facility: HOSPITAL | Age: 77
End: 2022-05-12
Attending: INTERNAL MEDICINE
Payer: MEDICARE

## 2022-05-12 DIAGNOSIS — M54.2 NECK PAIN: Primary | ICD-10-CM

## 2022-05-12 DIAGNOSIS — M54.2 PAINFUL CERVICAL ROM: ICD-10-CM

## 2022-05-12 NOTE — PROGRESS NOTES
[facility]   Physical Therapy Treatment Note     Name: Chikis Epstein  Clinic Number: [id number]    Therapy Diagnosis:   Encounter Diagnoses   Name Primary?    Neck pain Yes    Painful cervical ROM      Physician: Cornell Garcia, *    Visit Date: 5/12/2022    Physician Orders: PT Eval and Treat   Medical Diagnosis from Referral: M47.812 (ICD-10-CM) - Osteoarthritis of cervical spine, unspecified spinal osteoarthritis complication status  Evaluation Date: 4/13/2022  Authorization Period Expiration: 12/31/22  Plan of Care Expiration: 7/8/2022  Progress Note Due: 5/13/2022  Visit # / Visits authorized: 1/1 , 6/20  FOTO: 6/5     Precautions: Standard       PTA Visit #: 2/5     Time In: 1000 am  Time Out: 1045  Total Billable Time: 45 minutes    SUBJECTIVE     Pt reports: she is now having L UT pain which has shifted from the usual R. She has been up since 1 am as she can't get comfortable. She has tried Tylelnol without relief. She states this is the worse it has been since beginning PT. She considered calling her physician for any direction. The pain last night was around an 8/10. She is able to perform the neck stretches and shoulder exercises without complications. The pain is sharp and stabbing sensation in her L scapula.  She was partially compliant with home exercise program.  Response to previous treatment: feeling better    Pain: 6/10  Location: bilateral neck      OBJECTIVE     Objective Measures updated at progress report unless specified.     Treatment     Chikis received the treatments listed below:      therapeutic exercises to develop strength, endurance, ROM, flexibility and posture for 0 minutes including:    UBE 3 fwd/2 bkwd (fatigue)  UT Stretch 30 sec x 3 B  Levator Stretch B  - 30 sec x 3  Mid Trap Stretch 10 sec x 10  Scapular Retractions   - 10x   Brugger Green TB   - 2x10   Chin Retractions 20x for 5 seconds    Heat on R cervical and UT 15 min pre  treatment.    manual therapy techniques: were applied to the: cervical spine for 0 minutes, including:    STM to R cervical paraspinals, UT, LS, dynamic cupping, percussion gun, trigger point therapy  Cervical distraction  Not performed   Upper Trap Stretch Not performed       Patient Education and Home Exercises     Home Exercises Provided and Patient Education Provided     Education provided:   - HOME EXERCISE PROGRAM issued    Written Home Exercises Provided: yes. Exercises were reviewed and Chikis was able to demonstrate them prior to the end of the session.  Chikis demonstrated good  understanding of the education provided. See EMR under Patient Instructions for exercises provided during therapy sessions    ASSESSMENT     Pt presents with inc'd symptoms today which has persisted since Monday evening. Chikis is having difficulty sleeping due to pain levels. Exercises and manual therapy were deferred this date. Pt was agreeable to MHP and functional dry needling which was performed by Jose Newton, PT, DPT. Chikis did feel significant relief from pain and inc'd cervical ROM noted. Plan to monitor patient closely, patient was advised if her symptoms do not improve over the next day or two she should call her physician.      Chikis Is progressing well towards her goals.   Pt prognosis is Fair.     Pt will continue to benefit from skilled outpatient physical therapy to address the deficits listed in the problem list box on initial evaluation, provide pt/family education and to maximize pt's level of independence in the home and community environment.     Pt's spiritual, cultural and educational needs considered and pt agreeable to plan of care and goals.     Anticipated barriers to physical therapy: None    Goals:   Short Term Goals (3 Weeks):   1. Pt will be independent with HEP to supplement PT in improving pain free cervical mobility  2. Pt will improve cervical AROM 5 deg in all abnormla planes to improve cervical  mobility for driving  3. Pt will demonstrate improved sitting posture to decrease pain experienced in head and neck.  Long Term Goals (6 Weeks):   1. Pt will improve FOTO to </=NA% limitation to improve perceived limitation with changing and maintaining mobility.  2. Pt will improve cervical AROM to WNL in all planes to improve cervical mobility for driving   3. Pt will improve UE MMTs 1/2 grade in all planes to improve strength for lifting and carrying tasks.  4. Pt will report no pain with lifting 10 lbs to promote physical activity.   5. Pt will report no pain with cervical AROM in all planes to promote QOL.        PLAN   Plan of care Certification: 4/13/2022 to 7/8/2022.     Outpatient Physical Therapy 2 times weekly for 6 weeks to include the following interventions: Cervical/Lumbar Traction, Manual Therapy, Moist Heat/ Ice, Neuromuscular Re-ed, Patient Education, Self Care, Therapeutic Activities, Therapeutic Exercise and FDN.        Terrence Hurtado, PTA

## 2022-05-17 ENCOUNTER — LAB VISIT (OUTPATIENT)
Dept: LAB | Facility: HOSPITAL | Age: 77
End: 2022-05-17
Attending: INTERNAL MEDICINE
Payer: MEDICARE

## 2022-05-17 ENCOUNTER — CLINICAL SUPPORT (OUTPATIENT)
Dept: REHABILITATION | Facility: HOSPITAL | Age: 77
End: 2022-05-17
Attending: INTERNAL MEDICINE
Payer: MEDICARE

## 2022-05-17 DIAGNOSIS — R53.83 FATIGUE, UNSPECIFIED TYPE: ICD-10-CM

## 2022-05-17 DIAGNOSIS — M54.2 NECK PAIN: Primary | ICD-10-CM

## 2022-05-17 DIAGNOSIS — M54.2 PAINFUL CERVICAL ROM: ICD-10-CM

## 2022-05-17 LAB
T4 FREE SERPL-MCNC: 0.8 NG/DL (ref 0.71–1.51)
THYROPEROXIDASE IGG SERPL-ACNC: <6 IU/ML
TSH SERPL DL<=0.005 MIU/L-ACNC: 3.69 UIU/ML (ref 0.4–4)

## 2022-05-17 PROCEDURE — 84443 ASSAY THYROID STIM HORMONE: CPT | Performed by: INTERNAL MEDICINE

## 2022-05-17 PROCEDURE — 86376 MICROSOMAL ANTIBODY EACH: CPT | Performed by: INTERNAL MEDICINE

## 2022-05-17 PROCEDURE — 36415 COLL VENOUS BLD VENIPUNCTURE: CPT | Mod: PO | Performed by: INTERNAL MEDICINE

## 2022-05-17 PROCEDURE — 97110 THERAPEUTIC EXERCISES: CPT | Mod: CQ

## 2022-05-17 PROCEDURE — 84439 ASSAY OF FREE THYROXINE: CPT | Performed by: INTERNAL MEDICINE

## 2022-05-17 NOTE — PROGRESS NOTES
[facility]   Physical Therapy Treatment Note     Name: Chikis Epstein  Clinic Number: [id number]    Therapy Diagnosis:   No diagnosis found.  Physician: Cornell Garcia, *    Visit Date: 5/17/2022    Physician Orders: PT Eval and Treat   Medical Diagnosis from Referral: M47.812 (ICD-10-CM) - Osteoarthritis of cervical spine, unspecified spinal osteoarthritis complication status  Evaluation Date: 4/13/2022  Authorization Period Expiration: 12/31/22  Plan of Care Expiration: 7/8/2022  Progress Note Due: 5/13/2022  Visit # / Visits authorized: 1/1 , 6/20  FOTO: 6/5     Precautions: Standard       PTA Visit #: 2/5     Time In: 2:30 pm  Time Out: 3:05 pm  Total Billable Time: 35 minutes    SUBJECTIVE     Pt reports: much better since last visit. She reports the pain has gotten better everyday and she has also been sleeping well for the last three nights.  Response to previous treatment: feeling better    Pain: 0/10  Location: bilateral neck      OBJECTIVE     Objective Measures updated at progress report unless specified.     Treatment     Chikis received the treatments listed below:      therapeutic exercises to develop strength, endurance, ROM, flexibility and posture for 0 minutes including:    UBE 3 fwd/3 bkwd   UT Stretch 30 sec x 3 B  Levator Stretch B  - 30 sec x 3  Mid Trap Stretch 10 sec x 10  Scapular Retractions   - 10x   Brugger Green TB   - 2x10   Chin Retractions 20x for 5 seconds  Supine pect stretch 2 min    Heat on R cervical and UT 0 min pre treatment.    manual therapy techniques: were applied to the: cervical spine for 0 minutes, including:  STM to R cervical paraspinals, UT, LS, dynamic cupping, percussion gun, trigger point therapy  Cervical distraction  Not performed   Upper Trap Stretch Not performed       Patient Education and Home Exercises     Home Exercises Provided and Patient Education Provided     Education provided:   - HOME EXERCISE PROGRAM issued    Written  Home Exercises Provided: yes. Exercises were reviewed and Chikis was able to demonstrate them prior to the end of the session.  Chikis demonstrated good  understanding of the education provided. See EMR under Patient Instructions for exercises provided during therapy sessions    ASSESSMENT     Pt presents with no symptoms this date. Therefore therapeutic exercises focused on cervical and periscapular musculature were performed without progressions. Chikis declined manual therapy and MHP due to being asymptomatic and didn't want to take a chance with the pain returning. Plan to progress slowly per patient tolerance and incorporate FDN if pain returns.      Chikis Is progressing well towards her goals.   Pt prognosis is Fair.     Pt will continue to benefit from skilled outpatient physical therapy to address the deficits listed in the problem list box on initial evaluation, provide pt/family education and to maximize pt's level of independence in the home and community environment.     Pt's spiritual, cultural and educational needs considered and pt agreeable to plan of care and goals.     Anticipated barriers to physical therapy: None    Goals:   Short Term Goals (3 Weeks):   1. Pt will be independent with HEP to supplement PT in improving pain free cervical mobility  2. Pt will improve cervical AROM 5 deg in all abnormla planes to improve cervical mobility for driving  3. Pt will demonstrate improved sitting posture to decrease pain experienced in head and neck.  Long Term Goals (6 Weeks):   1. Pt will improve FOTO to </=NA% limitation to improve perceived limitation with changing and maintaining mobility.  2. Pt will improve cervical AROM to WNL in all planes to improve cervical mobility for driving   3. Pt will improve UE MMTs 1/2 grade in all planes to improve strength for lifting and carrying tasks.  4. Pt will report no pain with lifting 10 lbs to promote physical activity.   5. Pt will report no pain with cervical  AROM in all planes to promote QOL.        PLAN   Plan of care Certification: 4/13/2022 to 7/8/2022.     Outpatient Physical Therapy 2 times weekly for 6 weeks to include the following interventions: Cervical/Lumbar Traction, Manual Therapy, Moist Heat/ Ice, Neuromuscular Re-ed, Patient Education, Self Care, Therapeutic Activities, Therapeutic Exercise and FDN.        Terrence Hurtado, PTA

## 2022-05-19 ENCOUNTER — CLINICAL SUPPORT (OUTPATIENT)
Dept: REHABILITATION | Facility: HOSPITAL | Age: 77
End: 2022-05-19
Attending: INTERNAL MEDICINE
Payer: MEDICARE

## 2022-05-19 DIAGNOSIS — M54.2 NECK PAIN: Primary | ICD-10-CM

## 2022-05-19 DIAGNOSIS — M54.2 PAINFUL CERVICAL ROM: ICD-10-CM

## 2022-05-19 PROCEDURE — 97110 THERAPEUTIC EXERCISES: CPT

## 2022-05-19 NOTE — PROGRESS NOTES
[facility]   Physical Therapy Treatment Note     Name: Chikis Epstein  Clinic Number: [id number]    Therapy Diagnosis:   Encounter Diagnoses   Name Primary?    Neck pain Yes    Painful cervical ROM      Physician: Cornell Garcia, *    Visit Date: 5/19/2022    Physician Orders: PT Eval and Treat   Medical Diagnosis from Referral: M47.812 (ICD-10-CM) - Osteoarthritis of cervical spine, unspecified spinal osteoarthritis complication status  Evaluation Date: 4/13/2022  Authorization Period Expiration: 12/31/22  Plan of Care Expiration: 7/8/2022  Progress Note Due: 5/13/2022  Visit # / Visits authorized: 1/1 , 7/20  FOTO: 8     Precautions: Standard       PTA Visit #: 0/5     Time In: 1215  Time Out: 1245  Total Billable Time: 30 minutes    SUBJECTIVE     Pt reports: she has a little tightness on the R side of her neck, but no pain. Patient reports she is feeling 90-95% of normal.     Response to previous treatment: feeling better    Pain: 0/10  Location: bilateral neck      OBJECTIVE     Objective Measures updated at progress report unless specified.     Treatment     Chikis received the treatments listed below:      therapeutic exercises to develop strength, endurance, ROM, flexibility and posture for 30 minutes including:    UBE 3 fwd/3 bkwd   UT Stretch 30 sec x 3 B  Levator Stretch B  - 30 sec x 3  Mid Trap Stretch 10 sec x 10  Scapular Retractions   - 10x   Brugger Green TB   - 2x10   Chin Retractions 20x for 5 seconds  Supine pect stretch 2 min    Heat on R cervical and UT 0 min pre treatment.    manual therapy techniques: were applied to the: cervical spine for 0 minutes, including:  STM to R cervical paraspinals, UT, LS, dynamic cupping, percussion gun, trigger point therapy  Cervical distraction  Not performed   Upper Trap Stretch Not performed       Patient Education and Home Exercises     Home Exercises Provided and Patient Education Provided     Education provided:   - HOME  EXERCISE PROGRAM issued    Written Home Exercises Provided: yes. Exercises were reviewed and Chikis was able to demonstrate them prior to the end of the session.  Chikis demonstrated good  understanding of the education provided. See EMR under Patient Instructions for exercises provided during therapy sessions    ASSESSMENT     Patient presents to therapy with continued improvement in symptoms in neck but has some minor tightness today. Her symptoms are 90-95% of normal today. If patient no longer has symptoms on Tuesday she will hold therapy for 2-3 weeks for a trial without PT      Chikis Is progressing well towards her goals.   Pt prognosis is Fair.     Pt will continue to benefit from skilled outpatient physical therapy to address the deficits listed in the problem list box on initial evaluation, provide pt/family education and to maximize pt's level of independence in the home and community environment.     Pt's spiritual, cultural and educational needs considered and pt agreeable to plan of care and goals.     Anticipated barriers to physical therapy: None    Goals:   Short Term Goals (3 Weeks):   1. Pt will be independent with HEP to supplement PT in improving pain free cervical mobility  2. Pt will improve cervical AROM 5 deg in all abnormla planes to improve cervical mobility for driving  3. Pt will demonstrate improved sitting posture to decrease pain experienced in head and neck.  Long Term Goals (6 Weeks):   1. Pt will improve FOTO to </=NA% limitation to improve perceived limitation with changing and maintaining mobility.  2. Pt will improve cervical AROM to WNL in all planes to improve cervical mobility for driving   3. Pt will improve UE MMTs 1/2 grade in all planes to improve strength for lifting and carrying tasks.  4. Pt will report no pain with lifting 10 lbs to promote physical activity.   5. Pt will report no pain with cervical AROM in all planes to promote QOL.        PLAN   Plan of care  Certification: 4/13/2022 to 7/8/2022.     Outpatient Physical Therapy 2 times weekly for 6 weeks to include the following interventions: Cervical/Lumbar Traction, Manual Therapy, Moist Heat/ Ice, Neuromuscular Re-ed, Patient Education, Self Care, Therapeutic Activities, Therapeutic Exercise and FDN.        Jose Newton, PT

## 2022-05-24 ENCOUNTER — CLINICAL SUPPORT (OUTPATIENT)
Dept: REHABILITATION | Facility: HOSPITAL | Age: 77
End: 2022-05-24
Attending: INTERNAL MEDICINE
Payer: MEDICARE

## 2022-05-24 DIAGNOSIS — M54.2 PAINFUL CERVICAL ROM: ICD-10-CM

## 2022-05-24 DIAGNOSIS — M54.2 NECK PAIN: Primary | ICD-10-CM

## 2022-05-24 PROCEDURE — 97110 THERAPEUTIC EXERCISES: CPT

## 2022-05-24 NOTE — PLAN OF CARE
[facility]   Discharge Note    Name: Chikis Epstein  Clinic Number: [id number]    Therapy Diagnosis:   Encounter Diagnoses   Name Primary?    Neck pain Yes    Painful cervical ROM      Physician: Cornell Garcia, *    Physician Orders: PT Eval and Treat   Medical Diagnosis from Referral: M47.812 (ICD-10-CM) - Osteoarthritis of cervical spine, unspecified spinal osteoarthritis complication status  Evaluation Date: 4/13/2022        Date of Last visit: 5/24/2022  Total Visits Received: 9    ASSESSMENT      Patient presents to PT with continued subjective improvement with cervical pain and tightness. Patient was reassessed today and showed improvement with pain and cervical range of motion. Patient is going to continue her home program and return to PT if symptoms worsen.       Discharge reason: Patient has reached the maximum rehab potential for the present time and Patient requested discharge    Goals:   Short Term Goals (3 Weeks):   1. Pt will be independent with HEP to supplement PT in improving pain free cervical mobility MET 5/24/2022  2. Pt will improve cervical AROM 5 deg in all abnormla planes to improve cervical mobility for driving MET 5/24/2022  3. Pt will demonstrate improved sitting posture to decrease pain experienced in head and neck. MET 5/24/2022  Long Term Goals (6 Weeks):   1. Pt will improve FOTO to </=NA% limitation to improve perceived limitation with changing and maintaining mobility. NOT MET 5/24/2022  2. Pt will improve cervical AROM to WNL in all planes to improve cervical mobility for driving  NOT MET 5/24/2022  3. Pt will improve UE MMTs 1/2 grade in all planes to improve strength for lifting and carrying tasks. NOT MET 5/24/2022  4. Pt will report no pain with lifting 10 lbs to promote physical activity. MET 5/24/2022  5. Pt will report no pain with cervical AROM in all planes to promote QOL. MET 5/24/2022      PLAN   This patient is discharged from Physical  Therapy      Jose Newton, PT

## 2022-05-24 NOTE — PROGRESS NOTES
[facility]   Physical Therapy Treatment Note     Name: Chikis Epstein  Clinic Number: [id number]    Therapy Diagnosis:   Encounter Diagnoses   Name Primary?    Neck pain Yes    Painful cervical ROM      Physician: Cornell Garcia, *    Visit Date: 2022    Physician Orders: PT Eval and Treat   Medical Diagnosis from Referral: M47.812 (ICD-10-CM) - Osteoarthritis of cervical spine, unspecified spinal osteoarthritis complication status  Evaluation Date: 2022  Authorization Period Expiration: 22  Plan of Care Expiration: 2022  Progress Note Due: 2022  Visit # / Visits authorized:  ,   FOTO: 9     Precautions: Standard       PTA Visit #: 0/5     Time In: 1030  Time Out: 1045  Total Billable Time: 15 minutes    SUBJECTIVE     Pt reports: she continues to feel much better since the needling session. Patient has been doing her home exercises and would like to continue on her own.       Response to previous treatment: feeling better    Pain: 0/10  Location: bilateral neck      OBJECTIVE       Cervical AROM: Pain/Dysfunction with Movement:   Flexion: 55 degrees     Extension: 65 degrees    Right side bendin degrees     Left side bendin degrees     Right rotation: 60 degrees    Left rotation: 55 degrees        Myotome Right Left Pain/Dysfunction with Movement   C4- Shoulder Elevation 4/5 4/5     C5- Shoulder Abduction 4/5 4/5     C6- Wrist Extension 4/5 4/5     C7- Elbow Extension 4/5 4/5     C8- Phalangeal Abduction 4/5 4/5     T1- 5th Finger Abduction 4/5 4/5        Posture Assessment: Improved posture       Spurling's Test: Negative      Distraction Test: Negative       Treatment     Chikis received the treatments listed below:      therapeutic exercises and assessment to develop strength, endurance, ROM, flexibility and posture for 15 minutes including:      NOT PERFORMED   UBE 3 fwd/3 bkwd   UT Stretch 30 sec x 3 B  Levator Stretch B  - 30 sec x  3  Mid Trap Stretch 10 sec x 10  Scapular Retractions   - 10x   Brugger Green TB   - 2x10   Chin Retractions 20x for 5 seconds  Supine pect stretch 2 min    Heat on R cervical and UT 0 min pre treatment.    manual therapy techniques: were applied to the: cervical spine for 0 minutes, including:  STM to R cervical paraspinals, UT, LS, dynamic cupping, percussion gun, trigger point therapy  Cervical distraction  Not performed   Upper Trap Stretch Not performed       Patient Education and Home Exercises     Home Exercises Provided and Patient Education Provided     Education provided:   - HOME EXERCISE PROGRAM issued    Written Home Exercises Provided: yes. Exercises were reviewed and Chikis was able to demonstrate them prior to the end of the session.  Chikis demonstrated good  understanding of the education provided. See EMR under Patient Instructions for exercises provided during therapy sessions    ASSESSMENT     Patient presents to PT with continued subjective improvement with cervical pain and tightness. Patient was reassessed today and showed improvement with pain and cervical range of motion. Patient is going to continue her home program and return to PT if symptoms worsen.       Chikis Is progressing well towards her goals.   Pt prognosis is Fair.       Pt's spiritual, cultural and educational needs considered and pt agreeable to plan of care and goals.     Anticipated barriers to physical therapy: None    Goals:   Short Term Goals (3 Weeks):   1. Pt will be independent with HEP to supplement PT in improving pain free cervical mobility MET 5/24/2022  2. Pt will improve cervical AROM 5 deg in all abnormla planes to improve cervical mobility for driving MET 5/24/2022  3. Pt will demonstrate improved sitting posture to decrease pain experienced in head and neck. MET 5/24/2022  Long Term Goals (6 Weeks):   1. Pt will improve FOTO to </=NA% limitation to improve perceived limitation with changing and maintaining  mobility. NOT MET 5/24/2022  2. Pt will improve cervical AROM to WNL in all planes to improve cervical mobility for driving  NOT MET 5/24/2022  3. Pt will improve UE MMTs 1/2 grade in all planes to improve strength for lifting and carrying tasks. NOT MET 5/24/2022  4. Pt will report no pain with lifting 10 lbs to promote physical activity. MET 5/24/2022  5. Pt will report no pain with cervical AROM in all planes to promote QOL. MET 5/24/2022        PLAN     Discharge from PT     Jose Newton PT

## 2022-05-26 ENCOUNTER — PATIENT MESSAGE (OUTPATIENT)
Dept: ADMINISTRATIVE | Facility: OTHER | Age: 77
End: 2022-05-26
Payer: MEDICARE

## 2022-05-27 ENCOUNTER — DOCUMENTATION ONLY (OUTPATIENT)
Dept: REHABILITATION | Facility: HOSPITAL | Age: 77
End: 2022-05-27
Payer: MEDICARE

## 2022-06-18 NOTE — PATIENT INSTRUCTIONS
1. Restart vitamin D 1000iu a day - consume foods with calcium- at least 4 servings a day  2. Bone density due        Counseling and Referral of Other Preventative  (Italic type indicates deductible and co-insurance are waived)    Patient Name: Chikis Epstein  Today's Date: 8/2/2017      SERVICE LIMITATIONS RECOMMENDATION    Vaccines    · Pneumococcal (once after 65)    · Influenza (annually)    · Hepatitis B (if medium/high risk)    · Prevnar 13      Hepatitis B medium/high risk factors:       - End-stage renal disease       - Hemophiliacs who received Factor VII or         IX concentrates       - Clients of institutions for the mentally             retarded       - Persons who live in the same house as          a HepB carrier       - Homosexual men       - Illicit injectable drug abusers     Pneumococcal: due-     Influenza: off season     Hepatitis B: N/A     Prevnar 13: current    Mammogram (biennial age 50-74)  Annually (age 40 or over)  9/8/16- due in sept 2017    Pap (up to age 70 and after 70 if unknown history or abnormal study last 10 years)      N/A       Colorectal cancer screening (to age 75)    · Fecal occult blood test (annual)  · Flexible sigmoidoscopy (5y)  · Screening colonoscopy (10y)  · Barium enema   9/17/14- due in 2024    Diabetes self-management training (no USPSTF recommendations)  Requires referral by treating physician for patient with diabetes or renal disease. 10 hours of initial DSMT sessions of no less than 30 minutes each in a continuous 12-month period. 2 hours of follow-up DSMT in subsequent years.  N/A    Bone mass measurements (age 65 & older, biennial)  Requires diagnosis related to osteoporosis or estrogen deficiency. Biennial benefit unless patient has history of long-term glucocorticoid  due    Glaucoma screening (no USPSTF recommendation)  Diabetes mellitus, family history   , age 50 or over    American, age 65 or over  current    Medical nutrition therapy  [facility]  Hematology/Oncology  Progress Note    Patient Name: Elizabeth Franco  Admission Date: 6/15/2022  Hospital Length of Stay: 3 days  Code Status: Full Code     Subjective:     HPI:  Ms. Storm is a 24-year-old woman with sickle cell disease presenting with acute pain in bilateral lower hip/buttocks and chest similar to prior sickle cell pain crises.  She notes rare pain crises about once per year at those times occasionally receiving blood transfusion.  She is noted to have not tolerated chelation therapy for iron overload.  She does not take medication for her sickle cell disease aside from folic acid.  She denies known history of thrombosis.  On presentation to the emergency room  vital signs notable for temperature 100.9°, desaturation to 78% on room air with improvement on nasal cannula, to kidney on tachycardia.  Lab work with progressive anemia to 6.5 hemoglobin baseline 9.9 and leukocytosis to 19, bilirubin elevated 7.6 prior baseline 2.3.  D-dimer 24, retic >23.  CTA with bibasilar pulmonary density.      Interval History:  Patient states that she feels a little bit better status post exchange transfusion    Oncology Treatment Plan:   [Could not find a treatment plan. This SmartLink may be configured incorrectly. Contact a  for help.]    Medications:  Continuous Infusions:   sodium chloride 0.9% 100 mL/hr at 06/18/22 0420    hydromorphone in 0.9 % NaCl 6 mg/30 ml       Scheduled Meds:   acetaminophen  650 mg Oral TID    enoxaparin  40 mg Subcutaneous Daily    folic acid  1 mg Oral Daily    heparin (porcine)  4,000 Units Intravenous Once    lactulose  20 g Oral BID    levoFLOXacin  750 mg Intravenous Q24H    pantoprazole  40 mg Oral BID    polyethylene glycol  17 g Oral BID     PRN Meds:sodium chloride, sodium chloride, acetaminophen, acetaminophen, aluminum-magnesium hydroxide-simethicone, bisacodyL, naloxone, ondansetron, simethicone, sodium chloride  0.9%, sodium chloride 0.9%     Review of Systems   Constitutional:  Positive for fatigue. Negative for activity change, appetite change, chills, diaphoresis, fever and unexpected weight change.   HENT:  Negative for congestion, dental problem, drooling, ear discharge, ear pain, facial swelling, hearing loss, mouth sores, nosebleeds, postnasal drip, rhinorrhea, sinus pressure, sneezing, sore throat, tinnitus, trouble swallowing and voice change.    Eyes:  Negative for photophobia, pain, discharge, redness, itching and visual disturbance.   Respiratory:  Negative for cough, choking, chest tightness, shortness of breath, wheezing and stridor.    Cardiovascular:  Positive for chest pain. Negative for palpitations and leg swelling.   Gastrointestinal:  Negative for abdominal distention, abdominal pain, anal bleeding, blood in stool, constipation, diarrhea, nausea, rectal pain and vomiting.   Endocrine: Negative for cold intolerance, heat intolerance, polydipsia, polyphagia and polyuria.   Genitourinary:  Negative for decreased urine volume, difficulty urinating, dyspareunia, dysuria, enuresis, flank pain, frequency, genital sores, hematuria, menstrual problem, pelvic pain, urgency, vaginal bleeding, vaginal discharge and vaginal pain.   Musculoskeletal:  Positive for arthralgias, back pain and myalgias. Negative for gait problem, joint swelling, neck pain and neck stiffness.   Skin:  Negative for color change, pallor and rash.   Allergic/Immunologic: Negative for environmental allergies, food allergies and immunocompromised state.   Neurological:  Positive for weakness. Negative for dizziness, tremors, seizures, syncope, facial asymmetry, speech difficulty, light-headedness, numbness and headaches.   Hematological:  Negative for adenopathy. Does not bruise/bleed easily.   Psychiatric/Behavioral:  Positive for dysphoric mood. Negative for agitation, behavioral problems, confusion, decreased concentration, hallucinations,  for diabetes or renal disease (no recommended schedule)  Requires referral by treating physician for patient with diabetes or renal disease or kidney transplant within the past 3 years.  Can be provided in same year as diabetes self-management training (DSMT), and CMS recommends medical nutrition therapy take place after DSMT. Up to 3 hours for initial year and 2 hours in subsequent years.  N/A    Cardiovascular screening blood tests (every 5 years)  · Fasting lipid panel  Order as a panel if possible  current    Diabetes screening tests (at least every 3 years, Medicare covers annually or at 6-month intervals for prediabetic patients)  · Fasting blood sugar (FBS) or glucose tolerance test (GTT)  Patient must be diagnosed with one of the following:       - Hypertension       - Dyslipidemia       - Obesity (BMI 30kg/m2)       - Previous elevated impaired FBS or GTT       ... or any two of the following:       - Overweight (BMI 25 but <30)       - Family history of diabetes       - Age 65 or older       - History of gestational diabetes or birth of baby weighing more than 9 pounds  current    Abdominal aortic aneurysm screening (once)  · Sonogram   Limited to patients who meet one of the following criteria:       - Men who are 65-75 years old and have smoked more than 100 cigarette in their lifetime       - Anyone with a family history of abdominal aortic aneurysm       - Anyone recommended for screening by the USPSTF  mother- due    HIV screening (annually for increased risk patients)  · HIV-1 and HIV-2 by EIA, or LEONILA, rapid antibody test or oral mucosa transudate  Patients must be at increased risk for HIV infection per USPSTF guidelines or pregnant. Tests covered annually for patient at increased risk or as requested by the patient. Pregnant patients may receive up to 3 tests during pregnancy.  Risks discussed, screening is declined    Smoking cessation counseling (up to 8 sessions per year)  Patients must be  asymptomatic of tobacco-related conditions to receive as a preventative service.  N/A    Subsequent annual wellness visit  At least 12 months since last AWV  Return in one year     The following information is provided to all patients.  This information is to help you find resources for any of the problems found today that may be affecting your health:                Living healthy guide: www.[facility].[place].[facility]      Understanding Diabetes: www.diabetes.org      Eating healthy: www.cdc.gov/healthyweight      CDC home safety checklist: www.cdc.gov/steadi/patient.html      Agency on Aging: www.goea.[place].[facility]      Alcoholics anonymous (AA): www.aa.org      Physical Activity: www.kenji.nih.gov/kh4mkju      Tobacco use: www.quitwithusla.org      self-injury, sleep disturbance and suicidal ideas. The patient is nervous/anxious. The patient is not hyperactive.    Objective:     Vital Signs (Most Recent):  Temp: (!) 100.8 °F (38.2 °C) (06/18/22 0704)  Pulse: 109 (06/18/22 0704)  Resp: 18 (06/18/22 0704)  BP: 114/62 (06/18/22 0704)  SpO2: (!) 94 % (06/18/22 0704)   Vital Signs (24h Range):  Temp:  [98 °F (36.7 °C)-101.4 °F (38.6 °C)] 100.8 °F (38.2 °C)  Pulse:  [] 109  Resp:  [17-23] 18  SpO2:  [90 %-98 %] 94 %  BP: (110-129)/(60-75) 114/62     Weight: 52.7 kg (116 lb 2.9 oz)  Body mass index is 21.25 kg/m².  Body surface area is 1.52 meters squared.      Intake/Output Summary (Last 24 hours) at 6/18/2022 0933  Last data filed at 6/18/2022 0658  Gross per 24 hour   Intake 6004.56 ml   Output --   Net 6004.56 ml       Physical Exam    Significant Labs:   BMP:   Recent Labs   Lab 06/17/22  0434 06/18/22  0646    116*   * 133*   K 4.3 3.5   CL 97 101   CO2 24 24   BUN 6 4*   CREATININE 0.5 0.5   CALCIUM 8.3* 7.5*   MG  --  1.9   , CBC:   Recent Labs   Lab 06/17/22 0434 06/18/22  0646   WBC 13.74* 10.32   HGB 5.5* 7.7*   HCT 16.2* 23.6*    131*   , CMP:   Recent Labs   Lab 06/17/22 0434 06/18/22  0646   * 133*   K 4.3 3.5   CL 97 101   CO2 24 24    116*   BUN 6 4*   CREATININE 0.5 0.5   CALCIUM 8.3* 7.5*   PROT 6.6 5.4*   ALBUMIN 3.4* 2.7*   BILITOT 7.2* 3.1*   ALKPHOS 108 74   AST 54* 27   ALT 34 20   ANIONGAP 10 8   EGFRNONAA >60 >60   , Coagulation: No results for input(s): PT, INR, APTT in the last 48 hours., Haptoglobin: No results for input(s): HAPTOGLOBIN in the last 48 hours., Immunology: No results for input(s): SPEP, DANISHA, BETI, FREELAMBDALI in the last 48 hours., LDH: No results for input(s): LDHCSF, BFSOURCE in the last 48 hours., LFTs:   Recent Labs   Lab 06/17/22  0434 06/18/22  0646   ALT 34 20   AST 54* 27   ALKPHOS 108 74   BILITOT 7.2* 3.1*   PROT 6.6 5.4*   ALBUMIN 3.4* 2.7*   , Reticulocytes:   Recent Labs    Lab 06/17/22  0434   RETIC >23.0*   , Tumor Markers: No results for input(s): PSA, CEA, , AFPTM, [id number],  in the last 48 hours.    Invalid input(s): ALGTM, Uric Acid No results for input(s): URICACID in the last 48 hours., and Urine Studies: No results for input(s): COLORU, APPEARANCEUA, PHUR, SPECGRAV, PROTEINUA, GLUCUA, KETONESU, BILIRUBINUA, OCCULTUA, NITRITE, UROBILINOGEN, LEUKOCYTESUR, RBCUA, WBCUA, BACTERIA, SQUAMEPITHEL, HYALINECASTS in the last 48 hours.    Invalid input(s): WRIGHTSUR    Diagnostic Results:  I have reviewed all pertinent imaging results/findings within the past 24 hours.    Assessment/Plan:     * Acute chest syndrome due to sickle cell crisis  Sickle cell pain crisis:  Presentation concerning for acute chest syndrome.  Typically rare pain crises once yearly with rare need for blood transfusion but has had acute chest and exchange transfusion in years past.  Most recent hemoglobin S quantification 53% baseline in absence of sickle cell pain crisis April 2022. Current presentation with fever, tachypnea, tachycardia, leukocytosis, elevated retic in bilirubin with progressive anemia to hemoglobin 6.5 from baseline 9.9.  Elevated D-dimer with CTA chest negative for pulmonary embolism but did show bibasilar pulmonary density.    In ER can get initial simple txfusion 1uprbc while awaiting exchange transfusion. I discussed case with transfusion Medicine who agrees with exchange transfusion.  I have asked in ER to place a pheresis line - dialysis catheter such as a Trialysis or Brevia; consent by ER team please  Hemoglobin S quantification (53% in 4/2022)  Trend cbc, retic, bili  Empitic antibiotics  Would recommend lower extremity Doppler to rule out thrombosis given elevation in D-dimer.  CTA chest negative for pulmonary embolism.  If no evidence of thrombosis DVT prophylaxis would be recommended  Ivf prn for euvolemia  Supplemental O2  Analgesia per primary team/PCA  prn    06/16/2022  Plan for exchange transfusion this AM  Continue supportive care  Continue with previous recommendations--see above    06/17/2022  Exchange transfusion in process  Continue supportive care     06/18/2022.  Patient completed exchange transfusion.  She states she feels somewhat better she states that her cough is improved.  Hemoglobin is stable hopefully will patient's condition will improve over the next 24-48 hours status post exchange transfusion        Thank you for your consult. I will follow-up with patient. Please contact us if you have any additional questions.     Faheem Acosta MD  Hematology/Oncology  [facility]

## 2022-08-04 ENCOUNTER — OFFICE VISIT (OUTPATIENT)
Dept: URGENT CARE | Facility: CLINIC | Age: 77
End: 2022-08-04
Payer: MEDICARE

## 2022-08-04 VITALS
OXYGEN SATURATION: 98 % | DIASTOLIC BLOOD PRESSURE: 85 MMHG | SYSTOLIC BLOOD PRESSURE: 143 MMHG | HEART RATE: 83 BPM | TEMPERATURE: 99 F | RESPIRATION RATE: 16 BRPM | BODY MASS INDEX: 29.45 KG/M2 | WEIGHT: 150 LBS | HEIGHT: 60 IN

## 2022-08-04 DIAGNOSIS — J02.9 SORE THROAT: ICD-10-CM

## 2022-08-04 DIAGNOSIS — J01.40 ACUTE NON-RECURRENT PANSINUSITIS: ICD-10-CM

## 2022-08-04 DIAGNOSIS — J06.9 VIRAL URI WITH COUGH: Primary | ICD-10-CM

## 2022-08-04 LAB
CTP QC/QA: YES
SARS-COV-2 RDRP RESP QL NAA+PROBE: NEGATIVE

## 2022-08-04 PROCEDURE — 1125F AMNT PAIN NOTED PAIN PRSNT: CPT | Mod: CPTII,S$GLB,,

## 2022-08-04 PROCEDURE — 99203 PR OFFICE/OUTPT VISIT, NEW, LEVL III, 30-44 MIN: ICD-10-PCS | Mod: S$GLB,,,

## 2022-08-04 PROCEDURE — U0002 COVID-19 LAB TEST NON-CDC: HCPCS | Mod: QW,S$GLB,,

## 2022-08-04 PROCEDURE — 3079F DIAST BP 80-89 MM HG: CPT | Mod: CPTII,S$GLB,,

## 2022-08-04 PROCEDURE — 1160F RVW MEDS BY RX/DR IN RCRD: CPT | Mod: CPTII,S$GLB,,

## 2022-08-04 PROCEDURE — 3079F PR MOST RECENT DIASTOLIC BLOOD PRESSURE 80-89 MM HG: ICD-10-PCS | Mod: CPTII,S$GLB,,

## 2022-08-04 PROCEDURE — 3077F SYST BP >= 140 MM HG: CPT | Mod: CPTII,S$GLB,,

## 2022-08-04 PROCEDURE — 99203 OFFICE O/P NEW LOW 30 MIN: CPT | Mod: S$GLB,,,

## 2022-08-04 PROCEDURE — 1160F PR REVIEW ALL MEDS BY PRESCRIBER/CLIN PHARMACIST DOCUMENTED: ICD-10-PCS | Mod: CPTII,S$GLB,,

## 2022-08-04 PROCEDURE — 1159F PR MEDICATION LIST DOCUMENTED IN MEDICAL RECORD: ICD-10-PCS | Mod: CPTII,S$GLB,,

## 2022-08-04 PROCEDURE — 1125F PR PAIN SEVERITY QUANTIFIED, PAIN PRESENT: ICD-10-PCS | Mod: CPTII,S$GLB,,

## 2022-08-04 PROCEDURE — 1159F MED LIST DOCD IN RCRD: CPT | Mod: CPTII,S$GLB,,

## 2022-08-04 PROCEDURE — 3077F PR MOST RECENT SYSTOLIC BLOOD PRESSURE >= 140 MM HG: ICD-10-PCS | Mod: CPTII,S$GLB,,

## 2022-08-04 PROCEDURE — U0002: ICD-10-PCS | Mod: QW,S$GLB,,

## 2022-08-04 RX ORDER — AMOXICILLIN AND CLAVULANATE POTASSIUM 875; 125 MG/1; MG/1
1 TABLET, FILM COATED ORAL EVERY 12 HOURS
Qty: 14 TABLET | Refills: 0 | Status: SHIPPED | OUTPATIENT
Start: 2022-08-04 | End: 2022-08-11

## 2022-08-04 RX ORDER — BENZONATATE 200 MG/1
200 CAPSULE ORAL 3 TIMES DAILY PRN
Qty: 30 CAPSULE | Refills: 0 | Status: SHIPPED | OUTPATIENT
Start: 2022-08-04 | End: 2022-08-14

## 2022-08-04 NOTE — PATIENT INSTRUCTIONS
"-Today you have received a "wait and see" antibiotic. If symptoms worsen or do not improve after 7 days of symptoms on set, start antibiotic.    - Rest.    - Drink plenty of fluids.    - Acetaminophen (tylenol) or Ibuprofen (advil,motrin) as directed as needed for fever/pain. Avoid tylenol if you have a history of liver disease. Do not take ibuprofen if you have a history of GI bleeding, kidney disease, or if you take blood thinners.   - Ibuprofen dosing for adults: 400 mg by mouth every 4-6 hours as needed. Max: 2400 mg/day; Info: use lowest effective dose, shortest effective treatment duration; give w/ food if GI upset occurs.  - Ibuprofen dosing for children: [6 mo-10 yo] Dose: 5-10 mg/kg/dose by mouth every 6-8h as needed; Max: 40 mg/kg/day; Info: use lower dose for fever <102.5 F, higher dose for fever >102.5 F; use shortest effective tx duration; give w/ food if GI upset occurs. [11 yo and older] Dose: 200-400 mg by mouth every 4-6 hours as needed; Max: 1200 mg/day; Info: use lowest effective dose, shortest effective tx duration; give w/ food if GI upset occurs.  - Tylenol dosing for adults: [By mouth route, immediate-release form] Dose: 325-1000 mg by mouth every 4-6h as needed; Max: 1 g/4h and 4 g/day from all sources. [By mouth route, extended-release form] Dose: 650-1300 mg Extended Release by mouth every 8h as needed; Max: 4 g/day from all sources.   - Tylenol dosing for children: 6-10 yo [ oral tablet/capsule ] Dose: 1 tab/cap by mouth every 4-6h as needed; Max: 5 tabs or caps/24h; Info: do not exceed 75 mg/kg/day, up to 1 g/4h and 4 g/day, from all sources. 11 yo and older [ oral tablet/capsule ] Dose: 1-2 tabs/caps by mouth every 4-6h as needed; Max: 10 tabs or caps/24h; Info: do not exceed 1 g/4h and 4 g/day from all sources.    - You must understand that you have received an Urgent Care treatment only and that you may be released before all of your medical problems are known or treated.   - You, the " patient, will arrange for follow up care as instructed.   - If your condition worsens or fails to improve we recommend that you receive another evaluation at the ER immediately or contact your PCP to discuss your concerns or return here.   - Follow up with your PCP or specialty clinic as directed in the next 1-2 weeks if not improved or as needed.  You can call [phone] to schedule an appointment with the appropriate provider.    If your symptoms do not improve or worsen, go to the emergency room immediately.

## 2022-08-04 NOTE — PROGRESS NOTES
Subjective:       Patient ID: Chikis Epstein is a 76 y.o. female.    Vitals:  height is 5' (1.524 m) and weight is 68 kg (150 lb). Her oral temperature is 98.5 °F (36.9 °C). Her blood pressure is 143/85 (abnormal) and her pulse is 83. Her respiration is 16 and oxygen saturation is 98%.     Chief Complaint: Sore Throat    Patient presents for congestion, sore throat, and coughing that started on Monday. She denies any sick contacts. She has been taking robitussin and nasacort for symptoms with mild relief.     Sore Throat   This is a new problem. The current episode started in the past 7 days (Monday, 8/1/2022). The problem has been gradually worsening. The pain is at a severity of 2/10. The pain is mild. Associated symptoms include congestion, coughing (productive cough), headaches and shortness of breath. Pertinent negatives include no abdominal pain, diarrhea, drooling, ear discharge, ear pain, hoarse voice, plugged ear sensation, neck pain, stridor, swollen glands, trouble swallowing or vomiting. Associated symptoms comments: rhinorrhea. She has had no exposure to strep or mono. Treatments tried: Robitussin, nasacort, cloraseptic, and cough drops. The treatment provided mild relief.       HENT: Positive for congestion and sore throat. Negative for ear pain, ear discharge, drooling and trouble swallowing.    Neck: Negative for neck pain.   Respiratory: Positive for cough (productive cough) and shortness of breath. Negative for stridor.    Gastrointestinal: Negative for abdominal pain, vomiting and diarrhea.   Neurological: Positive for headaches. Negative for disorientation and altered mental status.   Psychiatric/Behavioral: Negative for altered mental status, disorientation and confusion.       Objective:      Physical Exam   Constitutional: She is oriented to person, place, and time. She appears well-developed. She is cooperative.  Non-toxic appearance. She does not appear ill. No distress.      Comments:Patient  sits comfortably in exam chair. Answers questions in complete sentences. Does not show any signs of distress or discoloration.        HENT:   Head: Normocephalic and atraumatic.   Ears:   Right Ear: Hearing, tympanic membrane, external ear and ear canal normal.   Left Ear: Hearing, tympanic membrane, external ear and ear canal normal.   Nose: Rhinorrhea and congestion present. No mucosal edema or nasal deformity. No epistaxis. Right sinus exhibits no maxillary sinus tenderness and no frontal sinus tenderness. Left sinus exhibits no maxillary sinus tenderness and no frontal sinus tenderness.   Mouth/Throat: Uvula is midline and mucous membranes are normal. No trismus in the jaw. Normal dentition. No uvula swelling. Posterior oropharyngeal erythema present. No oropharyngeal exudate or posterior oropharyngeal edema.   Eyes: Conjunctivae and lids are normal. No scleral icterus.   Neck: Trachea normal and phonation normal. Neck supple. No edema present. No erythema present. No neck rigidity present.   Cardiovascular: Normal rate, regular rhythm, normal heart sounds and normal pulses.   Pulmonary/Chest: Effort normal. No stridor. No respiratory distress. She has no decreased breath sounds. She has no wheezes. She has no rhonchi. She has no rales.   Abdominal: Normal appearance.   Musculoskeletal: Normal range of motion.         General: No deformity. Normal range of motion.   Neurological: She is alert and oriented to person, place, and time. She exhibits normal muscle tone. Coordination normal.   Skin: Skin is warm, dry, intact, not diaphoretic and not pale.   Psychiatric: Her speech is normal and behavior is normal. Judgment and thought content normal.   Nursing note and vitals reviewed.        Results for orders placed or performed in visit on 08/04/22   POCT COVID-19 Rapid Screening   Result Value Ref Range    POC Rapid COVID Negative Negative     Acceptable Yes        Assessment:       1. Viral URI  "with cough    2. Sore throat    3. Acute non-recurrent pansinusitis          Plan:         Viral URI with cough  -     benzonatate (TESSALON) 200 MG capsule; Take 1 capsule (200 mg total) by mouth 3 (three) times daily as needed for Cough.  Dispense: 30 capsule; Refill: 0    Sore throat  -     POCT COVID-19 Rapid Screening    Acute non-recurrent pansinusitis  -     amoxicillin-clavulanate 875-125mg (AUGMENTIN) 875-125 mg per tablet; Take 1 tablet by mouth every 12 (twelve) hours. for 7 days  Dispense: 14 tablet; Refill: 0                 Patient Instructions   -Today you have received a "wait and see" antibiotic. If symptoms worsen or do not improve after 7 days of symptoms on set, start antibiotic.    - Rest.    - Drink plenty of fluids.    - Acetaminophen (tylenol) or Ibuprofen (advil,motrin) as directed as needed for fever/pain. Avoid tylenol if you have a history of liver disease. Do not take ibuprofen if you have a history of GI bleeding, kidney disease, or if you take blood thinners.   - Ibuprofen dosing for adults: 400 mg by mouth every 4-6 hours as needed. Max: 2400 mg/day; Info: use lowest effective dose, shortest effective treatment duration; give w/ food if GI upset occurs.  - Ibuprofen dosing for children: [6 mo-12 yo] Dose: 5-10 mg/kg/dose by mouth every 6-8h as needed; Max: 40 mg/kg/day; Info: use lower dose for fever <102.5 F, higher dose for fever >102.5 F; use shortest effective tx duration; give w/ food if GI upset occurs. [11 yo and older] Dose: 200-400 mg by mouth every 4-6 hours as needed; Max: 1200 mg/day; Info: use lowest effective dose, shortest effective tx duration; give w/ food if GI upset occurs.  - Tylenol dosing for adults: [By mouth route, immediate-release form] Dose: 325-1000 mg by mouth every 4-6h as needed; Max: 1 g/4h and 4 g/day from all sources. [By mouth route, extended-release form] Dose: 650-1300 mg Extended Release by mouth every 8h as needed; Max: 4 g/day from all sources. "   - Tylenol dosing for children: 6-10 yo [ oral tablet/capsule ] Dose: 1 tab/cap by mouth every 4-6h as needed; Max: 5 tabs or caps/24h; Info: do not exceed 75 mg/kg/day, up to 1 g/4h and 4 g/day, from all sources. 13 yo and older [ oral tablet/capsule ] Dose: 1-2 tabs/caps by mouth every 4-6h as needed; Max: 10 tabs or caps/24h; Info: do not exceed 1 g/4h and 4 g/day from all sources.    - You must understand that you have received an Urgent Care treatment only and that you may be released before all of your medical problems are known or treated.   - You, the patient, will arrange for follow up care as instructed.   - If your condition worsens or fails to improve we recommend that you receive another evaluation at the ER immediately or contact your PCP to discuss your concerns or return here.   - Follow up with your PCP or specialty clinic as directed in the next 1-2 weeks if not improved or as needed.  You can call [phone] to schedule an appointment with the appropriate provider.    If your symptoms do not improve or worsen, go to the emergency room immediately.

## 2022-08-05 RX ORDER — METFORMIN HYDROCHLORIDE 500 MG/1
500 TABLET, EXTENDED RELEASE ORAL
Qty: 90 TABLET | Refills: 0 | Status: SHIPPED | OUTPATIENT
Start: 2022-08-05 | End: 2023-07-03

## 2022-08-06 NOTE — TELEPHONE ENCOUNTER
Refill Decision Note   Chikis Epstein  is requesting a refill authorization.  Brief Assessment and Rationale for Refill:  Approve    -Medication-Related Problems Identified: Requires labs  Medication Therapy Plan:       Medication Reconciliation Completed: No   Comments:     Provider Staff:     Action is required for this patient.   Please see care gap opportunities below in Care Due Message.     Thanks!  [facility]     Appointments      Date Provider   Last Visit   4/4/2022 Cornell Garcia,    Next Visit   10/3/2022 Cornell Garcia,      Note composed:11:20 PM 08/05/2022           Note composed:11:20 PM 08/05/2022

## 2022-08-06 NOTE — TELEPHONE ENCOUNTER
Care Due:                  Date            Visit Type   Department     Provider  --------------------------------------------------------------------------------                                EP -                              PRIMARY      MET INTERNAL  Last Visit: 04-      CARE ([facility])   MEDICINE       Cornell Garcia                              EP -                              PRIMARY      [facility] INTERNAL  Next Visit: 10-      CARE ([facility])   [facility]       Cornell Garcia                                                            Last  Test          Frequency    Reason                     Performed    Due Date  --------------------------------------------------------------------------------    HBA1C.......  6 months...  metFORMIN................  03- 09-    [facility] Embedded Care Gaps. Reference number: [id number]. 8/05/2022   10:11:21 PM CDT

## 2022-08-08 ENCOUNTER — HOSPITAL ENCOUNTER (EMERGENCY)
Facility: HOSPITAL | Age: 77
Discharge: HOME | End: 2022-08-08
Attending: EMERGENCY MEDICINE
Payer: MEDICARE

## 2022-08-08 ENCOUNTER — NURSE TRIAGE (OUTPATIENT)
Dept: ADMINISTRATIVE | Facility: CLINIC | Age: 77
End: 2022-08-08
Payer: MEDICARE

## 2022-08-08 VITALS
BODY MASS INDEX: 29.84 KG/M2 | DIASTOLIC BLOOD PRESSURE: 83 MMHG | OXYGEN SATURATION: 96 % | HEIGHT: 60 IN | RESPIRATION RATE: 18 BRPM | HEART RATE: 92 BPM | WEIGHT: 152 LBS | TEMPERATURE: 99 F | SYSTOLIC BLOOD PRESSURE: 163 MMHG

## 2022-08-08 DIAGNOSIS — M54.2 NECK PAIN: Primary | ICD-10-CM

## 2022-08-08 PROCEDURE — 99284 PR EMERGENCY DEPT VISIT,LEVEL IV: ICD-10-PCS | Mod: ,,, | Performed by: PHYSICIAN ASSISTANT

## 2022-08-08 PROCEDURE — 99284 EMERGENCY DEPT VISIT MOD MDM: CPT | Mod: 25

## 2022-08-08 PROCEDURE — 96372 THER/PROPH/DIAG INJ SC/IM: CPT | Performed by: PHYSICIAN ASSISTANT

## 2022-08-08 PROCEDURE — 99284 EMERGENCY DEPT VISIT MOD MDM: CPT | Mod: ,,, | Performed by: PHYSICIAN ASSISTANT

## 2022-08-08 PROCEDURE — 63600175 PHARM REV CODE 636 W HCPCS: Performed by: PHYSICIAN ASSISTANT

## 2022-08-08 PROCEDURE — 93010 EKG 12-LEAD: ICD-10-PCS | Mod: ,,, | Performed by: INTERNAL MEDICINE

## 2022-08-08 PROCEDURE — 93005 ELECTROCARDIOGRAM TRACING: CPT

## 2022-08-08 PROCEDURE — 93010 ELECTROCARDIOGRAM REPORT: CPT | Mod: ,,, | Performed by: INTERNAL MEDICINE

## 2022-08-08 RX ORDER — TRIAMCINOLONE ACETONIDE 40 MG/ML
40 INJECTION, SUSPENSION INTRA-ARTICULAR; INTRAMUSCULAR
Status: COMPLETED | OUTPATIENT
Start: 2022-08-08 | End: 2022-08-08

## 2022-08-08 RX ORDER — KETOROLAC TROMETHAMINE 30 MG/ML
10 INJECTION, SOLUTION INTRAMUSCULAR; INTRAVENOUS
Status: COMPLETED | OUTPATIENT
Start: 2022-08-08 | End: 2022-08-08

## 2022-08-08 RX ORDER — METHYLPREDNISOLONE 4 MG/1
TABLET ORAL
Qty: 1 EACH | Refills: 0 | Status: SHIPPED | OUTPATIENT
Start: 2022-08-08 | End: 2022-09-01

## 2022-08-08 RX ORDER — BACLOFEN 10 MG/1
10 TABLET ORAL 3 TIMES DAILY
Qty: 15 TABLET | Refills: 0 | Status: SHIPPED | OUTPATIENT
Start: 2022-08-08 | End: 2022-09-01

## 2022-08-08 RX ORDER — NAPROXEN 500 MG/1
500 TABLET ORAL 2 TIMES DAILY WITH MEALS
Qty: 10 TABLET | Refills: 0 | Status: SHIPPED | OUTPATIENT
Start: 2022-08-08 | End: 2023-04-04

## 2022-08-08 RX ADMIN — KETOROLAC TROMETHAMINE 10 MG: 30 INJECTION, SOLUTION INTRAMUSCULAR; INTRAVENOUS at 12:08

## 2022-08-08 RX ADMIN — TRIAMCINOLONE ACETONIDE 40 MG: 40 INJECTION, SUSPENSION INTRA-ARTICULAR; INTRAMUSCULAR at 01:08

## 2022-08-08 NOTE — ED PROVIDER NOTES
Encounter Date: 2022       History     Chief Complaint   Patient presents with    Neck Pain     On L side and going down to upper arm, crying with pain states seen at  sat given antibiotics for congestion     75 yo F with HTN, HTD, DM type II presents to the ED with neck/back pain. Pt reports sharp pain in her L shoulder blade that radiates into her L neck for the past 4 days. She denies fall or injury. She gets some relief when she leans forward on when she is still lying on her back.  The pain is worse with certain movements of her head and torso.  The pain occasionally radiates into her shoulder.  She has attempted treatment with moist heat, Tylenol, Aleve, methocarbamol.  She states that the Aleve provided some mild short relief.  She feels that she has had this pain in the past for which she went to physical therapy for treatment.  Denies any weakness or numbness in the extremities, chest pain, shortness of breath.        Review of patient's allergies indicates:   Allergen Reactions    Ace inhibitors      Other reaction(s): Swelling     Past Medical History:   Diagnosis Date    Anxiety     Aortic atherosclerosis     CXR 10/13/12    Arthritis     Back pain     Cataract     Hyperlipidemia 2016    Hypertension     MDD (major depressive disorder)     Menopause     Non morbid obesity due to excess calories 2016    Personal history of kidney stones     Type 2 diabetes mellitus without complication      Past Surgical History:   Procedure Laterality Date    CATARACT EXTRACTION, BILATERAL       SECTION      CHOLECYSTECTOMY      COLONOSCOPY      normal    EYE SURGERY      HYSTERECTOMY      KIDNEY STONE SURGERY      TOTAL ABDOMINAL HYSTERECTOMY W/ BILATERAL SALPINGOOPHORECTOMY       Family History   Problem Relation Age of Onset    Asthma Mother     Cancer Father         lymphoma    Lymphoma Father     Cancer Sister         breast    Breast cancer Sister      Depression Son     Heart disease Sister     Depression Son     Diabetes Son     Prostate cancer Son     Ovarian cancer Other      Social History     Tobacco Use    Smoking status: Never Smoker    Smokeless tobacco: Never Used   Substance Use Topics    Alcohol use: Not Currently     Alcohol/week: 0.0 standard drinks    Drug use: No     Review of Systems   Constitutional: Negative for fever.   HENT: Negative for sore throat.    Respiratory: Negative for shortness of breath.    Cardiovascular: Negative for chest pain.   Gastrointestinal: Negative for nausea.   Genitourinary: Negative for dysuria.   Musculoskeletal: Positive for back pain and neck pain.   Skin: Negative for rash.   Neurological: Negative for weakness.   Hematological: Does not bruise/bleed easily.       Physical Exam     Initial Vitals [08/08/22 1150]   BP Pulse Resp Temp SpO2   (!) 163/83 92 18 98.5 °F (36.9 °C) 96 %      MAP       --         Physical Exam    Nursing note and vitals reviewed.  Constitutional: She appears well-developed and well-nourished. She is not diaphoretic.  Non-toxic appearance. She does not appear ill. No distress.   HENT:   Head: Normocephalic and atraumatic.   Neck: Neck supple.   Cardiovascular: Normal rate and regular rhythm. Exam reveals no gallop and no friction rub.    No murmur heard.  Pulses:       Radial pulses are 2+ on the right side and 2+ on the left side.   Pulmonary/Chest: Effort normal and breath sounds normal. No accessory muscle usage. No tachypnea. No respiratory distress. She has no decreased breath sounds. She has no wheezes. She has no rhonchi. She has no rales.   Abdominal: She exhibits no distension.   Musculoskeletal:      Cervical back: Neck supple.        Back:       Comments: 5/5 strength to BUE.  Normal sensation to light touch.  Reproducible pain with palpation over the left rhomboid and left mid trapezius muscle.  Patient also has reproducible pain with range of motion of the neck.       Neurological: She is alert.   Skin: No rash noted.   Psychiatric: She has a normal mood and affect. Her behavior is normal.         ED Course   Procedures  Labs Reviewed - No data to display     ECG Results          EKG 12-lead (Final result)  Result time 08/08/22 12:26:57    Final result by Interface, Lab In [facility] (08/08/22 12:26:57)                 Narrative:    Test Reason : M54.2,    Vent. Rate : 087 BPM     Atrial Rate : 087 BPM     P-R Int : 170 ms          QRS Dur : 084 ms      QT Int : 326 ms       P-R-T Axes : 050 019 039 degrees     QTc Int : 392 ms    Normal sinus rhythm  Low voltage QRS  Nonspecific ST and T wave abnormality  Abnormal ECG  When compared with ECG of 20-NOV-2018 12:08,  Nonspecific T wave abnormality now evident in Inferior leads  Nonspecific T wave abnormality, worse in Lateral leads  Confirmed by BARRINGTON PECK MD ([id number]) on 8/8/2022 12:26:43 PM    Referred By:             Confirmed By:BARRINGTON PECK MD                            Imaging Results    None          Medications   ketorolac injection 9.999 mg (9.999 mg Intramuscular Given 8/8/22 1250)   triamcinolone acetonide injection 40 mg (40 mg Intramuscular Given 8/8/22 1317)     Medical Decision Making:   History:   Old Medical Records: I decided to obtain old medical records.  Initial Assessment:   76-year-old female presents to the ED for evaluation of left upper back and neck pain for the past few days.  No injury.  Hemodynamically stable.  Neurovascularly intact.  Afebrile.  See additional findings above.  Differential Diagnosis:   My differential diagnosis includes but is not limited to:  Muscle strain, muscle spasm, spondylosis  Clinical Tests:   Medical Tests: Reviewed  ED Management:  Based on my clinical evaluation, I do not detect any immediate, emergent, or life threatening condition/etiology that warrants additional workup today.  Feel that symptoms are musculoskeletal in nature as they are reproducible on examination.   Patient is neurovascularly intact.  I feel that the patient can be discharged with close follow up care.  EKG showed nonspecific T wave abnormalities in inferior and lateral leads - similar to prior EKGs.  Reviewed by myself and my attending. Symptoms not consistent with ACS.  Patient treated with Kenalog and Toradol injections in the ED.  Will treat with Medrol Dosepak, and since.  Patient counseled on closely monitoring her blood sugar while on steroid therapy.  Will change much relaxant from methocarbamol to baclofen.  Advised patient to continue with moist heat.  I have placed a referral to physical therapy.  Close follow-up with PCP in the next few days if her symptoms are not improving.  ED return precautions given.  Patient voiced understanding and is comfortable with discharge plan.  I have reviewed the patient's records and discussed this case with my supervising physician.                           Clinical Impression:   Final diagnoses:  [M54.2] Neck pain (Primary)          ED Disposition Condition    Discharge Stable        ED Prescriptions     Medication Sig Dispense Start Date End Date Auth. Provider    naproxen (NAPROSYN) 500 MG tablet Take 1 tablet (500 mg total) by mouth 2 (two) times daily with meals. Take with food 10 tablet 8/8/2022  Huong Morales PA-C    baclofen (LIORESAL) 10 MG tablet Take 1 tablet (10 mg total) by mouth 3 (three) times daily. 15 tablet 8/8/2022  Huong Morales PA-C    methylPREDNISolone (MEDROL DOSEPACK) 4 mg tablet use as directed 1 each 8/8/2022  Huong Morales PA-C        Follow-up Information     Follow up With Specialties Details Why Contact Info    Cornell Garcia,  Internal Medicine Schedule an appointment as soon as possible for a visit in 3 days For reevaluation, If symptoms do not improve [address] [facility] [id number]  [phone]             Huong Morales PA-C  08/09/22 5599

## 2022-08-08 NOTE — FIRST PROVIDER EVALUATION
Emergency Department TeleTriage Encounter Note      CHIEF COMPLAINT    Chief Complaint   Patient presents with    Neck Pain     On L side and going down to upper arm, crying with pain states seen at  sat given antibiotics for congestion       VITAL SIGNS   Initial Vitals [08/08/22 1150]   BP Pulse Resp Temp SpO2   (!) 163/83 92 18 98.5 °F (36.9 °C) 96 %      MAP       --            ALLERGIES    Review of patient's allergies indicates:   Allergen Reactions    Ace inhibitors      Other reaction(s): Swelling       PROVIDER TRIAGE NOTE  This is a teletriage evaluation of a 76 y.o. female presenting to the ED complaining of left sided shoulder/neck pain since last week. No trauma, denies headache, CP, SOB, weakness, numbness/tingling.  Currently on abx for a sinus infection  Pain is worse with movement.    Likely MSK pain.     Initial orders will be placed and care will be transferred to an alternate provider when patient is roomed for a full evaluation. Any additional orders and the final disposition will be determined by that provider.           ORDERS  Labs Reviewed   HEPATITIS C ANTIBODY       ED Orders (720h ago, onward)    Start Ordered     Status Ordering Provider    08/08/22 1152 08/08/22 1152  Hepatitis C Antibody  STAT         Ordered JOSSELINE SCANLON    08/08/22 1152 08/08/22 1152  EKG 12-lead  Once         Completed by MAGGIE DUMONT on 8/8/2022 at 12:04 PM JOSSELINE SCANLON            Virtual Visit Note: The provider triage portion of this emergency department evaluation and documentation was performed via [employer], a HIPAA-compliant telemedicine application, in concert with a tele-presenter in the room. A face to face patient evaluation with one of my colleagues will occur once the patient is placed in an emergency department room.      DISCLAIMER: This note was prepared with [employer] voice recognition transcription software. Garbled syntax, mangled pronouns, and other bizarre constructions may be  attributed to that software system.

## 2022-08-08 NOTE — TELEPHONE ENCOUNTER
Patient is calling, states she is having terrible shoulder pain. States went to  and was treated for URI but once home, started with shoulder pain. Triage done- dispo ED. Advised I would send message to provider. Verb understanding.     Reason for Disposition   Age > 40 and no obvious cause and pain even when not moving the arm (Exception: pain is clearly made worse by moving arm or bending neck)    Additional Information   Negative: Shock suspected (e.g., cold/pale/clammy skin, too weak to stand, low BP, rapid pulse)   Negative: Similar pain previously and it was from 'heart attack'   Negative: Similar pain previously and it was from 'angina' and not relieved by nitroglycerin   Negative: Sounds like a life-threatening emergency to the triager   Negative: Chest pain   Negative: Followed an injury to shoulder   Negative: Difficulty breathing or unusual sweating (e.g., sweating without exertion)   Negative: Pain lasting > 5 minutes and pain also present in chest (Exception: pain is clearly made worse by movement)    Protocols used: SHOULDER PAIN-A-OH

## 2022-08-08 NOTE — DISCHARGE INSTRUCTIONS
Continue applying moist heat to the area.  Do not take any additional Aleve/naproxen and stop taking the methocarbamol while taking your new medications.  Follow up with your primary doctor for reevaluation in the next few days if your symptoms are not improving with treatment.  You may also benefit from physical therapy and dry needling.    Return to the ER immediately if you develop fever greater than 100.4°, numbness or weakness in your arms, chest pain, difficulty breathing or any other worrisome symptoms.

## 2022-08-09 ENCOUNTER — OFFICE VISIT (OUTPATIENT)
Dept: OPTOMETRY | Facility: CLINIC | Age: 77
End: 2022-08-09
Payer: MEDICARE

## 2022-08-09 ENCOUNTER — PATIENT MESSAGE (OUTPATIENT)
Dept: INTERNAL MEDICINE | Facility: CLINIC | Age: 77
End: 2022-08-09
Payer: MEDICARE

## 2022-08-09 DIAGNOSIS — H04.123 BILATERAL DRY EYES: ICD-10-CM

## 2022-08-09 DIAGNOSIS — H52.7 REFRACTIVE ERROR: ICD-10-CM

## 2022-08-09 DIAGNOSIS — H40.1231 LOW-TENSION GLAUCOMA OF BOTH EYES, MILD STAGE: Primary | ICD-10-CM

## 2022-08-09 DIAGNOSIS — Z96.1 PSEUDOPHAKIA OF BOTH EYES: ICD-10-CM

## 2022-08-09 DIAGNOSIS — R94.31 ABNORMAL EKG: Primary | ICD-10-CM

## 2022-08-09 PROCEDURE — 1159F PR MEDICATION LIST DOCUMENTED IN MEDICAL RECORD: ICD-10-PCS | Mod: CPTII,S$GLB,, | Performed by: OPTOMETRIST

## 2022-08-09 PROCEDURE — 1126F AMNT PAIN NOTED NONE PRSNT: CPT | Mod: CPTII,S$GLB,, | Performed by: OPTOMETRIST

## 2022-08-09 PROCEDURE — 92015 DETERMINE REFRACTIVE STATE: CPT | Mod: S$GLB,,, | Performed by: OPTOMETRIST

## 2022-08-09 PROCEDURE — 92014 PR EYE EXAM, EST PATIENT,COMPREHESV: ICD-10-PCS | Mod: S$GLB,,, | Performed by: OPTOMETRIST

## 2022-08-09 PROCEDURE — 1101F PT FALLS ASSESS-DOCD LE1/YR: CPT | Mod: CPTII,S$GLB,, | Performed by: OPTOMETRIST

## 2022-08-09 PROCEDURE — 1101F PR PT FALLS ASSESS DOC 0-1 FALLS W/OUT INJ PAST YR: ICD-10-PCS | Mod: CPTII,S$GLB,, | Performed by: OPTOMETRIST

## 2022-08-09 PROCEDURE — 92015 PR REFRACTION: ICD-10-PCS | Mod: S$GLB,,, | Performed by: OPTOMETRIST

## 2022-08-09 PROCEDURE — 1159F MED LIST DOCD IN RCRD: CPT | Mod: CPTII,S$GLB,, | Performed by: OPTOMETRIST

## 2022-08-09 PROCEDURE — 92014 COMPRE OPH EXAM EST PT 1/>: CPT | Mod: S$GLB,,, | Performed by: OPTOMETRIST

## 2022-08-09 PROCEDURE — 3288F FALL RISK ASSESSMENT DOCD: CPT | Mod: CPTII,S$GLB,, | Performed by: OPTOMETRIST

## 2022-08-09 PROCEDURE — 99999 PR PBB SHADOW E&M-EST. PATIENT-LVL III: ICD-10-PCS | Mod: PBBFAC,,, | Performed by: OPTOMETRIST

## 2022-08-09 PROCEDURE — 1160F PR REVIEW ALL MEDS BY PRESCRIBER/CLIN PHARMACIST DOCUMENTED: ICD-10-PCS | Mod: CPTII,S$GLB,, | Performed by: OPTOMETRIST

## 2022-08-09 PROCEDURE — 3288F PR FALLS RISK ASSESSMENT DOCUMENTED: ICD-10-PCS | Mod: CPTII,S$GLB,, | Performed by: OPTOMETRIST

## 2022-08-09 PROCEDURE — 99999 PR PBB SHADOW E&M-EST. PATIENT-LVL III: CPT | Mod: PBBFAC,,, | Performed by: OPTOMETRIST

## 2022-08-09 PROCEDURE — 1160F RVW MEDS BY RX/DR IN RCRD: CPT | Mod: CPTII,S$GLB,, | Performed by: OPTOMETRIST

## 2022-08-09 PROCEDURE — 1126F PR PAIN SEVERITY QUANTIFIED, NO PAIN PRESENT: ICD-10-PCS | Mod: CPTII,S$GLB,, | Performed by: OPTOMETRIST

## 2022-08-09 NOTE — PROGRESS NOTES
HPI     Glaucoma      Additional comments: Blur ou at dist, x mos, no assoc pain or red, no   relief over time, constant               Comments     77 Y/o female is here for routine eye exam with C/o Over due IOP Check pt   states she is not seeing as well as before at distance vision is blurry   Pt denies pain and discomfort   No f/f    Eye med: Latanoprost OU QHS           Last edited by Serjio Ye, OD on 8/9/2022 12:29 PM. (History)            Assessment /Plan     For exam results, see Encounter Report.    Low-tension glaucoma of both eyes, mild stage    Bilateral dry eyes    Pseudophakia of both eyes    Refractive error      1. IOP low and stable, nerve eval stable,  cont Latanaprost qhs OU,  Prev hvf normal oct with rnfl loss sup and nasal, pachy normal, possible father was glaucoma suspect. RTC 6 month iop ck. With dfeTarget 12-14.  VF and OCT also  2. Recommend artificial tears. 1 drop 2x per day. Chronicity of disease and treatment discussed.  4. Spectacle Rx given, discussed different options for glasses. RTC 1 year routine eye exam.

## 2022-08-12 NOTE — TELEPHONE ENCOUNTER
There are some abnormalities seen on EKG  I want you to see a Cardiologist for further eval, referral in

## 2022-08-13 ENCOUNTER — PATIENT MESSAGE (OUTPATIENT)
Dept: INTERNAL MEDICINE | Facility: CLINIC | Age: 77
End: 2022-08-13
Payer: MEDICARE

## 2022-08-25 ENCOUNTER — PES CALL (OUTPATIENT)
Dept: ADMINISTRATIVE | Facility: CLINIC | Age: 77
End: 2022-08-25
Payer: MEDICARE

## 2022-09-01 ENCOUNTER — OFFICE VISIT (OUTPATIENT)
Dept: CARDIOLOGY | Facility: CLINIC | Age: 77
End: 2022-09-01
Payer: MEDICARE

## 2022-09-01 VITALS
HEIGHT: 60 IN | WEIGHT: 154.13 LBS | SYSTOLIC BLOOD PRESSURE: 148 MMHG | HEART RATE: 92 BPM | BODY MASS INDEX: 30.26 KG/M2 | DIASTOLIC BLOOD PRESSURE: 67 MMHG

## 2022-09-01 DIAGNOSIS — E78.00 HYPERCHOLESTEREMIA: ICD-10-CM

## 2022-09-01 DIAGNOSIS — I10 PRIMARY HYPERTENSION: ICD-10-CM

## 2022-09-01 DIAGNOSIS — R94.31 ABNORMAL EKG: Primary | ICD-10-CM

## 2022-09-01 DIAGNOSIS — R53.1 EPISODIC WEAKNESS: ICD-10-CM

## 2022-09-01 PROCEDURE — 99204 OFFICE O/P NEW MOD 45 MIN: CPT | Mod: S$GLB,,, | Performed by: INTERNAL MEDICINE

## 2022-09-01 PROCEDURE — 1126F AMNT PAIN NOTED NONE PRSNT: CPT | Mod: CPTII,S$GLB,, | Performed by: INTERNAL MEDICINE

## 2022-09-01 PROCEDURE — 99204 PR OFFICE/OUTPT VISIT, NEW, LEVL IV, 45-59 MIN: ICD-10-PCS | Mod: S$GLB,,, | Performed by: INTERNAL MEDICINE

## 2022-09-01 PROCEDURE — 1159F MED LIST DOCD IN RCRD: CPT | Mod: CPTII,S$GLB,, | Performed by: INTERNAL MEDICINE

## 2022-09-01 PROCEDURE — 1101F PR PT FALLS ASSESS DOC 0-1 FALLS W/OUT INJ PAST YR: ICD-10-PCS | Mod: CPTII,S$GLB,, | Performed by: INTERNAL MEDICINE

## 2022-09-01 PROCEDURE — 3288F FALL RISK ASSESSMENT DOCD: CPT | Mod: CPTII,S$GLB,, | Performed by: INTERNAL MEDICINE

## 2022-09-01 PROCEDURE — 1159F PR MEDICATION LIST DOCUMENTED IN MEDICAL RECORD: ICD-10-PCS | Mod: CPTII,S$GLB,, | Performed by: INTERNAL MEDICINE

## 2022-09-01 PROCEDURE — 99999 PR PBB SHADOW E&M-EST. PATIENT-LVL V: CPT | Mod: PBBFAC,,, | Performed by: INTERNAL MEDICINE

## 2022-09-01 PROCEDURE — 3078F DIAST BP <80 MM HG: CPT | Mod: CPTII,S$GLB,, | Performed by: INTERNAL MEDICINE

## 2022-09-01 PROCEDURE — 1101F PT FALLS ASSESS-DOCD LE1/YR: CPT | Mod: CPTII,S$GLB,, | Performed by: INTERNAL MEDICINE

## 2022-09-01 PROCEDURE — 1160F RVW MEDS BY RX/DR IN RCRD: CPT | Mod: CPTII,S$GLB,, | Performed by: INTERNAL MEDICINE

## 2022-09-01 PROCEDURE — 3078F PR MOST RECENT DIASTOLIC BLOOD PRESSURE < 80 MM HG: ICD-10-PCS | Mod: CPTII,S$GLB,, | Performed by: INTERNAL MEDICINE

## 2022-09-01 PROCEDURE — 1126F PR PAIN SEVERITY QUANTIFIED, NO PAIN PRESENT: ICD-10-PCS | Mod: CPTII,S$GLB,, | Performed by: INTERNAL MEDICINE

## 2022-09-01 PROCEDURE — 99999 PR PBB SHADOW E&M-EST. PATIENT-LVL V: ICD-10-PCS | Mod: PBBFAC,,, | Performed by: INTERNAL MEDICINE

## 2022-09-01 PROCEDURE — 3288F PR FALLS RISK ASSESSMENT DOCUMENTED: ICD-10-PCS | Mod: CPTII,S$GLB,, | Performed by: INTERNAL MEDICINE

## 2022-09-01 PROCEDURE — 3077F PR MOST RECENT SYSTOLIC BLOOD PRESSURE >= 140 MM HG: ICD-10-PCS | Mod: CPTII,S$GLB,, | Performed by: INTERNAL MEDICINE

## 2022-09-01 PROCEDURE — 1160F PR REVIEW ALL MEDS BY PRESCRIBER/CLIN PHARMACIST DOCUMENTED: ICD-10-PCS | Mod: CPTII,S$GLB,, | Performed by: INTERNAL MEDICINE

## 2022-09-01 PROCEDURE — 3077F SYST BP >= 140 MM HG: CPT | Mod: CPTII,S$GLB,, | Performed by: INTERNAL MEDICINE

## 2022-09-01 NOTE — PROGRESS NOTES
Subjective:     Problem List:  Hypertension  Hypercholesterolemia  Ascending aorta - mildly enlarged    HPI:   Chikis Epstein is a 76 y.o. female referred by Cornell Garcia DO for evaluation of Abnormal ECG.  ER visit on 8/8/2022 for pain on the left side of her neck and shoulder blade on the same side. An ECG obtained there revealed minor ST abnormality in the inferior lateral leads slightly worse than 11/2018.  She did not report chest discomfort at that time.  She does not report chest discomfort or shortness of breath with exertion.  She feels faint and anxious periodically. This has occurred at the mall and also at home. It resolves when she sits down for a few minutes. It has been occurring every 1-2 weeks for the past few months.   On the Digital Hypertension program.  On 10 mg of amlodipine she has occasional mild lower extremity swelling.      Review of Systems   Constitutional: Negative for malaise/fatigue, weight gain and weight loss.   HENT:  Positive for hearing loss.    Cardiovascular:  Positive for leg swelling. Negative for chest pain, dyspnea on exertion and palpitations.   Respiratory:  Negative for cough and hemoptysis.    Hematologic/Lymphatic: Does not bruise/bleed easily.   Musculoskeletal:  Positive for arthritis. Negative for muscle cramps.   Gastrointestinal:  Positive for heartburn. Negative for abdominal pain and melena.   Genitourinary:  Negative for hematuria and nocturia.   Neurological:  Positive for headaches and light-headedness. Negative for seizures.   Psychiatric/Behavioral:  Negative for depression.      Review of patient's allergies indicates:   Allergen Reactions    Ace inhibitors      Other reaction(s): Swelling        Current Outpatient Medications   Medication Sig    acetaminophen (TYLENOL) 500 MG tablet Take 500 mg by mouth every 6 (six) hours as needed for Pain.    amLODIPine (NORVASC) 10 MG tablet TAKE 1 TABLET EVERY DAY    buPROPion (WELLBUTRIN XL) 300 MG 24 hr  tablet TAKE 1 TABLET (300 MG TOTAL) BY MOUTH ONCE DAILY.    calcium-vitamin D3 (OS-ANDRA 500 + D3) 500 mg-5 mcg (200 unit) per tablet Take 1 tablet by mouth once daily.    cetirizine (ZYRTEC) 10 MG tablet Take 10 mg by mouth as needed for Allergies.    hydroCHLOROthiazide (HYDRODIURIL) 12.5 MG Tab Take 1 tablet (12.5 mg total) by mouth once daily.    latanoprost 0.005 % ophthalmic solution INSTILL 1 DROP INTO BOTH EYES EVERY EVENING    LORazepam (ATIVAN) 0.5 MG tablet Take 1 tablet (0.5 mg total) by mouth every 12 (twelve) hours as needed for Anxiety.    metFORMIN (GLUCOPHAGE-XR) 500 MG ER 24hr tablet TAKE 1 TABLET (500 MG TOTAL) BY MOUTH DAILY WITH BREAKFAST.    MULTIVIT-MINERALS/FOLIC ACID (CENTRUM MULTIGUMMIES ORAL) Take 2 each by mouth once daily.    naproxen (NAPROSYN) 500 MG tablet Take 1 tablet (500 mg total) by mouth 2 (two) times daily with meals. Take with food (Patient taking differently: Take 500 mg by mouth as needed. Take with food)    pantoprazole (PROTONIX) 40 MG tablet TAKE 1 TABLET (40 MG TOTAL) BY MOUTH  DAILY.    pravastatin (PRAVACHOL) 40 MG tablet TAKE 1 TABLET EVERY DAY    triamcinolone (NASACORT AQ) 55 mcg nasal inhaler 2 sprays by Nasal route once daily. 1 - 2 Aerosol, Spray Nasal Every day (Patient taking differently: 2 sprays by Nasal route daily as needed. 1 - 2 Aerosol, Spray Nasal Every day)    valsartan (DIOVAN) 320 MG tablet TAKE 1 TABLET ONE TIME DAILY     No current facility-administered medications for this visit.         Past Medical and Surgical history:  Chikis Epstein  has a past medical history of Anxiety, Aortic atherosclerosis, Arthritis, Back pain, Cataract, Hyperlipidemia (1/27/2016), Hypertension, MDD (major depressive disorder), Menopause, Non morbid obesity due to excess calories (12/29/2016), Personal history of kidney stones, and Type 2 diabetes mellitus without complication.   Past Surgical History:   Procedure Laterality Date    CATARACT EXTRACTION, BILATERAL        SECTION      CHOLECYSTECTOMY      COLONOSCOPY      normal    EYE SURGERY      HYSTERECTOMY      KIDNEY STONE SURGERY      TOTAL ABDOMINAL HYSTERECTOMY W/ BILATERAL SALPINGOOPHORECTOMY         Social history:  Chikis Epstein  reports that she has never smoked. She has never used smokeless tobacco. She reports that she does not currently use alcohol. She reports that she does not use drugs.    Family history:  Family History   Problem Relation Age of Onset    Asthma Mother     Cancer Father         lymphoma    Lymphoma Father     Cancer Sister         breast    Breast cancer Sister     Depression Son     Heart disease Sister     Depression Son     Diabetes Son     Prostate cancer Son     Ovarian cancer Other             Objective:     BP (!) 148/67   Pulse 92   Ht 5' (1.524 m)   Wt 69.9 kg (154 lb 1.6 oz)   BMI 30.10 kg/m²     Physical Exam  Constitutional:       Appearance: She is well-developed.      Comments:      HENT:      Head: Normocephalic.   Neck:      Vascular: No carotid bruit or JVD.   Cardiovascular:      Rate and Rhythm: Normal rate and regular rhythm.      Pulses:           Radial pulses are 2+ on the right side and 2+ on the left side.        Posterior tibial pulses are 2+ on the right side and 2+ on the left side.      Heart sounds: S1 normal and S2 normal. No murmur heard.    No gallop.   Pulmonary:      Effort: Pulmonary effort is normal.      Breath sounds: No wheezing or rales.   Chest:      Chest wall: There is no dullness to percussion.   Abdominal:      General: There is no abdominal bruit.      Palpations: Abdomen is soft. There is no hepatomegaly or splenomegaly.      Tenderness: There is no abdominal tenderness.   Musculoskeletal:      Right lower leg: No edema.      Left lower leg: No edema.   Skin:     General: Skin is warm and dry.      Findings: No bruising or rash.      Nails: There is no clubbing.   Neurological:      Mental Status: She is alert and oriented to person,  place, and time.      Gait: Gait normal.   Psychiatric:         Speech: Speech normal.         Behavior: Behavior normal.         Judgment: Judgment normal.         Lab Results   Component Value Date    CHOL 168 03/30/2022    CHOL 189 03/01/2021    CHOL 180 12/13/2019     Lab Results   Component Value Date    HDL 61 03/30/2022    HDL 66 03/01/2021    HDL 69 12/13/2019     Lab Results   Component Value Date    LDLCALC 85.0 03/30/2022    LDLCALC 106.8 03/01/2021    LDLCALC 94.8 12/13/2019     Lab Results   Component Value Date    TRIG 110 03/30/2022    TRIG 81 03/01/2021    TRIG 81 12/13/2019     Lab Results   Component Value Date    CHOLHDL 36.3 03/30/2022    CHOLHDL 34.9 03/01/2021    CHOLHDL 38.3 12/13/2019     Lab Results   Component Value Date    ALT 19 03/30/2022    AST 23 03/30/2022    ALKPHOS 77 03/30/2022    BILITOT 0.4 03/30/2022      Lab Results   Component Value Date    CREATININE 0.8 03/30/2022    BUN 13 03/30/2022     03/30/2022    K 4.3 03/30/2022     03/30/2022    CO2 27 03/30/2022       Results for orders placed in visit on 11/19/18    Transthoracic echo (TTE) complete    Interpretation Summary  · Normal left ventricular systolic function. The estimated ejection fraction is 65%  · Normal LV diastolic function.  · Mild aortic regurgitation.  · The ascending aorta is mildly dilated when indexed to BSA.  · Trace pulmonic regurgitation.  · Normal right ventricular systolic function.  · Normal central venous pressure (3 mm Hg).  · The estimated PA systolic pressure is 28.40 mm Hg     Recent ECG sinus rhythm w minor ST abnormality.      Assessment and Plan:       ICD-10-CM ICD-9-CM   1. Abnormal EKG  R94.31 794.31   2. Episodic weakness  R53.1 728.87        Minor ST abnormality on ECG w/o chest discomfort. This does not warrant a stress test.  Episodes of feeling faint may be due to sherley or tachyarrhythmia.  They occur every 1-2 weeks therefore I will order a Holter monitor for an extended  period. Echo to confirm that the heart remains structurally normal.  LDL <100 mg/dl. Continue pravastatin.  Reviewed blood pressure record in Digital Medicine program. Continue same antihypertensive meds.    Orders entered during this encounter:    Abnormal EKG  -     Ambulatory referral/consult to Cardiology  -     Cardiac Monitor - 3-15 Day Adult (Cupid Only); Future  -     Echo; Future; Expected date: 09/01/2022    Episodic weakness  -     Cardiac Monitor - 3-15 Day Adult (Cupid Only); Future  -     Echo; Future; Expected date: 09/01/2022       Follow up in about 1 year (around 9/1/2023), or if symptoms worsen or fail to improve.

## 2022-09-06 ENCOUNTER — TELEPHONE (OUTPATIENT)
Dept: ADMINISTRATIVE | Facility: HOSPITAL | Age: 77
End: 2022-09-06
Payer: MEDICARE

## 2022-09-06 VITALS — DIASTOLIC BLOOD PRESSURE: 71 MMHG | SYSTOLIC BLOOD PRESSURE: 139 MMHG

## 2022-09-09 ENCOUNTER — PES CALL (OUTPATIENT)
Dept: ADMINISTRATIVE | Facility: CLINIC | Age: 77
End: 2022-09-09
Payer: MEDICARE

## 2022-09-13 ENCOUNTER — TELEPHONE (OUTPATIENT)
Dept: ELECTROPHYSIOLOGY | Facility: CLINIC | Age: 77
End: 2022-09-13
Payer: MEDICARE

## 2022-09-19 ENCOUNTER — CLINICAL SUPPORT (OUTPATIENT)
Dept: CARDIOLOGY | Facility: HOSPITAL | Age: 77
End: 2022-09-19
Attending: INTERNAL MEDICINE
Payer: MEDICARE

## 2022-09-19 ENCOUNTER — HOSPITAL ENCOUNTER (OUTPATIENT)
Dept: CARDIOLOGY | Facility: HOSPITAL | Age: 77
Discharge: HOME | End: 2022-09-19
Attending: INTERNAL MEDICINE
Payer: MEDICARE

## 2022-09-19 VITALS
DIASTOLIC BLOOD PRESSURE: 77 MMHG | WEIGHT: 154 LBS | SYSTOLIC BLOOD PRESSURE: 168 MMHG | BODY MASS INDEX: 30.23 KG/M2 | HEIGHT: 60 IN | HEART RATE: 78 BPM

## 2022-09-19 DIAGNOSIS — R53.1 EPISODIC WEAKNESS: ICD-10-CM

## 2022-09-19 DIAGNOSIS — R94.31 ABNORMAL EKG: ICD-10-CM

## 2022-09-19 LAB
ASCENDING AORTA: 3.31 CM
AV INDEX (PROSTH): 0.81
AV MEAN GRADIENT: 5 MMHG
AV PEAK GRADIENT: 8 MMHG
AV VALVE AREA: 2.55 CM2
AV VELOCITY RATIO: 0.72
BSA FOR ECHO PROCEDURE: 1.72 M2
CV ECHO LV RWT: 0.5 CM
DOP CALC AO PEAK VEL: 1.42 M/S
DOP CALC AO VTI: 27.86 CM
DOP CALC LVOT AREA: 3.2 CM2
DOP CALC LVOT DIAMETER: 2.01 CM
DOP CALC LVOT PEAK VEL: 1.02 M/S
DOP CALC LVOT STROKE VOLUME: 71.17 CM3
DOP CALCLVOT PEAK VEL VTI: 22.44 CM
E WAVE DECELERATION TIME: 244.54 MSEC
E/A RATIO: 0.69
E/E' RATIO: 9.29 M/S
ECHO LV POSTERIOR WALL: 0.96 CM (ref 0.6–1.1)
EJECTION FRACTION: 60 %
FRACTIONAL SHORTENING: 28 % (ref 28–44)
INTERVENTRICULAR SEPTUM: 0.87 CM (ref 0.6–1.1)
LA MAJOR: 3.99 CM
LA MINOR: 4.13 CM
LA WIDTH: 2.85 CM
LEFT ATRIUM SIZE: 3.13 CM
LEFT ATRIUM VOLUME INDEX MOD: 17.5 ML/M2
LEFT ATRIUM VOLUME INDEX: 18.4 ML/M2
LEFT ATRIUM VOLUME MOD: 29.28 CM3
LEFT ATRIUM VOLUME: 30.78 CM3
LEFT INTERNAL DIMENSION IN SYSTOLE: 2.79 CM (ref 2.1–4)
LEFT VENTRICLE DIASTOLIC VOLUME INDEX: 38.36 ML/M2
LEFT VENTRICLE DIASTOLIC VOLUME: 64.06 ML
LEFT VENTRICLE MASS INDEX: 63 G/M2
LEFT VENTRICLE SYSTOLIC VOLUME INDEX: 17.6 ML/M2
LEFT VENTRICLE SYSTOLIC VOLUME: 29.34 ML
LEFT VENTRICULAR INTERNAL DIMENSION IN DIASTOLE: 3.85 CM (ref 3.5–6)
LEFT VENTRICULAR MASS: 105.58 G
LV LATERAL E/E' RATIO: 7.22 M/S
LV SEPTAL E/E' RATIO: 13 M/S
MV A" WAVE DURATION": 9.13 MSEC
MV PEAK A VEL: 0.94 M/S
MV PEAK E VEL: 0.65 M/S
MV STENOSIS PRESSURE HALF TIME: 70.92 MS
MV VALVE AREA P 1/2 METHOD: 3.1 CM2
PISA TR MAX VEL: 2.63 M/S
PULM VEIN S/D RATIO: 1.56
PV PEAK D VEL: 0.34 M/S
PV PEAK S VEL: 0.53 M/S
RA MAJOR: 3.42 CM
RA PRESSURE: 3 MMHG
RA WIDTH: 2.92 CM
RIGHT VENTRICULAR END-DIASTOLIC DIMENSION: 2.76 CM
RV TISSUE DOPPLER FREE WALL SYSTOLIC VELOCITY 1 (APICAL 4 CHAMBER VIEW): 10.98 CM/S
SINUS: 3.25 CM
STJ: 2.98 CM
TDI LATERAL: 0.09 M/S
TDI SEPTAL: 0.05 M/S
TDI: 0.07 M/S
TR MAX PG: 28 MMHG
TRICUSPID ANNULAR PLANE SYSTOLIC EXCURSION: 1.41 CM
TV REST PULMONARY ARTERY PRESSURE: 31 MMHG

## 2022-09-19 PROCEDURE — 93306 TTE W/DOPPLER COMPLETE: CPT

## 2022-09-19 PROCEDURE — 93248 EXT ECG>7D<15D REV&INTERPJ: CPT | Mod: ,,, | Performed by: INTERNAL MEDICINE

## 2022-09-19 PROCEDURE — 93306 TTE W/DOPPLER COMPLETE: CPT | Mod: 26,,, | Performed by: INTERNAL MEDICINE

## 2022-09-19 PROCEDURE — 93306 ECHO (CUPID ONLY): ICD-10-PCS | Mod: 26,,, | Performed by: INTERNAL MEDICINE

## 2022-09-19 PROCEDURE — 93248 CV CARDIAC MONITOR - 3-15 DAY ADULT (CUPID ONLY): ICD-10-PCS | Mod: ,,, | Performed by: INTERNAL MEDICINE

## 2022-09-26 ENCOUNTER — LAB VISIT (OUTPATIENT)
Dept: LAB | Facility: HOSPITAL | Age: 77
End: 2022-09-26
Payer: MEDICARE

## 2022-09-26 DIAGNOSIS — Z00.00 ANNUAL PHYSICAL EXAM: ICD-10-CM

## 2022-09-26 DIAGNOSIS — I10 HYPERTENSION, UNSPECIFIED TYPE: ICD-10-CM

## 2022-09-26 DIAGNOSIS — E78.00 PURE HYPERCHOLESTEROLEMIA: ICD-10-CM

## 2022-09-26 LAB
ALBUMIN SERPL BCP-MCNC: 4.1 G/DL (ref 3.5–5.2)
ALP SERPL-CCNC: 87 U/L (ref 55–135)
ALT SERPL W/O P-5'-P-CCNC: 18 U/L (ref 10–44)
ANION GAP SERPL CALC-SCNC: 12 MMOL/L (ref 8–16)
AST SERPL-CCNC: 22 U/L (ref 10–40)
BASOPHILS # BLD AUTO: 0.07 K/UL (ref 0–0.2)
BASOPHILS NFR BLD: 0.8 % (ref 0–1.9)
BILIRUB SERPL-MCNC: 0.5 MG/DL (ref 0.1–1)
BUN SERPL-MCNC: 10 MG/DL (ref 8–23)
CALCIUM SERPL-MCNC: 10.6 MG/DL (ref 8.7–10.5)
CHLORIDE SERPL-SCNC: 101 MMOL/L (ref 95–110)
CHOLEST SERPL-MCNC: 183 MG/DL (ref 120–199)
CHOLEST/HDLC SERPL: 2.5 {RATIO} (ref 2–5)
CO2 SERPL-SCNC: 27 MMOL/L (ref 23–29)
CREAT SERPL-MCNC: 0.9 MG/DL (ref 0.5–1.4)
DIFFERENTIAL METHOD: NORMAL
EOSINOPHIL # BLD AUTO: 0.3 K/UL (ref 0–0.5)
EOSINOPHIL NFR BLD: 2.9 % (ref 0–8)
ERYTHROCYTE [DISTWIDTH] IN BLOOD BY AUTOMATED COUNT: 13.2 % (ref 11.5–14.5)
EST. GFR  (NO RACE VARIABLE): >60 ML/MIN/1.73 M^2
GLUCOSE SERPL-MCNC: 130 MG/DL (ref 70–110)
HCT VFR BLD AUTO: 41.8 % (ref 37–48.5)
HDLC SERPL-MCNC: 73 MG/DL (ref 40–75)
HDLC SERPL: 39.9 % (ref 20–50)
HGB BLD-MCNC: 13.6 G/DL (ref 12–16)
IMM GRANULOCYTES # BLD AUTO: 0.02 K/UL (ref 0–0.04)
IMM GRANULOCYTES NFR BLD AUTO: 0.2 % (ref 0–0.5)
LDLC SERPL CALC-MCNC: 87 MG/DL (ref 63–159)
LYMPHOCYTES # BLD AUTO: 1.6 K/UL (ref 1–4.8)
LYMPHOCYTES NFR BLD: 18.6 % (ref 18–48)
MCH RBC QN AUTO: 29.8 PG (ref 27–31)
MCHC RBC AUTO-ENTMCNC: 32.5 G/DL (ref 32–36)
MCV RBC AUTO: 92 FL (ref 82–98)
MONOCYTES # BLD AUTO: 0.6 K/UL (ref 0.3–1)
MONOCYTES NFR BLD: 6.8 % (ref 4–15)
NEUTROPHILS # BLD AUTO: 6 K/UL (ref 1.8–7.7)
NEUTROPHILS NFR BLD: 70.7 % (ref 38–73)
NONHDLC SERPL-MCNC: 110 MG/DL
NRBC BLD-RTO: 0 /100 WBC
PLATELET # BLD AUTO: 327 K/UL (ref 150–450)
PMV BLD AUTO: 10.3 FL (ref 9.2–12.9)
POTASSIUM SERPL-SCNC: 4.4 MMOL/L (ref 3.5–5.1)
PROT SERPL-MCNC: 7.1 G/DL (ref 6–8.4)
RBC # BLD AUTO: 4.56 M/UL (ref 4–5.4)
SODIUM SERPL-SCNC: 140 MMOL/L (ref 136–145)
TRIGL SERPL-MCNC: 115 MG/DL (ref 30–150)
TSH SERPL DL<=0.005 MIU/L-ACNC: 3.84 UIU/ML (ref 0.4–4)
WBC # BLD AUTO: 8.5 K/UL (ref 3.9–12.7)

## 2022-09-26 PROCEDURE — 84443 ASSAY THYROID STIM HORMONE: CPT | Performed by: INTERNAL MEDICINE

## 2022-09-26 PROCEDURE — 80061 LIPID PANEL: CPT | Performed by: INTERNAL MEDICINE

## 2022-09-26 PROCEDURE — 80053 COMPREHEN METABOLIC PANEL: CPT | Performed by: INTERNAL MEDICINE

## 2022-09-26 PROCEDURE — 85025 COMPLETE CBC W/AUTO DIFF WBC: CPT | Performed by: INTERNAL MEDICINE

## 2022-09-26 PROCEDURE — 36415 COLL VENOUS BLD VENIPUNCTURE: CPT | Mod: PO | Performed by: INTERNAL MEDICINE

## 2022-10-03 ENCOUNTER — OFFICE VISIT (OUTPATIENT)
Dept: INTERNAL MEDICINE | Facility: CLINIC | Age: 77
End: 2022-10-03
Payer: MEDICARE

## 2022-10-03 VITALS
TEMPERATURE: 98 F | SYSTOLIC BLOOD PRESSURE: 130 MMHG | RESPIRATION RATE: 18 BRPM | HEIGHT: 60 IN | WEIGHT: 156.69 LBS | BODY MASS INDEX: 30.76 KG/M2 | OXYGEN SATURATION: 97 % | HEART RATE: 95 BPM | DIASTOLIC BLOOD PRESSURE: 82 MMHG

## 2022-10-03 DIAGNOSIS — Z23 NEED FOR PNEUMOCOCCAL VACCINATION: ICD-10-CM

## 2022-10-03 DIAGNOSIS — I70.8 AORTO-ILIAC ATHEROSCLEROSIS: ICD-10-CM

## 2022-10-03 DIAGNOSIS — F41.9 ANXIETY: ICD-10-CM

## 2022-10-03 DIAGNOSIS — I70.0 AORTO-ILIAC ATHEROSCLEROSIS: ICD-10-CM

## 2022-10-03 DIAGNOSIS — E78.00 HYPERCHOLESTEREMIA: ICD-10-CM

## 2022-10-03 DIAGNOSIS — K21.9 GASTROESOPHAGEAL REFLUX DISEASE, UNSPECIFIED WHETHER ESOPHAGITIS PRESENT: Primary | ICD-10-CM

## 2022-10-03 DIAGNOSIS — R73.03 PREDIABETES: ICD-10-CM

## 2022-10-03 DIAGNOSIS — Z12.31 ENCOUNTER FOR SCREENING MAMMOGRAM FOR BREAST CANCER: ICD-10-CM

## 2022-10-03 PROCEDURE — 1101F PT FALLS ASSESS-DOCD LE1/YR: CPT | Mod: CPTII,S$GLB,, | Performed by: INTERNAL MEDICINE

## 2022-10-03 PROCEDURE — 1159F PR MEDICATION LIST DOCUMENTED IN MEDICAL RECORD: ICD-10-PCS | Mod: CPTII,S$GLB,, | Performed by: INTERNAL MEDICINE

## 2022-10-03 PROCEDURE — 90677 PNEUMOCOCCAL CONJUGATE VACCINE 20-VALENT: ICD-10-PCS | Mod: S$GLB,,, | Performed by: INTERNAL MEDICINE

## 2022-10-03 PROCEDURE — 99999 PR PBB SHADOW E&M-EST. PATIENT-LVL V: CPT | Mod: PBBFAC,,, | Performed by: INTERNAL MEDICINE

## 2022-10-03 PROCEDURE — 3075F PR MOST RECENT SYSTOLIC BLOOD PRESS GE 130-139MM HG: ICD-10-PCS | Mod: CPTII,S$GLB,, | Performed by: INTERNAL MEDICINE

## 2022-10-03 PROCEDURE — 3079F DIAST BP 80-89 MM HG: CPT | Mod: CPTII,S$GLB,, | Performed by: INTERNAL MEDICINE

## 2022-10-03 PROCEDURE — 1159F MED LIST DOCD IN RCRD: CPT | Mod: CPTII,S$GLB,, | Performed by: INTERNAL MEDICINE

## 2022-10-03 PROCEDURE — 3288F PR FALLS RISK ASSESSMENT DOCUMENTED: ICD-10-PCS | Mod: CPTII,S$GLB,, | Performed by: INTERNAL MEDICINE

## 2022-10-03 PROCEDURE — 1101F PR PT FALLS ASSESS DOC 0-1 FALLS W/OUT INJ PAST YR: ICD-10-PCS | Mod: CPTII,S$GLB,, | Performed by: INTERNAL MEDICINE

## 2022-10-03 PROCEDURE — 3079F PR MOST RECENT DIASTOLIC BLOOD PRESSURE 80-89 MM HG: ICD-10-PCS | Mod: CPTII,S$GLB,, | Performed by: INTERNAL MEDICINE

## 2022-10-03 PROCEDURE — 1160F RVW MEDS BY RX/DR IN RCRD: CPT | Mod: CPTII,S$GLB,, | Performed by: INTERNAL MEDICINE

## 2022-10-03 PROCEDURE — G0009 ADMIN PNEUMOCOCCAL VACCINE: HCPCS | Mod: S$GLB,,, | Performed by: INTERNAL MEDICINE

## 2022-10-03 PROCEDURE — 90694 VACC AIIV4 NO PRSRV 0.5ML IM: CPT | Mod: S$GLB,,, | Performed by: INTERNAL MEDICINE

## 2022-10-03 PROCEDURE — 3288F FALL RISK ASSESSMENT DOCD: CPT | Mod: CPTII,S$GLB,, | Performed by: INTERNAL MEDICINE

## 2022-10-03 PROCEDURE — 99499 RISK ADDL DX/OHS AUDIT: ICD-10-PCS | Mod: HCNC,S$GLB,, | Performed by: INTERNAL MEDICINE

## 2022-10-03 PROCEDURE — 99214 OFFICE O/P EST MOD 30 MIN: CPT | Mod: 25,S$GLB,, | Performed by: INTERNAL MEDICINE

## 2022-10-03 PROCEDURE — 99999 PR PBB SHADOW E&M-EST. PATIENT-LVL V: ICD-10-PCS | Mod: PBBFAC,,, | Performed by: INTERNAL MEDICINE

## 2022-10-03 PROCEDURE — 3075F SYST BP GE 130 - 139MM HG: CPT | Mod: CPTII,S$GLB,, | Performed by: INTERNAL MEDICINE

## 2022-10-03 PROCEDURE — G0009 PNEUMOCOCCAL CONJUGATE VACCINE 20-VALENT: ICD-10-PCS | Mod: S$GLB,,, | Performed by: INTERNAL MEDICINE

## 2022-10-03 PROCEDURE — 1126F PR PAIN SEVERITY QUANTIFIED, NO PAIN PRESENT: ICD-10-PCS | Mod: CPTII,S$GLB,, | Performed by: INTERNAL MEDICINE

## 2022-10-03 PROCEDURE — 1126F AMNT PAIN NOTED NONE PRSNT: CPT | Mod: CPTII,S$GLB,, | Performed by: INTERNAL MEDICINE

## 2022-10-03 PROCEDURE — G0008 ADMIN INFLUENZA VIRUS VAC: HCPCS | Mod: S$GLB,,, | Performed by: INTERNAL MEDICINE

## 2022-10-03 PROCEDURE — 90677 PCV20 VACCINE IM: CPT | Mod: S$GLB,,, | Performed by: INTERNAL MEDICINE

## 2022-10-03 PROCEDURE — 99214 PR OFFICE/OUTPT VISIT, EST, LEVL IV, 30-39 MIN: ICD-10-PCS | Mod: 25,S$GLB,, | Performed by: INTERNAL MEDICINE

## 2022-10-03 PROCEDURE — 1160F PR REVIEW ALL MEDS BY PRESCRIBER/CLIN PHARMACIST DOCUMENTED: ICD-10-PCS | Mod: CPTII,S$GLB,, | Performed by: INTERNAL MEDICINE

## 2022-10-03 PROCEDURE — G0008 FLU VACCINE - QUADRIVALENT - ADJUVANTED: ICD-10-PCS | Mod: S$GLB,,, | Performed by: INTERNAL MEDICINE

## 2022-10-03 PROCEDURE — 99499 UNLISTED E&M SERVICE: CPT | Mod: HCNC,S$GLB,, | Performed by: INTERNAL MEDICINE

## 2022-10-03 PROCEDURE — 90694 FLU VACCINE - QUADRIVALENT - ADJUVANTED: ICD-10-PCS | Mod: S$GLB,,, | Performed by: INTERNAL MEDICINE

## 2022-10-03 RX ORDER — BUSPIRONE HYDROCHLORIDE 15 MG/1
15 TABLET ORAL 2 TIMES DAILY
Qty: 180 TABLET | Refills: 1 | Status: SHIPPED | OUTPATIENT
Start: 2022-10-03 | End: 2023-03-06

## 2022-10-03 NOTE — PROGRESS NOTES
Subjective:       Patient ID: Chikis Epstein is a 76 y.o. female.    Chief Complaint: Follow-up (6 Mos )    HPI  Pt with HLD, Prediabetes, Anxiety, Aortic atherosclerosis, GERD and anxiety/depression is c/o intermittent symptoms of anxiety which has worsened recently. She notices worsening symptoms when going out in public. Mood can be unstable at times. No SI/HI. On Wellbutrin.    Review of Systems   Constitutional:  Negative for activity change, appetite change, chills, diaphoresis, fatigue, fever and unexpected weight change.   HENT:  Negative for postnasal drip, rhinorrhea, sinus pressure/congestion, sneezing, sore throat, trouble swallowing and voice change.    Respiratory:  Negative for cough, shortness of breath and wheezing.    Cardiovascular:  Negative for chest pain, palpitations and leg swelling.   Gastrointestinal:  Negative for abdominal pain, blood in stool, constipation, diarrhea, nausea and vomiting.   Genitourinary:  Negative for dysuria.   Musculoskeletal:  Negative for arthralgias and myalgias.   Integumentary:  Negative for rash and wound.   Allergic/Immunologic: Negative for environmental allergies and food allergies.   Hematological:  Negative for adenopathy. Does not bruise/bleed easily.   Psychiatric/Behavioral:  Negative for self-injury and suicidal ideas. The patient is nervous/anxious.        Objective:      Physical Exam  Constitutional:       General: She is not in acute distress.     Appearance: Normal appearance. She is well-developed. She is not diaphoretic.   HENT:      Head: Normocephalic and atraumatic.      Right Ear: External ear normal.      Left Ear: External ear normal.      Nose: Nose normal.      Mouth/Throat:      Pharynx: No oropharyngeal exudate.   Eyes:      General: No scleral icterus.        Right eye: No discharge.         Left eye: No discharge.      Conjunctiva/sclera: Conjunctivae normal.      Pupils: Pupils are equal, round, and reactive to light.   Neck:       Vascular: No JVD.   Cardiovascular:      Rate and Rhythm: Normal rate and regular rhythm.      Pulses: Normal pulses.      Heart sounds: Normal heart sounds.   Pulmonary:      Effort: Pulmonary effort is normal. No respiratory distress.      Breath sounds: Normal breath sounds. No wheezing, rhonchi or rales.   Abdominal:      General: Bowel sounds are normal. There is no distension.      Palpations: Abdomen is soft.      Tenderness: There is no abdominal tenderness. There is no guarding or rebound.   Musculoskeletal:      Cervical back: Neck supple.      Right lower leg: No edema.      Left lower leg: No edema.   Lymphadenopathy:      Cervical: No cervical adenopathy.   Skin:     General: Skin is warm and dry.      Coloration: Skin is not pale.      Findings: No rash.   Neurological:      General: No focal deficit present.      Mental Status: She is alert and oriented to person, place, and time.      Gait: Gait normal.   Psychiatric:         Behavior: Behavior normal.         Thought Content: Thought content normal.       Assessment:       Problem List Items Addressed This Visit          Psychiatric    Anxiety    Relevant Medications    busPIRone (BUSPAR) 15 MG tablet       Cardiac/Vascular    Hypercholesteremia    Aorto-iliac atherosclerosis       Endocrine    Prediabetes       GI    Gastroesophageal reflux disease - Primary     Other Visit Diagnoses       Need for pneumococcal vaccination        Relevant Orders    (In Office Administered) Pneumococcal Conjugate Vaccine (20 Valent) (IM) (Completed)    Encounter for screening mammogram for breast cancer        Relevant Orders    Mammo Digital Screening Bilat w/ Chin            Plan:    Prediabetes- last A1C of 6.2(3/22)<--6.2(9/21)<--6.0(12/19)       Continue Metformin 500 mg qam      HTN- stable on current meds      HLD- continue statin      GERD- stable on PPI      Anxiety- on Wellbutrin  mg daily   -add Buspar 15 mg BID     Aortic atherosclerosis- on  statin     F/u in 6 months for annual exam

## 2022-10-05 ENCOUNTER — HOSPITAL ENCOUNTER (OUTPATIENT)
Dept: RADIOLOGY | Facility: HOSPITAL | Age: 77
Discharge: HOME | End: 2022-10-05
Attending: INTERNAL MEDICINE
Payer: MEDICARE

## 2022-10-05 DIAGNOSIS — Z12.31 ENCOUNTER FOR SCREENING MAMMOGRAM FOR BREAST CANCER: ICD-10-CM

## 2022-10-05 PROCEDURE — 77067 MAMMO DIGITAL SCREENING BILAT WITH TOMO: ICD-10-PCS | Mod: 26,,, | Performed by: RADIOLOGY

## 2022-10-05 PROCEDURE — 77067 SCR MAMMO BI INCL CAD: CPT | Mod: TC,PO

## 2022-10-05 PROCEDURE — 77063 BREAST TOMOSYNTHESIS BI: CPT | Mod: 26,,, | Performed by: RADIOLOGY

## 2022-10-05 PROCEDURE — 77063 BREAST TOMOSYNTHESIS BI: CPT | Mod: TC,PO

## 2022-10-05 PROCEDURE — 77067 SCR MAMMO BI INCL CAD: CPT | Mod: 26,,, | Performed by: RADIOLOGY

## 2022-10-05 PROCEDURE — 77063 MAMMO DIGITAL SCREENING BILAT WITH TOMO: ICD-10-PCS | Mod: 26,,, | Performed by: RADIOLOGY

## 2022-10-06 LAB
OHS CV EVENT MONITOR DAY: 14
OHS CV HOLTER HOOKUP DATE: NORMAL
OHS CV HOLTER HOOKUP TIME: NORMAL
OHS CV HOLTER LENGTH DECIMAL HOURS: 336
OHS CV HOLTER LENGTH HOURS: 0
OHS CV HOLTER LENGTH MINUTES: 0
OHS CV HOLTER SCAN DATE: NORMAL
OHS CV HOLTER SINUS AVERAGE HR: 71 BPM
OHS CV HOLTER SINUS MAX HR: 141 BPM
OHS CV HOLTER SINUS MIN HR: 52 BPM
OHS CV HOLTER STUDY END DATE: NORMAL
OHS CV HOLTER STUDY END TIME: NORMAL

## 2023-01-24 ENCOUNTER — PES CALL (OUTPATIENT)
Dept: ADMINISTRATIVE | Facility: CLINIC | Age: 78
End: 2023-01-24
Payer: MEDICARE

## 2023-02-04 DIAGNOSIS — I70.0 ATHEROSCLEROSIS OF AORTA: ICD-10-CM

## 2023-02-04 RX ORDER — PRAVASTATIN SODIUM 40 MG/1
TABLET ORAL
Qty: 90 TABLET | Refills: 1 | Status: SHIPPED | OUTPATIENT
Start: 2023-02-04 | End: 2023-09-16

## 2023-02-04 NOTE — TELEPHONE ENCOUNTER
Care Due:                  Date            Visit Type   Department     Provider  --------------------------------------------------------------------------------                                EP -                              PRIMARY      MET INTERNAL  Last Visit: 10-      CARE ([facility])   MEDICINE       Cornell Garcia                              EP -                              PRIMARY      MET INTERNAL  Next Visit: 04-      CARE ([facility])   [facility]       Cronell Garcia                                                            Last  Test          Frequency    Reason                     Performed    Due Date  --------------------------------------------------------------------------------    HBA1C.......  6 months...  metFORMIN................  03- 09-    Health [facility] Embedded Care Gaps. Reference number: [id number]. 2/04/2023   3:21:21 AM CST

## 2023-02-07 DIAGNOSIS — Z00.00 ENCOUNTER FOR MEDICARE ANNUAL WELLNESS EXAM: ICD-10-CM

## 2023-02-08 PROBLEM — M54.2 NECK PAIN: Status: RESOLVED | Noted: 2022-04-13 | Resolved: 2023-02-08

## 2023-02-08 PROBLEM — M54.2 PAINFUL CERVICAL ROM: Status: RESOLVED | Noted: 2022-04-13 | Resolved: 2023-02-08

## 2023-02-09 ENCOUNTER — OFFICE VISIT (OUTPATIENT)
Dept: INTERNAL MEDICINE | Facility: CLINIC | Age: 78
End: 2023-02-09
Payer: MEDICARE

## 2023-02-09 ENCOUNTER — DOCUMENTATION ONLY (OUTPATIENT)
Dept: INTERNAL MEDICINE | Facility: CLINIC | Age: 78
End: 2023-02-09

## 2023-02-09 ENCOUNTER — LAB VISIT (OUTPATIENT)
Dept: LAB | Facility: HOSPITAL | Age: 78
End: 2023-02-09
Payer: MEDICARE

## 2023-02-09 VITALS
HEART RATE: 83 BPM | HEIGHT: 60 IN | BODY MASS INDEX: 30.73 KG/M2 | OXYGEN SATURATION: 98 % | WEIGHT: 156.5 LBS | SYSTOLIC BLOOD PRESSURE: 135 MMHG | DIASTOLIC BLOOD PRESSURE: 78 MMHG | RESPIRATION RATE: 14 BRPM

## 2023-02-09 DIAGNOSIS — M46.92 UNSPECIFIED INFLAMMATORY SPONDYLOPATHY, CERVICAL REGION: ICD-10-CM

## 2023-02-09 DIAGNOSIS — R82.90 ABNORMAL URINE ODOR: ICD-10-CM

## 2023-02-09 DIAGNOSIS — H91.93 DECREASED HEARING OF BOTH EARS: ICD-10-CM

## 2023-02-09 DIAGNOSIS — K25.9 PYLORUS ULCER, UNSPECIFIED CHRONICITY: ICD-10-CM

## 2023-02-09 DIAGNOSIS — I70.8 AORTO-ILIAC ATHEROSCLEROSIS: ICD-10-CM

## 2023-02-09 DIAGNOSIS — E78.00 PURE HYPERCHOLESTEROLEMIA: ICD-10-CM

## 2023-02-09 DIAGNOSIS — F32.5 MAJOR DEPRESSIVE DISORDER, SINGLE EPISODE, IN FULL REMISSION: ICD-10-CM

## 2023-02-09 DIAGNOSIS — I70.0 ATHEROSCLEROSIS OF AORTA: ICD-10-CM

## 2023-02-09 DIAGNOSIS — K21.9 GASTROESOPHAGEAL REFLUX DISEASE, UNSPECIFIED WHETHER ESOPHAGITIS PRESENT: ICD-10-CM

## 2023-02-09 DIAGNOSIS — F41.9 ANXIETY: ICD-10-CM

## 2023-02-09 DIAGNOSIS — Z00.00 ENCOUNTER FOR PREVENTIVE HEALTH EXAMINATION: Primary | ICD-10-CM

## 2023-02-09 DIAGNOSIS — Z00.00 ENCOUNTER FOR MEDICARE ANNUAL WELLNESS EXAM: ICD-10-CM

## 2023-02-09 DIAGNOSIS — R73.03 PREDIABETES: ICD-10-CM

## 2023-02-09 DIAGNOSIS — E66.09 CLASS 1 OBESITY DUE TO EXCESS CALORIES WITH SERIOUS COMORBIDITY AND BODY MASS INDEX (BMI) OF 30.0 TO 30.9 IN ADULT: ICD-10-CM

## 2023-02-09 DIAGNOSIS — I10 HYPERTENSION, UNSPECIFIED TYPE: ICD-10-CM

## 2023-02-09 DIAGNOSIS — I70.0 AORTO-ILIAC ATHEROSCLEROSIS: ICD-10-CM

## 2023-02-09 DIAGNOSIS — I77.810 ASCENDING AORTA DILATATION: ICD-10-CM

## 2023-02-09 LAB
BACTERIA #/AREA URNS AUTO: ABNORMAL /HPF
BILIRUB UR QL STRIP: NEGATIVE
CLARITY UR REFRACT.AUTO: CLEAR
COLOR UR AUTO: YELLOW
GLUCOSE UR QL STRIP: NEGATIVE
HGB UR QL STRIP: NEGATIVE
KETONES UR QL STRIP: NEGATIVE
LEUKOCYTE ESTERASE UR QL STRIP: NEGATIVE
MICROSCOPIC COMMENT: ABNORMAL
NITRITE UR QL STRIP: POSITIVE
PH UR STRIP: 8 [PH] (ref 5–8)
PROT UR QL STRIP: NEGATIVE
RBC #/AREA URNS AUTO: 5 /HPF (ref 0–4)
SP GR UR STRIP: 1.01 (ref 1–1.03)
URN SPEC COLLECT METH UR: ABNORMAL
WBC #/AREA URNS AUTO: 21 /HPF (ref 0–5)

## 2023-02-09 PROCEDURE — 1160F RVW MEDS BY RX/DR IN RCRD: CPT | Mod: HCNC,CPTII,S$GLB, | Performed by: NURSE PRACTITIONER

## 2023-02-09 PROCEDURE — 3288F PR FALLS RISK ASSESSMENT DOCUMENTED: ICD-10-PCS | Mod: HCNC,CPTII,S$GLB, | Performed by: NURSE PRACTITIONER

## 2023-02-09 PROCEDURE — 99499 RISK ADDL DX/OHS AUDIT: ICD-10-PCS | Mod: HCNC,S$GLB,, | Performed by: NURSE PRACTITIONER

## 2023-02-09 PROCEDURE — 3078F PR MOST RECENT DIASTOLIC BLOOD PRESSURE < 80 MM HG: ICD-10-PCS | Mod: HCNC,CPTII,S$GLB, | Performed by: NURSE PRACTITIONER

## 2023-02-09 PROCEDURE — 3288F FALL RISK ASSESSMENT DOCD: CPT | Mod: HCNC,CPTII,S$GLB, | Performed by: NURSE PRACTITIONER

## 2023-02-09 PROCEDURE — 87186 SC STD MICRODIL/AGAR DIL: CPT | Mod: HCNC | Performed by: NURSE PRACTITIONER

## 2023-02-09 PROCEDURE — 87088 URINE BACTERIA CULTURE: CPT | Mod: HCNC | Performed by: NURSE PRACTITIONER

## 2023-02-09 PROCEDURE — 1170F FXNL STATUS ASSESSED: CPT | Mod: HCNC,CPTII,S$GLB, | Performed by: NURSE PRACTITIONER

## 2023-02-09 PROCEDURE — 87086 URINE CULTURE/COLONY COUNT: CPT | Mod: HCNC | Performed by: NURSE PRACTITIONER

## 2023-02-09 PROCEDURE — 3078F DIAST BP <80 MM HG: CPT | Mod: HCNC,CPTII,S$GLB, | Performed by: NURSE PRACTITIONER

## 2023-02-09 PROCEDURE — 99499 UNLISTED E&M SERVICE: CPT | Mod: HCNC,S$GLB,, | Performed by: NURSE PRACTITIONER

## 2023-02-09 PROCEDURE — 81001 URINALYSIS AUTO W/SCOPE: CPT | Mod: HCNC | Performed by: NURSE PRACTITIONER

## 2023-02-09 PROCEDURE — 1125F PR PAIN SEVERITY QUANTIFIED, PAIN PRESENT: ICD-10-PCS | Mod: HCNC,CPTII,S$GLB, | Performed by: NURSE PRACTITIONER

## 2023-02-09 PROCEDURE — 3075F PR MOST RECENT SYSTOLIC BLOOD PRESS GE 130-139MM HG: ICD-10-PCS | Mod: HCNC,CPTII,S$GLB, | Performed by: NURSE PRACTITIONER

## 2023-02-09 PROCEDURE — 87077 CULTURE AEROBIC IDENTIFY: CPT | Mod: HCNC | Performed by: NURSE PRACTITIONER

## 2023-02-09 PROCEDURE — 99999 PR PBB SHADOW E&M-EST. PATIENT-LVL V: ICD-10-PCS | Mod: PBBFAC,HCNC,, | Performed by: NURSE PRACTITIONER

## 2023-02-09 PROCEDURE — 3075F SYST BP GE 130 - 139MM HG: CPT | Mod: HCNC,CPTII,S$GLB, | Performed by: NURSE PRACTITIONER

## 2023-02-09 PROCEDURE — G0439 PPPS, SUBSEQ VISIT: HCPCS | Mod: HCNC,S$GLB,, | Performed by: NURSE PRACTITIONER

## 2023-02-09 PROCEDURE — 1159F PR MEDICATION LIST DOCUMENTED IN MEDICAL RECORD: ICD-10-PCS | Mod: HCNC,CPTII,S$GLB, | Performed by: NURSE PRACTITIONER

## 2023-02-09 PROCEDURE — 1160F PR REVIEW ALL MEDS BY PRESCRIBER/CLIN PHARMACIST DOCUMENTED: ICD-10-PCS | Mod: HCNC,CPTII,S$GLB, | Performed by: NURSE PRACTITIONER

## 2023-02-09 PROCEDURE — G0439 PR MEDICARE ANNUAL WELLNESS SUBSEQUENT VISIT: ICD-10-PCS | Mod: HCNC,S$GLB,, | Performed by: NURSE PRACTITIONER

## 2023-02-09 PROCEDURE — 1170F PR FUNCTIONAL STATUS ASSESSED: ICD-10-PCS | Mod: HCNC,CPTII,S$GLB, | Performed by: NURSE PRACTITIONER

## 2023-02-09 PROCEDURE — 1100F PTFALLS ASSESS-DOCD GE2>/YR: CPT | Mod: HCNC,CPTII,S$GLB, | Performed by: NURSE PRACTITIONER

## 2023-02-09 PROCEDURE — 1125F AMNT PAIN NOTED PAIN PRSNT: CPT | Mod: HCNC,CPTII,S$GLB, | Performed by: NURSE PRACTITIONER

## 2023-02-09 PROCEDURE — 1100F PR PT FALLS ASSESS DOC 2+ FALLS/FALL W/INJURY/YR: ICD-10-PCS | Mod: HCNC,CPTII,S$GLB, | Performed by: NURSE PRACTITIONER

## 2023-02-09 PROCEDURE — 99999 PR PBB SHADOW E&M-EST. PATIENT-LVL V: CPT | Mod: PBBFAC,HCNC,, | Performed by: NURSE PRACTITIONER

## 2023-02-09 PROCEDURE — 1159F MED LIST DOCD IN RCRD: CPT | Mod: HCNC,CPTII,S$GLB, | Performed by: NURSE PRACTITIONER

## 2023-02-09 NOTE — PROGRESS NOTES
Chikis Epstein presented for a  Medicare AWV and comprehensive Health Risk Assessment today. The following components were reviewed and updated:    Medical history  Family History  Social history  Allergies and Current Medications  Health Risk Assessment  Health Maintenance  Care Team     ** See Completed Assessments for Annual Wellness Visit within the encounter summary.**       The following assessments were completed:  Living Situation  CAGE  Depression Screening  Timed Get Up and Go  Whisper Test  Cognitive Function Screening  Nutrition Screening  ADL Screening  PAQ Screening    Vitals:    02/09/23 1158   BP: 135/78   BP Location: Left arm   Patient Position: Sitting   Pulse: 83   Resp: 14   SpO2: 98%   Weight: 71 kg (156 lb 8.4 oz)   Height: 5' (1.524 m)     Body mass index is 30.57 kg/m².  Physical Exam  Constitutional:       Comments: Younger in appearance than age   HENT:      Right Ear: There is no impacted cerumen.      Left Ear: There is no impacted cerumen.   Eyes:      General: No scleral icterus.  Cardiovascular:      Rate and Rhythm: Normal rate and regular rhythm.   Pulmonary:      Effort: Pulmonary effort is normal.      Breath sounds: Normal breath sounds.   Abdominal:      Palpations: Abdomen is soft.   Musculoskeletal:         General: No swelling. Normal range of motion.   Skin:     General: Skin is warm and dry.   Neurological:      Mental Status: She is alert and oriented to person, place, and time.   Psychiatric:         Mood and Affect: Mood normal.         Behavior: Behavior normal.         Thought Content: Thought content normal.         Judgment: Judgment normal.          Medication review & Opioid screening perform during rooming section. No prescribed opioid medications noted.  Review for substance use disorder screening performed during rooming section. No substance abuse noted on screening.      Diagnoses and health risks identified today and associated recommendations/orders:    1.  Hypertension, unspecified type  Stable followed by cardiology    2. Anxiety  Stable followed by PCP    3. Prediabetes  Stable followed by PCP    4. Pure hypercholesterolemia  Stable followed by cardiology    5. Aorto-iliac atherosclerosis  Stable followed by cardiology    6. Ascending aorta dilatation  Stable followed by cardiology    7. Atherosclerosis of aorta  Stable followed by cardiology    8. Pylorus ulcer, unspecified chronicity  Stable followed by PCP    9. Gastroesophageal reflux disease, unspecified whether esophagitis present  Stable followed by PCP    10. Major depressive disorder, single episode, in full remission  Stable followed by PCP-PHQ 2 score 1    11. Unspecified inflammatory spondylopathy, cervical region  Stable followed by PCP    12. Encounter for preventive health examination  Here for Health Risk Assessment/Annual Wellness Visit.  Health maintenance reviewed and updated. Follow up in one year.        13. Decreased hearing of both ears  - Ambulatory referral/consult to Audiology; Future    14. Abnormal urine odor  - Urinalysis; Future  - Urine culture; Future  - Urinalysis Microscopic; Future    15. Class 1 obesity due to excess calories with serious comorbidity and body mass index (BMI) of 30.0 to 30.9 in adult  Chronic. Followed by PCP.   Centers for Disease Control and Prevention (CDC)  weight recommendations for current BMI & ideal BMI range discussed with patient.  Recommended  gradual weight loss,  mediterranean diet , start structured regular exercise .         Provided Chikis with a 5-10 year written screening schedule and personal prevention plan. Recommendations were developed using the USPSTF age appropriate recommendations. Education, counseling, and referrals were provided as needed. After Visit Summary printed and given to patient which includes a list of additional screenings\tests needed. Cognitive function clock drawing was labeled with the patient's identification & forwarded  to medical records to be scanned into the patient's epic chart.    Follow up in about 1 year (around 2/9/2024) for HRA.    Bette Benjamin NP I offered to discuss advanced care planning, including how to pick a person who would make decisions for you if you were unable to make them for yourself, called a health care power of , and what kind of decisions you might make such as use of life sustaining treatments such as ventilators and tube feeding when faced with a life limiting illness recorded on a living will that they will need to know. (How you want to be cared for as you near the end of your natural life)     X Patient is interested in learning more about how to make advanced directives.  I provided them paperwork and offered to discuss this with them.

## 2023-02-09 NOTE — PATIENT INSTRUCTIONS
Counseling and Referral of Other Preventative  (Italic type indicates deductible and co-insurance are waived)    Patient Name: Chikis Epstein  Today's Date: 2/9/2023    Health Maintenance         Date Due Completion Date    COVID-19 Vaccine (4 - Booster for Pfizer series) considering 11/9/2021    Hemoglobin A1c (Prediabetes) 03/30/2023 3/30/2022    DEXA Scan 04/13/2023 4/13/2021        Lipid Panel 09/26/2023 9/26/2022    TETANUS VACCINE    Gradual  weight loss    Suggest 2nd opinion on hearing aides    11/27/2028 11/27/2018          No orders of the defined types were placed in this encounter.    The following information is provided to all patients.  This information is to help you find resources for any of the problems found today that may be affecting your health:                Living healthy guide: www.[facility].[place].gov      Understanding Diabetes: www.diabetes.org      Eating healthy: www.cdc.gov/healthyweight      CDC home safety checklist: www.cdc.gov/steadi/patient.html      Agency on Aging: www.goea.[place].[facility]      Alcoholics anonymous (AA): www.aa.org      Physical Activity: www.kenji.nih.gov/gf4frbl      Tobacco use: www.quitwithusla.org

## 2023-02-09 NOTE — PROGRESS NOTES
Pt states urine w strong odor- no dysuria, frequency, fever , back pain. Spoke w PCP- U/A & culture ordered- plan to order macrobid 100mg BID x 7 days if + for UTI . the patient was notified.     normal...

## 2023-02-10 ENCOUNTER — PATIENT MESSAGE (OUTPATIENT)
Dept: INTERNAL MEDICINE | Facility: CLINIC | Age: 78
End: 2023-02-10
Payer: MEDICARE

## 2023-02-10 RX ORDER — NITROFURANTOIN 25; 75 MG/1; MG/1
100 CAPSULE ORAL 2 TIMES DAILY
Qty: 14 CAPSULE | Refills: 0 | Status: SHIPPED | OUTPATIENT
Start: 2023-02-10 | End: 2023-02-10

## 2023-02-10 RX ORDER — NITROFURANTOIN 25; 75 MG/1; MG/1
100 CAPSULE ORAL 2 TIMES DAILY
Qty: 14 CAPSULE | Refills: 0 | Status: SHIPPED | OUTPATIENT
Start: 2023-02-10 | End: 2023-02-17

## 2023-02-12 LAB — BACTERIA UR CULT: ABNORMAL

## 2023-02-13 ENCOUNTER — PATIENT MESSAGE (OUTPATIENT)
Dept: INTERNAL MEDICINE | Facility: CLINIC | Age: 78
End: 2023-02-13
Payer: MEDICARE

## 2023-02-13 DIAGNOSIS — R32 URINARY INCONTINENCE, UNSPECIFIED TYPE: Primary | ICD-10-CM

## 2023-02-15 ENCOUNTER — CLINICAL SUPPORT (OUTPATIENT)
Dept: OTOLARYNGOLOGY | Facility: CLINIC | Age: 78
End: 2023-02-15
Payer: MEDICARE

## 2023-02-15 DIAGNOSIS — H90.3 SENSORINEURAL HEARING LOSS (SNHL) OF BOTH EARS: Primary | ICD-10-CM

## 2023-02-15 PROCEDURE — 92567 TYMPANOMETRY: CPT | Mod: HCNC,S$GLB,,

## 2023-02-15 PROCEDURE — 99999 PR PBB SHADOW E&M-EST. PATIENT-LVL I: ICD-10-PCS | Mod: PBBFAC,HCNC,,

## 2023-02-15 PROCEDURE — 92567 PR TYMPA2METRY: ICD-10-PCS | Mod: HCNC,S$GLB,,

## 2023-02-15 PROCEDURE — 92557 PR COMPREHENSIVE HEARING TEST: ICD-10-PCS | Mod: HCNC,S$GLB,,

## 2023-02-15 PROCEDURE — 99999 PR PBB SHADOW E&M-EST. PATIENT-LVL I: CPT | Mod: PBBFAC,HCNC,,

## 2023-02-15 PROCEDURE — 92557 COMPREHENSIVE HEARING TEST: CPT | Mod: HCNC,S$GLB,,

## 2023-02-15 NOTE — PROGRESS NOTES
Chikis Epstein, a 77 y.o. female was seen today in the clinic for an audiologic evaluation. The patient's primary complaint was hearing loss and increased difficulty understanding speech with current amplification. Ms. Epstein has been aided in both ears with her current hearing aids for approximately 10 years.  She presented her most recent hearing test, completed on 9/12/2022 by an outside clinic, which indicated mild sloping to severe sensorineural hearing loss, bilaterally. She denied tinnitus, ear pain, drainage, and dizziness. No history of noise exposure was reported. Ms. Epstein did report that her mother experienced hearing loss with aging.     Pure tone testing revealed mild to moderate sensorineural hearing loss in both ears. Speech reception thresholds were obtained at 55 dBHL in the right ear and 55 dBHL in the left ear. Speech discrimination scores were 92% in the right ear and 80% in the left ear. Tympanometry revealed Type A tympanogram in the right ear and Type C tympanogram in the left ear.    Results were reviewed with the patient and the recommendation of a hearing aid consultation was discussed.    Recommendations:  Otologic evaluation  Annual audiologic evaluation  Hearing protection in noise  Hearing aid consultation

## 2023-03-13 ENCOUNTER — CLINICAL SUPPORT (OUTPATIENT)
Dept: OTOLARYNGOLOGY | Facility: CLINIC | Age: 78
End: 2023-03-13
Payer: MEDICARE

## 2023-03-13 DIAGNOSIS — H90.3 SENSORINEURAL HEARING LOSS, BILATERAL: Primary | ICD-10-CM

## 2023-03-13 NOTE — PROGRESS NOTES
Chikis Epstein was seen today in the clinic for a hearing aid consult.  She was able to demo a pair of Phonak Audeo L70-R trial hearing aids during the appointment.  We discussed in detail the hearing aid technology levels, pricing, policies and procedures and a recommendation was made for a pair of Advanced level hearing aids.  She decided to proceed with the ordering the hearing aids and measurements were taken, #1 med , vented domes, and color P7.  She paid the $250 hearing aid deposit and signed the hearing aid order form and a copy was given to her.  I will contact her when the hearing aids arrive to schedule the hearing aid fitting.

## 2023-03-17 ENCOUNTER — TELEPHONE (OUTPATIENT)
Dept: OTOLARYNGOLOGY | Facility: CLINIC | Age: 78
End: 2023-03-17
Payer: MEDICARE

## 2023-03-17 ENCOUNTER — PATIENT MESSAGE (OUTPATIENT)
Dept: OTOLARYNGOLOGY | Facility: CLINIC | Age: 78
End: 2023-03-17
Payer: MEDICARE

## 2023-03-17 NOTE — TELEPHONE ENCOUNTER
Left message on voicemail notifying patient that her hearing aids are in and that I scheduled her hearing aid fitting appointment Monday 3/20 at 8:30.

## 2023-03-27 ENCOUNTER — CLINICAL SUPPORT (OUTPATIENT)
Dept: OTOLARYNGOLOGY | Facility: CLINIC | Age: 78
End: 2023-03-27
Payer: MEDICARE

## 2023-03-27 DIAGNOSIS — H90.3 SENSORINEURAL HEARING LOSS, BILATERAL: Primary | ICD-10-CM

## 2023-03-27 NOTE — PROGRESS NOTES
Chikis Epstein was seen today in the clinic for a hearing aid fitting.  She was fit with a pair of Phonak Audeo P70-R hearing aids.  Real ear and feedback tests were completed and the hearing aids were programmed.  The fit was good and she reported good subjective benefit.  We discussed the option of activating her volume controls and possibly changing to small power domes at the follow up if she reports that she needs more gain.  She is not yet interested in bluetooth options for her phone but will consider it for her 2 week follow up.  She practiced insertion and removal of the hearing aids and we reviewed the use/care manual.  The hearing aid purchase agreement was signed and a copy given to her.  She paid the balance on the hearing aids and a 2 week follow up appointment was scheduled.  She will contact me prior to that appointment with any questions or concerns.    3/27/2023    Right ear:  Serial number: [id number]  : Phonak  Model: Audeo L70-R  Type: YVETTE  Color: P7 graphite gray  Battery size: Rechargeable  Tube length: #1  Speaker power: med   Dome size and style: med vented dome  Warranty expiration: 6/10/26  L and D expiration: 6/10/26    Left ear:  Serial number: [id number]  : Phonak   Model: Audeo L70-R  Type: YVETTE  Color:  P7 graphite gray  Battery size: rechargeable  Tube length: #1  Speaker power: med  Dome size and style: med vented dome  Warranty expiration: 6/10/26  L and D expiration: 6/10/26

## 2023-04-04 ENCOUNTER — OFFICE VISIT (OUTPATIENT)
Dept: INTERNAL MEDICINE | Facility: CLINIC | Age: 78
End: 2023-04-04
Payer: MEDICARE

## 2023-04-04 VITALS
HEIGHT: 60 IN | DIASTOLIC BLOOD PRESSURE: 68 MMHG | SYSTOLIC BLOOD PRESSURE: 152 MMHG | BODY MASS INDEX: 28.79 KG/M2 | HEART RATE: 97 BPM | RESPIRATION RATE: 20 BRPM | TEMPERATURE: 98 F | WEIGHT: 146.63 LBS | OXYGEN SATURATION: 97 %

## 2023-04-04 DIAGNOSIS — Z00.00 ANNUAL PHYSICAL EXAM: ICD-10-CM

## 2023-04-04 DIAGNOSIS — G31.84 MCI (MILD COGNITIVE IMPAIRMENT): ICD-10-CM

## 2023-04-04 DIAGNOSIS — I10 HYPERTENSION, UNSPECIFIED TYPE: ICD-10-CM

## 2023-04-04 DIAGNOSIS — F41.9 ANXIETY: ICD-10-CM

## 2023-04-04 DIAGNOSIS — E78.00 HYPERCHOLESTEREMIA: ICD-10-CM

## 2023-04-04 DIAGNOSIS — E78.00 PURE HYPERCHOLESTEROLEMIA: Primary | ICD-10-CM

## 2023-04-04 DIAGNOSIS — I70.0 ATHEROSCLEROSIS OF AORTA: ICD-10-CM

## 2023-04-04 DIAGNOSIS — R73.03 PREDIABETES: ICD-10-CM

## 2023-04-04 PROCEDURE — 1159F MED LIST DOCD IN RCRD: CPT | Mod: HCNC,CPTII,S$GLB, | Performed by: INTERNAL MEDICINE

## 2023-04-04 PROCEDURE — 99214 OFFICE O/P EST MOD 30 MIN: CPT | Mod: 25,HCNC,S$GLB, | Performed by: INTERNAL MEDICINE

## 2023-04-04 PROCEDURE — 3078F DIAST BP <80 MM HG: CPT | Mod: HCNC,CPTII,S$GLB, | Performed by: INTERNAL MEDICINE

## 2023-04-04 PROCEDURE — G0009 PNEUMOCOCCAL CONJUGATE VACCINE 20-VALENT: ICD-10-PCS | Mod: HCNC,S$GLB,, | Performed by: INTERNAL MEDICINE

## 2023-04-04 PROCEDURE — 1126F AMNT PAIN NOTED NONE PRSNT: CPT | Mod: HCNC,CPTII,S$GLB, | Performed by: INTERNAL MEDICINE

## 2023-04-04 PROCEDURE — 99999 PR PBB SHADOW E&M-EST. PATIENT-LVL V: CPT | Mod: PBBFAC,HCNC,, | Performed by: INTERNAL MEDICINE

## 2023-04-04 PROCEDURE — 1160F RVW MEDS BY RX/DR IN RCRD: CPT | Mod: HCNC,CPTII,S$GLB, | Performed by: INTERNAL MEDICINE

## 2023-04-04 PROCEDURE — 99214 PR OFFICE/OUTPT VISIT, EST, LEVL IV, 30-39 MIN: ICD-10-PCS | Mod: 25,HCNC,S$GLB, | Performed by: INTERNAL MEDICINE

## 2023-04-04 PROCEDURE — 1101F PT FALLS ASSESS-DOCD LE1/YR: CPT | Mod: HCNC,CPTII,S$GLB, | Performed by: INTERNAL MEDICINE

## 2023-04-04 PROCEDURE — 1159F PR MEDICATION LIST DOCUMENTED IN MEDICAL RECORD: ICD-10-PCS | Mod: HCNC,CPTII,S$GLB, | Performed by: INTERNAL MEDICINE

## 2023-04-04 PROCEDURE — 1126F PR PAIN SEVERITY QUANTIFIED, NO PAIN PRESENT: ICD-10-PCS | Mod: HCNC,CPTII,S$GLB, | Performed by: INTERNAL MEDICINE

## 2023-04-04 PROCEDURE — 3077F PR MOST RECENT SYSTOLIC BLOOD PRESSURE >= 140 MM HG: ICD-10-PCS | Mod: HCNC,CPTII,S$GLB, | Performed by: INTERNAL MEDICINE

## 2023-04-04 PROCEDURE — G0009 ADMIN PNEUMOCOCCAL VACCINE: HCPCS | Mod: HCNC,S$GLB,, | Performed by: INTERNAL MEDICINE

## 2023-04-04 PROCEDURE — 3288F FALL RISK ASSESSMENT DOCD: CPT | Mod: HCNC,CPTII,S$GLB, | Performed by: INTERNAL MEDICINE

## 2023-04-04 PROCEDURE — 90677 PNEUMOCOCCAL CONJUGATE VACCINE 20-VALENT: ICD-10-PCS | Mod: HCNC,S$GLB,, | Performed by: INTERNAL MEDICINE

## 2023-04-04 PROCEDURE — 1160F PR REVIEW ALL MEDS BY PRESCRIBER/CLIN PHARMACIST DOCUMENTED: ICD-10-PCS | Mod: HCNC,CPTII,S$GLB, | Performed by: INTERNAL MEDICINE

## 2023-04-04 PROCEDURE — 99999 PR PBB SHADOW E&M-EST. PATIENT-LVL V: ICD-10-PCS | Mod: PBBFAC,HCNC,, | Performed by: INTERNAL MEDICINE

## 2023-04-04 PROCEDURE — 1101F PR PT FALLS ASSESS DOC 0-1 FALLS W/OUT INJ PAST YR: ICD-10-PCS | Mod: HCNC,CPTII,S$GLB, | Performed by: INTERNAL MEDICINE

## 2023-04-04 PROCEDURE — 3077F SYST BP >= 140 MM HG: CPT | Mod: HCNC,CPTII,S$GLB, | Performed by: INTERNAL MEDICINE

## 2023-04-04 PROCEDURE — 3078F PR MOST RECENT DIASTOLIC BLOOD PRESSURE < 80 MM HG: ICD-10-PCS | Mod: HCNC,CPTII,S$GLB, | Performed by: INTERNAL MEDICINE

## 2023-04-04 PROCEDURE — 90677 PCV20 VACCINE IM: CPT | Mod: HCNC,S$GLB,, | Performed by: INTERNAL MEDICINE

## 2023-04-04 PROCEDURE — 3288F PR FALLS RISK ASSESSMENT DOCUMENTED: ICD-10-PCS | Mod: HCNC,CPTII,S$GLB, | Performed by: INTERNAL MEDICINE

## 2023-04-04 NOTE — PROGRESS NOTES
Subjective     Patient ID: Chikis Epstein is a 77 y.o. female.    Chief Complaint: Annual Exam    HPI  77 y.o. Female here for annual exam.      Vaccines: Influenza (); Tetanus (); Prevnar 20 (); Shingrix ()  Eye exam:   Mammogram: 10/22  Gyn exam: declined  Colonoscopy:   DEXA: (NL)     Exercise: no  Diet: regular       Past Medical History:  No date: Anxiety  No date: Aortic atherosclerosis      Comment:  CXR 10/13/12  No date: Arthritis  No date: Back pain  No date: Cataract  2016: Hyperlipidemia  No date: Hypertension  No date: MDD (major depressive disorder)  No date: Menopause  2016: Non morbid obesity due to excess calories  No date: Personal history of kidney stones  No date: Type 2 diabetes mellitus without complication  Past Surgical History:  No date: CATARACT EXTRACTION, BILATERAL  No date:  SECTION  No date: CHOLECYSTECTOMY  : COLONOSCOPY      Comment:  normal  No date: EYE SURGERY  No date: HYSTERECTOMY  No date: KIDNEY STONE SURGERY  No date: TOTAL ABDOMINAL HYSTERECTOMY W/ BILATERAL SALPINGOOPHORECTOMY  Social History    Socioeconomic History      Marital status:       Spouse name: Not on file      Number of children: Not on file      Years of education: Not on file      Highest education level: Not on file    Occupational History      Occupation: Retired    Tobacco Use      Smoking status: Never Smoker      Smokeless tobacco: Never Used    Substance and Sexual Activity      Alcohol use: Yes        Alcohol/week: 0.0 standard drinks        Comment: social. one drink a week.      Drug use: No      Sexual activity: Yes        Partners: Male    Other Topics      Concerns:        Not on file    Social History Narrative      Not on file     Social Determinants of Health  Financial Resource Strain:       Difficulty of Paying Living Expenses:   Food Insecurity:       Worried About Running Out of Food in the Last Year:       Ran Out of Food in the Last  Year:   Transportation Needs:       Lack of Transportation (Medical):       Lack of Transportation (Non-Medical):   Physical Activity:       Days of Exercise per Week:       Minutes of Exercise per Session:   Stress:       Feeling of Stress :   Social Connections:       Frequency of Communication with Friends and Family:       Frequency of Social Gatherings with Friends and Family:       Attends Christianity Services:       Active Member of Clubs or Organizations:       Attends Club or Organization Meetings:       Marital Status:   Review of patient's allergies indicates:   -- Ace inhibitors     --  Other reaction(s): Swelling  Review of Systems   Constitutional:  Negative for activity change, appetite change, chills, diaphoresis, fatigue, fever and unexpected weight change.   HENT:  Negative for nasal congestion, mouth sores, postnasal drip, rhinorrhea, sinus pressure/congestion, sneezing, sore throat, trouble swallowing and voice change.    Eyes:  Negative for pain, discharge and visual disturbance.   Respiratory:  Negative for cough, shortness of breath and wheezing.    Cardiovascular:  Negative for chest pain, palpitations and leg swelling.   Gastrointestinal:  Negative for abdominal pain, blood in stool, constipation, diarrhea, nausea and vomiting.   Endocrine: Negative for cold intolerance and heat intolerance.   Genitourinary:  Negative for difficulty urinating, dysuria, frequency, hematuria and urgency.   Musculoskeletal:  Negative for arthralgias and myalgias.   Integumentary:  Negative for rash and wound.   Allergic/Immunologic: Negative for environmental allergies and food allergies.   Neurological:  Positive for memory loss. Negative for dizziness, tremors, seizures, syncope, weakness, light-headedness and headaches.   Hematological:  Negative for adenopathy. Does not bruise/bleed easily.   Psychiatric/Behavioral:  Negative for confusion, self-injury, sleep disturbance and suicidal ideas. The patient is  nervous/anxious.         Objective     Physical Exam  Vitals and nursing note reviewed.   Constitutional:       General: She is not in acute distress.     Appearance: Normal appearance. She is well-developed. She is not diaphoretic.   HENT:      Head: Normocephalic and atraumatic.      Right Ear: External ear normal.      Left Ear: External ear normal.      Nose: Nose normal.      Mouth/Throat:      Pharynx: No oropharyngeal exudate.   Eyes:      General: No scleral icterus.        Right eye: No discharge.         Left eye: No discharge.      Conjunctiva/sclera: Conjunctivae normal.      Pupils: Pupils are equal, round, and reactive to light.   Neck:      Thyroid: No thyromegaly.      Vascular: No JVD.   Cardiovascular:      Rate and Rhythm: Normal rate and regular rhythm.      Pulses: Normal pulses.      Heart sounds: Normal heart sounds. No murmur heard.  Pulmonary:      Effort: Pulmonary effort is normal. No respiratory distress.      Breath sounds: Normal breath sounds. No wheezing, rhonchi or rales.   Chest:      Chest wall: No tenderness.   Abdominal:      General: Bowel sounds are normal. There is no distension.      Palpations: Abdomen is soft.      Tenderness: There is no abdominal tenderness. There is no guarding or rebound.   Musculoskeletal:      Cervical back: Neck supple.      Right lower leg: No edema.      Left lower leg: No edema.   Lymphadenopathy:      Cervical: No cervical adenopathy.   Skin:     General: Skin is warm and dry.      Coloration: Skin is not pale.      Findings: No rash.   Neurological:      General: No focal deficit present.      Mental Status: She is alert and oriented to person, place, and time.      Gait: Gait normal.   Psychiatric:         Behavior: Behavior normal.         Thought Content: Thought content normal.         Judgment: Judgment normal.          Assessment and Plan     Problem List Items Addressed This Visit          Psychiatric    Anxiety       Cardiac/Vascular     Pure hypercholesterolemia - Primary    Hypertension    Hypercholesteremia    Atherosclerosis of aorta     Other Visit Diagnoses       MCI (mild cognitive impairment)        Relevant Orders    Ambulatory referral/consult to Neurology           Prediabetes- last A1C of 6.2(3/22)<--6.2(9/21)<--6.0(12/19)       Continue Metformin 500 mg qam      HTN- stable on current meds      HLD- continue statin      GERD- stable on PPI      Anxiety- on Wellbutrin  mg/Buspar 15 mg qd      Aortic atherosclerosis- on statin     MCI- referral to neuro      F/u in 6 months

## 2023-04-05 ENCOUNTER — OFFICE VISIT (OUTPATIENT)
Dept: NEUROLOGY | Facility: CLINIC | Age: 78
End: 2023-04-05
Payer: MEDICARE

## 2023-04-05 VITALS
BODY MASS INDEX: 27.68 KG/M2 | DIASTOLIC BLOOD PRESSURE: 82 MMHG | HEART RATE: 84 BPM | SYSTOLIC BLOOD PRESSURE: 145 MMHG | HEIGHT: 61 IN | WEIGHT: 146.63 LBS

## 2023-04-05 DIAGNOSIS — G31.84 MCI (MILD COGNITIVE IMPAIRMENT): ICD-10-CM

## 2023-04-05 DIAGNOSIS — R41.3 MEMORY CHANGES: Primary | ICD-10-CM

## 2023-04-05 PROCEDURE — 99999 PR PBB SHADOW E&M-EST. PATIENT-LVL IV: ICD-10-PCS | Mod: PBBFAC,HCNC,,

## 2023-04-05 PROCEDURE — 1125F AMNT PAIN NOTED PAIN PRSNT: CPT | Mod: HCNC,CPTII,S$GLB,

## 2023-04-05 PROCEDURE — 3077F PR MOST RECENT SYSTOLIC BLOOD PRESSURE >= 140 MM HG: ICD-10-PCS | Mod: HCNC,CPTII,S$GLB,

## 2023-04-05 PROCEDURE — 1125F PR PAIN SEVERITY QUANTIFIED, PAIN PRESENT: ICD-10-PCS | Mod: HCNC,CPTII,S$GLB,

## 2023-04-05 PROCEDURE — 3079F DIAST BP 80-89 MM HG: CPT | Mod: HCNC,CPTII,S$GLB,

## 2023-04-05 PROCEDURE — 1160F RVW MEDS BY RX/DR IN RCRD: CPT | Mod: HCNC,CPTII,S$GLB,

## 2023-04-05 PROCEDURE — 3288F FALL RISK ASSESSMENT DOCD: CPT | Mod: HCNC,CPTII,S$GLB,

## 2023-04-05 PROCEDURE — 1159F PR MEDICATION LIST DOCUMENTED IN MEDICAL RECORD: ICD-10-PCS | Mod: HCNC,CPTII,S$GLB,

## 2023-04-05 PROCEDURE — 99999 PR PBB SHADOW E&M-EST. PATIENT-LVL IV: CPT | Mod: PBBFAC,HCNC,,

## 2023-04-05 PROCEDURE — 1101F PR PT FALLS ASSESS DOC 0-1 FALLS W/OUT INJ PAST YR: ICD-10-PCS | Mod: HCNC,CPTII,S$GLB,

## 2023-04-05 PROCEDURE — 1159F MED LIST DOCD IN RCRD: CPT | Mod: HCNC,CPTII,S$GLB,

## 2023-04-05 PROCEDURE — 1160F PR REVIEW ALL MEDS BY PRESCRIBER/CLIN PHARMACIST DOCUMENTED: ICD-10-PCS | Mod: HCNC,CPTII,S$GLB,

## 2023-04-05 PROCEDURE — 3288F PR FALLS RISK ASSESSMENT DOCUMENTED: ICD-10-PCS | Mod: HCNC,CPTII,S$GLB,

## 2023-04-05 PROCEDURE — 3077F SYST BP >= 140 MM HG: CPT | Mod: HCNC,CPTII,S$GLB,

## 2023-04-05 PROCEDURE — 99205 OFFICE O/P NEW HI 60 MIN: CPT | Mod: HCNC,S$GLB,,

## 2023-04-05 PROCEDURE — 3079F PR MOST RECENT DIASTOLIC BLOOD PRESSURE 80-89 MM HG: ICD-10-PCS | Mod: HCNC,CPTII,S$GLB,

## 2023-04-05 PROCEDURE — 99205 PR OFFICE/OUTPT VISIT, NEW, LEVL V, 60-74 MIN: ICD-10-PCS | Mod: HCNC,S$GLB,,

## 2023-04-05 PROCEDURE — 1101F PT FALLS ASSESS-DOCD LE1/YR: CPT | Mod: HCNC,CPTII,S$GLB,

## 2023-04-05 NOTE — PROGRESS NOTES
[facility] NEUROLOGY  [facility], [place]    Date: 4/5/23  Patient Name: Chikis Epstein   MRN: [id number]   PCP: Cornell Garcia  Referring Provider: Cornell Garcia, *    Chief Complaint: Memory changes  Subjective:   Patient seen in consultation at the request of Cornell Garcia, * for the evaluation of memory. A copy of this note will be sent to the referring physician.        HPI:   Ms. Chikis Epstein is a 77 y.o. female presenting for memory evaluation. She reports that changes started a while ago, more than a year. She notes sometimes its worse than others.    She describes changes such as having to have people repeat themselves during conversations and forgetting words that she wants to use. Notes difficulty paying attention., and then may forget later as a result. She may start a task and get preoccupied/distracted and then forget what she was doing. She marks things on a calendar. She does not cook as much anymore, though no safety concerns regarding cognition or memory. She does not do the bills as often anymore as it has been a lot to manage. Notes that she feels overwhelmed with task and chores at times. Is able to perform her ADL's otherwise independently. Manages medications independently. Drives independently, though notes she has felt less comfortable over the last 2 years and has felt overwhelmed in traffic and difficultly with concentration. Sometimes feels restless at night.    She notes no significant family history of memory disorders. She states her mother had hearing problems and was not quite herself in her later years.    Upon discussing stressors, she notes that at the end of 2019, her younger sister passed from cancer. Significant hardship with the loss. She additionally notes loss of her cousin (close like a brother) and her dog in close timing. She endorses that it seems that her cognition has been worse since then. Additionally, she expresses  anxiousness and frustration at times with attention/memory changes as well as noting the changes in her physicality over time.  She utilizes her william for additional support. Reads her prayer book and participates in [place] group.   Denies any hallucinations, delusions, or paranoia.    She has been wearing hearing aids over 10 years and got new hearing aids less than a week ago and does feel that they need an adjustment. Hearing difficulties have affected her especially in conversations.    Takes daily multivitamins.       PAST MEDICAL HISTORY:  Past Medical History:   Diagnosis Date    Anxiety     Aortic atherosclerosis     CXR 10/13/12    Arthritis     Back pain     Cataract     Hyperlipidemia 2016    Hypertension     MDD (major depressive disorder)     Menopause     Non morbid obesity due to excess calories 2016    Personal history of kidney stones     Type 2 diabetes mellitus without complication        PAST SURGICAL HISTORY:  Past Surgical History:   Procedure Laterality Date    CATARACT EXTRACTION, BILATERAL       SECTION      CHOLECYSTECTOMY      COLONOSCOPY      normal    EYE SURGERY      HYSTERECTOMY      KIDNEY STONE SURGERY      TOTAL ABDOMINAL HYSTERECTOMY W/ BILATERAL SALPINGOOPHORECTOMY         CURRENT MEDS:  Current Outpatient Medications   Medication Sig Dispense Refill    acetaminophen (TYLENOL) 500 MG tablet Take 500 mg by mouth every 6 (six) hours as needed for Pain.      amLODIPine (NORVASC) 10 MG tablet TAKE 1 TABLET EVERY DAY 90 tablet 3    buPROPion (WELLBUTRIN XL) 300 MG 24 hr tablet TAKE 1 TABLET EVERY DAY 90 tablet 2    busPIRone (BUSPAR) 15 MG tablet TAKE 1 TABLET (15 MG TOTAL) BY MOUTH 2 (TWO) TIMES A DAY. 180 tablet 1    calcium-vitamin D3 (OS-ANDRA 500 + D3) 500 mg-5 mcg (200 unit) per tablet Take 1 tablet by mouth once daily.      cetirizine (ZYRTEC) 10 MG tablet Take 10 mg by mouth as needed for Allergies.      hydroCHLOROthiazide (HYDRODIURIL) 12.5 MG Tab TAKE 1  TABLET ONE TIME DAILY 90 tablet 1    latanoprost 0.005 % ophthalmic solution INSTILL 1 DROP INTO BOTH EYES EVERY EVENING 7.5 mL 3    LORazepam (ATIVAN) 0.5 MG tablet Take 1 tablet (0.5 mg total) by mouth every 12 (twelve) hours as needed for Anxiety. 30 tablet 0    MULTIVIT-MINERALS/FOLIC ACID (CENTRUM MULTIGUMMIES ORAL) Take 2 each by mouth once daily.      pantoprazole (PROTONIX) 40 MG tablet TAKE 1 TABLET EVERY DAY 90 tablet 2    pravastatin (PRAVACHOL) 40 MG tablet TAKE 1 TABLET EVERY DAY 90 tablet 1    valsartan (DIOVAN) 320 MG tablet TAKE 1 TABLET EVERY DAY 90 tablet 3    metFORMIN (GLUCOPHAGE-XR) 500 MG ER 24hr tablet TAKE 1 TABLET (500 MG TOTAL) BY MOUTH DAILY WITH BREAKFAST. 90 tablet 0     No current facility-administered medications for this visit.       ALLERGIES:  Review of patient's allergies indicates:   Allergen Reactions    Ace inhibitors      Other reaction(s): Swelling       FAMILY HISTORY:  Family History   Problem Relation Age of Onset    Asthma Mother     Cancer Father         lymphoma    Lymphoma Father     Cancer Sister         breast    Breast cancer Sister     Depression Son     Heart disease Sister     Depression Son     Diabetes Son     Prostate cancer Son     Ovarian cancer Other        SOCIAL HISTORY:  Social History     Tobacco Use    Smoking status: Never    Smokeless tobacco: Never   Substance Use Topics    Alcohol use: Not Currently     Alcohol/week: 0.0 standard drinks    Drug use: No       Review of Systems:  Gen: no fever, no chills, no generalized feeling of weakness   HEENT: no double vision, no blurred vision, no eye pain, no eye exudates. no nasal congestion,   no traumatic injury of head, no neck pain, no neck stiffness. no photophobia or phonophobia at this time. ?    Heart: no chest pain, no SOB    Lungs: no SOB, no cough    MSK: no weakness of legs, intact ROM    ABD: no abd pain, no N/V/D/C, no difficulty with defecation.    Extremities: No leg pain, no edema.      "  Objective:     Vitals:    04/05/23 0830   BP: (!) 145/82   BP Location: Right arm   Patient Position: Sitting   Pulse: 84   Weight: 66.5 kg (146 lb 9.7 oz)   Height: 5' 1" (1.549 m)       General: Female in NAD, alert and awake, Aox3, well groomed. ?    ? ?    HEENT: Head is NC/AT EOMI, pupil size: 4 mm B/L, no nystagmus noted; patient hearing slightly decreased consistent with hearing aids in place. Mucous membrane moist, uvula midline, no pharyngeal erythema, exudates or discharges.      Neck: Supple. no nuchal rigidity.      Cardiovascular: well perfused, no cyanosis        Respiratory: Symmetric chest rise noted       Musculoskeletal: Muscle tone noted to be adequate for patient age, muscle mass is WNL. No spontaneous movements or fasciculations noted during this examination.       Extremities: No pedal edema or calf tenderness. No cogwheel rigidity noted on B/L UE extremities.       Neurological Examination.    Mental status: AA&O x3; Affect/mood is euthymic/congruent, tearful upon discussing stressors noted in HPI; no aphasia noted during examination. Patient answers simple questions appropriately & follows simple commands; no dysarthria or expressive aphasia; no allie-neglect or extinction. Vocabulary/word finding: excellent. Does exhibit some difficulty with concentration during some tasks in MOCA     Cranial Nerves: visual fields intact to confrontation, EOMI, no nystagmus, PERRLA,?facial muscles symmetric and no facial droop noted, patient hearing slightly decreased consistent with hearing aids in place, ?facial sensation to sharp and light touch grossly intact B/L. Patient smiles, frowns, closes eyes ?forcefully uneventfully. Uvula midline and no difficulty with pronunciation. No SCM/trapezius/muscle of mastication weakness noted B/L. Patient has adequate control of tongue and may protrude it and move it adequately.       Muscle Function: Tone WNL and Muscle bulk WNL. Symmetric and full throughout upper " and lower extremities      Sensory: light touch are grossly intact in bilateral upper and lower extremities, face, and trunk.       Gait: adequate casual gait with stride length and arm swing WNL.     MOCA: 28/30  Visuospatial/Executive 5/5, Naming 2/3, Attention 5/6, Language 2/3, Abstraction 2/2, Delayed Recall 5/5, Orientation 6/6, +1      Assessment:   Chikis Epstein is a 77 y.o. female presenting for memory evaluation. MOCA is normal with no suspicion for underlying memory disorder. Suspect combination of factors contributing to discussed changes, such as those expected for patient's peer group, underlying stressors discussed that may impact concentration, and need for further optimization of hearing aid setting. Will order Vitamin B12 to rule out underlying vitamin deficiency that may be contributing. Discussed memory recommendations to protect memory. Recommend following up regarding hearing aids as planned. Discussed small incremental increase in activity/exercise level to help with memory and with stress. Continue following up with PCP regarding anxiety and stressors.    Plan:     Problem List Items Addressed This Visit    None  Visit Diagnoses       Memory changes    -  Primary    Relevant Orders    VITAMIN B12    MCI (mild cognitive impairment)              - Vitamin B12 ordered  - encourage cognitively stimulating activities including regular socialization, reading, puzzles  - encourage regular physical activity for good vascular and brain health  - encouraged tight control blood pressure, glucose, cholesterol    Follow up as needed or if any symptoms worsen overtime.     I spent a total of 60 minutes on the day of the visit. This includes face to face time and non-face to face time preparing to see the patient (eg, review of tests), obtaining and/or reviewing separately obtained history, documenting clinical information in the electronic or other health record, independently interpreting results and  communicating results to the patient/family/caregiver, or care coordinator. Jose Antonio Petty DO was available in clinic throughout this encounter.     Diane Haque PA-C

## 2023-04-06 ENCOUNTER — LAB VISIT (OUTPATIENT)
Dept: LAB | Facility: HOSPITAL | Age: 78
End: 2023-04-06
Attending: INTERNAL MEDICINE
Payer: MEDICARE

## 2023-04-06 DIAGNOSIS — Z00.00 ANNUAL PHYSICAL EXAM: ICD-10-CM

## 2023-04-06 DIAGNOSIS — G31.84 MCI (MILD COGNITIVE IMPAIRMENT): ICD-10-CM

## 2023-04-06 DIAGNOSIS — R73.03 PREDIABETES: ICD-10-CM

## 2023-04-06 DIAGNOSIS — R41.3 MEMORY CHANGES: ICD-10-CM

## 2023-04-06 DIAGNOSIS — I70.0 ATHEROSCLEROSIS OF AORTA: ICD-10-CM

## 2023-04-06 DIAGNOSIS — E78.00 HYPERCHOLESTEREMIA: ICD-10-CM

## 2023-04-06 DIAGNOSIS — E78.00 PURE HYPERCHOLESTEROLEMIA: ICD-10-CM

## 2023-04-06 DIAGNOSIS — F41.9 ANXIETY: ICD-10-CM

## 2023-04-06 DIAGNOSIS — I10 HYPERTENSION, UNSPECIFIED TYPE: ICD-10-CM

## 2023-04-06 LAB
ALBUMIN SERPL BCP-MCNC: 4.2 G/DL (ref 3.5–5.2)
ALP SERPL-CCNC: 69 U/L (ref 55–135)
ALT SERPL W/O P-5'-P-CCNC: 17 U/L (ref 10–44)
ANION GAP SERPL CALC-SCNC: 13 MMOL/L (ref 8–16)
AST SERPL-CCNC: 22 U/L (ref 10–40)
BASOPHILS # BLD AUTO: 0.07 K/UL (ref 0–0.2)
BASOPHILS NFR BLD: 1 % (ref 0–1.9)
BILIRUB SERPL-MCNC: 0.4 MG/DL (ref 0.1–1)
BUN SERPL-MCNC: 14 MG/DL (ref 8–23)
CALCIUM SERPL-MCNC: 10.6 MG/DL (ref 8.7–10.5)
CHLORIDE SERPL-SCNC: 100 MMOL/L (ref 95–110)
CHOLEST SERPL-MCNC: 165 MG/DL (ref 120–199)
CHOLEST/HDLC SERPL: 2.5 {RATIO} (ref 2–5)
CO2 SERPL-SCNC: 26 MMOL/L (ref 23–29)
CREAT SERPL-MCNC: 1 MG/DL (ref 0.5–1.4)
DIFFERENTIAL METHOD: NORMAL
EOSINOPHIL # BLD AUTO: 0.2 K/UL (ref 0–0.5)
EOSINOPHIL NFR BLD: 2.2 % (ref 0–8)
ERYTHROCYTE [DISTWIDTH] IN BLOOD BY AUTOMATED COUNT: 12.8 % (ref 11.5–14.5)
EST. GFR  (NO RACE VARIABLE): 58 ML/MIN/1.73 M^2
ESTIMATED AVG GLUCOSE: 123 MG/DL (ref 68–131)
GLUCOSE SERPL-MCNC: 118 MG/DL (ref 70–110)
HBA1C MFR BLD: 5.9 % (ref 4–5.6)
HCT VFR BLD AUTO: 42.3 % (ref 37–48.5)
HDLC SERPL-MCNC: 67 MG/DL (ref 40–75)
HDLC SERPL: 40.6 % (ref 20–50)
HGB BLD-MCNC: 14 G/DL (ref 12–16)
IMM GRANULOCYTES # BLD AUTO: 0.02 K/UL (ref 0–0.04)
IMM GRANULOCYTES NFR BLD AUTO: 0.3 % (ref 0–0.5)
LDLC SERPL CALC-MCNC: 81 MG/DL (ref 63–159)
LYMPHOCYTES # BLD AUTO: 1.7 K/UL (ref 1–4.8)
LYMPHOCYTES NFR BLD: 24.5 % (ref 18–48)
MCH RBC QN AUTO: 29.8 PG (ref 27–31)
MCHC RBC AUTO-ENTMCNC: 33.1 G/DL (ref 32–36)
MCV RBC AUTO: 90 FL (ref 82–98)
MONOCYTES # BLD AUTO: 0.4 K/UL (ref 0.3–1)
MONOCYTES NFR BLD: 6.3 % (ref 4–15)
NEUTROPHILS # BLD AUTO: 4.5 K/UL (ref 1.8–7.7)
NEUTROPHILS NFR BLD: 65.7 % (ref 38–73)
NONHDLC SERPL-MCNC: 98 MG/DL
NRBC BLD-RTO: 0 /100 WBC
PLATELET # BLD AUTO: 364 K/UL (ref 150–450)
PMV BLD AUTO: 10.5 FL (ref 9.2–12.9)
POTASSIUM SERPL-SCNC: 4.1 MMOL/L (ref 3.5–5.1)
PROT SERPL-MCNC: 7.3 G/DL (ref 6–8.4)
RBC # BLD AUTO: 4.7 M/UL (ref 4–5.4)
SODIUM SERPL-SCNC: 139 MMOL/L (ref 136–145)
TRIGL SERPL-MCNC: 85 MG/DL (ref 30–150)
TSH SERPL DL<=0.005 MIU/L-ACNC: 2.38 UIU/ML (ref 0.4–4)
VIT B12 SERPL-MCNC: 771 PG/ML (ref 210–950)
WBC # BLD AUTO: 6.82 K/UL (ref 3.9–12.7)

## 2023-04-06 PROCEDURE — 82607 VITAMIN B-12: CPT | Mod: HCNC

## 2023-04-06 PROCEDURE — 80053 COMPREHEN METABOLIC PANEL: CPT | Mod: HCNC | Performed by: INTERNAL MEDICINE

## 2023-04-06 PROCEDURE — 83036 HEMOGLOBIN GLYCOSYLATED A1C: CPT | Mod: HCNC | Performed by: INTERNAL MEDICINE

## 2023-04-06 PROCEDURE — 80061 LIPID PANEL: CPT | Mod: HCNC | Performed by: INTERNAL MEDICINE

## 2023-04-06 PROCEDURE — 84443 ASSAY THYROID STIM HORMONE: CPT | Mod: HCNC | Performed by: INTERNAL MEDICINE

## 2023-04-06 PROCEDURE — 85025 COMPLETE CBC W/AUTO DIFF WBC: CPT | Mod: HCNC | Performed by: INTERNAL MEDICINE

## 2023-04-06 PROCEDURE — 36415 COLL VENOUS BLD VENIPUNCTURE: CPT | Mod: HCNC,PO | Performed by: INTERNAL MEDICINE

## 2023-04-10 ENCOUNTER — CLINICAL SUPPORT (OUTPATIENT)
Dept: OTOLARYNGOLOGY | Facility: CLINIC | Age: 78
End: 2023-04-10
Payer: MEDICARE

## 2023-04-10 DIAGNOSIS — H90.3 SENSORINEURAL HEARING LOSS, BILATERAL: Primary | ICD-10-CM

## 2023-04-10 PROCEDURE — 99499 NO LOS: ICD-10-PCS | Mod: HCNC,S$GLB,, | Performed by: AUDIOLOGIST

## 2023-04-10 PROCEDURE — 99499 UNLISTED E&M SERVICE: CPT | Mod: HCNC,S$GLB,, | Performed by: AUDIOLOGIST

## 2023-04-10 NOTE — PROGRESS NOTES
Chikis Epstein was seen today in the clinic for a hearing aid follow up.  She reported doing well with the hearing aids but having trouble with the receivers staying in her ears.  I placed retention wires on the receivers but the wire would not stay in her right ear.  Impressions were made and sent to [employer] for cShells AU.  I will contact her when they arrive.    Right ear:  Serial number: [id number]  : [employer]  Model: Audeo L70-R  Type: YVETTE  Color: P7 graphite gray  Battery size: Rechargeable  Tube length: #1  Speaker power: med   Dome size and style: med vented dome  Warranty expiration: 6/10/26  L and D expiration: 6/10/26     Left ear:  Serial number: [id number]  : [employer]   Model: Audeo L70-R  Type: YVETTE  Color:  P7 graphite gray  Battery size: rechargeable  Tube length: #1  Speaker power: med  Dome size and style: med vented dome  Warranty expiration: 6/10/26  L and D expiration: 6/10/26

## 2023-04-12 ENCOUNTER — TELEPHONE (OUTPATIENT)
Dept: INTERNAL MEDICINE | Facility: CLINIC | Age: 78
End: 2023-04-12
Payer: MEDICARE

## 2023-04-12 DIAGNOSIS — I10 HYPERTENSION, UNSPECIFIED TYPE: Primary | ICD-10-CM

## 2023-04-12 DIAGNOSIS — R73.03 PREDIABETES: ICD-10-CM

## 2023-04-18 ENCOUNTER — TELEPHONE (OUTPATIENT)
Dept: OTOLARYNGOLOGY | Facility: CLINIC | Age: 78
End: 2023-04-18
Payer: MEDICARE

## 2023-04-18 ENCOUNTER — CLINICAL SUPPORT (OUTPATIENT)
Dept: OTOLARYNGOLOGY | Facility: CLINIC | Age: 78
End: 2023-04-18
Payer: MEDICARE

## 2023-04-18 ENCOUNTER — PATIENT MESSAGE (OUTPATIENT)
Dept: OTOLARYNGOLOGY | Facility: CLINIC | Age: 78
End: 2023-04-18

## 2023-04-18 DIAGNOSIS — H90.3 SENSORINEURAL HEARING LOSS, BILATERAL: Primary | ICD-10-CM

## 2023-04-18 PROCEDURE — 99499 NO LOS: ICD-10-PCS | Mod: HCNC,S$GLB,, | Performed by: AUDIOLOGIST

## 2023-04-18 PROCEDURE — 99499 UNLISTED E&M SERVICE: CPT | Mod: HCNC,S$GLB,, | Performed by: AUDIOLOGIST

## 2023-04-18 NOTE — PROGRESS NOTES
Chikis Epstein was seen today in the clinic for a hearing aid follow up.  I changed her receivers to the cShell receivers and reprogrammed the hearing aids.  I had her practice insertion and removal of the hearing aids.  She will contact me next week to let me know how she is doing with them.    Right ear:  Serial number: [id number]  : [employer]  Model: Audeo L70-R  Type: YVETTE  Color: P7 graphite gray  Battery size: Rechargeable  Tube length: #1  Speaker power: med   Dome size and style: cShell, [id number], [id number], warranty 7/16/23  Warranty expiration: 6/10/26  L and D expiration: 6/10/26     Left ear:  Serial number: [id number]  : [employer]   Model: Audeo L70-R  Type: YVETTE  Color:  P7 graphite gray  Battery size: rechargeable  Tube length: #1  Speaker power: med  Dome size and style: cShell, [id number], [id number], warranty 7/16/23  Warranty expiration: 6/10/26  L and D expiration: 6/10/26

## 2023-04-24 ENCOUNTER — TELEPHONE (OUTPATIENT)
Dept: INTERNAL MEDICINE | Facility: CLINIC | Age: 78
End: 2023-04-24
Payer: MEDICARE

## 2023-04-24 DIAGNOSIS — E83.52 HYPERCALCEMIA: Primary | ICD-10-CM

## 2023-04-24 DIAGNOSIS — R53.83 FATIGUE, UNSPECIFIED TYPE: ICD-10-CM

## 2023-05-07 ENCOUNTER — PATIENT MESSAGE (OUTPATIENT)
Dept: OTOLARYNGOLOGY | Facility: CLINIC | Age: 78
End: 2023-05-07
Payer: MEDICARE

## 2023-05-09 ENCOUNTER — CLINICAL SUPPORT (OUTPATIENT)
Dept: OTOLARYNGOLOGY | Facility: CLINIC | Age: 78
End: 2023-05-09
Payer: MEDICARE

## 2023-05-09 DIAGNOSIS — H90.3 SENSORINEURAL HEARING LOSS, BILATERAL: Primary | ICD-10-CM

## 2023-05-09 PROCEDURE — 99499 NO LOS: ICD-10-PCS | Mod: HCNC,S$GLB,, | Performed by: AUDIOLOGIST

## 2023-05-09 PROCEDURE — 99499 UNLISTED E&M SERVICE: CPT | Mod: HCNC,S$GLB,, | Performed by: AUDIOLOGIST

## 2023-05-09 NOTE — PROGRESS NOTES
Chikis Epstein was seen today in the clinic for a hearing aid follow up.  She reported that the receivers are making her ears sore and that she is not hearing well with the hearing aids especially in noise.  She also expressed interest in having the [employer] vonnie on her phone.      She was inserting the receivers of both hearings upside down which was causing her ear pain and her not hearing as well.  We practiced insertion of the receivers several times and she reported better sound quality and them being more comfortable.  I installed and paired the vonnie on her phone and demonstrated how to use the vonnie.  I also made an adjustment for speech in noise for clarity.  She will contact me next week to let me know how she is doing.    Right ear:  Serial number: [id number]  : [employer]  Model: Audeo L70-R  Type: YVETTE  Color: P7 graphite gray  Battery size: Rechargeable  Tube length: #1  Speaker power: med   Dome size and style: Josef [id number], [id number], warranty 7/16/23  Warranty expiration: 6/10/26  L and D expiration: 6/10/26     Left ear:  Serial number: [id number]  : [employer]   Model: Audeo L70-R  Type: YVETTE  Color:  P7 graphite gray  Battery size: rechargeable  Tube length: #1  Speaker power: med  Dome size and style: [employer], [id number], [id number], warranty 7/16/23  Warranty expiration: 6/10/26  L and D expiration: 6/10/26

## 2023-05-15 PROBLEM — Z00.00 ENCOUNTER FOR PREVENTIVE HEALTH EXAMINATION: Chronic | Status: RESOLVED | Noted: 2023-02-09 | Resolved: 2023-05-15

## 2023-05-18 ENCOUNTER — PATIENT MESSAGE (OUTPATIENT)
Dept: OTOLARYNGOLOGY | Facility: CLINIC | Age: 78
End: 2023-05-18
Payer: MEDICARE

## 2023-05-21 ENCOUNTER — PATIENT MESSAGE (OUTPATIENT)
Dept: ADMINISTRATIVE | Facility: OTHER | Age: 78
End: 2023-05-21
Payer: MEDICARE

## 2023-06-16 ENCOUNTER — PATIENT MESSAGE (OUTPATIENT)
Dept: ADMINISTRATIVE | Facility: OTHER | Age: 78
End: 2023-06-16
Payer: MEDICARE

## 2023-07-02 NOTE — TELEPHONE ENCOUNTER
No care due was identified.  [facility] Embedded Care Due Messages. Reference number: [id number].   7/02/2023 1:57:48 AM CDT

## 2023-07-03 RX ORDER — METFORMIN HYDROCHLORIDE 500 MG/1
500 TABLET, EXTENDED RELEASE ORAL
Qty: 90 TABLET | Refills: 0 | Status: SHIPPED | OUTPATIENT
Start: 2023-07-03 | End: 2023-12-01

## 2023-07-03 NOTE — TELEPHONE ENCOUNTER
Refill Routing Note   Medication(s) are not appropriate for processing by [facility] for the following reason(s):      Due for refill >6 months ago    [facility] action(s):  Defer None identified            Appointments  past 12m or future 3m with PCP    Date Provider   Last Visit   4/4/2023 Cornell Garcia,    Next Visit   10/4/2023 Cornell Garcia DO   ED visits in past 90 days: 0        Note composed:2:54 AM 07/03/2023

## 2023-07-10 ENCOUNTER — OFFICE VISIT (OUTPATIENT)
Dept: URGENT CARE | Facility: CLINIC | Age: 78
End: 2023-07-10
Payer: MEDICARE

## 2023-07-10 VITALS
TEMPERATURE: 99 F | RESPIRATION RATE: 20 BRPM | DIASTOLIC BLOOD PRESSURE: 83 MMHG | SYSTOLIC BLOOD PRESSURE: 146 MMHG | BODY MASS INDEX: 27.56 KG/M2 | OXYGEN SATURATION: 97 % | HEART RATE: 75 BPM | HEIGHT: 61 IN | WEIGHT: 146 LBS

## 2023-07-10 DIAGNOSIS — H66.92 LEFT OTITIS MEDIA, UNSPECIFIED OTITIS MEDIA TYPE: Primary | ICD-10-CM

## 2023-07-10 PROCEDURE — 99213 PR OFFICE/OUTPT VISIT, EST, LEVL III, 20-29 MIN: ICD-10-PCS | Mod: S$GLB,,,

## 2023-07-10 PROCEDURE — 99213 OFFICE O/P EST LOW 20 MIN: CPT | Mod: S$GLB,,,

## 2023-07-10 RX ORDER — AMOXICILLIN AND CLAVULANATE POTASSIUM 875; 125 MG/1; MG/1
1 TABLET, FILM COATED ORAL EVERY 12 HOURS
Qty: 14 TABLET | Refills: 0 | Status: SHIPPED | OUTPATIENT
Start: 2023-07-10 | End: 2023-07-17

## 2023-07-10 NOTE — PROGRESS NOTES
"Subjective:      Patient ID: Chikis Epstein is a 77 y.o. female.    Vitals:  height is 5' 1" (1.549 m) and weight is 66.2 kg (146 lb). Her oral temperature is 98.5 °F (36.9 °C). Her blood pressure is 146/83 (abnormal) and her pulse is 75. Her respiration is 20 and oxygen saturation is 97%.     Chief Complaint: Otalgia    This is a 77 y.o. female who presents today with a chief complaint of ear pain.  Pt reports that this started about 3 days ago.  Patient stated that it feels like she has fluid in her ears. Pt reports that she wears hearing aids and has still been having difficulty hearing.      Otalgia   There is pain in both ears. This is a new problem. The current episode started in the past 7 days. The problem occurs constantly. The problem has been gradually worsening. There has been no fever. The pain is at a severity of 10/10. The pain is severe. Associated symptoms include hearing loss. Associated symptoms comments: Ear fullness. She has tried antibiotics (Amoxillian) for the symptoms. The treatment provided no relief.     HENT:  Positive for ear pain and hearing loss.     Objective:     Physical Exam   Constitutional: She is oriented to person, place, and time. She appears well-developed. She is cooperative.  Non-toxic appearance. She does not appear ill. No distress.   HENT:   Head: Normocephalic and atraumatic.   Ears:   Right Ear: Hearing, tympanic membrane, external ear and ear canal normal.   Left Ear: Hearing, external ear and ear canal normal. Tympanic membrane is injected, erythematous and bulging.   Nose: No mucosal edema, rhinorrhea or congestion. Right sinus exhibits no maxillary sinus tenderness and no frontal sinus tenderness. Left sinus exhibits no maxillary sinus tenderness and no frontal sinus tenderness.   Mouth/Throat: Uvula is midline and mucous membranes are normal. No trismus in the jaw. No uvula swelling. No oropharyngeal exudate, posterior oropharyngeal edema or posterior oropharyngeal " erythema.   Eyes: Conjunctivae and lids are normal.   Neck: Trachea normal and phonation normal. Neck supple. No edema present. No erythema present.   Cardiovascular: Normal rate, regular rhythm, normal heart sounds and normal pulses.   Pulmonary/Chest: Effort normal and breath sounds normal. No respiratory distress. She has no decreased breath sounds. She has no wheezes. She has no rhonchi.   Abdominal: Normal appearance.   Musculoskeletal: Normal range of motion.         General: Normal range of motion.   Lymphadenopathy:     She has no cervical adenopathy.   Neurological: She is alert and oriented to person, place, and time. She exhibits normal muscle tone.   Skin: Skin is warm, dry, intact and not diaphoretic.   Psychiatric: Her speech is normal and behavior is normal.   Nursing note and vitals reviewed.    Assessment:     1. Left otitis media, unspecified otitis media type        Plan:       Left otitis media, unspecified otitis media type  -     amoxicillin-clavulanate 875-125mg (AUGMENTIN) 875-125 mg per tablet; Take 1 tablet by mouth every 12 (twelve) hours. for 7 days  Dispense: 14 tablet; Refill: 0    Pt in no acute distress. Vitals reassuring. Discussed treatment of otitis media with Augmentin. Discussed OTC medications for symptom relief. Discussed the importance of further evaluation if symptoms worsen. Patient stated verbal understanding.    Patient Instructions   Ear Infection:  Take full course of antibiotics as prescribed.  Humidifier use at home.  Warm compresses to affected ear  Elevate head on a pillow at night   Flonase Nasal Spray as directed for nasal congestion  Over the Counter Claritin or Zyrtec (plain) as directed for allergy symptoms  Tylenol or Motrin every 4 - 6 hours as needed for fever or ear pain.  Follow up with your PCP in 1 week of initiating antibiotics or sooner for no improvement in symptoms  Follow up in the ER for any worsening of symptoms such as new fever, increasing ear  pain, neck stiffness, shortness of breath, etc.  If you smoke, stop smoking.

## 2023-08-21 DIAGNOSIS — E78.00 HYPERCHOLESTEREMIA: Primary | ICD-10-CM

## 2023-08-24 ENCOUNTER — TELEPHONE (OUTPATIENT)
Dept: INTERNAL MEDICINE | Facility: CLINIC | Age: 78
End: 2023-08-24
Payer: MEDICARE

## 2023-08-24 VITALS — DIASTOLIC BLOOD PRESSURE: 83 MMHG | SYSTOLIC BLOOD PRESSURE: 138 MMHG

## 2023-09-16 DIAGNOSIS — I70.0 ATHEROSCLEROSIS OF AORTA: ICD-10-CM

## 2023-09-16 DIAGNOSIS — I10 ESSENTIAL HYPERTENSION: ICD-10-CM

## 2023-09-16 RX ORDER — PRAVASTATIN SODIUM 40 MG/1
TABLET ORAL
Qty: 90 TABLET | Refills: 1 | Status: SHIPPED | OUTPATIENT
Start: 2023-09-16 | End: 2024-02-22

## 2023-09-16 RX ORDER — AMLODIPINE BESYLATE 10 MG/1
TABLET ORAL
Qty: 90 TABLET | Refills: 1 | Status: SHIPPED | OUTPATIENT
Start: 2023-09-16 | End: 2024-02-22

## 2023-09-16 NOTE — TELEPHONE ENCOUNTER
Care Due:                  Date            Visit Type   Department     Provider  --------------------------------------------------------------------------------                                EP -                              PRIMARY      MET INTERNAL  Last Visit: 04-      CARE ([facility])   MEDICINE       Cornell Garcia                              EP -                              PRIMARY      [facility] INTERNAL  Next Visit: 10-      CARE ([facility])   MEDICINE       Cornell Garcia                                                            Last  Test          Frequency    Reason                     Performed    Due Date  --------------------------------------------------------------------------------    HBA1C.......  6 months...  metFORMIN................  04-   10-    Health [facility] Embedded Care Due Messages. Reference number: [id number].   9/16/2023 2:46:58 AM CDT

## 2023-09-17 NOTE — TELEPHONE ENCOUNTER
Refill Decision Note   Chikis Epstein  is requesting a refill authorization.  Brief Assessment and Rationale for Refill:  Approve     Medication Therapy Plan:  FLOS      Comments:     Note composed:11:11 PM 09/16/2023

## 2023-09-19 ENCOUNTER — OFFICE VISIT (OUTPATIENT)
Dept: OPTOMETRY | Facility: CLINIC | Age: 78
End: 2023-09-19
Payer: MEDICARE

## 2023-09-19 DIAGNOSIS — H40.1231 LOW-TENSION GLAUCOMA OF BOTH EYES, MILD STAGE: Primary | ICD-10-CM

## 2023-09-19 DIAGNOSIS — H04.123 BILATERAL DRY EYES: ICD-10-CM

## 2023-09-19 DIAGNOSIS — Z96.1 PSEUDOPHAKIA OF BOTH EYES: ICD-10-CM

## 2023-09-19 DIAGNOSIS — H52.7 REFRACTIVE ERROR: ICD-10-CM

## 2023-09-19 PROCEDURE — 92133 POSTERIOR SEGMENT OCT OPTIC NERVE(OCULAR COHERENCE TOMOGRAPHY) - OU - BOTH EYES: ICD-10-PCS | Mod: HCNC,S$GLB,, | Performed by: OPTOMETRIST

## 2023-09-19 PROCEDURE — 99999 PR PBB SHADOW E&M-EST. PATIENT-LVL III: ICD-10-PCS | Mod: PBBFAC,HCNC,, | Performed by: OPTOMETRIST

## 2023-09-19 PROCEDURE — 1159F MED LIST DOCD IN RCRD: CPT | Mod: HCNC,CPTII,S$GLB, | Performed by: OPTOMETRIST

## 2023-09-19 PROCEDURE — 3288F PR FALLS RISK ASSESSMENT DOCUMENTED: ICD-10-PCS | Mod: HCNC,CPTII,S$GLB, | Performed by: OPTOMETRIST

## 2023-09-19 PROCEDURE — 1101F PR PT FALLS ASSESS DOC 0-1 FALLS W/OUT INJ PAST YR: ICD-10-PCS | Mod: HCNC,CPTII,S$GLB, | Performed by: OPTOMETRIST

## 2023-09-19 PROCEDURE — 1126F AMNT PAIN NOTED NONE PRSNT: CPT | Mod: HCNC,CPTII,S$GLB, | Performed by: OPTOMETRIST

## 2023-09-19 PROCEDURE — 3288F FALL RISK ASSESSMENT DOCD: CPT | Mod: HCNC,CPTII,S$GLB, | Performed by: OPTOMETRIST

## 2023-09-19 PROCEDURE — 1126F PR PAIN SEVERITY QUANTIFIED, NO PAIN PRESENT: ICD-10-PCS | Mod: HCNC,CPTII,S$GLB, | Performed by: OPTOMETRIST

## 2023-09-19 PROCEDURE — 1101F PT FALLS ASSESS-DOCD LE1/YR: CPT | Mod: HCNC,CPTII,S$GLB, | Performed by: OPTOMETRIST

## 2023-09-19 PROCEDURE — 1160F RVW MEDS BY RX/DR IN RCRD: CPT | Mod: HCNC,CPTII,S$GLB, | Performed by: OPTOMETRIST

## 2023-09-19 PROCEDURE — 92014 PR EYE EXAM, EST PATIENT,COMPREHESV: ICD-10-PCS | Mod: HCNC,S$GLB,, | Performed by: OPTOMETRIST

## 2023-09-19 PROCEDURE — 1159F PR MEDICATION LIST DOCUMENTED IN MEDICAL RECORD: ICD-10-PCS | Mod: HCNC,CPTII,S$GLB, | Performed by: OPTOMETRIST

## 2023-09-19 PROCEDURE — 99999 PR PBB SHADOW E&M-EST. PATIENT-LVL III: CPT | Mod: PBBFAC,HCNC,, | Performed by: OPTOMETRIST

## 2023-09-19 PROCEDURE — 92014 COMPRE OPH EXAM EST PT 1/>: CPT | Mod: HCNC,S$GLB,, | Performed by: OPTOMETRIST

## 2023-09-19 PROCEDURE — 92133 CPTRZD OPH DX IMG PST SGM ON: CPT | Mod: HCNC,S$GLB,, | Performed by: OPTOMETRIST

## 2023-09-19 PROCEDURE — 1160F PR REVIEW ALL MEDS BY PRESCRIBER/CLIN PHARMACIST DOCUMENTED: ICD-10-PCS | Mod: HCNC,CPTII,S$GLB, | Performed by: OPTOMETRIST

## 2023-09-19 NOTE — PROGRESS NOTES
HPI    DSL- 8/9/2022 Dr. Ye    78 y/o female present to clinic for IOP check. H/o of Low-tension   glaucoma. Pt states diplopia vision, resolves when blinking. She denies   dry eyes. Occasional flashes of lights. She is using Latanoprost drops as   directed, no refills needed.     Eyemeds  Latanoprost OU QHS   Last edited by Sean Love on 9/19/2023  2:31 PM.            Assessment /Plan     For exam results, see Encounter Report.    Low-tension glaucoma of both eyes, mild stage  -     OCT - Optic Nerve    Bilateral dry eyes    Pseudophakia of both eyes    Refractive error       IOP low and stable, nerve eval stable,  cont Latanaprost qhs OU,  Prev hvf normal oct today with some worsening, with rnfl loss sup and nasal, pachy normal, possible father was glaucoma suspect. Target 12-14.  RTC HVF(24-2)colt std and iop ck after, consider add drop  2. Recommend artificial tears. 1 drop 2x per day. Chronicity of disease and treatment discussed.   3. Monitor condition. Patient to report any changes. RTC 1 year recheck.

## 2023-10-02 ENCOUNTER — LAB VISIT (OUTPATIENT)
Dept: LAB | Facility: HOSPITAL | Age: 78
End: 2023-10-02
Attending: INTERNAL MEDICINE
Payer: MEDICARE

## 2023-10-02 DIAGNOSIS — R73.03 PREDIABETES: ICD-10-CM

## 2023-10-02 DIAGNOSIS — R53.83 FATIGUE, UNSPECIFIED TYPE: ICD-10-CM

## 2023-10-02 DIAGNOSIS — E83.52 HYPERCALCEMIA: ICD-10-CM

## 2023-10-02 DIAGNOSIS — I10 HYPERTENSION, UNSPECIFIED TYPE: ICD-10-CM

## 2023-10-02 DIAGNOSIS — E78.00 HYPERCHOLESTEREMIA: ICD-10-CM

## 2023-10-02 LAB
25(OH)D3+25(OH)D2 SERPL-MCNC: 39 NG/ML (ref 30–96)
ALBUMIN SERPL BCP-MCNC: 4 G/DL (ref 3.5–5.2)
ALP SERPL-CCNC: 73 U/L (ref 55–135)
ALT SERPL W/O P-5'-P-CCNC: 19 U/L (ref 10–44)
ANION GAP SERPL CALC-SCNC: 7 MMOL/L (ref 8–16)
AST SERPL-CCNC: 25 U/L (ref 10–40)
BASOPHILS # BLD AUTO: 0.07 K/UL (ref 0–0.2)
BASOPHILS NFR BLD: 1 % (ref 0–1.9)
BILIRUB SERPL-MCNC: 0.3 MG/DL (ref 0.1–1)
BUN SERPL-MCNC: 13 MG/DL (ref 8–23)
CA-I BLDV-SCNC: 1.29 MMOL/L (ref 1.06–1.42)
CALCIUM SERPL-MCNC: 10.1 MG/DL (ref 8.7–10.5)
CHLORIDE SERPL-SCNC: 103 MMOL/L (ref 95–110)
CHOLEST SERPL-MCNC: 188 MG/DL (ref 120–199)
CHOLEST/HDLC SERPL: 2.7 {RATIO} (ref 2–5)
CO2 SERPL-SCNC: 28 MMOL/L (ref 23–29)
CREAT SERPL-MCNC: 0.8 MG/DL (ref 0.5–1.4)
DIFFERENTIAL METHOD: NORMAL
EOSINOPHIL # BLD AUTO: 0.2 K/UL (ref 0–0.5)
EOSINOPHIL NFR BLD: 3.1 % (ref 0–8)
ERYTHROCYTE [DISTWIDTH] IN BLOOD BY AUTOMATED COUNT: 13.1 % (ref 11.5–14.5)
EST. GFR  (NO RACE VARIABLE): >60 ML/MIN/1.73 M^2
ESTIMATED AVG GLUCOSE: 131 MG/DL (ref 68–131)
GLUCOSE SERPL-MCNC: 119 MG/DL (ref 70–110)
HBA1C MFR BLD: 6.2 % (ref 4–5.6)
HCT VFR BLD AUTO: 42.5 % (ref 37–48.5)
HDLC SERPL-MCNC: 69 MG/DL (ref 40–75)
HDLC SERPL: 36.7 % (ref 20–50)
HGB BLD-MCNC: 13.7 G/DL (ref 12–16)
IMM GRANULOCYTES # BLD AUTO: 0.02 K/UL (ref 0–0.04)
IMM GRANULOCYTES NFR BLD AUTO: 0.3 % (ref 0–0.5)
LDLC SERPL CALC-MCNC: 100.4 MG/DL (ref 63–159)
LYMPHOCYTES # BLD AUTO: 1.9 K/UL (ref 1–4.8)
LYMPHOCYTES NFR BLD: 28.6 % (ref 18–48)
MCH RBC QN AUTO: 29.5 PG (ref 27–31)
MCHC RBC AUTO-ENTMCNC: 32.2 G/DL (ref 32–36)
MCV RBC AUTO: 91 FL (ref 82–98)
MONOCYTES # BLD AUTO: 0.5 K/UL (ref 0.3–1)
MONOCYTES NFR BLD: 7.5 % (ref 4–15)
NEUTROPHILS # BLD AUTO: 4 K/UL (ref 1.8–7.7)
NEUTROPHILS NFR BLD: 59.5 % (ref 38–73)
NONHDLC SERPL-MCNC: 119 MG/DL
NRBC BLD-RTO: 0 /100 WBC
PLATELET # BLD AUTO: 311 K/UL (ref 150–450)
PMV BLD AUTO: 10.9 FL (ref 9.2–12.9)
POTASSIUM SERPL-SCNC: 4.3 MMOL/L (ref 3.5–5.1)
PROT SERPL-MCNC: 7 G/DL (ref 6–8.4)
PTH-INTACT SERPL-MCNC: 117.1 PG/ML (ref 9–77)
RBC # BLD AUTO: 4.65 M/UL (ref 4–5.4)
SODIUM SERPL-SCNC: 138 MMOL/L (ref 136–145)
TRIGL SERPL-MCNC: 93 MG/DL (ref 30–150)
WBC # BLD AUTO: 6.78 K/UL (ref 3.9–12.7)

## 2023-10-02 PROCEDURE — 85025 COMPLETE CBC W/AUTO DIFF WBC: CPT | Mod: HCNC | Performed by: INTERNAL MEDICINE

## 2023-10-02 PROCEDURE — 80061 LIPID PANEL: CPT | Mod: HCNC | Performed by: INTERNAL MEDICINE

## 2023-10-02 PROCEDURE — 83036 HEMOGLOBIN GLYCOSYLATED A1C: CPT | Mod: HCNC | Performed by: INTERNAL MEDICINE

## 2023-10-02 PROCEDURE — 83970 ASSAY OF PARATHORMONE: CPT | Mod: HCNC | Performed by: INTERNAL MEDICINE

## 2023-10-02 PROCEDURE — 36415 COLL VENOUS BLD VENIPUNCTURE: CPT | Mod: HCNC,PO | Performed by: INTERNAL MEDICINE

## 2023-10-02 PROCEDURE — 82330 ASSAY OF CALCIUM: CPT | Mod: HCNC | Performed by: INTERNAL MEDICINE

## 2023-10-02 PROCEDURE — 80053 COMPREHEN METABOLIC PANEL: CPT | Mod: HCNC | Performed by: INTERNAL MEDICINE

## 2023-10-02 PROCEDURE — 82306 VITAMIN D 25 HYDROXY: CPT | Mod: HCNC | Performed by: INTERNAL MEDICINE

## 2023-10-02 NOTE — TELEPHONE ENCOUNTER
No care due was identified.  Health [facility] Embedded Care Due Messages. Reference number: [id number].   10/02/2023 2:55:31 PM CDT

## 2023-10-03 RX ORDER — BUPROPION HYDROCHLORIDE 300 MG/1
300 TABLET ORAL DAILY
Qty: 90 TABLET | Refills: 2 | Status: SHIPPED | OUTPATIENT
Start: 2023-10-03

## 2023-10-04 ENCOUNTER — OFFICE VISIT (OUTPATIENT)
Dept: INTERNAL MEDICINE | Facility: CLINIC | Age: 78
End: 2023-10-04
Payer: MEDICARE

## 2023-10-04 VITALS
HEIGHT: 60 IN | OXYGEN SATURATION: 98 % | WEIGHT: 153.88 LBS | DIASTOLIC BLOOD PRESSURE: 66 MMHG | RESPIRATION RATE: 20 BRPM | SYSTOLIC BLOOD PRESSURE: 138 MMHG | TEMPERATURE: 97 F | BODY MASS INDEX: 30.21 KG/M2 | HEART RATE: 80 BPM

## 2023-10-04 DIAGNOSIS — I70.8 AORTO-ILIAC ATHEROSCLEROSIS: ICD-10-CM

## 2023-10-04 DIAGNOSIS — E21.3 HYPERPARATHYROIDISM: ICD-10-CM

## 2023-10-04 DIAGNOSIS — E66.09 CLASS 1 OBESITY DUE TO EXCESS CALORIES WITH SERIOUS COMORBIDITY AND BODY MASS INDEX (BMI) OF 30.0 TO 30.9 IN ADULT: ICD-10-CM

## 2023-10-04 DIAGNOSIS — K21.9 GASTROESOPHAGEAL REFLUX DISEASE, UNSPECIFIED WHETHER ESOPHAGITIS PRESENT: ICD-10-CM

## 2023-10-04 DIAGNOSIS — E78.00 PURE HYPERCHOLESTEROLEMIA: ICD-10-CM

## 2023-10-04 DIAGNOSIS — I70.0 ATHEROSCLEROSIS OF AORTA: Primary | ICD-10-CM

## 2023-10-04 DIAGNOSIS — R73.03 PREDIABETES: ICD-10-CM

## 2023-10-04 DIAGNOSIS — I10 HYPERTENSION, UNSPECIFIED TYPE: ICD-10-CM

## 2023-10-04 DIAGNOSIS — F41.9 ANXIETY: ICD-10-CM

## 2023-10-04 DIAGNOSIS — I70.0 AORTO-ILIAC ATHEROSCLEROSIS: ICD-10-CM

## 2023-10-04 PROCEDURE — 99214 PR OFFICE/OUTPT VISIT, EST, LEVL IV, 30-39 MIN: ICD-10-PCS | Mod: HCNC,S$GLB,, | Performed by: INTERNAL MEDICINE

## 2023-10-04 PROCEDURE — 3075F SYST BP GE 130 - 139MM HG: CPT | Mod: HCNC,CPTII,S$GLB, | Performed by: INTERNAL MEDICINE

## 2023-10-04 PROCEDURE — G0008 ADMIN INFLUENZA VIRUS VAC: HCPCS | Mod: HCNC,S$GLB,, | Performed by: INTERNAL MEDICINE

## 2023-10-04 PROCEDURE — 99999 PR PBB SHADOW E&M-EST. PATIENT-LVL IV: ICD-10-PCS | Mod: PBBFAC,HCNC,, | Performed by: INTERNAL MEDICINE

## 2023-10-04 PROCEDURE — 99214 OFFICE O/P EST MOD 30 MIN: CPT | Mod: HCNC,S$GLB,, | Performed by: INTERNAL MEDICINE

## 2023-10-04 PROCEDURE — 1101F PT FALLS ASSESS-DOCD LE1/YR: CPT | Mod: HCNC,CPTII,S$GLB, | Performed by: INTERNAL MEDICINE

## 2023-10-04 PROCEDURE — 3075F PR MOST RECENT SYSTOLIC BLOOD PRESS GE 130-139MM HG: ICD-10-PCS | Mod: HCNC,CPTII,S$GLB, | Performed by: INTERNAL MEDICINE

## 2023-10-04 PROCEDURE — 1159F PR MEDICATION LIST DOCUMENTED IN MEDICAL RECORD: ICD-10-PCS | Mod: HCNC,CPTII,S$GLB, | Performed by: INTERNAL MEDICINE

## 2023-10-04 PROCEDURE — 3288F FALL RISK ASSESSMENT DOCD: CPT | Mod: HCNC,CPTII,S$GLB, | Performed by: INTERNAL MEDICINE

## 2023-10-04 PROCEDURE — 1126F AMNT PAIN NOTED NONE PRSNT: CPT | Mod: HCNC,CPTII,S$GLB, | Performed by: INTERNAL MEDICINE

## 2023-10-04 PROCEDURE — 1101F PR PT FALLS ASSESS DOC 0-1 FALLS W/OUT INJ PAST YR: ICD-10-PCS | Mod: HCNC,CPTII,S$GLB, | Performed by: INTERNAL MEDICINE

## 2023-10-04 PROCEDURE — 3078F PR MOST RECENT DIASTOLIC BLOOD PRESSURE < 80 MM HG: ICD-10-PCS | Mod: HCNC,CPTII,S$GLB, | Performed by: INTERNAL MEDICINE

## 2023-10-04 PROCEDURE — 99999 PR PBB SHADOW E&M-EST. PATIENT-LVL IV: CPT | Mod: PBBFAC,HCNC,, | Performed by: INTERNAL MEDICINE

## 2023-10-04 PROCEDURE — 3078F DIAST BP <80 MM HG: CPT | Mod: HCNC,CPTII,S$GLB, | Performed by: INTERNAL MEDICINE

## 2023-10-04 PROCEDURE — G0008 FLU VACCINE - QUADRIVALENT - ADJUVANTED: ICD-10-PCS | Mod: HCNC,S$GLB,, | Performed by: INTERNAL MEDICINE

## 2023-10-04 PROCEDURE — 90694 VACC AIIV4 NO PRSRV 0.5ML IM: CPT | Mod: HCNC,S$GLB,, | Performed by: INTERNAL MEDICINE

## 2023-10-04 PROCEDURE — 90694 FLU VACCINE - QUADRIVALENT - ADJUVANTED: ICD-10-PCS | Mod: HCNC,S$GLB,, | Performed by: INTERNAL MEDICINE

## 2023-10-04 PROCEDURE — 3288F PR FALLS RISK ASSESSMENT DOCUMENTED: ICD-10-PCS | Mod: HCNC,CPTII,S$GLB, | Performed by: INTERNAL MEDICINE

## 2023-10-04 PROCEDURE — 1159F MED LIST DOCD IN RCRD: CPT | Mod: HCNC,CPTII,S$GLB, | Performed by: INTERNAL MEDICINE

## 2023-10-04 PROCEDURE — 1126F PR PAIN SEVERITY QUANTIFIED, NO PAIN PRESENT: ICD-10-PCS | Mod: HCNC,CPTII,S$GLB, | Performed by: INTERNAL MEDICINE

## 2023-10-04 RX ORDER — ALPRAZOLAM 0.25 MG/1
TABLET ORAL
Qty: 60 TABLET | Refills: 1 | Status: SHIPPED | OUTPATIENT
Start: 2023-10-04

## 2023-10-04 NOTE — PROGRESS NOTES
Subjective     Patient ID: Chikis Epstein is a 77 y.o. female.    Chief Complaint: Follow-up    HPI  Pt with HLD, Prediabetes, Anxiety, Aortic atherosclerosis, GERD and anxiety/depression is here for 6 month f/u. No acute complaints today.  Review of Systems   Constitutional:  Negative for activity change, appetite change, chills, diaphoresis, fatigue, fever and unexpected weight change.   HENT:  Negative for postnasal drip, rhinorrhea, sinus pressure/congestion, sneezing, sore throat, trouble swallowing and voice change.    Respiratory:  Negative for cough, shortness of breath and wheezing.    Cardiovascular:  Negative for chest pain, palpitations and leg swelling.   Gastrointestinal:  Negative for abdominal pain, blood in stool, constipation, diarrhea, nausea and vomiting.   Genitourinary:  Negative for dysuria.   Musculoskeletal:  Negative for arthralgias and myalgias.   Integumentary:  Negative for rash and wound.   Allergic/Immunologic: Negative for environmental allergies and food allergies.   Hematological:  Negative for adenopathy. Does not bruise/bleed easily.   Psychiatric/Behavioral:  Positive for sleep disturbance. Negative for self-injury and suicidal ideas. The patient is nervous/anxious.           Objective     Physical Exam  Constitutional:       General: She is not in acute distress.     Appearance: Normal appearance. She is well-developed. She is not diaphoretic.   HENT:      Head: Normocephalic and atraumatic.      Right Ear: External ear normal.      Left Ear: External ear normal.      Nose: Nose normal.      Mouth/Throat:      Pharynx: No oropharyngeal exudate.   Eyes:      General: No scleral icterus.        Right eye: No discharge.         Left eye: No discharge.      Conjunctiva/sclera: Conjunctivae normal.      Pupils: Pupils are equal, round, and reactive to light.   Neck:      Vascular: No JVD.   Cardiovascular:      Rate and Rhythm: Normal rate and regular rhythm.      Pulses: Normal  pulses.      Heart sounds: Normal heart sounds.   Pulmonary:      Effort: Pulmonary effort is normal. No respiratory distress.      Breath sounds: Normal breath sounds. No wheezing, rhonchi or rales.   Abdominal:      General: Bowel sounds are normal. There is no distension.      Palpations: Abdomen is soft.      Tenderness: There is no abdominal tenderness. There is no guarding or rebound.   Musculoskeletal:      Cervical back: Neck supple.      Right lower leg: No edema.      Left lower leg: No edema.   Lymphadenopathy:      Cervical: No cervical adenopathy.   Skin:     General: Skin is warm and dry.      Coloration: Skin is not pale.      Findings: No rash.   Neurological:      General: No focal deficit present.      Mental Status: She is alert and oriented to person, place, and time.      Gait: Gait normal.   Psychiatric:         Behavior: Behavior normal.         Thought Content: Thought content normal.            Assessment and Plan     1. Atherosclerosis of aorta    2. Hypertension, unspecified type  -     CBC auto differential; Future; Expected date: 04/04/2024  -     Comprehensive metabolic panel; Future; Expected date: 04/04/2024  -     Hemoglobin A1c; Future; Expected date: 04/04/2024  -     Lipid panel; Future; Expected date: 04/04/2024  -     TSH; Future; Expected date: 04/04/2024  -     Urinalysis; Future; Expected date: 04/04/2024    3. Prediabetes  -     CBC auto differential; Future; Expected date: 04/04/2024  -     Comprehensive metabolic panel; Future; Expected date: 04/04/2024  -     Hemoglobin A1c; Future; Expected date: 04/04/2024  -     Lipid panel; Future; Expected date: 04/04/2024  -     TSH; Future; Expected date: 04/04/2024  -     Urinalysis; Future; Expected date: 04/04/2024    4. Class 1 obesity due to excess calories with serious comorbidity and body mass index (BMI) of 30.0 to 30.9 in adult    5. Gastroesophageal reflux disease, unspecified whether esophagitis present    6.  Hyperparathyroidism    7. Anxiety    8. Pure hypercholesterolemia  Overview:  Discussed aortic atherosclerosis in addition to pt hyperlipidemia  Started pravastatin therapy today pt f/u in 3 months.        9. Aorto-iliac atherosclerosis    Other orders  -     ALPRAZolam (XANAX) 0.25 MG tablet; 1-2 tabs PO qHS PRN insomnia  Dispense: 60 tablet; Refill: 1  -     Influenza - Quadrivalent (Adjuvanted)     Prediabetes- last A1C of 6.2(10/23)<--6.2(3/22)<--6.2(9/21)<--6.0(12/19)       Continue Metformin 500 mg qam      HTN- stable on current meds      HLD- continue statin      GERD- stable on PPI      Anxiety- stable on Wellbutrin  mg/Buspar 15 mg qd      Aortic atherosclerosis- on statin      Insomnia- trial of Xanax 0.25-0.5 mg qHS     Hyperparathyroidism- stable     F/u in 6 months for annual exam

## 2023-10-19 DIAGNOSIS — K21.9 GASTROESOPHAGEAL REFLUX DISEASE, UNSPECIFIED WHETHER ESOPHAGITIS PRESENT: ICD-10-CM

## 2023-10-19 RX ORDER — PANTOPRAZOLE SODIUM 40 MG/1
40 TABLET, DELAYED RELEASE ORAL DAILY
Qty: 90 TABLET | Refills: 3 | Status: SHIPPED | OUTPATIENT
Start: 2023-10-19

## 2023-10-19 RX ORDER — BUPROPION HYDROCHLORIDE 300 MG/1
300 TABLET ORAL DAILY
Qty: 90 TABLET | Refills: 3 | Status: SHIPPED | OUTPATIENT
Start: 2023-10-19 | End: 2023-11-10 | Stop reason: SDUPTHER

## 2023-10-19 NOTE — TELEPHONE ENCOUNTER
No care due was identified.  [facility] Embedded Care Due Messages. Reference number: [id number].   10/19/2023 3:12:42 PM CDT

## 2023-10-19 NOTE — TELEPHONE ENCOUNTER
No care due was identified.  [facility] Embedded Care Due Messages. Reference number: [id number].   10/19/2023 10:34:59 AM CDT

## 2023-10-19 NOTE — TELEPHONE ENCOUNTER
----- Message from Anisa England sent at 10/19/2023 10:17 AM CDT -----  Contact: [phone]  Requesting an RX refill or new RX.  Is this a refill or new RX: refill   RX name and strength (copy/paste from chart):  buPROPion (WELLBUTRIN XL) 300 MG 24 hr tablet  Is this a 30 day or 90 day RX: 30  Pharmacy name and phone # (copy/paste from chart):    [id number] - [place] - [facility]  AT [facility] [address]  [address] [place] [address]  Phone: [phone] Fax: [phone]     The doctors have asked that we provide their patients with the following 2 reminders -- prescription refills can take up to 72 hours, and a friendly reminder that in the future you can use your [facility] account to request refills: y

## 2023-10-20 DIAGNOSIS — F41.9 ANXIETY: ICD-10-CM

## 2023-10-20 RX ORDER — BUPROPION HYDROCHLORIDE 300 MG/1
300 TABLET ORAL DAILY
Qty: 90 TABLET | Refills: 3 | OUTPATIENT
Start: 2023-10-20

## 2023-10-20 RX ORDER — BUSPIRONE HYDROCHLORIDE 15 MG/1
15 TABLET ORAL 2 TIMES DAILY
Qty: 180 TABLET | Refills: 3 | Status: SHIPPED | OUTPATIENT
Start: 2023-10-20

## 2023-10-20 NOTE — TELEPHONE ENCOUNTER
No care due was identified.  Health [facility] Embedded Care Due Messages. Reference number: [id number].   10/20/2023 2:04:40 AM CDT

## 2023-10-20 NOTE — TELEPHONE ENCOUNTER
Refill Routing Note   Medication(s) are not appropriate for processing by [facility] for the following reason(s):      Medication outside of protocol    [facility] action(s):  Quick Discontinue  Route Care Due:  None identified     Medication Therapy Plan: QDC:Buproprion sent 10/2/23 to [facility] then 10/19/23 sent to local [facility] for a bridge awaiting mail order      Appointments  past 12m or future 3m with PCP    Date Provider   Last Visit   10/4/2023 Cornell Garcia, DO   Next Visit   4/9/2024 Cornell Garcia, DO   ED visits in past 90 days: 0        Note composed:8:19 AM 10/20/2023

## 2023-10-20 NOTE — TELEPHONE ENCOUNTER
Refill Decision Note   Chikis Epstein  is requesting a refill authorization.  Brief Assessment and Rationale for Refill:  Approve     Medication Therapy Plan:         Comments:     Note composed:11:21 PM 10/19/2023             Appointments     Last Visit   10/4/2023 Cornell Garcia, DO   Next Visit   4/9/2024 Cornell Garcia, DO

## 2023-10-29 DIAGNOSIS — I10 ESSENTIAL HYPERTENSION: ICD-10-CM

## 2023-10-29 RX ORDER — HYDROCHLOROTHIAZIDE 12.5 MG/1
12.5 TABLET ORAL
Qty: 90 TABLET | Refills: 3 | Status: SHIPPED | OUTPATIENT
Start: 2023-10-29

## 2023-10-29 NOTE — TELEPHONE ENCOUNTER
Refill Decision Note   Chikis Epstein  is requesting a refill authorization.  Brief Assessment and Rationale for Refill:  Approve     Medication Therapy Plan:         Comments:     Note composed:12:56 PM 10/29/2023

## 2023-10-29 NOTE — TELEPHONE ENCOUNTER
No care due was identified.  [facility] Embedded Care Due Messages. Reference number: [id number].   10/29/2023 3:15:00 AM CDT

## 2023-11-10 ENCOUNTER — OFFICE VISIT (OUTPATIENT)
Dept: CARDIOLOGY | Facility: CLINIC | Age: 78
End: 2023-11-10
Payer: MEDICARE

## 2023-11-10 VITALS
HEIGHT: 60 IN | WEIGHT: 156.31 LBS | DIASTOLIC BLOOD PRESSURE: 70 MMHG | HEART RATE: 80 BPM | SYSTOLIC BLOOD PRESSURE: 142 MMHG | BODY MASS INDEX: 30.69 KG/M2

## 2023-11-10 DIAGNOSIS — R06.09 DYSPNEA ON EXERTION: ICD-10-CM

## 2023-11-10 DIAGNOSIS — I10 PRIMARY HYPERTENSION: Primary | ICD-10-CM

## 2023-11-10 DIAGNOSIS — E78.00 HYPERCHOLESTEREMIA: ICD-10-CM

## 2023-11-10 DIAGNOSIS — I77.810 ASCENDING AORTA DILATATION: ICD-10-CM

## 2023-11-10 PROCEDURE — 1125F AMNT PAIN NOTED PAIN PRSNT: CPT | Mod: HCNC,CPTII,S$GLB, | Performed by: INTERNAL MEDICINE

## 2023-11-10 PROCEDURE — 93010 ELECTROCARDIOGRAM REPORT: CPT | Mod: HCNC,S$GLB,, | Performed by: INTERNAL MEDICINE

## 2023-11-10 PROCEDURE — 3288F PR FALLS RISK ASSESSMENT DOCUMENTED: ICD-10-PCS | Mod: HCNC,CPTII,S$GLB, | Performed by: INTERNAL MEDICINE

## 2023-11-10 PROCEDURE — 1101F PT FALLS ASSESS-DOCD LE1/YR: CPT | Mod: HCNC,CPTII,S$GLB, | Performed by: INTERNAL MEDICINE

## 2023-11-10 PROCEDURE — 93005 ELECTROCARDIOGRAM TRACING: CPT | Mod: HCNC,S$GLB,, | Performed by: INTERNAL MEDICINE

## 2023-11-10 PROCEDURE — 99999 PR PBB SHADOW E&M-EST. PATIENT-LVL IV: ICD-10-PCS | Mod: PBBFAC,HCNC,, | Performed by: INTERNAL MEDICINE

## 2023-11-10 PROCEDURE — 1160F PR REVIEW ALL MEDS BY PRESCRIBER/CLIN PHARMACIST DOCUMENTED: ICD-10-PCS | Mod: HCNC,CPTII,S$GLB, | Performed by: INTERNAL MEDICINE

## 2023-11-10 PROCEDURE — 93010 EKG 12-LEAD: ICD-10-PCS | Mod: HCNC,S$GLB,, | Performed by: INTERNAL MEDICINE

## 2023-11-10 PROCEDURE — 3078F PR MOST RECENT DIASTOLIC BLOOD PRESSURE < 80 MM HG: ICD-10-PCS | Mod: HCNC,CPTII,S$GLB, | Performed by: INTERNAL MEDICINE

## 2023-11-10 PROCEDURE — 1159F PR MEDICATION LIST DOCUMENTED IN MEDICAL RECORD: ICD-10-PCS | Mod: HCNC,CPTII,S$GLB, | Performed by: INTERNAL MEDICINE

## 2023-11-10 PROCEDURE — 99214 OFFICE O/P EST MOD 30 MIN: CPT | Mod: HCNC,S$GLB,, | Performed by: INTERNAL MEDICINE

## 2023-11-10 PROCEDURE — 1125F PR PAIN SEVERITY QUANTIFIED, PAIN PRESENT: ICD-10-PCS | Mod: HCNC,CPTII,S$GLB, | Performed by: INTERNAL MEDICINE

## 2023-11-10 PROCEDURE — 1101F PR PT FALLS ASSESS DOC 0-1 FALLS W/OUT INJ PAST YR: ICD-10-PCS | Mod: HCNC,CPTII,S$GLB, | Performed by: INTERNAL MEDICINE

## 2023-11-10 PROCEDURE — 1159F MED LIST DOCD IN RCRD: CPT | Mod: HCNC,CPTII,S$GLB, | Performed by: INTERNAL MEDICINE

## 2023-11-10 PROCEDURE — 99214 PR OFFICE/OUTPT VISIT, EST, LEVL IV, 30-39 MIN: ICD-10-PCS | Mod: HCNC,S$GLB,, | Performed by: INTERNAL MEDICINE

## 2023-11-10 PROCEDURE — 93005 EKG 12-LEAD: ICD-10-PCS | Mod: HCNC,S$GLB,, | Performed by: INTERNAL MEDICINE

## 2023-11-10 PROCEDURE — 1160F RVW MEDS BY RX/DR IN RCRD: CPT | Mod: HCNC,CPTII,S$GLB, | Performed by: INTERNAL MEDICINE

## 2023-11-10 PROCEDURE — 3077F SYST BP >= 140 MM HG: CPT | Mod: HCNC,CPTII,S$GLB, | Performed by: INTERNAL MEDICINE

## 2023-11-10 PROCEDURE — 3077F PR MOST RECENT SYSTOLIC BLOOD PRESSURE >= 140 MM HG: ICD-10-PCS | Mod: HCNC,CPTII,S$GLB, | Performed by: INTERNAL MEDICINE

## 2023-11-10 PROCEDURE — 3078F DIAST BP <80 MM HG: CPT | Mod: HCNC,CPTII,S$GLB, | Performed by: INTERNAL MEDICINE

## 2023-11-10 PROCEDURE — 3288F FALL RISK ASSESSMENT DOCD: CPT | Mod: HCNC,CPTII,S$GLB, | Performed by: INTERNAL MEDICINE

## 2023-11-10 PROCEDURE — 99999 PR PBB SHADOW E&M-EST. PATIENT-LVL IV: CPT | Mod: PBBFAC,HCNC,, | Performed by: INTERNAL MEDICINE

## 2023-11-10 NOTE — PROGRESS NOTES
Subjective:   Chief Complaint:  Chikis Epstein is a 78 y.o. female who presents for follow-up of Hypertension and Hyperlipidemia      Problem List and HPI:   Hypertension  Hypercholesterolemia  Ascending aorta - mildly enlarged    She reports fatigue and shortness of breath with exertion for the past year. She does not report chest discomfort w exertion.  Feels she is a bit unsteady on her feet. Also has postural dizziness.  In the [employer] program for Hypertension. Reviewed record. Feels poorly when SBP is in the 120s. Was advised to take a lower dose of amlodipine but went back to 10 mg after a few days.    Sticky pain in the mid chest lasting up to 1 minute. Occurs at night but not w exertion.      Review of patient's allergies indicates:   Allergen Reactions    Ace inhibitors      Other reaction(s): Swelling        Current Outpatient Medications   Medication Sig    acetaminophen (TYLENOL) 500 MG tablet Take 500 mg by mouth every 6 (six) hours as needed for Pain.    ALPRAZolam (XANAX) 0.25 MG tablet 1-2 tabs PO qHS PRN insomnia    amLODIPine (NORVASC) 10 MG tablet TAKE 1 TABLET EVERY DAY    buPROPion (WELLBUTRIN XL) 300 MG 24 hr tablet Take 1 tablet (300 mg total) by mouth once daily.    busPIRone (BUSPAR) 15 MG tablet TAKE 1 TABLET TWICE DAILY    calcium-vitamin D3 (OS-ANDRA 500 + D3) 500 mg-5 mcg (200 unit) per tablet Take 1 tablet by mouth once daily.    cetirizine (ZYRTEC) 10 MG tablet Take 10 mg by mouth as needed for Allergies.    hydroCHLOROthiazide (HYDRODIURIL) 12.5 MG Tab TAKE 1 TABLET EVERY DAY    latanoprost 0.005 % ophthalmic solution INSTILL 1 DROP INTO BOTH EYES EVERY EVENING    metFORMIN (GLUCOPHAGE-XR) 500 MG ER 24hr tablet TAKE 1 TABLET (500 MG TOTAL) BY MOUTH DAILY WITH BREAKFAST.    MULTIVIT-MINERALS/FOLIC ACID (CENTRUM MULTIGUMMIES ORAL) Take 2 each by mouth once daily.    pantoprazole (PROTONIX) 40 MG tablet Take 1 tablet (40 mg total) by mouth once daily.    pravastatin (PRAVACHOL) 40  MG tablet TAKE 1 TABLET EVERY DAY    valsartan (DIOVAN) 320 MG tablet TAKE 1 TABLET EVERY DAY     No current facility-administered medications for this visit.       Social history:  Chikis Epstein  reports that she has never smoked. She has never used smokeless tobacco. She reports that she does not currently use alcohol. She reports that she does not use drugs.      Objective:   BP (!) 142/70   Pulse 80   Ht 5' (1.524 m)   Wt 70.9 kg (156 lb 4.9 oz)   BMI 30.53 kg/m²    Physical Exam  Constitutional:       Appearance: She is well-developed.      Comments:      HENT:      Head: Normocephalic.   Neck:      Vascular: No carotid bruit or JVD.   Cardiovascular:      Rate and Rhythm: Normal rate and regular rhythm.      Pulses:           Radial pulses are 2+ on the right side and 2+ on the left side.        Posterior tibial pulses are 2+ on the right side and 2+ on the left side.      Heart sounds: S1 normal and S2 normal. No murmur heard.     No gallop.   Pulmonary:      Effort: Pulmonary effort is normal.      Breath sounds: No wheezing or rales.   Chest:      Chest wall: There is no dullness to percussion.   Abdominal:      General: There is no abdominal bruit.      Palpations: Abdomen is soft. There is no hepatomegaly or splenomegaly.      Tenderness: There is no abdominal tenderness.   Musculoskeletal:      Right lower leg: No edema.      Left lower leg: No edema.   Skin:     General: Skin is warm and dry.      Findings: No bruising or rash.      Nails: There is no clubbing.   Neurological:      Mental Status: She is alert and oriented to person, place, and time.      Gait: Gait normal.   Psychiatric:         Speech: Speech normal.         Behavior: Behavior normal.         Judgment: Judgment normal.         Lipids:  Recent Labs   Lab 10/02/23  0709   LDL Cholesterol 100.4   HDL 69   Cholesterol 188      Renal:  Recent Labs   Lab 10/02/23  0709   Creatinine 0.8   Potassium 4.3   CO2 28   BUN 13     Liver:  Recent  Labs   Lab 10/02/23  0709   AST 25   ALT 19     CBC:  Lab Results   Component Value Date    WBC 6.78 10/02/2023    HGB 13.7 10/02/2023    HCT 42.5 10/02/2023    MCV 91 10/02/2023     10/02/2023           Results for orders placed during the hospital encounter of 09/19/22    Echo    Interpretation Summary  · The left ventricle is normal in size with concentric remodeling and normal systolic function. The estimated ejection fraction is 60%.  · Normal right ventricular size with normal right ventricular systolic function.  · Normal left ventricular diastolic function.  · The estimated PA systolic pressure is 31 mmHg.  · Normal central venous pressure (3 mmHg).         Assessment and Plan:       ICD-10-CM ICD-9-CM   1. Primary hypertension  I10 401.9   2. Dyspnea on exertion  R06.09 786.09   3. Hypercholesteremia  E78.00 272.0   4. Ascending aorta dilatation  I77.810 447.71        Move valsartan to qhs. Continue amlodipine and HCTZ in am.  Shortness of breath w exertion may be an anginal equivalent. It is reasonable to perform a stress test.  LDL is 100 mg/dl. Continue same dose of pravastatin.  Ascending aorta is <4 cm, but per echo report in 2018 is mildly enlarged when corrected for BSA. I do not recommend additional imaging at this time.      Orders placed during this encounter:     Primary hypertension  -     IN OFFICE EKG 12-LEAD (to Muse)    Dyspnea on exertion  -     Stress Echo Which stress agent will be used? Pharmacological; Future    Hypercholesteremia    Ascending aorta dilatation  -     IN OFFICE EKG 12-LEAD (to Muse)         Follow up in about 1 year (around 11/10/2024) for CHRIS.

## 2023-11-13 ENCOUNTER — PATIENT MESSAGE (OUTPATIENT)
Dept: ADMINISTRATIVE | Facility: OTHER | Age: 78
End: 2023-11-13
Payer: MEDICARE

## 2023-11-21 ENCOUNTER — HOSPITAL ENCOUNTER (OUTPATIENT)
Dept: CARDIOLOGY | Facility: HOSPITAL | Age: 78
Discharge: HOME | End: 2023-11-21
Attending: INTERNAL MEDICINE
Payer: MEDICARE

## 2023-11-21 VITALS
HEIGHT: 60 IN | WEIGHT: 155 LBS | DIASTOLIC BLOOD PRESSURE: 65 MMHG | RESPIRATION RATE: 18 BRPM | HEART RATE: 83 BPM | BODY MASS INDEX: 30.43 KG/M2 | SYSTOLIC BLOOD PRESSURE: 144 MMHG

## 2023-11-21 DIAGNOSIS — R06.09 DYSPNEA ON EXERTION: ICD-10-CM

## 2023-11-21 LAB
ASCENDING AORTA: 3.27 CM
BSA FOR ECHO PROCEDURE: 1.73 M2
CV ECHO LV RWT: 0.37 CM
CV STRESS BASE HR: 83 BPM
DIASTOLIC BLOOD PRESSURE: 65 MMHG
DOP CALC LVOT AREA: 3.5 CM2
DOP CALC LVOT DIAMETER: 2.1 CM
DOP CALC LVOT PEAK VEL: 0.82 M/S
DOP CALC LVOT STROKE VOLUME: 63.77 CM3
DOP CALCLVOT PEAK VEL VTI: 18.42 CM
E WAVE DECELERATION TIME: 187.26 MSEC
E/A RATIO: 0.9
E/E' RATIO: 6 M/S
ECHO LV POSTERIOR WALL: 0.8 CM (ref 0.6–1.1)
FRACTIONAL SHORTENING: 38 % (ref 28–44)
INTERVENTRICULAR SEPTUM: 0.77 CM (ref 0.6–1.1)
LA MAJOR: 4.51 CM
LA MINOR: 4.09 CM
LA WIDTH: 3.66 CM
LEFT ATRIUM SIZE: 2.62 CM
LEFT ATRIUM VOLUME INDEX MOD: 23.1 ML/M2
LEFT ATRIUM VOLUME INDEX: 20.9 ML/M2
LEFT ATRIUM VOLUME MOD: 38.66 CM3
LEFT ATRIUM VOLUME: 34.96 CM3
LEFT INTERNAL DIMENSION IN SYSTOLE: 2.71 CM (ref 2.1–4)
LEFT VENTRICLE DIASTOLIC VOLUME INDEX: 50.77 ML/M2
LEFT VENTRICLE DIASTOLIC VOLUME: 84.78 ML
LEFT VENTRICLE MASS INDEX: 62 G/M2
LEFT VENTRICLE SYSTOLIC VOLUME INDEX: 16.3 ML/M2
LEFT VENTRICLE SYSTOLIC VOLUME: 27.23 ML
LEFT VENTRICULAR INTERNAL DIMENSION IN DIASTOLE: 4.34 CM (ref 3.5–6)
LEFT VENTRICULAR MASS: 104.33 G
LV LATERAL E/E' RATIO: 4.69 M/S
LV SEPTAL E/E' RATIO: 8.33 M/S
MV A" WAVE DURATION": 9.99 MSEC
MV PEAK A VEL: 0.83 M/S
MV PEAK E VEL: 0.75 M/S
MV STENOSIS PRESSURE HALF TIME: 54.31 MS
MV VALVE AREA P 1/2 METHOD: 4.05 CM2
OHS CV CPX 1 MINUTE RECOVERY HEART RATE: 123 BPM
OHS CV CPX 85 PERCENT MAX PREDICTED HEART RATE MALE: 121
OHS CV CPX MAX PREDICTED HEART RATE: 142
OHS CV CPX PATIENT IS FEMALE: 1
OHS CV CPX PATIENT IS MALE: 0
OHS CV CPX PEAK DIASTOLIC BLOOD PRESSURE: 50 MMHG
OHS CV CPX PEAK HEAR RATE: 125 BPM
OHS CV CPX PEAK RATE PRESSURE PRODUCT: NORMAL
OHS CV CPX PEAK SYSTOLIC BLOOD PRESSURE: 142 MMHG
OHS CV CPX PERCENT MAX PREDICTED HEART RATE ACHIEVED: 91
OHS CV CPX RATE PRESSURE PRODUCT PRESENTING: NORMAL
OHS CV INITIAL DOSE: 10 MCG/KG/MIN
OHS CV PEAK DOSE: 30 MCG/KG/MIN
PULM VEIN S/D RATIO: 1.14
PV PEAK D VEL: 0.36 M/S
PV PEAK S VEL: 0.41 M/S
RA MAJOR: 3.85 CM
RA PRESSURE ESTIMATED: 3 MMHG
RA WIDTH: 2.73 CM
RIGHT VENTRICULAR END-DIASTOLIC DIMENSION: 2.72 CM
SINUS: 2.99 CM
STJ: 3.02 CM
SYSTOLIC BLOOD PRESSURE: 144 MMHG
TDI LATERAL: 0.16 M/S
TDI SEPTAL: 0.09 M/S
TDI: 0.13 M/S
TRICUSPID ANNULAR PLANE SYSTOLIC EXCURSION: 2.37 CM
Z-SCORE OF LEFT VENTRICULAR DIMENSION IN END DIASTOLE: -0.73
Z-SCORE OF LEFT VENTRICULAR DIMENSION IN END SYSTOLE: -0.52

## 2023-11-21 PROCEDURE — 93351 STRESS TTE COMPLETE: CPT | Mod: 26,HCNC,, | Performed by: INTERNAL MEDICINE

## 2023-11-21 PROCEDURE — 93351 STRESS ECHO (CUPID ONLY): ICD-10-PCS | Mod: 26,HCNC,, | Performed by: INTERNAL MEDICINE

## 2023-11-21 PROCEDURE — 93351 STRESS TTE COMPLETE: CPT | Mod: HCNC

## 2023-11-21 PROCEDURE — 63600175 PHARM REV CODE 636 W HCPCS: Mod: HCNC | Performed by: INTERNAL MEDICINE

## 2023-11-21 RX ORDER — DOBUTAMINE HYDROCHLORIDE 400 MG/100ML
30 INJECTION, SOLUTION INTRAVENOUS
Status: COMPLETED | OUTPATIENT
Start: 2023-11-21 | End: 2023-11-21

## 2023-11-21 RX ORDER — ATROPINE SULFATE 0.1 MG/ML
0.25 INJECTION INTRAVENOUS
Status: COMPLETED | OUTPATIENT
Start: 2023-11-21 | End: 2023-11-21

## 2023-11-21 RX ADMIN — ATROPINE SULFATE 0.25 MG: 0.1 INJECTION INTRAVENOUS at 01:11

## 2023-11-21 RX ADMIN — DOBUTAMINE 30 MCG/KG/MIN: 12.5 INJECTION, SOLUTION, CONCENTRATE INTRAVENOUS at 01:11

## 2023-11-22 ENCOUNTER — TELEPHONE (OUTPATIENT)
Dept: CARDIOLOGY | Facility: CLINIC | Age: 78
End: 2023-11-22
Payer: MEDICARE

## 2023-11-22 NOTE — TELEPHONE ENCOUNTER
----- Message from Seb Chau MD sent at 11/21/2023  6:21 PM CST -----  Please let patient know that her stress test was normal. Pl let us know if the shortness of breath gets worse.

## 2023-11-30 RX ORDER — VALSARTAN 320 MG/1
TABLET ORAL
Qty: 90 TABLET | Refills: 3 | Status: SHIPPED | OUTPATIENT
Start: 2023-11-30

## 2023-11-30 NOTE — TELEPHONE ENCOUNTER
Refill Routing Note   Medication(s) are not appropriate for processing by [facility] for the following reason(s):        Required vitals abnormal    (!) 144/65       [facility] action(s):  Defer        Medication Therapy Plan: (!) 144/65      Appointments  past 12m or future 3m with PCP    Date Provider   Last Visit   10/4/2023 Cornell Garcia, DO   Next Visit   4/9/2024 Cornell Garcia, DO   ED visits in past 90 days: 0        Note composed:8:13 AM 11/30/2023

## 2023-11-30 NOTE — TELEPHONE ENCOUNTER
No care due was identified.  [facility] Embedded Care Due Messages. Reference number: [id number].   11/30/2023 3:03:24 AM CST

## 2023-11-30 NOTE — TELEPHONE ENCOUNTER
No care due was identified.  [facility] Embedded Care Due Messages. Reference number: [id number].   11/30/2023 3:02:56 AM CST

## 2023-11-30 NOTE — TELEPHONE ENCOUNTER
Refill Routing Note   Medication(s) are not appropriate for processing by [facility] for the following reason(s):        No active prescription written by provider    [facility] action(s):  Defer        Medication Therapy Plan:  AS OF  23      Appointments  past 12m or future 3m with PCP    Date Provider   Last Visit   10/4/2023 Cornell Garcia, DO   Next Visit   2024 Cornell Garcia, DO   ED visits in past 90 days: 0        Note composed:3:10 PM 2023

## 2023-12-01 RX ORDER — METFORMIN HYDROCHLORIDE 500 MG/1
TABLET, EXTENDED RELEASE ORAL
Qty: 90 TABLET | Refills: 3 | Status: SHIPPED | OUTPATIENT
Start: 2023-12-01

## 2023-12-12 ENCOUNTER — TELEPHONE (OUTPATIENT)
Dept: INTERNAL MEDICINE | Facility: CLINIC | Age: 78
End: 2023-12-12
Payer: MEDICARE

## 2023-12-12 ENCOUNTER — CLINICAL SUPPORT (OUTPATIENT)
Dept: OPHTHALMOLOGY | Facility: CLINIC | Age: 78
End: 2023-12-12
Payer: MEDICARE

## 2023-12-12 ENCOUNTER — OFFICE VISIT (OUTPATIENT)
Dept: OPTOMETRY | Facility: CLINIC | Age: 78
End: 2023-12-12
Payer: MEDICARE

## 2023-12-12 VITALS — SYSTOLIC BLOOD PRESSURE: 139 MMHG | DIASTOLIC BLOOD PRESSURE: 76 MMHG

## 2023-12-12 DIAGNOSIS — H40.1231 LOW-TENSION GLAUCOMA OF BOTH EYES, MILD STAGE: Primary | ICD-10-CM

## 2023-12-12 DIAGNOSIS — H40.1231 LOW-TENSION GLAUCOMA OF BOTH EYES, MILD STAGE: ICD-10-CM

## 2023-12-12 DIAGNOSIS — H04.123 BILATERAL DRY EYES: ICD-10-CM

## 2023-12-12 DIAGNOSIS — Z96.1 PSEUDOPHAKIA OF BOTH EYES: ICD-10-CM

## 2023-12-12 PROCEDURE — 1159F PR MEDICATION LIST DOCUMENTED IN MEDICAL RECORD: ICD-10-PCS | Mod: HCNC,CPTII,S$GLB, | Performed by: OPTOMETRIST

## 2023-12-12 PROCEDURE — 99999 PR PBB SHADOW E&M-EST. PATIENT-LVL III: ICD-10-PCS | Mod: PBBFAC,HCNC,, | Performed by: OPTOMETRIST

## 2023-12-12 PROCEDURE — 99999 PR PBB SHADOW E&M-EST. PATIENT-LVL III: CPT | Mod: PBBFAC,HCNC,, | Performed by: OPTOMETRIST

## 2023-12-12 PROCEDURE — 1100F PR PT FALLS ASSESS DOC 2+ FALLS/FALL W/INJURY/YR: ICD-10-PCS | Mod: HCNC,CPTII,S$GLB, | Performed by: OPTOMETRIST

## 2023-12-12 PROCEDURE — 3288F PR FALLS RISK ASSESSMENT DOCUMENTED: ICD-10-PCS | Mod: HCNC,CPTII,S$GLB, | Performed by: OPTOMETRIST

## 2023-12-12 PROCEDURE — 1160F PR REVIEW ALL MEDS BY PRESCRIBER/CLIN PHARMACIST DOCUMENTED: ICD-10-PCS | Mod: HCNC,CPTII,S$GLB, | Performed by: OPTOMETRIST

## 2023-12-12 PROCEDURE — 3288F FALL RISK ASSESSMENT DOCD: CPT | Mod: HCNC,CPTII,S$GLB, | Performed by: OPTOMETRIST

## 2023-12-12 PROCEDURE — 1100F PTFALLS ASSESS-DOCD GE2>/YR: CPT | Mod: HCNC,CPTII,S$GLB, | Performed by: OPTOMETRIST

## 2023-12-12 PROCEDURE — 1159F MED LIST DOCD IN RCRD: CPT | Mod: HCNC,CPTII,S$GLB, | Performed by: OPTOMETRIST

## 2023-12-12 PROCEDURE — 99213 OFFICE O/P EST LOW 20 MIN: CPT | Mod: HCNC,S$GLB,, | Performed by: OPTOMETRIST

## 2023-12-12 PROCEDURE — 1126F PR PAIN SEVERITY QUANTIFIED, NO PAIN PRESENT: ICD-10-PCS | Mod: HCNC,CPTII,S$GLB, | Performed by: OPTOMETRIST

## 2023-12-12 PROCEDURE — 1126F AMNT PAIN NOTED NONE PRSNT: CPT | Mod: HCNC,CPTII,S$GLB, | Performed by: OPTOMETRIST

## 2023-12-12 PROCEDURE — 99213 PR OFFICE/OUTPT VISIT, EST, LEVL III, 20-29 MIN: ICD-10-PCS | Mod: HCNC,S$GLB,, | Performed by: OPTOMETRIST

## 2023-12-12 PROCEDURE — 1160F RVW MEDS BY RX/DR IN RCRD: CPT | Mod: HCNC,CPTII,S$GLB, | Performed by: OPTOMETRIST

## 2023-12-12 NOTE — PROGRESS NOTES
HVF rel/fix good OD fair OS coop good OU / chart checked for latex allergy/ -0.25 + 1.50 x 015 OD - 1.00 + 1.25 x 175 OS - BJ

## 2023-12-13 NOTE — PROGRESS NOTES
"HPI     Glaucoma     Additional comments: Patient Chikis Epstein is a 78 year old female.           Comments    Pt here for 3 month IOP check with HVF review. Pt states that her VA OU is   not as clear ("not able to see everything clearly") with night vision. Pt   states that she has itchy eyes from allergies but denies any eye pain. Pt   states that she has not missed any nights of using medication and is using   as instructed.    DLS: 09/19/2023 with Dr. Ye    Meds: Latanoprost qhs OU    POHx:  1. Low-tension glaucoma of both eyes, mild stage  2. Bilateral dry eyes  3. Pseudophakia of both eyes  4. Refractive error            Last edited by Josefina Rucker on 12/12/2023  2:37 PM.            Assessment /Plan     For exam results, see Encounter Report.    Low-tension glaucoma of both eyes, mild stage    Bilateral dry eyes    Pseudophakia of both eyes      IOP staying low and stable, nerve eval stable,  cont Latanaprost qhs OU,  today hvf normal oct prev with some worsening, with rnfl loss sup and nasal, pachy normal, possible father was glaucoma suspect. Target 12-14.  RTC 4 mos iop ck , consider add drop  2. Contributing to eye allergy symptoms, Recommend artificial tears. 1 drop 2x per day. Chronicity of disease and treatment discussed.                    "

## 2023-12-15 ENCOUNTER — PATIENT MESSAGE (OUTPATIENT)
Dept: ADMINISTRATIVE | Facility: OTHER | Age: 78
End: 2023-12-15
Payer: MEDICARE

## 2024-01-02 ENCOUNTER — TELEPHONE (OUTPATIENT)
Dept: OTOLARYNGOLOGY | Facility: CLINIC | Age: 79
End: 2024-01-02
Payer: MEDICARE

## 2024-01-02 NOTE — TELEPHONE ENCOUNTER
Returned patient's phone call and left a message.    ----- Message from Domi Sosa MA sent at 12/29/2023  4:59 PM CST -----    ----- Message -----  From: Sindy Robertson  Sent: 12/29/2023   3:48 PM CST  To: [facility] Staff    Type:  Needs Medical Advice    Who Called:  Pt    Would the patient rather a call back or a response via [facility]?  call  Best Call Back Number:   [phone]  Additional Information:  Pt would like a call back from   Pt has questions regarding cleaning her equipment and the programming of her phone.

## 2024-01-04 ENCOUNTER — HOSPITAL ENCOUNTER (EMERGENCY)
Facility: HOSPITAL | Age: 79
Discharge: HOME | End: 2024-01-04
Attending: EMERGENCY MEDICINE
Payer: MEDICARE

## 2024-01-04 VITALS
RESPIRATION RATE: 20 BRPM | DIASTOLIC BLOOD PRESSURE: 67 MMHG | WEIGHT: 155 LBS | HEART RATE: 79 BPM | OXYGEN SATURATION: 96 % | BODY MASS INDEX: 30.43 KG/M2 | HEIGHT: 60 IN | TEMPERATURE: 99 F | SYSTOLIC BLOOD PRESSURE: 152 MMHG

## 2024-01-04 DIAGNOSIS — R93.89 ABNORMAL CHEST X-RAY: Primary | ICD-10-CM

## 2024-01-04 DIAGNOSIS — R05.9 COUGH: ICD-10-CM

## 2024-01-04 DIAGNOSIS — R11.10 EMESIS: ICD-10-CM

## 2024-01-04 DIAGNOSIS — J18.9 PNEUMONIA OF LEFT LOWER LOBE DUE TO INFECTIOUS ORGANISM: ICD-10-CM

## 2024-01-04 DIAGNOSIS — B34.9 VIRAL SYNDROME: ICD-10-CM

## 2024-01-04 LAB
ALBUMIN SERPL BCP-MCNC: 4 G/DL (ref 3.5–5.2)
ALP SERPL-CCNC: 72 U/L (ref 55–135)
ALT SERPL W/O P-5'-P-CCNC: 19 U/L (ref 10–44)
ANION GAP SERPL CALC-SCNC: 11 MMOL/L (ref 8–16)
AST SERPL-CCNC: 25 U/L (ref 10–40)
BASOPHILS # BLD AUTO: 0.05 K/UL (ref 0–0.2)
BASOPHILS NFR BLD: 0.4 % (ref 0–1.9)
BILIRUB SERPL-MCNC: 0.6 MG/DL (ref 0.1–1)
BILIRUB UR QL STRIP: NEGATIVE
BNP SERPL-MCNC: 18 PG/ML (ref 0–99)
BUN SERPL-MCNC: 8 MG/DL (ref 8–23)
CALCIUM SERPL-MCNC: 9.9 MG/DL (ref 8.7–10.5)
CHLORIDE SERPL-SCNC: 102 MMOL/L (ref 95–110)
CLARITY UR REFRACT.AUTO: CLEAR
CO2 SERPL-SCNC: 25 MMOL/L (ref 23–29)
COLOR UR AUTO: YELLOW
CREAT SERPL-MCNC: 0.8 MG/DL (ref 0.5–1.4)
DIFFERENTIAL METHOD BLD: ABNORMAL
EOSINOPHIL # BLD AUTO: 0.2 K/UL (ref 0–0.5)
EOSINOPHIL NFR BLD: 1.7 % (ref 0–8)
ERYTHROCYTE [DISTWIDTH] IN BLOOD BY AUTOMATED COUNT: 13.2 % (ref 11.5–14.5)
EST. GFR  (NO RACE VARIABLE): >60 ML/MIN/1.73 M^2
GLUCOSE SERPL-MCNC: 181 MG/DL (ref 70–110)
GLUCOSE UR QL STRIP: NEGATIVE
HCT VFR BLD AUTO: 39.6 % (ref 37–48.5)
HCV AB SERPL QL IA: NORMAL
HGB BLD-MCNC: 13.3 G/DL (ref 12–16)
HGB UR QL STRIP: NEGATIVE
HIV 1+2 AB+HIV1 P24 AG SERPL QL IA: NORMAL
IMM GRANULOCYTES # BLD AUTO: 0.04 K/UL (ref 0–0.04)
IMM GRANULOCYTES NFR BLD AUTO: 0.3 % (ref 0–0.5)
INFLUENZA A, MOLECULAR: NEGATIVE
INFLUENZA B, MOLECULAR: NEGATIVE
KETONES UR QL STRIP: NEGATIVE
LEUKOCYTE ESTERASE UR QL STRIP: NEGATIVE
LIPASE SERPL-CCNC: 18 U/L (ref 4–60)
LYMPHOCYTES # BLD AUTO: 0.9 K/UL (ref 1–4.8)
LYMPHOCYTES NFR BLD: 7.3 % (ref 18–48)
MCH RBC QN AUTO: 29.5 PG (ref 27–31)
MCHC RBC AUTO-ENTMCNC: 33.6 G/DL (ref 32–36)
MCV RBC AUTO: 88 FL (ref 82–98)
MONOCYTES # BLD AUTO: 0.7 K/UL (ref 0.3–1)
MONOCYTES NFR BLD: 5.9 % (ref 4–15)
NEUTROPHILS # BLD AUTO: 10.1 K/UL (ref 1.8–7.7)
NEUTROPHILS NFR BLD: 84.4 % (ref 38–73)
NITRITE UR QL STRIP: NEGATIVE
NRBC BLD-RTO: 0 /100 WBC
PH UR STRIP: 8 [PH] (ref 5–8)
PLATELET # BLD AUTO: 262 K/UL (ref 150–450)
PMV BLD AUTO: 10.3 FL (ref 9.2–12.9)
POTASSIUM SERPL-SCNC: 3.9 MMOL/L (ref 3.5–5.1)
PROT SERPL-MCNC: 7.1 G/DL (ref 6–8.4)
PROT UR QL STRIP: NEGATIVE
RBC # BLD AUTO: 4.51 M/UL (ref 4–5.4)
SARS-COV-2 RDRP RESP QL NAA+PROBE: NEGATIVE
SODIUM SERPL-SCNC: 138 MMOL/L (ref 136–145)
SP GR UR STRIP: 1.01 (ref 1–1.03)
SPECIMEN SOURCE: NORMAL
TROPONIN I SERPL DL<=0.01 NG/ML-MCNC: <0.006 NG/ML (ref 0–0.03)
URN SPEC COLLECT METH UR: NORMAL
WBC # BLD AUTO: 11.92 K/UL (ref 3.9–12.7)

## 2024-01-04 PROCEDURE — 63700000 PHARM REV CODE 250 ALT 637 W/O HCPCS: Mod: HCNC | Performed by: PHYSICIAN ASSISTANT

## 2024-01-04 PROCEDURE — 87502 INFLUENZA DNA AMP PROBE: CPT | Mod: HCNC | Performed by: PHYSICIAN ASSISTANT

## 2024-01-04 PROCEDURE — 83690 ASSAY OF LIPASE: CPT | Mod: HCNC | Performed by: PHYSICIAN ASSISTANT

## 2024-01-04 PROCEDURE — 93010 ELECTROCARDIOGRAM REPORT: CPT | Mod: HCNC,,, | Performed by: INTERNAL MEDICINE

## 2024-01-04 PROCEDURE — 25000003 PHARM REV CODE 250: Mod: HCNC | Performed by: PHYSICIAN ASSISTANT

## 2024-01-04 PROCEDURE — 84484 ASSAY OF TROPONIN QUANT: CPT | Mod: HCNC | Performed by: PHYSICIAN ASSISTANT

## 2024-01-04 PROCEDURE — 80053 COMPREHEN METABOLIC PANEL: CPT | Mod: HCNC | Performed by: PHYSICIAN ASSISTANT

## 2024-01-04 PROCEDURE — 96375 TX/PRO/DX INJ NEW DRUG ADDON: CPT | Mod: HCNC

## 2024-01-04 PROCEDURE — 63600175 PHARM REV CODE 636 W HCPCS: Mod: HCNC | Performed by: PHYSICIAN ASSISTANT

## 2024-01-04 PROCEDURE — 85025 COMPLETE CBC W/AUTO DIFF WBC: CPT | Mod: HCNC | Performed by: PHYSICIAN ASSISTANT

## 2024-01-04 PROCEDURE — 96361 HYDRATE IV INFUSION ADD-ON: CPT | Mod: HCNC

## 2024-01-04 PROCEDURE — 96374 THER/PROPH/DIAG INJ IV PUSH: CPT | Mod: HCNC

## 2024-01-04 PROCEDURE — 93005 ELECTROCARDIOGRAM TRACING: CPT | Mod: HCNC

## 2024-01-04 PROCEDURE — 86803 HEPATITIS C AB TEST: CPT | Mod: HCNC | Performed by: PHYSICIAN ASSISTANT

## 2024-01-04 PROCEDURE — U0002 COVID-19 LAB TEST NON-CDC: HCPCS | Mod: HCNC | Performed by: PHYSICIAN ASSISTANT

## 2024-01-04 PROCEDURE — 83880 ASSAY OF NATRIURETIC PEPTIDE: CPT | Mod: HCNC | Performed by: PHYSICIAN ASSISTANT

## 2024-01-04 PROCEDURE — 99285 EMERGENCY DEPT VISIT HI MDM: CPT | Mod: 25,HCNC

## 2024-01-04 PROCEDURE — 81003 URINALYSIS AUTO W/O SCOPE: CPT | Mod: HCNC | Performed by: PHYSICIAN ASSISTANT

## 2024-01-04 PROCEDURE — 87389 HIV-1 AG W/HIV-1&-2 AB AG IA: CPT | Mod: HCNC | Performed by: PHYSICIAN ASSISTANT

## 2024-01-04 RX ORDER — ONDANSETRON 4 MG/1
4 TABLET, ORALLY DISINTEGRATING ORAL EVERY 8 HOURS PRN
Qty: 12 TABLET | Refills: 0 | Status: SHIPPED | OUTPATIENT
Start: 2024-01-04

## 2024-01-04 RX ORDER — ACETAMINOPHEN 325 MG/1
650 TABLET ORAL
Status: COMPLETED | OUTPATIENT
Start: 2024-01-04 | End: 2024-01-04

## 2024-01-04 RX ORDER — AZITHROMYCIN 250 MG/1
500 TABLET, FILM COATED ORAL
Status: COMPLETED | OUTPATIENT
Start: 2024-01-04 | End: 2024-01-04

## 2024-01-04 RX ORDER — AMOXICILLIN AND CLAVULANATE POTASSIUM 875; 125 MG/1; MG/1
1 TABLET, FILM COATED ORAL
Status: COMPLETED | OUTPATIENT
Start: 2024-01-04 | End: 2024-01-04

## 2024-01-04 RX ORDER — KETOROLAC TROMETHAMINE 30 MG/ML
10 INJECTION, SOLUTION INTRAMUSCULAR; INTRAVENOUS
Status: COMPLETED | OUTPATIENT
Start: 2024-01-04 | End: 2024-01-04

## 2024-01-04 RX ORDER — AMOXICILLIN AND CLAVULANATE POTASSIUM 875; 125 MG/1; MG/1
1 TABLET, FILM COATED ORAL 2 TIMES DAILY
Qty: 14 TABLET | Refills: 0 | Status: SHIPPED | OUTPATIENT
Start: 2024-01-04 | End: 2024-01-16 | Stop reason: ALTCHOICE

## 2024-01-04 RX ORDER — AZITHROMYCIN 250 MG/1
250 TABLET, FILM COATED ORAL DAILY
Qty: 6 TABLET | Refills: 0 | Status: SHIPPED | OUTPATIENT
Start: 2024-01-04 | End: 2024-01-16 | Stop reason: ALTCHOICE

## 2024-01-04 RX ORDER — ONDANSETRON 2 MG/ML
4 INJECTION INTRAMUSCULAR; INTRAVENOUS
Status: COMPLETED | OUTPATIENT
Start: 2024-01-04 | End: 2024-01-04

## 2024-01-04 RX ADMIN — ACETAMINOPHEN 650 MG: 325 TABLET ORAL at 08:01

## 2024-01-04 RX ADMIN — ONDANSETRON 4 MG: 2 INJECTION INTRAMUSCULAR; INTRAVENOUS at 08:01

## 2024-01-04 RX ADMIN — AZITHROMYCIN DIHYDRATE 500 MG: 250 TABLET ORAL at 01:01

## 2024-01-04 RX ADMIN — KETOROLAC TROMETHAMINE 10 MG: 30 INJECTION, SOLUTION INTRAMUSCULAR; INTRAVENOUS at 01:01

## 2024-01-04 RX ADMIN — AMOXICILLIN AND CLAVULANATE POTASSIUM 1 TABLET: 875; 125 TABLET, FILM COATED ORAL at 01:01

## 2024-01-04 RX ADMIN — SODIUM CHLORIDE 500 ML: 9 INJECTION, SOLUTION INTRAVENOUS at 08:01

## 2024-01-04 NOTE — ED PROVIDER NOTES
Encounter Date: 2024       History     Chief Complaint   Patient presents with    Emesis    Chills     Onset this morning     78-year-old female with past medical history of hypertension, hyperlipidemia, MDD, type 2 DM presents to the emergency department with chief complaint of sore throat that began a couple of days ago.  She also endorses headache, congestion, dry cough, body aches.  She began to have nausea and vomiting this morning.  She reports a couple of episodes of vomiting prior to arrival.  She continues to feel nauseous.  She did have a large bowel movement this morning but denies diarrhea.  She feels as though she has to defecate currently as well.  She endorses epigastric abdominal pain that occurs when she vomits or coughs.  She states that her abdomen feels sore.  No urinary symptoms.  No sick contacts.  She has tried over-the-counter throat spray as well as tea for her symptoms without significant improvement.  She states that she feels very fatigued and is having trouble walking because she feels so weak.  She denies other worsening or alleviating factors.      Review of patient's allergies indicates:   Allergen Reactions    Ace inhibitors      Other reaction(s): Swelling     Past Medical History:   Diagnosis Date    Anxiety     Aortic atherosclerosis     CXR 10/13/12    Arthritis     Back pain     Cataract     Hyperlipidemia 2016    Hypertension     MDD (major depressive disorder)     Menopause     Non morbid obesity due to excess calories 2016    Personal history of kidney stones     Type 2 diabetes mellitus without complication      Past Surgical History:   Procedure Laterality Date    CATARACT EXTRACTION, BILATERAL       SECTION      CHOLECYSTECTOMY      COLONOSCOPY      normal    EYE SURGERY      HYSTERECTOMY      KIDNEY STONE SURGERY      TOTAL ABDOMINAL HYSTERECTOMY W/ BILATERAL SALPINGOOPHORECTOMY       Family History   Problem Relation Age of Onset    Asthma  Mother     Cancer Father         lymphoma    Lymphoma Father     Cancer Sister         breast    Breast cancer Sister     Depression Son     Heart disease Sister     Depression Son     Diabetes Son     Prostate cancer Son     Ovarian cancer Other      Social History     Tobacco Use    Smoking status: Never    Smokeless tobacco: Never   Substance Use Topics    Alcohol use: Not Currently     Alcohol/week: 0.0 standard drinks of alcohol    Drug use: No     Review of Systems   HENT:  Positive for congestion and sore throat.    Respiratory:  Positive for cough.    Gastrointestinal:  Positive for nausea and vomiting.   Neurological:  Positive for headaches.       Physical Exam     Initial Vitals [01/04/24 0702]   BP Pulse Resp Temp SpO2   130/62 95 20 98.8 °F (37.1 °C) 95 %      MAP       --         Physical Exam    Vitals reviewed.  Constitutional: She is not diaphoretic. No distress.   Ill-appearing   HENT:   Head: Normocephalic and atraumatic.   Dry mucous membrane   Eyes: Conjunctivae and EOM are normal. Pupils are equal, round, and reactive to light.   Neck: Neck supple.   Normal range of motion.  Cardiovascular:  Normal rate, regular rhythm, normal heart sounds and intact distal pulses.     Exam reveals no gallop and no friction rub.       No murmur heard.  Pulmonary/Chest: She has no wheezes. She has rhonchi. She has no rales.   Abdominal: Abdomen is soft. Bowel sounds are normal. There is abdominal tenderness (Mild) in the epigastric area. There is no rebound and no guarding.   Musculoskeletal:         General: Normal range of motion.      Cervical back: Normal range of motion and neck supple.     Neurological: She is alert and oriented to person, place, and time. She has normal strength. No cranial nerve deficit or sensory deficit. GCS score is 15. GCS eye subscore is 4. GCS verbal subscore is 5. GCS motor subscore is 6.   Skin: Skin is warm and dry. Capillary refill takes less than 2 seconds.   Psychiatric: She  has a normal mood and affect. Her behavior is normal. Judgment and thought content normal.         ED Course   Procedures  Labs Reviewed   CBC W/ AUTO DIFFERENTIAL - Abnormal; Notable for the following components:       Result Value    Gran # (ANC) 10.1 (*)     Lymph # 0.9 (*)     Gran % 84.4 (*)     Lymph % 7.3 (*)     All other components within normal limits   COMPREHENSIVE METABOLIC PANEL - Abnormal; Notable for the following components:    Glucose 181 (*)     All other components within normal limits   INFLUENZA A & B BY MOLECULAR   HIV 1 / 2 ANTIBODY    Narrative:     Release to patient->Immediate   HEPATITIS C ANTIBODY    Narrative:     Release to patient->Immediate   B-TYPE NATRIURETIC PEPTIDE   TROPONIN I   URINALYSIS, REFLEX TO URINE CULTURE    Narrative:     Specimen Source->Urine   SARS-COV-2 RNA AMPLIFICATION, QUAL   LIPASE     EKG Readings: (Independently Interpreted)   Initial Reading: No STEMI. Rhythm: Normal Sinus Rhythm. Heart Rate: 83.     ECG Results              EKG 12-lead (Final result)  Result time 01/04/24 10:29:38      Final result by Interface, Lab In [facility] (01/04/24 10:29:38)                   Narrative:    Test Reason : R11.10,    Vent. Rate : 083 BPM     Atrial Rate : 083 BPM     P-R Int : 166 ms          QRS Dur : 084 ms      QT Int : 340 ms       P-R-T Axes : 082 017 127 degrees     QTc Int : 399 ms    Normal sinus rhythm with marked artifact  Low voltage QRS  Nonspecific ST and T wave abnormality  Abnormal ECG  When compared with ECG of 21-NOV-2023 13:10,  ST no longer depressed in Inferior leads  Nonspecific T wave abnormality now evident in Inferior leads  T wave inversion now evident in Lateral leads  QT has shortened  Confirmed by Huey YBARRA MD ([id number]) on 1/4/2024 10:29:27 AM    Referred By: SHANTELERR   SELF           Confirmed By:Huey YBARRA MD                                  Imaging Results              X-Ray Chest PA And Lateral (Final result)  Result time 01/04/24 09:08:59       Final result by Carlos Woodruff MD (01/04/24 09:08:59)                   Impression:      Insert epic    1. Rounded focus of increased parenchymal attenuation projects over the left lower lung zone, not confirmed on the lateral view.  Finding may reflect artifact from adjacent rib however developing consolidation or nodule not excluded.  Correlation is advised, short-term follow-up recommended to ensure resolution      Electronically signed by: Carlos Woodruff MD  Date:    01/04/2024  Time:    09:08               Narrative:    EXAMINATION:  XR CHEST PA AND LATERAL    CLINICAL HISTORY:  Cough, unspecified    TECHNIQUE:  PA and lateral views of the chest were performed.    COMPARISON:  04/04/2022    FINDINGS:  The cardiomediastinal silhouette is not enlarged noting calcification of the aorta..  There is no pleural effusion.  The trachea is midline.  The lungs are symmetrically expanded bilaterally with mildly coarse interstitial attenuation.  There is a focus of increased attenuation projected over the left lower lung zone, may reflect artifact from the adjacent anterior rib although not evident on the previous exam..  There is no pneumothorax.  The osseous structures are remarkable for degenerative change..                                    X-Rays:   Independently Interpreted Readings:   Chest X-Ray: Normal heart size. Round opacity at left lower lung zone. No pneumothorax.      Medications   sodium chloride 0.9% bolus 500 mL 500 mL (0 mLs Intravenous Stopped 1/4/24 0933)   ondansetron injection 4 mg (4 mg Intravenous Given 1/4/24 0835)   acetaminophen tablet 650 mg (650 mg Oral Given 1/4/24 0836)   amoxicillin-clavulanate 875-125mg per tablet 1 tablet (1 tablet Oral Given 1/4/24 1324)   azithromycin tablet 500 mg (500 mg Oral Given 1/4/24 1324)   ketorolac injection 9.999 mg (9.999 mg Intravenous Given 1/4/24 1339)     Medical Decision Making  Emergent evaluation of a 78 y.o. female presenting to the  emergency department complaining of headache, congestion, sore throat, cough, epigastric abdominal pain, vomiting, nausea, fatigue that began 2 days ago. Patient is afebrile, hemodynamically stable, and non toxic appearing.   Will order labs, chest x-ray, viral swabs, IV fluid as patient does appear dehydrated and Zofran, Tylenol.     Differential diagnosis includes but isn't limited to viral syndrome, pneumonia, COVID, influenza, ACS, gastritis, pancreatitis.    UA without infection.  No severe hematologic derangements.  No leukocytosis.  H/H is stable.  She has mild hyperglycemia of 181.  No evidence of DKA with a normal CO2 and normal anion gap.  Kidney function is baseline.  Troponin and BNP are within normal limits.  COVID swab and influenza swabs are negative.  Chest x-ray rounded consolidation of the left lower lung zone.  The patient has no smoking history.  This is concerning for consolidation versus nodule.  Will treat with Augmentin and azithromycin.  Recommend outpatient follow-up once antibiotic courses complete.  Patient voices understanding with this plan.  She feels much improved after IV fluids, Zofran, Tylenol.  She does feel safe with going home.  Extensive return precautions discussed.  Patient voices understanding.  All questions answered.  The patient was instructed to follow up with a primary care provider or to return to the emergency department for worsening symptoms. The treatment plan was discussed with the patient who demonstrated understanding and comfort with plan. The patient's history, physical exam, and plan of care was discussed with and agreed upon with my supervising physician.     Amount and/or Complexity of Data Reviewed  Labs: ordered. Decision-making details documented in ED Course.  Radiology: ordered.    Risk  OTC drugs.  Prescription drug management.              Attending Attestation:     Physician Attestation Statement for NP/PA:   I have directed and reviewed the workup  performed by the PA/NP.  I performed the substantive portion of the medical decision making.     Other NP/PA Attestation Additions:    History of Present Illness: Source: Patient; patient's ; records external to today's visit.    This is a 77 yo female who presents to the ED with known diabetes, hypertension, and hyperlipidemia with a 3 day history of multiple symptoms including mild diffuse headache (not the worst of her life or thunderclap in nature), sore throat that is scratchy in nature, congestion and rhinorrhea, cough that is productive of only scant sputum, nausea and vomiting, and diffuse body aches.  She does not have any mey chest pain and does not feel short of breath per se but does feel very fatigued today which is what prompted her arrival to the emergency department.   Physical Exam: VS upon arrival: 130/62; 95; 20; 95% room air; 98.8° F oral.  Consititutional: Pt is awake, alert, and oriented x 4. Does not appear to be in any mey distress. Certainly no respiratory distress.  HEENT: PERRL; EOMI; nares patent; mm moist.  Neck: Supple with good ROM.  CV: Normal rate; regular rhythm; no mrg. Heart sounds normal. Very subtle bilateral symmetric pitting peripheral edema. 2+ radials bilateral and symmetric.  Respiratory: CTA bilaterally with no focal rales, ronchi, or wheezes.  GI: Abdomen soft, NTND. No rebound. No guarding. BS normal. Benign exam.   MSK: No long bone deformities; no focal joint swellings.  Neuro: MAEW.   Skin: No skin lesions or rash.       Medical Decision Making: Differential for this patient:  - Sounds infectious in nature. Most likely flu or other viral syndrome (including COVID). Will order flu and COVID testing and a CBC. Also will need a CXR to rule out pneumonia.   - With the fatigue, we will assure her ECG and troponin are reassuring, although with all of the other infectious symptoms that she is having, I think cardiac ischemia is much less likely.   - Also with the  fatigue will make sure she is not anemia and does not have any other electrolyte abnormalities.     The patient's ECG, independently reviewed and interpreted by me, reveals sinus rhythm with some baseline artifact and nonspecific ST and T-wave changes not concerning for acute ischemia or infarction.  In comparison to previous EKG from November 2023 the lateral ST segments appear improved.  The patient's electrolytes reveal a glucose of 181 but no evidence of DKA.  The patient's white blood cell count is 11.92 with a left shift.  The patient's flu test and COVID testing are negative.  The patient's urinalysis is negative for infection.  The patient's chest x-ray did show what appears to be a rounded infiltrate in the left lower lung zone.  It is fairly well-circumscribed.  The patient's troponin of note was normal.    Although the patient's influenza testing and COVID testing were negative, I feel that the patient does indeed have a viral syndrome of some sort.  We have discussed supportive care measures for this.  She has shown significant improvement here in the ED with antiemetics and IV fluids and I do feel she is appropriate for discharge home.    As above, the chest x-ray did show a what appears to me to be a well-circumscribed infiltrate in the left lower lung zone.  We will cover the patient with azithromycin and Augmentin for presumed pneumonia.  I have discussed this with the patient and her  as I want her to have a follow-up chest x-ray following the treatment of this to assure that that area improves and resolves.  They have voiced understanding of this.    Otherwise, the patient's troponin was normal.  Her EKG did not show any significant concerns regarding ST changes.    Strict return precautions were given.    ED Diagnosis:  1. Acute viral syndrome/upper respiratory infection.   2. Acute superimposed pneumonia.   3. Above diagnoses complicated by known diabetes, hypertension, and  hyperlipidemia.    I performed a face to face evaluation of the patient, and I personally made and approve the management plan for this patient and take responsibility for the patient management.                   ED Course as of 01/04/24 1439   Thu Jan 04, 2024   0811 WBC: 11.92 [JM]   0811 Hemoglobin: 13.3 [JM]   0811 Hematocrit: 39.6 [JM]   0811 Platelet Count: 262 [JM]   0837 Glucose(!): 181 [JM]   0837 Creatinine: 0.8 [JM]   0837 BNP: 18 [JM]   0837 Lipase: 18 [JM]   0837 Troponin I: <0.006 [JM]   0837 Influenza A, Molecular: Negative [JM]   0837 Influenza B, Molecular: Negative []   0837 SARS-CoV-2 RNA, Amplification, Qual: Negative []      ED Course User Index  [JM] Sherrill Magana PA-C                           Clinical Impression:  Final diagnoses:  [R11.10] Emesis  [R05.9] Cough  [R93.89] Abnormal chest x-ray (Primary)  [J18.9] Pneumonia of left lower lobe due to infectious organism  [B34.9] Viral syndrome          ED Disposition Condition    Discharge Stable          ED Prescriptions       Medication Sig Dispense Start Date End Date Auth. Provider    amoxicillin-clavulanate 875-125mg (AUGMENTIN) 875-125 mg per tablet Take 1 tablet by mouth 2 (two) times daily. for 7 days 14 tablet 1/4/2024 1/11/2024 Sherrill Magana PA-C    azithromycin (Z-MORELIA) 250 MG tablet Take 1 tablet (250 mg total) by mouth once daily. Take first 2 tablets together, then 1 every day until finished. 6 tablet 1/4/2024 -- Sherrill Magana PA-C    ondansetron (ZOFRAN-ODT) 4 MG TbDL Take 1 tablet (4 mg total) by mouth every 8 (eight) hours as needed (nausea). 12 tablet 1/4/2024 -- Sherrill Magana PA-C          Follow-up Information       Follow up With Specialties Details Why Contact Info    [facility] Emergency Medicine Go to  If symptoms worsen [id number]  [facility] [address] [phone]    Cornell Garcia, DO Internal Medicine Schedule an appointment as soon as  possible for a visit in 1 week  [address] [facility] [address]  [phone]               Sherrill Magana PA-C  01/04/24 9990       Melanie Roman MD  01/04/24 1004

## 2024-01-04 NOTE — ED NOTES
Sore throat X 2 days; n/v started early this morning. C/o epigastric pain and pain worse with vomiting / coughing. Reports chills at home but never took her temperature. Also c/o diarrhea

## 2024-01-04 NOTE — DISCHARGE INSTRUCTIONS
Please take Augmentin and azithromycin as prescribed to completion.  Take Zofran as needed for nausea.  Follow-up closely for your primary doctor for repeat chest imaging after you have completed the course of antibiotics or return to the emergency department sooner for any new or worsening symptoms.

## 2024-01-10 ENCOUNTER — TELEPHONE (OUTPATIENT)
Dept: INTERNAL MEDICINE | Facility: CLINIC | Age: 79
End: 2024-01-10
Payer: MEDICARE

## 2024-01-10 NOTE — TELEPHONE ENCOUNTER
----- Message from Merrick Ochoa sent at 1/10/2024  3:54 PM CST -----  Contact: Pt  [phone]  [email or web address]     Patient is calling for Medical Advice regarding: Feeling weak    How long has patient had these symptoms: 1 week     Pharmacy name and phone#:[facility] #[id number] - [place] - [address]  AT [facility] [address]   Phone: [phone]  Fax: [phone]      Would like response via [employer]: call    Comments:

## 2024-01-16 ENCOUNTER — OFFICE VISIT (OUTPATIENT)
Dept: INTERNAL MEDICINE | Facility: CLINIC | Age: 79
End: 2024-01-16
Payer: MEDICARE

## 2024-01-16 ENCOUNTER — LAB VISIT (OUTPATIENT)
Dept: LAB | Facility: HOSPITAL | Age: 79
End: 2024-01-16
Payer: MEDICARE

## 2024-01-16 VITALS
TEMPERATURE: 98 F | OXYGEN SATURATION: 97 % | SYSTOLIC BLOOD PRESSURE: 156 MMHG | BODY MASS INDEX: 30.26 KG/M2 | WEIGHT: 154.13 LBS | DIASTOLIC BLOOD PRESSURE: 82 MMHG | RESPIRATION RATE: 18 BRPM | HEART RATE: 80 BPM | HEIGHT: 60 IN

## 2024-01-16 DIAGNOSIS — R53.83 FATIGUE, UNSPECIFIED TYPE: ICD-10-CM

## 2024-01-16 DIAGNOSIS — R09.82 POST-NASAL DRIP: Primary | ICD-10-CM

## 2024-01-16 LAB
T4 FREE SERPL-MCNC: 1.01 NG/DL (ref 0.71–1.51)
TSH SERPL DL<=0.005 MIU/L-ACNC: 2.34 UIU/ML (ref 0.4–4)

## 2024-01-16 PROCEDURE — 84439 ASSAY OF FREE THYROXINE: CPT | Mod: HCNC | Performed by: NURSE PRACTITIONER

## 2024-01-16 PROCEDURE — 84443 ASSAY THYROID STIM HORMONE: CPT | Mod: HCNC | Performed by: NURSE PRACTITIONER

## 2024-01-16 PROCEDURE — 99999 PR PBB SHADOW E&M-EST. PATIENT-LVL V: CPT | Mod: PBBFAC,HCNC,, | Performed by: NURSE PRACTITIONER

## 2024-01-16 PROCEDURE — 1126F AMNT PAIN NOTED NONE PRSNT: CPT | Mod: HCNC,CPTII,S$GLB, | Performed by: NURSE PRACTITIONER

## 2024-01-16 PROCEDURE — 36415 COLL VENOUS BLD VENIPUNCTURE: CPT | Mod: HCNC,PO | Performed by: NURSE PRACTITIONER

## 2024-01-16 PROCEDURE — 3077F SYST BP >= 140 MM HG: CPT | Mod: HCNC,CPTII,S$GLB, | Performed by: NURSE PRACTITIONER

## 2024-01-16 PROCEDURE — 1159F MED LIST DOCD IN RCRD: CPT | Mod: HCNC,CPTII,S$GLB, | Performed by: NURSE PRACTITIONER

## 2024-01-16 PROCEDURE — 1160F RVW MEDS BY RX/DR IN RCRD: CPT | Mod: HCNC,CPTII,S$GLB, | Performed by: NURSE PRACTITIONER

## 2024-01-16 PROCEDURE — 99213 OFFICE O/P EST LOW 20 MIN: CPT | Mod: HCNC,S$GLB,, | Performed by: NURSE PRACTITIONER

## 2024-01-16 PROCEDURE — 3079F DIAST BP 80-89 MM HG: CPT | Mod: HCNC,CPTII,S$GLB, | Performed by: NURSE PRACTITIONER

## 2024-01-16 RX ORDER — FLUTICASONE PROPIONATE 50 MCG
1 SPRAY, SUSPENSION (ML) NASAL DAILY
Qty: 16 G | Refills: 0 | Status: SHIPPED | OUTPATIENT
Start: 2024-01-16

## 2024-01-16 NOTE — ASSESSMENT & PLAN NOTE
Recent vitamin-D level was within normal limits, CBC, CMP were also normal during recent ER visit.    We will check her thyroid function and T4 levels as this can be a cause of fatigue.    New patient recommended to increase physical activity as much as possible.    She will be contacted via patient portal with results.    Referral to sleep Medicine provided as STOP BANG 4/8 reflecting intermediate risk for sleep apnea.

## 2024-01-16 NOTE — PROGRESS NOTES
Subjective     Patient ID: Chikis Epstein is a 78 y.o. female.    Chief Complaint: Follow-up, Fatigue, and Pneumonia    78-year-old female with past medical history of hypertension, hyperlipidemia, MDD, type 2 DM presents for ER follow up for fatigue.   Work up was positive for infiltrate of the LLL which was treated with azithromycin and Augmentin. Pt tested negative for flu and COVID. EKG was unremarkable and there was no evidence of DKA. UA was negative for infection.     Since discharge pt continues to complain of post nasal drip, R nasal congestion and ongoing fatigue. She has continued to take her Xanax 0.25 mg QP/PRN. When taken she gets about 4 hours of sleep. She states that her  has mentioned to her that she snores at night. She continues to feel fatigue during the day. She would like to increase her psychical activity but does not have the motivation.   Hx of depression and continues to be compliant with Wellbutrin  mg. She feels like her moods remain stable and well controlled.       Review of Systems   HENT:  Positive for postnasal drip and rhinorrhea.    Psychiatric/Behavioral:  Positive for sleep disturbance.           Objective     Physical Exam  Vitals reviewed.   Constitutional:       Appearance: Normal appearance.   HENT:      Head: Normocephalic and atraumatic.      Nose: Rhinorrhea present.      Right Turbinates: Swollen.   Eyes:      Conjunctiva/sclera: Conjunctivae normal.      Pupils: Pupils are equal, round, and reactive to light.   Cardiovascular:      Rate and Rhythm: Normal rate and regular rhythm.   Pulmonary:      Effort: Pulmonary effort is normal.      Breath sounds: Normal breath sounds.   Abdominal:      General: Bowel sounds are normal.      Palpations: Abdomen is soft.   Musculoskeletal:         General: Normal range of motion.      Cervical back: Normal range of motion and neck supple.   Skin:     General: Skin is warm.   Neurological:      General: No focal deficit  present.   Psychiatric:         Mood and Affect: Mood normal.        Assessment and Plan   1. Post-nasal drip  Assessment & Plan:  Patient encouraged to continue with Claritin.    We will add Flonase to current regimen.    Patient would like to defer ENT referral at this time    Orders:  -     fluticasone propionate (FLONASE) 50 mcg/actuation nasal spray; 1 spray (50 mcg total) by Each Nostril route once daily.  Dispense: 16 g; Refill: 0    2. Fatigue, unspecified type  Assessment & Plan:  Recent vitamin-D level was within normal limits, CBC, CMP were also normal during recent ER visit.    We will check her thyroid function and T4 levels as this can be a cause of fatigue.    New patient recommended to increase physical activity as much as possible.    She will be contacted via patient portal with results.    Referral to sleep Medicine provided as STOP BANG 4/8 reflecting intermediate risk for sleep apnea.     Orders:  -     TSH; Future; Expected date: 01/16/2024  -     T4, FREE; Future; Expected date: 01/16/2024  -     Ambulatory referral/consult to Sleep Disorders; Future; Expected date: 01/23/2024    RTC on 4/9/24 as previously scheduled.

## 2024-01-16 NOTE — ASSESSMENT & PLAN NOTE
Patient encouraged to continue with Claritin.    We will add Flonase to current regimen.    Patient would like to defer ENT referral at this time

## 2024-01-29 ENCOUNTER — TELEPHONE (OUTPATIENT)
Dept: INTERNAL MEDICINE | Facility: CLINIC | Age: 79
End: 2024-01-29
Payer: MEDICARE

## 2024-01-29 VITALS — SYSTOLIC BLOOD PRESSURE: 139 MMHG | DIASTOLIC BLOOD PRESSURE: 68 MMHG

## 2024-02-05 ENCOUNTER — OFFICE VISIT (OUTPATIENT)
Dept: URGENT CARE | Facility: CLINIC | Age: 79
End: 2024-02-05
Payer: MEDICARE

## 2024-02-05 VITALS
HEART RATE: 76 BPM | BODY MASS INDEX: 30.23 KG/M2 | RESPIRATION RATE: 18 BRPM | TEMPERATURE: 98 F | DIASTOLIC BLOOD PRESSURE: 77 MMHG | HEIGHT: 60 IN | WEIGHT: 154 LBS | OXYGEN SATURATION: 95 % | SYSTOLIC BLOOD PRESSURE: 147 MMHG

## 2024-02-05 DIAGNOSIS — N30.01 ACUTE CYSTITIS WITH HEMATURIA: Primary | ICD-10-CM

## 2024-02-05 LAB
BILIRUB UR QL STRIP: POSITIVE
GLUCOSE UR QL STRIP: POSITIVE
KETONES UR QL STRIP: NEGATIVE
LEUKOCYTE ESTERASE UR QL STRIP: NEGATIVE
PH, POC UA: 7 (ref 5–8)
POC BLOOD, URINE: POSITIVE
POC NITRATES, URINE: POSITIVE
PROT UR QL STRIP: POSITIVE
SP GR UR STRIP: 1.01 (ref 1–1.03)
UROBILINOGEN UR STRIP-ACNC: POSITIVE (ref 0.1–1.1)

## 2024-02-05 PROCEDURE — 87086 URINE CULTURE/COLONY COUNT: CPT | Mod: HCNC | Performed by: PHYSICIAN ASSISTANT

## 2024-02-05 PROCEDURE — 81003 URINALYSIS AUTO W/O SCOPE: CPT | Mod: QW,S$GLB,, | Performed by: PHYSICIAN ASSISTANT

## 2024-02-05 PROCEDURE — 87088 URINE BACTERIA CULTURE: CPT | Mod: HCNC | Performed by: PHYSICIAN ASSISTANT

## 2024-02-05 PROCEDURE — 99213 OFFICE O/P EST LOW 20 MIN: CPT | Mod: S$GLB,,, | Performed by: PHYSICIAN ASSISTANT

## 2024-02-05 PROCEDURE — 87186 SC STD MICRODIL/AGAR DIL: CPT | Mod: HCNC | Performed by: PHYSICIAN ASSISTANT

## 2024-02-05 PROCEDURE — 87077 CULTURE AEROBIC IDENTIFY: CPT | Mod: HCNC | Performed by: PHYSICIAN ASSISTANT

## 2024-02-05 RX ORDER — NITROFURANTOIN 25; 75 MG/1; MG/1
100 CAPSULE ORAL 2 TIMES DAILY
Qty: 10 CAPSULE | Refills: 0 | Status: SHIPPED | OUTPATIENT
Start: 2024-02-05 | End: 2024-02-10

## 2024-02-05 RX ORDER — PHENAZOPYRIDINE HYDROCHLORIDE 200 MG/1
200 TABLET, FILM COATED ORAL 3 TIMES DAILY PRN
Qty: 6 TABLET | Refills: 0 | Status: SHIPPED | OUTPATIENT
Start: 2024-02-05 | End: 2024-02-07

## 2024-02-05 NOTE — PATIENT INSTRUCTIONS
If your condition worsens or fails to improve we recommend that you receive another evaluation at the ER immediately or contact your PCP to discuss your concerns or return here. You must understand that you've received an urgent care treatment only and that you may be released before all your medical problems are known or treated. You the patient will arrange for followup care as instructed.   If you had cultures done it will take 3-5 days to result. We will call you with the result.   If you are are female and on BCP use additional methods to prevent pregnancy while on the antibiotics and for one cycle after.   Cranberry juice may help. Get the 100% cranberry juice and mix 4 oz of juice with 4 oz of water and drink this 8 oz glass of liquid once a day.   Increase water intake to at least 8-10 glasses/day.  Do not wear contacts with Pyridium as it will stain them.  You may want to wear a panty liner with Pyridium as it may stain your underwear.  Avoid caffeine, alcohol, or spicy foods as they irritate the bladder.

## 2024-02-05 NOTE — PROGRESS NOTES
Subjective:      Patient ID: Chikis Epstein is a 78 y.o. female.    Vitals:  vitals were not taken for this visit.     Chief Complaint: Cystitis    This is a 78 y.o. female who presents today with a chief complaint of bladder infection. Patient notice blood in her urine this morning.     Urinary Tract Infection   This is a new problem. The current episode started yesterday. The quality of the pain is described as burning and aching. There has been no fever. She is Not sexually active. There is No history of pyelonephritis. Associated symptoms include chills, frequency, urgency and constipation. Treatments tried: AZO. The treatment provided mild relief. Her past medical history is significant for hypertension and kidney stones.       Constitution: Positive for chills.   Gastrointestinal:  Positive for constipation.   Genitourinary:  Positive for frequency and urgency.      Objective:     Physical Exam    Assessment:     No diagnosis found.    Plan:       There are no diagnoses linked to this encounter.

## 2024-02-05 NOTE — PROGRESS NOTES
Subjective:      Patient ID: Chikis Epstein is a 78 y.o. female.    Vitals:  height is 5' (1.524 m) and weight is 69.9 kg (154 lb). Her oral temperature is 98.2 °F (36.8 °C). Her blood pressure is 147/77 (abnormal) and her pulse is 76. Her respiration is 18 and oxygen saturation is 95%.     Chief Complaint: Cystitis     78 y.o. female  presents with increased burning, frequency, and urgency since yesterday.The pt she noticed hematuria this morning. She tried OTC Azo with mild relief. Pertinent negatives include radiating back pain, N/V/D, altered mental status, vaginal discharge, or fever. Her medical history is significant for kidney stones.           Urinary Tract Infection   This is a new problem. The current episode started yesterday. The quality of the pain is described as burning and aching. There has been no fever. She is Not sexually active. There is No history of pyelonephritis. Associated symptoms include chills, frequency, hematuria and urgency. Pertinent negatives include no discharge, flank pain, hesitancy, nausea, sweats, vomiting, weight loss, constipation, rash or withholding. She has tried nothing (AZO) for the symptoms. The treatment provided mild relief. Her past medical history is significant for hypertension and kidney stones.       Constitution: Positive for chills. Negative for appetite change, sweating, fatigue and fever.   HENT: Negative.     Neck: neck negative.   Cardiovascular: Negative.    Eyes: Negative.    Respiratory: Negative.  Negative for shortness of breath.    Gastrointestinal:  Negative for abdominal pain, nausea, vomiting, constipation and diarrhea.   Endocrine: negative.   Genitourinary:  Positive for dysuria, frequency, urgency, hematuria and history of kidney stones. Negative for flank pain, vaginal discharge, vaginal bleeding and genital sore.   Musculoskeletal: Negative.  Negative for back pain, muscle cramps and muscle ache.   Skin: Negative.  Negative for rash.    Allergic/Immunologic: Negative.    Neurological: Negative.  Negative for disorientation, altered mental status and loss of consciousness.   Hematologic/Lymphatic: Negative.    Psychiatric/Behavioral: Negative.  Negative for altered mental status and disorientation.       Past Medical History:   Diagnosis Date    Anxiety     Aortic atherosclerosis     CXR 10/13/12    Arthritis     Back pain     Cataract     Hyperlipidemia 2016    Hypertension     MDD (major depressive disorder)     Menopause     Non morbid obesity due to excess calories 2016    Personal history of kidney stones     Type 2 diabetes mellitus without complication        Past Surgical History:   Procedure Laterality Date    CATARACT EXTRACTION, BILATERAL       SECTION      CHOLECYSTECTOMY      COLONOSCOPY      normal    EYE SURGERY      HYSTERECTOMY      KIDNEY STONE SURGERY      TOTAL ABDOMINAL HYSTERECTOMY W/ BILATERAL SALPINGOOPHORECTOMY         Family History   Problem Relation Age of Onset    Asthma Mother     Cancer Father         lymphoma    Lymphoma Father     Cancer Sister         breast    Breast cancer Sister     Depression Son     Heart disease Sister     Depression Son     Diabetes Son     Prostate cancer Son     Ovarian cancer Other        Social History     Socioeconomic History    Marital status:    Occupational History    Occupation: Retired   Tobacco Use    Smoking status: Never    Smokeless tobacco: Never   Substance and Sexual Activity    Alcohol use: Not Currently     Alcohol/week: 0.0 standard drinks of alcohol    Drug use: No    Sexual activity: Yes     Partners: Male     Social Determinants of Health     Financial Resource Strain: Low Risk  (2023)    Overall Financial Resource Strain (CARDIA)     Difficulty of Paying Living Expenses: Not hard at all   Food Insecurity: No Food Insecurity (2024)    Hunger Vital Sign     Worried About Running Out of Food in the Last Year: Never true     Ran  Out of Food in the Last Year: Never true   Transportation Needs: No Transportation Needs (1/11/2024)    PRAPARE - Transportation     Lack of Transportation (Medical): No     Lack of Transportation (Non-Medical): No   Physical Activity: Inactive (1/11/2024)    Exercise Vital Sign     Days of Exercise per Week: 0 days     Minutes of Exercise per Session: 0 min   Stress: No Stress Concern Present (1/11/2024)    [place] [employer] - Occupational Stress Questionnaire     Feeling of Stress : Not at all   Social Connections: Unknown (1/11/2024)    Social Connection and Isolation Panel [NHANES]     Frequency of Communication with Friends and Family: Once a week     Frequency of Social Gatherings with Friends and Family: Once a week     Active Member of Clubs or Organizations: Yes     Attends Club or Organization Meetings: More than 4 times per year     Marital Status:    Housing Stability: Unknown (1/11/2024)    Housing Stability Vital Sign     Unable to Pay for Housing in the Last Year: No     Unstable Housing in the Last Year: No       Current Outpatient Medications   Medication Sig Dispense Refill    acetaminophen (TYLENOL) 500 MG tablet Take 500 mg by mouth every 6 (six) hours as needed for Pain.      ALPRAZolam (XANAX) 0.25 MG tablet 1-2 tabs PO qHS PRN insomnia 60 tablet 1    amLODIPine (NORVASC) 10 MG tablet TAKE 1 TABLET EVERY DAY 90 tablet 1    buPROPion (WELLBUTRIN XL) 300 MG 24 hr tablet Take 1 tablet (300 mg total) by mouth once daily. 90 tablet 2    busPIRone (BUSPAR) 15 MG tablet TAKE 1 TABLET TWICE DAILY 180 tablet 3    calcium-vitamin D3 (OS-ANDRA 500 + D3) 500 mg-5 mcg (200 unit) per tablet Take 1 tablet by mouth once daily.      cetirizine (ZYRTEC) 10 MG tablet Take 10 mg by mouth as needed for Allergies.      fluticasone propionate (FLONASE) 50 mcg/actuation nasal spray 1 spray (50 mcg total) by Each Nostril route once daily. 16 g 0    hydroCHLOROthiazide (HYDRODIURIL) 12.5 MG  Tab TAKE 1 TABLET EVERY DAY 90 tablet 3    latanoprost 0.005 % ophthalmic solution INSTILL 1 DROP INTO BOTH EYES EVERY EVENING 7.5 mL 3    metFORMIN (GLUCOPHAGE-XR) 500 MG ER 24hr tablet TAKE 1 TABLET EVERY DAY WITH BREAKFAST 90 tablet 3    MULTIVIT-MINERALS/FOLIC ACID (CENTRUM MULTIGUMMIES ORAL) Take 2 each by mouth once daily.      nitrofurantoin, macrocrystal-monohydrate, (MACROBID) 100 MG capsule Take 1 capsule (100 mg total) by mouth 2 (two) times daily. for 5 days 10 capsule 0    ondansetron (ZOFRAN-ODT) 4 MG TbDL Take 1 tablet (4 mg total) by mouth every 8 (eight) hours as needed (nausea). 12 tablet 0    pantoprazole (PROTONIX) 40 MG tablet Take 1 tablet (40 mg total) by mouth once daily. 90 tablet 3    phenazopyridine (PYRIDIUM) 200 MG tablet Take 1 tablet (200 mg total) by mouth 3 (three) times daily as needed for Pain. 6 tablet 0    pravastatin (PRAVACHOL) 40 MG tablet TAKE 1 TABLET EVERY DAY 90 tablet 1    valsartan (DIOVAN) 320 MG tablet TAKE 1 TABLET EVERY DAY 90 tablet 3     No current facility-administered medications for this visit.       Review of patient's allergies indicates:   Allergen Reactions    Ace inhibitors      Other reaction(s): Swelling       Objective:     Physical Exam   Constitutional: She is oriented to person, place, and time. She appears well-developed.  Non-toxic appearance. She does not appear ill. No distress.   HENT:   Head: Normocephalic and atraumatic.   Nose: Nose normal.   Mouth/Throat: Mucous membranes are normal.   Eyes: Conjunctivae and lids are normal.   Neck: Trachea normal.   Cardiovascular: Normal rate, regular rhythm and normal heart sounds.   No murmur heard.Exam reveals no gallop and no friction rub.   Pulmonary/Chest: Effort normal and breath sounds normal. No stridor. No respiratory distress. She has no wheezes. She has no rhonchi. She has no rales.   Abdominal: Normal appearance and bowel sounds are normal. She exhibits no distension and no mass. Soft. flat  abdomen There is no abdominal tenderness. There is no rebound, no guarding, no left CVA tenderness and no right CVA tenderness. No hernia.   Neurological: She is alert and oriented to person, place, and time. She has normal strength.   Skin: Skin is warm, dry, intact, not diaphoretic, not pale and no rash.   Psychiatric: Her speech is normal and behavior is normal. Mood, judgment and thought content normal.   Nursing note and vitals reviewed.    Results for orders placed or performed in visit on 02/05/24   POCT Urinalysis, Dipstick, Automated, W/O Scope   Result Value Ref Range    POC Blood, Urine Positive (A) Negative    POC Bilirubin, Urine Positive (A) Negative    POC Urobilinogen, Urine positive 0.1 - 1.1    POC Ketones, Urine Negative Negative    POC Protein, Urine Positive (A) Negative    POC Nitrates, Urine Positive (A) Negative    POC Glucose, Urine Positive (A) Negative    pH, UA 7.0 5 - 8    POC Specific Gravity, Urine 1.015 1.003 - 1.029    POC Leukocytes, Urine Negative Negative         Assessment:     1. Acute cystitis with hematuria        Plan:       Acute cystitis with hematuria  -     POCT Urinalysis, Dipstick, Automated, W/O Scope  -     CULTURE, URINE  -     phenazopyridine (PYRIDIUM) 200 MG tablet; Take 1 tablet (200 mg total) by mouth 3 (three) times daily as needed for Pain.  Dispense: 6 tablet; Refill: 0  -     nitrofurantoin, macrocrystal-monohydrate, (MACROBID) 100 MG capsule; Take 1 capsule (100 mg total) by mouth 2 (two) times daily. for 5 days  Dispense: 10 capsule; Refill: 0    Results reviewed  I have reviewed the patient chart and pertinent past imaging/labs.  Patient Instructions   If your condition worsens or fails to improve we recommend that you receive another evaluation at the ER immediately or contact your PCP to discuss your concerns or return here. You must understand that you've received an urgent care treatment only and that you may be released before all your medical  problems are known or treated. You the patient will arrange for followup care as instructed.   If you had cultures done it will take 3-5 days to result. We will call you with the result.   If you are are female and on BCP use additional methods to prevent pregnancy while on the antibiotics and for one cycle after.   Cranberry juice may help. Get the 100% cranberry juice and mix 4 oz of juice with 4 oz of water and drink this 8 oz glass of liquid once a day.   Increase water intake to at least 8-10 glasses/day.  Do not wear contacts with Pyridium as it will stain them.  You may want to wear a panty liner with Pyridium as it may stain your underwear.  Avoid caffeine, alcohol, or spicy foods as they irritate the bladder.

## 2024-02-08 LAB — BACTERIA UR CULT: ABNORMAL

## 2024-02-12 ENCOUNTER — TELEPHONE (OUTPATIENT)
Dept: URGENT CARE | Facility: CLINIC | Age: 79
End: 2024-02-12
Payer: MEDICARE

## 2024-02-12 NOTE — TELEPHONE ENCOUNTER
----- Message from Fermin Davis PA-C sent at 2/8/2024  8:12 AM CST -----  Please call pt with UC result. Treated appropriately. Instruct to complete macrobid abx. Thank you      2-12-24 Spoke to patient regarding appropriate treatment with Microbid. Patient finished Macrobid. Macrobid made her nauseated at times. KMRTR.

## 2024-02-22 DIAGNOSIS — I70.0 ATHEROSCLEROSIS OF AORTA: ICD-10-CM

## 2024-02-22 DIAGNOSIS — I10 ESSENTIAL HYPERTENSION: ICD-10-CM

## 2024-02-22 RX ORDER — PRAVASTATIN SODIUM 40 MG/1
TABLET ORAL
Qty: 90 TABLET | Refills: 0 | Status: SHIPPED | OUTPATIENT
Start: 2024-02-22

## 2024-02-22 RX ORDER — AMLODIPINE BESYLATE 10 MG/1
TABLET ORAL
Qty: 90 TABLET | Refills: 2 | Status: SHIPPED | OUTPATIENT
Start: 2024-02-22

## 2024-02-22 NOTE — TELEPHONE ENCOUNTER
Care Due:                  Date            Visit Type   Department     Provider  --------------------------------------------------------------------------------                                EP -                              PRIMARY      [facility] INTERNAL  Last Visit: 10-      CARE ([facility])   MEDICINE       Cornell Garcia                              EP -                              PRIMARY      [facility] INTERNAL  Next Visit: 04-      CARE ([facility])   [facility]       Cornell Garcia                                                            Last  Test          Frequency    Reason                     Performed    Due Date  --------------------------------------------------------------------------------    HBA1C.......  6 months...  metFORMIN................  10-   03-    [facility] Embedded Care Due Messages. Reference number: [id number].   2/22/2024 11:42:17 AM CST

## 2024-02-22 NOTE — TELEPHONE ENCOUNTER
Refill Routing Note   Medication(s) are not appropriate for processing by [facility] for the following reason(s):        ED/Hospital Visit since last OV with provider  Required vitals abnormal-  02/05/24 (!) 147/77       [facility] action(s):  Defer     Requires labs : Yes      Medication Therapy Plan: 02/05/24 (!) 147/77 FOV 4/9/24    Pharmacist review requested: Yes     Appointments  past 12m or future 3m with PCP    Date Provider   Last Visit   10/4/2023 Cornell Garcia, DO   Next Visit   4/9/2024 Cornell Garcia, DO   ED visits in past 90 days: 1        Note composed:12:31 PM 02/22/2024

## 2024-02-22 NOTE — TELEPHONE ENCOUNTER
Refill Routing Note   Medication(s) are not appropriate for processing by [facility] for the following reason(s):        ED/Hospital Visit since last OV with provider  Required vitals abnormal    [facility] action(s):  Defer     Requires labs : Yes           Pharmacist review requested: Yes     Appointments  past 12m or future 3m with PCP    Date Provider   Last Visit   10/4/2023 Cornell Garcia, DO   Next Visit   4/9/2024 Cornell Garcia, DO   ED visits in past 90 days: 1        Note composed:12:03 PM 02/22/2024

## 2024-03-06 DIAGNOSIS — H40.1231 LOW-TENSION GLAUCOMA OF BOTH EYES, MILD STAGE: ICD-10-CM

## 2024-03-06 RX ORDER — LATANOPROST 50 UG/ML
SOLUTION/ DROPS OPHTHALMIC
Qty: 7.5 ML | Refills: 3 | Status: SHIPPED | OUTPATIENT
Start: 2024-03-06

## 2024-03-20 ENCOUNTER — TELEPHONE (OUTPATIENT)
Dept: INTERNAL MEDICINE | Facility: CLINIC | Age: 79
End: 2024-03-20
Payer: MEDICARE

## 2024-03-20 NOTE — TELEPHONE ENCOUNTER
----- Message from Aliza Jin sent at 3/20/2024  3:38 PM CDT -----  FYI    Pt cancelled appointment with Consult to Sleep Disordersstated pt sent msg back do not want to reschedule at this time.. We will cancel the referral and if patient want to reschedule referral can be reinstated.    Thanks

## 2024-04-16 ENCOUNTER — OFFICE VISIT (OUTPATIENT)
Dept: OPTOMETRY | Facility: CLINIC | Age: 79
End: 2024-04-16
Payer: MEDICARE

## 2024-04-16 DIAGNOSIS — H40.1231 LOW-TENSION GLAUCOMA OF BOTH EYES, MILD STAGE: Primary | ICD-10-CM

## 2024-04-16 DIAGNOSIS — H52.7 REFRACTIVE ERROR: ICD-10-CM

## 2024-04-16 PROCEDURE — 99999 PR PBB SHADOW E&M-EST. PATIENT-LVL III: CPT | Mod: PBBFAC,HCNC,, | Performed by: OPTOMETRIST

## 2024-04-16 PROCEDURE — 1126F AMNT PAIN NOTED NONE PRSNT: CPT | Mod: HCNC,CPTII,S$GLB, | Performed by: OPTOMETRIST

## 2024-04-16 PROCEDURE — 1159F MED LIST DOCD IN RCRD: CPT | Mod: HCNC,CPTII,S$GLB, | Performed by: OPTOMETRIST

## 2024-04-16 PROCEDURE — 3288F FALL RISK ASSESSMENT DOCD: CPT | Mod: HCNC,CPTII,S$GLB, | Performed by: OPTOMETRIST

## 2024-04-16 PROCEDURE — 1101F PT FALLS ASSESS-DOCD LE1/YR: CPT | Mod: HCNC,CPTII,S$GLB, | Performed by: OPTOMETRIST

## 2024-04-16 PROCEDURE — 99213 OFFICE O/P EST LOW 20 MIN: CPT | Mod: HCNC,S$GLB,, | Performed by: OPTOMETRIST

## 2024-04-16 PROCEDURE — 1160F RVW MEDS BY RX/DR IN RCRD: CPT | Mod: HCNC,CPTII,S$GLB, | Performed by: OPTOMETRIST

## 2024-04-16 NOTE — PROGRESS NOTES
HPI    Gtt's:  Latanoprost QHS OU  Blur ou at near x mos, no assoc pain or red, constant, no relief over   time.  Chikis Epstein is a 78 y.o. female who comes in for IOP check.   Patient reports today vision with current glasses is not too great.  Pt. Is seeing floaters again OU.  Pt. Denies any other ocular health complains today.        Last edited by Serjio Ye, OD on 4/16/2024  2:55 PM.            Assessment /Plan     For exam results, see Encounter Report.    Low-tension glaucoma of both eyes, mild stage    Refractive error      IOP still low and stable, IOP staying low and stable, nerve eval stable,  cont Latanaprost qhs OU,  today hvf normal oct prev with some worsening, with rnfl loss sup and nasal, pachy normal, possible father was glaucoma suspect. Target 12-14.  RTC 4-6  mos iop ck , consider add drop  2. Needs new spec rx with increased add, current glasses at +2.00

## 2024-04-18 ENCOUNTER — TELEPHONE (OUTPATIENT)
Dept: ADMINISTRATIVE | Facility: CLINIC | Age: 79
End: 2024-04-18
Payer: MEDICARE

## 2024-04-19 ENCOUNTER — OFFICE VISIT (OUTPATIENT)
Dept: INTERNAL MEDICINE | Facility: CLINIC | Age: 79
End: 2024-04-19
Payer: MEDICARE

## 2024-04-19 VITALS
HEIGHT: 60 IN | BODY MASS INDEX: 30.35 KG/M2 | DIASTOLIC BLOOD PRESSURE: 76 MMHG | OXYGEN SATURATION: 97 % | RESPIRATION RATE: 18 BRPM | HEART RATE: 84 BPM | SYSTOLIC BLOOD PRESSURE: 134 MMHG | TEMPERATURE: 97 F | WEIGHT: 154.56 LBS

## 2024-04-19 DIAGNOSIS — M46.92 UNSPECIFIED INFLAMMATORY SPONDYLOPATHY, CERVICAL REGION: ICD-10-CM

## 2024-04-19 DIAGNOSIS — Z00.00 ENCOUNTER FOR MEDICARE ANNUAL WELLNESS EXAM: Primary | ICD-10-CM

## 2024-04-19 DIAGNOSIS — H90.3 SENSORINEURAL HEARING LOSS (SNHL) OF BOTH EARS: ICD-10-CM

## 2024-04-19 DIAGNOSIS — D69.2 SENILE PURPURA: ICD-10-CM

## 2024-04-19 DIAGNOSIS — E21.3 HYPERPARATHYROIDISM: ICD-10-CM

## 2024-04-19 DIAGNOSIS — E78.00 PURE HYPERCHOLESTEROLEMIA: ICD-10-CM

## 2024-04-19 DIAGNOSIS — E66.09 CLASS 1 OBESITY DUE TO EXCESS CALORIES WITH SERIOUS COMORBIDITY AND BODY MASS INDEX (BMI) OF 30.0 TO 30.9 IN ADULT: ICD-10-CM

## 2024-04-19 DIAGNOSIS — I10 HYPERTENSION, UNSPECIFIED TYPE: ICD-10-CM

## 2024-04-19 DIAGNOSIS — F41.9 ANXIETY: ICD-10-CM

## 2024-04-19 DIAGNOSIS — I70.0 AORTIC ATHEROSCLEROSIS: ICD-10-CM

## 2024-04-19 DIAGNOSIS — F32.5 MAJOR DEPRESSIVE DISORDER, SINGLE EPISODE, IN FULL REMISSION: ICD-10-CM

## 2024-04-19 DIAGNOSIS — R73.03 PREDIABETES: ICD-10-CM

## 2024-04-19 DIAGNOSIS — R26.9 ABNORMALITY OF GAIT AND MOBILITY: ICD-10-CM

## 2024-04-19 PROCEDURE — 1158F ADVNC CARE PLAN TLK DOCD: CPT | Mod: HCNC,CPTII,S$GLB, | Performed by: NURSE PRACTITIONER

## 2024-04-19 PROCEDURE — G0439 PPPS, SUBSEQ VISIT: HCPCS | Mod: HCNC,S$GLB,, | Performed by: NURSE PRACTITIONER

## 2024-04-19 PROCEDURE — 1159F MED LIST DOCD IN RCRD: CPT | Mod: HCNC,CPTII,S$GLB, | Performed by: NURSE PRACTITIONER

## 2024-04-19 PROCEDURE — 1170F FXNL STATUS ASSESSED: CPT | Mod: HCNC,CPTII,S$GLB, | Performed by: NURSE PRACTITIONER

## 2024-04-19 PROCEDURE — 3075F SYST BP GE 130 - 139MM HG: CPT | Mod: HCNC,CPTII,S$GLB, | Performed by: NURSE PRACTITIONER

## 2024-04-19 PROCEDURE — 99999 PR PBB SHADOW E&M-EST. PATIENT-LVL V: CPT | Mod: PBBFAC,HCNC,, | Performed by: NURSE PRACTITIONER

## 2024-04-19 PROCEDURE — 1160F RVW MEDS BY RX/DR IN RCRD: CPT | Mod: HCNC,CPTII,S$GLB, | Performed by: NURSE PRACTITIONER

## 2024-04-19 PROCEDURE — 3078F DIAST BP <80 MM HG: CPT | Mod: HCNC,CPTII,S$GLB, | Performed by: NURSE PRACTITIONER

## 2024-04-19 PROCEDURE — 3288F FALL RISK ASSESSMENT DOCD: CPT | Mod: HCNC,CPTII,S$GLB, | Performed by: NURSE PRACTITIONER

## 2024-04-19 PROCEDURE — 1101F PT FALLS ASSESS-DOCD LE1/YR: CPT | Mod: HCNC,CPTII,S$GLB, | Performed by: NURSE PRACTITIONER

## 2024-04-19 NOTE — PATIENT INSTRUCTIONS
Counseling and Referral of Other Preventative  (Italic type indicates deductible and co-insurance are waived)    Patient Name: Chikis Epstein  Today's Date: 4/19/2024    Health Maintenance         Date Due Completion Date    RSV Vaccine (Age 60+ and Pregnant patients) (1 - 1-dose 60+ series) Never done ---    Hemoglobin A1c (Prediabetes) 10/02/2024 10/2/2023    DEXA Scan 04/13/2025 4/13/2021    Override on 1/20/2012: Done    Lipid Panel 10/02/2028 10/2/2023    TETANUS VACCINE 11/27/2028 11/27/2018          Orders Placed This Encounter   Procedures    Ambulatory referral/consult to Physical/Occupational Therapy     The following information is provided to all patients.  This information is to help you find resources for any of the problems found today that may be affecting your health:                  Living healthy guide: www.[facility].[place].gov      Understanding Diabetes: www.diabetes.org      Eating healthy: www.cdc.gov/healthyweight      CDC home safety checklist: www.cdc.gov/steadi/patient.html      Agency on Aging: www.goea.[place].[facility]      Alcoholics anonymous (AA): www.aa.org      Physical Activity: www.kenji.nih.gov/mw6kaoi      Tobacco use: www.quitwithusla.org

## 2024-04-19 NOTE — PROGRESS NOTES
Chikis Epstein presented for a follow-up Medicare AWV today. The following components were reviewed and updated:    Medical history  Family History  Social history  Allergies and Current Medications  Health Risk Assessment  Health Maintenance  Care Team    **See Completed Assessments for Annual Wellness visit with in the encounter summary    The following assessments were completed:  Depression Screening  Cognitive function Screening  Timed Get Up Test  Whisper Test    Health Maintenance    MMG: 10/05/2022  DXA: 04/13/2021  Colonoscopy: 09/17/2024  Flu Vaccine: 10/04/2023  Pneumonia 13 Vaccine: 12/23/2015  Pneumonia 20 Vaccine: 04/04/2023  Tetanus/Tdap: 11/27/2018  Zoster Vaccine: 05/26/2020; 12/10/2019  Hepatitis C Screen: 01/04/2024  HIV Screen: 01/04/2024  Microalbumin: 03/30/2022    Vitals:    04/19/24 1029   BP: 134/76   BP Location: Right arm   Patient Position: Sitting   BP Method: Medium (Manual)   Pulse: 84   Resp: 18   Temp: 97 °F (36.1 °C)   TempSrc: Temporal   SpO2: 97%   Weight: 70.1 kg (154 lb 8.7 oz)   Height: 5' (1.524 m)     BP Readings from Last 3 Encounters:   04/19/24 134/76   02/05/24 (!) 147/77   01/29/24 139/68     Body mass index is 30.18 kg/m².  Wt Readings from Last 3 Encounters:   04/19/24 1029 70.1 kg (154 lb 8.7 oz)   02/05/24 1106 69.9 kg (154 lb)   01/16/24 1337 69.9 kg (154 lb 1.6 oz)           Review for Opioid Screening: Patient does not have a prescription for Opioids.   Review of Substance Use Disorders: Patient does not use substances.     Pain level assessed and reviewed.     Diagnoses and health risks identified today and associated recommendations/orders:  1. Encounter for Medicare annual wellness exam    - Ambulatory Referral/Consult to Enhanced Annual Wellness Visit (eAWV)    2. Senile purpura    Chronic, stable, follow up with PCP     3. Abnormality of gait and mobility    - Ambulatory referral/consult to Physical/Occupational Therapy; Future    4.  Hyperparathyroidism    Chronic, stable, follow up with PCP     5. Unspecified inflammatory spondylopathy, cervical region    Chronic, stable, follow up with PCP     6. Major depressive disorder, single episode, in full remission  8. Anxiety    Chronic, stable, continue current medications, follow up with PCP     7. Aortic atherosclerosis    Chronic, stable, follow up with PCP     9. Sensorineural hearing loss (SNHL) of both ears    Chronic, stable, follow up with PCP     10. Hypertension, unspecified type    Chronic, stable, continue current medications, follow up with PCP     11. Pure hypercholesterolemia    Chronic, stable, continue current medication, follow up with PCP     12. Prediabetes    Chronic, stable, continue current medication, follow up with PCP     13. Class 1 obesity due to excess calories with serious comorbidity and body mass index (BMI) of 30.0 to 30.9 in adult    Chronic, stable, follow up with PCP         Provided Chikis with a 5-10 year written screening schedule and personal prevention plan. Recommendations were developed using the USPSTF age appropriate recommendations. Education, counseling, and referrals were provided as needed.  After Visit Summary printed and given to patient which includes a list of additional screenings\tests needed.    Follow up in about 1 year (around 4/19/2025) for Medicare Annual Wellness Visit.      Rosa Elena Gay, MILLA    I offered to discuss advanced care planning, including how to pick a person who would make decisions for you if you were unable to make them for yourself, called a health care power of , and what kind of decisions you might make such as use of life sustaining treatments such as ventilators and tube feeding when faced with a life limiting illness recorded on a living will that they will need to know. (How you want to be cared for as you near the end of your natural life)     X Patient is interested in learning more about how to make advanced  directives.  I provided them paperwork and offered to discuss this with them.

## 2024-05-11 ENCOUNTER — PATIENT MESSAGE (OUTPATIENT)
Dept: ADMINISTRATIVE | Facility: OTHER | Age: 79
End: 2024-05-11
Payer: MEDICARE

## 2024-05-13 ENCOUNTER — CLINICAL SUPPORT (OUTPATIENT)
Dept: REHABILITATION | Facility: HOSPITAL | Age: 79
End: 2024-05-13
Payer: MEDICARE

## 2024-05-13 DIAGNOSIS — R26.9 ABNORMALITY OF GAIT AND MOBILITY: ICD-10-CM

## 2024-05-13 DIAGNOSIS — R26.9 GAIT ABNORMALITY: ICD-10-CM

## 2024-05-13 DIAGNOSIS — R26.89 BALANCE DISORDER: Primary | ICD-10-CM

## 2024-05-13 PROCEDURE — 97162 PT EVAL MOD COMPLEX 30 MIN: CPT | Mod: HCNC

## 2024-05-13 PROCEDURE — 97110 THERAPEUTIC EXERCISES: CPT | Mod: HCNC

## 2024-05-13 NOTE — PLAN OF CARE
"[facility]   Physical Therapy Initial Evaluation      Name: Chikis Epstein  Clinic Number: [id number]    Therapy Diagnosis:   Encounter Diagnoses   Name Primary?    Abnormality of gait and mobility     Balance disorder Yes    Gait abnormality         Physician: Rosa Elena Gay NP    Physician Orders: PT Eval and Treat   Medical Diagnosis from Referral: R26.9 (ICD-10-CM) - Abnormality of gait and mobility   Evaluation Date: 5/13/2024  Authorization Period Expiration: 4/19/25  Plan of Care Expiration: 8/13/24  Progress Note Due: 6/13/24  Visit # / Visits authorized: 1/ 1   FOTO: 1/ 3    Precautions:  [place]      Time In: 11:30am  Time Out: 1215pm  Total Appointment Time (timed & untimed codes): 45 minutes    Subjective     Date of onset: 1 year    History of current condition - Chikis reports: she feels insteady on her stairs and walking from the car to her mall, walking around the grocery store. She feels she doesn't get around like she used to; she feels "wiggly or drunk" sometimes. She reports feeling like she's falling forward when walking. She has felt this way for around 1 year and has been worked up by her MD. She is [place] and wears hearing aids (forgot them today). She feels a mild dizziness and gets anxious when she knows she needs to walk somewhere. She has been fearful of driving lately as well (no motion sickness). She does not own an AD. She gets back pain with household chores and with car transfers.     Falls: 1 fall, tripped on a table at home while carrying something (cut her top lip) in March 2024.     Imaging: none:     Prior Therapy: PT prior for neck   Social History:  lives with their spouse in [facility] without stairs   Occupation: retired   Prior Level of Function: (I)  Current Level of Function: (I)    Pain:  N/a     Patients goals: to have more mobility; be able to sweep/mop at home      Medical History:   Past Medical History:   Diagnosis Date    Anxiety     Aortic " atherosclerosis     CXR 10/13/12    Arthritis     Back pain     Cataract     Hyperlipidemia 2016    Hypertension     MDD (major depressive disorder)     Menopause     Non morbid obesity due to excess calories 2016    Personal history of kidney stones     Type 2 diabetes mellitus without complication        Surgical History:   Chikis Epstein  has a past surgical history that includes Colonoscopy (); Kidney stone surgery; Total abdominal hysterectomy w/ bilateral salpingoophorectomy; Cataract extraction, bilateral; Eye surgery; Hysterectomy;  section; and Cholecystectomy.    Medications:   Chikis has a current medication list which includes the following prescription(s): acetaminophen, alprazolam, amlodipine, bupropion, buspirone, calcium-vitamin d3, cetirizine, fluticasone propionate, hydrochlorothiazide, latanoprost, metformin, multivit-minerals/folic acid, pantoprazole, pravastatin, and valsartan.    Allergies:   Review of patient's allergies indicates:   Allergen Reactions    Ace inhibitors      Other reaction(s): Swelling        Objective        POSTURE    Postural examination/scapula alignment: Rounded shoulder, Abnormal trunk flexion, and Increased kyphosis        Range of Motion/Strength:     Hip Right MMT Left MMT Pain/Dysfunction with Movement (pain=!)   AROM        flexion  WFL 4-/5  WFL 4-/5            abduction  WFL 3+/5  WFL 3+/5    Internal rotation  WFL 3+/5  WFL 3+/5    External rotation  WFL 3+/5  WFL 3+/5      Knee Right MMT Left MMT Pain/Dysfunction with Movement (pain=!)   AROM        flexion  WFL 4-/5  WFL 4-/5    extension  WFL 4-/5  WFL 4-/5      Ankle Right MMT Left MMT Pain/Dysfunction with Movement (pain=!)   AROM        dorsiflexion  WFL 3+/5  WFL 3+/5    plantarflexion  WFL 3+/5  WFL 3+/5    inversion  WFL 3+/5  WFL 3+/5    eversion  WFL 3+/5  WFL 3+/5        Gait Analysis:Without AD  Deviations: narrow ALEX, unsteady in straight path & around turns     TU seconds    TUG Cutoff Scores: 13.5 sec           Single Leg Stance: R 3 seconds, L 2 seconds      SRIVASTAVA  BALANCE ASSESSMENT TOOL  1. Sitting to Standing   3 - able to stand independely using hands  2. Standing Unsupported   4 - able to stand safely 2 minutes without hold  3. Sitting Unsupported   4 - able to sit safely and securely 2 minutes  4. Standing to Sitting   3 - controls descent by using hands  5. Pivot Transfer   2 - able to transfer with verbal cuing and/or supervision  6. Standing with Eyes Closed   3 - able to stand 10 seconds with supervision  7. Standing with Feet Together   0 - needs help to attain position and unable to hold for 15 seconds  8. Reaching Forward with Outstretched Arm   0 - loses balance while trying, requires external support  9. Retrieving Object from Floor   3 - able to pick slipper but needs supervision  10. Turning to Look Behind   0 - needs assistance to keep from loosing balance or falling  11. Turning 360 Degrees   2 - able to turn 360 safely but slowly  12. Placing Alternate Foot on Step   2 - able to complete 4 steps without aid with supervision  13. Standing with One Foot in Front   0 - Looses balance while stepping or standing  14. Standing on One Foot   0 - unable to try or needs assistance to prevent fall    TOTAL SCORE: 26  Maximum: 56  Score:   0-20= high fall risk   21-40 moderate fall risk   41-56 low fall risk     Fall risk cut-off scores:   Elderly and History of falls: <51/56   Elderly and No history of falls: <42/56   CVA: <45/56        Intake Outcome Measure for FOTO Balance Survey    Therapist reviewed FOTO scores for Chikis Epstein on 5/13/2024.   FOTO documents entered into [employer] - see Media section or FOTO account episode details.     Intake Score: 45%         Treatment     Total Treatment time (time-based codes) separate from Evaluation: 10 minutes     Chikis received the treatments listed below:      therapeutic exercises to develop strength, endurance, ROM, &   flexibility for 10 minutes including:  HEP instruction & return demonstration:   [x] DF green TB  [x] seated march green band around feet  [x] sidelying hip abd  []    Patient Education and Home Exercises     Education provided:   - HEP    Written Home Exercises Provided: yes. Exercises were reviewed and Chikis was able to demonstrate them prior to the end of the session.  Chikis demonstrated good  understanding of the education provided. See EMR under Patient Instructions for exercises provided during therapy sessions.    Assessment     Chikis is a 78 y.o. female referred to outpatient Physical Therapy with a medical diagnosis of abnormality of gait & balance. Patient presents with B ankle and hip weakness with  impaired gait and balance, affecting her safety with community mobility. Pt is at a moderate fall risk and would benefit from further skilled PT to address.     Patient prognosis is Good.   Patient will benefit from skilled outpatient Physical Therapy to address the deficits stated above and in the chart below, provide patient /family education, and to maximize patientt's level of independence.     Plan of care discussed with patient: No  Patient's spiritual, cultural and educational needs considered and patient is agreeable to the plan of care and goals as stated below:     Anticipated Barriers for therapy: comorbidities   Medical Necessity is demonstrated by the following  History  Co-morbidities and personal factors that may impact the plan of care [] LOW: no personal factors / co-morbidities  [x] MODERATE: 1-2 personal factors / co-morbidities  [] HIGH: 3+ personal factors / co-morbidities    Moderate / High Support Documentation:   Co-morbidities affecting plan of care: cervical DDD, [place], impaired vision, depression, anxiety, HTN, obesity    Personal Factors:   age  lifestyle     Examination  Body Structures and Functions, activity limitations and participation restrictions that may impact the plan of care  [] LOW: addressing 1-2 elements  [x] MODERATE: 3+ elements  [] HIGH: 4+ elements (please support below)    Moderate / High Support Documentation: impacts safety with community ambulation, gait quality, & social outings      Clinical Presentation [] LOW: stable  [x] MODERATE: Evolving  [] HIGH: Unstable     Decision Making/ Complexity Score: moderate       Goals:  Short Term Goals: 6 visits  1. Pt will be (I) /c HEP to supplement PT in order to improve functional tasks  2. Pt will improve B ankle MMTs to 4-/5 grossly to improve strength for functional activities  3. Pt will be able to ambulate up/down ramp with least restrictive AD Mod I       Long Term Goals: 12 visits   1. Pt will improve B hips  MMTs to 4-/5 grossly to improve strength for functional activities  2. Pt will improve FOTO score to </= 52% to reduce perceived pain with mobility   3. Pt will improve SRIVASTAVA score to >/= 20, indicating reduced fall risk   4. Pt will ambulate 200' Mod I with least restrictive AD, with steady gait   Plan     Plan of care Certification: 5/13/2024 to 8/13/24.    Outpatient Physical Therapy 2 times weekly for 12 visits to include the following interventions: Gait Training, Manual Therapy, Moist Heat/ Ice, Neuromuscular Re-ed, Patient Education, Therapeutic Activities, and Therapeutic Exercise, E-stim and Dry Needling as needed.     Suki Verma, PT

## 2024-05-17 ENCOUNTER — CLINICAL SUPPORT (OUTPATIENT)
Dept: REHABILITATION | Facility: HOSPITAL | Age: 79
End: 2024-05-17
Payer: MEDICARE

## 2024-05-17 DIAGNOSIS — R26.9 GAIT ABNORMALITY: ICD-10-CM

## 2024-05-17 DIAGNOSIS — R26.89 BALANCE DISORDER: ICD-10-CM

## 2024-05-17 DIAGNOSIS — R26.9 ABNORMALITY OF GAIT AND MOBILITY: Primary | ICD-10-CM

## 2024-05-17 PROCEDURE — 97112 NEUROMUSCULAR REEDUCATION: CPT | Mod: HCNC,CQ

## 2024-05-17 PROCEDURE — 97530 THERAPEUTIC ACTIVITIES: CPT | Mod: HCNC,CQ

## 2024-05-17 NOTE — PROGRESS NOTES
[facility]   Physical Therapy Treatment Note      Name: Chikis Epstein  Clinic Number: [id number]    Therapy Diagnosis:   Encounter Diagnoses   Name Primary?    Abnormality of gait and mobility Yes    Balance disorder     Gait abnormality      Physician: Rosa Elena Gay NP    Visit Date: 5/17/2024    Physician Orders: PT Eval and Treat   Medical Diagnosis from Referral: R26.9 (ICD-10-CM) - Abnormality of gait and mobility   Evaluation Date: 5/13/2024  Authorization Period Expiration: 4/19/25  Plan of Care Expiration: 8/13/24  Progress Note Due: 6/13/24  Visit # / Visits authorized: 1/ 1 1/10  FOTO: 1/ 3     Precautions:  [place]       PTA Visit #: 1/5     Time In: 1000 am  Time Out: 1045 am  Total Billable Time: 38 minutes (7 min not billed d/t BP readings)     Subjective     Pt reports: she is sore from the exercises at home.  She was compliant with home exercise program.  Response to previous treatment: no adverse affect  Functional change: progressing    Pain: 0/10  Location:  N/A    Objective      Objective Measures updated at progress report unless specified.     Treatment     Chikis received the treatments listed below:      therapeutic exercises to develop strength, endurance, ROM, flexibility, posture, and core stabilization for 5 minutes including:  [x]sidelying hip abd 2x10  []  []  []  []  []        neuromuscular re-education activities to improve: Balance, Coordination, Kinesthetic, Sense, Proprioception, and Posture for 25 minutes. The following activities were included:  [x]SLS B 2x30s ANTELMO support  [x]NBOS 2x30s (high guard hands)  [x]Cone taps B 20x ea.  [x]Tandem stance B 2x30s (high guard hands)  [x]DF green TB   [x]LAQ B 2x10 3s    therapeutic activities to improve functional performance for 8 minutes, including:  [x]seated march green band around feet   [x]standing hip abd  []  []  []  []        Patient Education and Home Exercises       Education provided:   - rest in between  exercise sets    Written Home Exercises Provided: Patient instructed to cont prior HEP. Exercises were reviewed and Chikis was able to demonstrate them prior to the end of the session.  Chikis demonstrated good  understanding of the education provided. See EMR under Patient Instructions for exercises provided during therapy sessions    Assessment     Pt presents comes to clinic with reports of feeling anxiety if she has to walk long distances d/t fear of falling. She also states that she is quite sore from doing the HEP given at time of evaluation. Pt with difficulty performing seated resisted DF with GTB as she was unable to maintain proper foot separation, therefore ball was placed between tibia to accommodate. Patient felt dizzy after seated TE at which point BP was taken twice (140/76 pr 82, 133/74 pr 78 O2 sat 97). After the rest break patient was able to continue. Heavy cueing needed to correctly perform SL hip abduction to avoid posterior roll and compensate with anterior hip and quads. Patient instructed not to perform standing balance activities at home for safety concerns.  Chikis Is progressing well towards her goals.   Pt prognosis is Good.     Pt will continue to benefit from skilled outpatient physical therapy to address the deficits listed in the problem list box on initial evaluation, provide pt/family education and to maximize pt's level of independence in the home and community environment.     Pt's spiritual, cultural and educational needs considered and pt agreeable to plan of care and goals.     Anticipated barriers to physical therapy: comorbidities     Goals:   Short Term Goals: 6 visits  1. Pt will be (I) /c HEP to supplement PT in order to improve functional tasks  2. Pt will improve B ankle MMTs to 4-/5 grossly to improve strength for functional activities  3. Pt will be able to ambulate up/down ramp with least restrictive AD Mod I         Long Term Goals: 12 visits   1. Pt will improve B hips   MMTs to 4-/5 grossly to improve strength for functional activities  2. Pt will improve FOTO score to </= 52% to reduce perceived pain with mobility   3. Pt will improve SRIVASTAVA score to >/= 20, indicating reduced fall risk   4. Pt will ambulate 200' Mod I with least restrictive AD, with steady gait     Plan     Plan of care Certification: 5/13/2024 to 8/13/24.     Outpatient Physical Therapy 2 times weekly for 12 visits to include the following interventions: Gait Training, Manual Therapy, Moist Heat/ Ice, Neuromuscular Re-ed, Patient Education, Therapeutic Activities, and Therapeutic Exercise, E-stim and Dry Needling as needed.     PT/PTA met face to face to discuss pt's treatment plan and progress towards established goals. Pt will be seen by a physical therapist minimally every 6th visit or every 30 days.      Terrence Hurtado PTA

## 2024-05-21 ENCOUNTER — CLINICAL SUPPORT (OUTPATIENT)
Dept: REHABILITATION | Facility: HOSPITAL | Age: 79
End: 2024-05-21
Payer: MEDICARE

## 2024-05-21 DIAGNOSIS — R26.9 GAIT ABNORMALITY: ICD-10-CM

## 2024-05-21 DIAGNOSIS — R26.9 ABNORMALITY OF GAIT AND MOBILITY: Primary | ICD-10-CM

## 2024-05-21 DIAGNOSIS — R26.89 BALANCE DISORDER: ICD-10-CM

## 2024-05-21 PROCEDURE — 97530 THERAPEUTIC ACTIVITIES: CPT | Mod: HCNC

## 2024-05-21 PROCEDURE — 97112 NEUROMUSCULAR REEDUCATION: CPT | Mod: HCNC

## 2024-05-21 NOTE — PROGRESS NOTES
"[facility]   Physical Therapy Treatment Note      Name: Chikis Epstein  Clinic Number: [id number]    Therapy Diagnosis:   Encounter Diagnoses   Name Primary?    Abnormality of gait and mobility Yes    Balance disorder     Gait abnormality        Physician: Rsoa Elena Gay NP    Visit Date: 5/21/2024    Physician Orders: PT Eval and Treat   Medical Diagnosis from Referral: R26.9 (ICD-10-CM) - Abnormality of gait and mobility   Evaluation Date: 5/13/2024  Authorization Period Expiration: 4/19/25  Plan of Care Expiration: 8/13/24  Progress Note Due: 6/13/24  Visit # / Visits authorized: 1/ 1, 2/10  FOTO: 1/ 3     Precautions:  [place]       PTA Visit #: 1/5     Time In: 145 pm  Time Out: 230 pm  Total Billable Time: 45 minutes     Subjective     Pt reports: her neck has been hurting the past few days   She was compliant with home exercise program.  Response to previous treatment: no adverse affect  Functional change: progressing    Pain: 0/10  Location:  N/A    Objective      Objective Measures updated at progress report unless specified.     Treatment     Chikis received the treatments listed below:      therapeutic exercises to develop strength, endurance, ROM, flexibility, posture, and core stabilization for 10  minutes including:  []sidelying hip abd 2x10  [x] recumbent bike 6'   [x] gastroc wedge stretch 2x30" B   []  []  []    neuromuscular re-education activities to improve: Balance, Coordination, Kinesthetic, Sense, Proprioception, and Posture for 23  minutes. The following activities were included:  [x]SLS B 2x30s ANTELMO support  [x]NBOS 2x30s (high guard hands)  [x]Cone taps B 20x ea. On airex   [x]Tandem stance B 2x30s (high guard hands) on airex   []DF green TB   []LAQ B 2x10 3s    therapeutic activities to improve functional performance for 12  minutes, including:  [x]seated march green band around feet 4x20"  [x]standing hip abd 2x10 B   [x] lateral stepping with partner hold on wand x2 " laps   []  []  []        Patient Education and Home Exercises       Education provided:   - rest in between exercise sets    Written Home Exercises Provided: Patient instructed to cont prior HEP. Exercises were reviewed and Chikis was able to demonstrate them prior to the end of the session.  Chikis demonstrated good  understanding of the education provided. See EMR under Patient Instructions for exercises provided during therapy sessions    Assessment     Pt required CGA/Min A with static balance exercise to prevent LOB. Dynamic hip and balance strengthening added with lateral stepping and static balance and hip strengthening performed with hip abd in standing with BUE support required for balance. Pt c/o B gastroc tightness towards end of session, gastroc stretch added to address this. Pt reported neck pain was reduced after session. Progress as tolerated.     Chikis Is progressing well towards her goals.   Pt prognosis is Good.     Pt will continue to benefit from skilled outpatient physical therapy to address the deficits listed in the problem list box on initial evaluation, provide pt/family education and to maximize pt's level of independence in the home and community environment.     Pt's spiritual, cultural and educational needs considered and pt agreeable to plan of care and goals.     Anticipated barriers to physical therapy: comorbidities     Goals:   Short Term Goals: 6 visits  1. Pt will be (I) /c HEP to supplement PT in order to improve functional tasks  2. Pt will improve B ankle MMTs to 4-/5 grossly to improve strength for functional activities  3. Pt will be able to ambulate up/down ramp with least restrictive AD Mod I         Long Term Goals: 12 visits   1. Pt will improve B hips  MMTs to 4-/5 grossly to improve strength for functional activities  2. Pt will improve FOTO score to </= 52% to reduce perceived pain with mobility   3. Pt will improve SRIVASTAVA score to >/= 20, indicating reduced fall risk   4. Pt  will ambulate 200' Mod I with least restrictive AD, with steady gait     Plan     Plan of care Certification: 5/13/2024 to 8/13/24.     Outpatient Physical Therapy 2 times weekly for 12 visits to include the following interventions: Gait Training, Manual Therapy, Moist Heat/ Ice, Neuromuscular Re-ed, Patient Education, Therapeutic Activities, and Therapeutic Exercise, E-stim and Dry Needling as needed.     PT/PTA met face to face to discuss pt's treatment plan and progress towards established goals. Pt will be seen by a physical therapist minimally every 6th visit or every 30 days.      Suki Verma, PT

## 2024-05-23 ENCOUNTER — CLINICAL SUPPORT (OUTPATIENT)
Dept: REHABILITATION | Facility: HOSPITAL | Age: 79
End: 2024-05-23
Payer: MEDICARE

## 2024-05-23 DIAGNOSIS — R26.9 ABNORMALITY OF GAIT AND MOBILITY: ICD-10-CM

## 2024-05-23 DIAGNOSIS — R26.89 BALANCE DISORDER: Primary | ICD-10-CM

## 2024-05-23 PROCEDURE — 97112 NEUROMUSCULAR REEDUCATION: CPT | Mod: HCNC,CQ

## 2024-05-23 PROCEDURE — 97530 THERAPEUTIC ACTIVITIES: CPT | Mod: HCNC,CQ

## 2024-05-23 NOTE — PROGRESS NOTES
"[facility]   Physical Therapy Treatment Note      Name: Chikis Epstein  Clinic Number: [id number]    Therapy Diagnosis:   Encounter Diagnoses   Name Primary?    Balance disorder Yes    Abnormality of gait and mobility          Physician: Rosa Elena Gay NP    Visit Date: 5/23/2024    Physician Orders: PT Eval and Treat   Medical Diagnosis from Referral: R26.9 (ICD-10-CM) - Abnormality of gait and mobility   Evaluation Date: 5/13/2024  Authorization Period Expiration: 4/19/25  Plan of Care Expiration: 8/13/24  Progress Note Due: 6/13/24  Visit # / Visits authorized: 1/ 1, 3/10  FOTO: 1/ 3     Precautions:  [place]       PTA Visit #: 1/5     Time In: 230 pm  Time Out: 315 pm  Total Billable Time: 45 minutes     Subjective     Pt reports: her neck continues to hurt the lately, but if she stays active with her HEP and other things she doesn't notice it as much.   She was compliant with home exercise program.  Response to previous treatment: no adverse affect  Functional change: progressing    Pain: 0/10  Location:  N/A    Objective      Objective Measures updated at progress report unless specified.     Treatment     Chikis received the treatments listed below:      therapeutic exercises to develop strength, endurance, ROM, flexibility, posture, and core stabilization for 10 minutes including:  []sidelying hip abd 2x10  [x] recumbent bike 6'   [x] gastroc wedge stretch 3x30" B   []  []  []    neuromuscular re-education activities to improve: Balance, Coordination, Kinesthetic, Sense, Proprioception, and Posture for 23  minutes. The following activities were included:  [x]SLS B 2x30s ANTELMO support  [x]NBOS 2x30s (high guard hands)  [x]Cone taps B 20x ea. On airex   [x]Tandem stance B 2x30s ANTELMO on airex   []DF green TB   []LAQ B 2x10 3s    therapeutic activities to improve functional performance for 12  minutes, including:  [x]seated march green band around feet 4x20"  [x]standing hip abd 2x10 B   [x] " lateral stepping with partner hold on wand x2 laps   [x]step ups lvl 2 B 20x ANTELMO  []  []        Patient Education and Home Exercises       Education provided:   - rest in between exercise sets    Written Home Exercises Provided: Patient instructed to cont prior HEP. Exercises were reviewed and Chikis was able to demonstrate them prior to the end of the session.  Chikis demonstrated good  understanding of the education provided. See EMR under Patient Instructions for exercises provided during therapy sessions    Assessment     Pt required cueing through out session to correctly perform exercises due to poor memory. Seated rest breaks with hydration also required to recover from muscle fatigue. ANTELMO needed with SL and tandem balance vs high guard hands from last session. Patient requires encouragement and reminders that therapy is a slow process. Added step ups for improved community ambulation.    Chikis Is progressing well towards her goals.   Pt prognosis is Good.     Pt will continue to benefit from skilled outpatient physical therapy to address the deficits listed in the problem list box on initial evaluation, provide pt/family education and to maximize pt's level of independence in the home and community environment.     Pt's spiritual, cultural and educational needs considered and pt agreeable to plan of care and goals.     Anticipated barriers to physical therapy: comorbidities     Goals:   Short Term Goals: 6 visits  1. Pt will be (I) /c HEP to supplement PT in order to improve functional tasks  2. Pt will improve B ankle MMTs to 4-/5 grossly to improve strength for functional activities  3. Pt will be able to ambulate up/down ramp with least restrictive AD Mod I         Long Term Goals: 12 visits   1. Pt will improve B hips  MMTs to 4-/5 grossly to improve strength for functional activities  2. Pt will improve FOTO score to </= 52% to reduce perceived pain with mobility   3. Pt will improve SRIVASTAVA score to >/= 20,  indicating reduced fall risk   4. Pt will ambulate 200' Mod I with least restrictive AD, with steady gait     Plan     Plan of care Certification: 5/13/2024 to 8/13/24.     Outpatient Physical Therapy 2 times weekly for 12 visits to include the following interventions: Gait Training, Manual Therapy, Moist Heat/ Ice, Neuromuscular Re-ed, Patient Education, Therapeutic Activities, and Therapeutic Exercise, E-stim and Dry Needling as needed.     PT/PTA met face to face to discuss pt's treatment plan and progress towards established goals. Pt will be seen by a physical therapist minimally every 6th visit or every 30 days.      Terrence Hurtado, PTA

## 2024-05-28 ENCOUNTER — CLINICAL SUPPORT (OUTPATIENT)
Dept: REHABILITATION | Facility: HOSPITAL | Age: 79
End: 2024-05-28
Payer: MEDICARE

## 2024-05-28 DIAGNOSIS — R26.89 BALANCE DISORDER: Primary | ICD-10-CM

## 2024-05-28 PROCEDURE — 97530 THERAPEUTIC ACTIVITIES: CPT | Mod: HCNC,CQ

## 2024-05-28 PROCEDURE — 97112 NEUROMUSCULAR REEDUCATION: CPT | Mod: HCNC,CQ

## 2024-05-28 NOTE — PROGRESS NOTES
"[facility]   Physical Therapy Treatment Note      Name: Chikis Epstein  Clinic Number: [id number]    Therapy Diagnosis:   Encounter Diagnosis   Name Primary?    Balance disorder Yes           Physician: Rosa Elena Gay NP    Visit Date: 5/28/2024    Physician Orders: PT Eval and Treat   Medical Diagnosis from Referral: R26.9 (ICD-10-CM) - Abnormality of gait and mobility   Evaluation Date: 5/13/2024  Authorization Period Expiration: 4/19/25  Plan of Care Expiration: 8/13/24  Progress Note Due: 6/13/24  Visit # / Visits authorized: 1/ 1, 3/10  FOTO: 1/ 3     Precautions:  [place]       PTA Visit #: 1/5     Time In: 140 pm  Time Out: 226 pm  Total Billable Time: 46 minutes     Subjective     Pt reports: she is under a lot of stress lately, her sister in law passed away this morning. She has been getting a burning sensation at the top of her L foot.  She was compliant with home exercise program.  Response to previous treatment: no adverse affect  Functional change: progressing    Pain: 0/10  Location:  N/A    Objective      Objective Measures updated at progress report unless specified.     Treatment     Chikis received the treatments listed below:      therapeutic exercises to develop strength, endurance, ROM, flexibility, posture, and core stabilization for 6 minutes including:  []sidelying hip abd 2x10  [x] recumbent bike 6'   [] gastroc wedge stretch 3x30" B   []  []  []    neuromuscular re-education activities to improve: Balance, Coordination, Kinesthetic, Sense, Proprioception, and Posture for 23  minutes. The following activities were included:  [x]SLS B 2x30s ANTELMO support  [x]NBOS 2x30s (high guard hands)  [x]Cone taps B 20x ea. On airex   [x]Tandem stance B 2x30s ANTELMO on airex   [x]DF green TB   [x]LAQ B 2# 3x10 3s  [x]step over airex 20x  []  []  []  []  []  therapeutic activities to improve functional performance for 19 minutes, including:  [x]seated march green band around feet " "4x20"  [x]standing hip abd 2x10 B   [x] lateral stepping with partner hold on wand x2 laps   [x]step ups lvl 2 B 20x ANTELMO  [x]STS 2x10   []        Patient Education and Home Exercises       Education provided:   - rest in between exercise sets    Written Home Exercises Provided: Patient instructed to cont prior HEP. Exercises were reviewed and Chikis was able to demonstrate them prior to the end of the session.  Chikis demonstrated good  understanding of the education provided. See EMR under Patient Instructions for exercises provided during therapy session.    Assessment   Pt comes to therapy in poor spirits due to a death in the family. Pt requires mild encouragement and reminders not to get discouraged over her situation. Patient was more familiar with activities today and may benefit from less frequent advances d/t memory deficits. Pt also requires SBA to intermittent CGA for all standing and balance activities.      Chikis Is progressing well towards her goals.   Pt prognosis is Good.     Pt will continue to benefit from skilled outpatient physical therapy to address the deficits listed in the problem list box on initial evaluation, provide pt/family education and to maximize pt's level of independence in the home and community environment.     Pt's spiritual, cultural and educational needs considered and pt agreeable to plan of care and goals.     Anticipated barriers to physical therapy: comorbidities     Goals:   Short Term Goals: 6 visits  1. Pt will be (I) /c HEP to supplement PT in order to improve functional tasks  2. Pt will improve B ankle MMTs to 4-/5 grossly to improve strength for functional activities  3. Pt will be able to ambulate up/down ramp with least restrictive AD Mod I         Long Term Goals: 12 visits   1. Pt will improve B hips  MMTs to 4-/5 grossly to improve strength for functional activities  2. Pt will improve FOTO score to </= 52% to reduce perceived pain with mobility   3. Pt will improve " SRIVASTAVA score to >/= 20, indicating reduced fall risk   4. Pt will ambulate 200' Mod I with least restrictive AD, with steady gait     Plan     Plan of care Certification: 5/13/2024 to 8/13/24.     Outpatient Physical Therapy 2 times weekly for 12 visits to include the following interventions: Gait Training, Manual Therapy, Moist Heat/ Ice, Neuromuscular Re-ed, Patient Education, Therapeutic Activities, and Therapeutic Exercise, E-stim and Dry Needling as needed.     PT/PTA met face to face to discuss pt's treatment plan and progress towards established goals. Pt will be seen by a physical therapist minimally every 6th visit or every 30 days.      Terrence Hurtado, PTA

## 2024-05-30 ENCOUNTER — CLINICAL SUPPORT (OUTPATIENT)
Dept: REHABILITATION | Facility: HOSPITAL | Age: 79
End: 2024-05-30
Payer: MEDICARE

## 2024-05-30 DIAGNOSIS — R26.9 GAIT ABNORMALITY: ICD-10-CM

## 2024-05-30 DIAGNOSIS — R26.89 BALANCE DISORDER: Primary | ICD-10-CM

## 2024-05-30 DIAGNOSIS — R26.9 ABNORMALITY OF GAIT AND MOBILITY: ICD-10-CM

## 2024-05-30 PROCEDURE — 97110 THERAPEUTIC EXERCISES: CPT | Mod: KX,HCNC

## 2024-05-30 PROCEDURE — 97112 NEUROMUSCULAR REEDUCATION: CPT | Mod: KX,HCNC

## 2024-05-30 NOTE — PROGRESS NOTES
"[facility]   Physical Therapy Treatment Note      Name: Chikis Epstein  Clinic Number: [id number]    Therapy Diagnosis:   Encounter Diagnoses   Name Primary?    Balance disorder Yes    Abnormality of gait and mobility     Gait abnormality        Physician: Rosa Elena Gay NP    Visit Date: 5/30/2024    Physician Orders: PT Eval and Treat   Medical Diagnosis from Referral: R26.9 (ICD-10-CM) - Abnormality of gait and mobility   Evaluation Date: 5/13/2024  Authorization Period Expiration: 4/19/25  Plan of Care Expiration: 8/13/24  Progress Note Due: 6/13/24  Visit # / Visits authorized: 1/ 1, 4/10  FOTO: 1/ 3     Precautions:  [place]       PTA Visit #: 1/5     Time In: 133 pm  Time Out: 125 pm  Total Billable Time: 53  minutes     Subjective     Pt reports: she is having increased ease with getting up from a chair & from the floor. She still feels "rocking" when she's walking at times, especially in the grocery store.   She was compliant with home exercise program.  Response to previous treatment: no adverse affect  Functional change: progressing    Pain: 0/10  Location:  N/A    Objective      Objective Measures updated at progress report unless specified.     Treatment     Chikis received the treatments listed below:      therapeutic exercises to develop strength, endurance, ROM, flexibility, posture, and core stabilization for 15 minutes including:  []sidelying hip abd 2x10  [x] recumbent bike 6'   [x]seated march green band around feet 4x20"  [] gastroc wedge stretch 3x30" B   []  []  []    neuromuscular re-education activities to improve: Balance, Coordination, Kinesthetic, Sense, Proprioception, and Posture for 38  minutes. The following activities were included:  [x]SLS B 2x30s ANTELMO support  [x]NBOS 2x30s (high guard hands)  [x]Cone taps B 20x ea. On airex   []Tandem stance B 2x30s ANTELMO on airex   []DF green TB   []LAQ B 2# 3x10 3s  []step over airex 20x  []  []  [x]smooth pursuit horiz & " vertical seated   [x] gaze stabilization horiz & vertical seated & standing      therapeutic activities to improve functional performance for 0  minutes, including:    []standing hip abd 2x10 B   [] lateral stepping with partner hold on wand x2 laps   []step ups lvl 2 B 20x ANTELMO  []STS 2x10   []        Patient Education and Home Exercises       Education provided:   - rest in between exercise sets  -HEP: [id number]; updated & reviewed   -    Written Home Exercises Provided: Patient instructed to cont prior HEP. Exercises were reviewed and Chikis was able to demonstrate them prior to the end of the session.  Chikis demonstrated good  understanding of the education provided. See EMR under Patient Instructions for exercises provided during therapy session.    Assessment   Pt demonstrates limited visual acuity with smooth pursuit exercises and reported feeling some symptoms of dizziness while performing gaze stabilization exercises. Progress to standing gaze stabilization exercises. Added smooth pursuit and gaze stabilization exercises in sitting to HEP and plan to progress to standing as tolerated. Progress is still limited due to impaired memory and anxiety around exercises at times during session.       Chikis Is progressing well towards her goals.   Pt prognosis is Good.     Pt will continue to benefit from skilled outpatient physical therapy to address the deficits listed in the problem list box on initial evaluation, provide pt/family education and to maximize pt's level of independence in the home and community environment.     Pt's spiritual, cultural and educational needs considered and pt agreeable to plan of care and goals.     Anticipated barriers to physical therapy: comorbidities     Goals:   Short Term Goals: 6 visits  1. Pt will be (I) /c HEP to supplement PT in order to improve functional tasks  2. Pt will improve B ankle MMTs to 4-/5 grossly to improve strength for functional activities  3. Pt will be able to  ambulate up/down ramp with least restrictive AD Mod I         Long Term Goals: 12 visits   1. Pt will improve B hips  MMTs to 4-/5 grossly to improve strength for functional activities  2. Pt will improve FOTO score to </= 52% to reduce perceived pain with mobility   3. Pt will improve SRIVASTAVA score to >/= 20, indicating reduced fall risk   4. Pt will ambulate 200' Mod I with least restrictive AD, with steady gait     Plan     Plan of care Certification: 5/13/2024 to 8/13/24.     Outpatient Physical Therapy 2 times weekly for 12 visits to include the following interventions: Gait Training, Manual Therapy, Moist Heat/ Ice, Neuromuscular Re-ed, Patient Education, Therapeutic Activities, and Therapeutic Exercise, E-stim and Dry Needling as needed.     PT/PTA met face to face to discuss pt's treatment plan and progress towards established goals. Pt will be seen by a physical therapist minimally every 6th visit or every 30 days.      Suki Verma, PT

## 2024-06-04 ENCOUNTER — CLINICAL SUPPORT (OUTPATIENT)
Dept: OTOLARYNGOLOGY | Facility: CLINIC | Age: 79
End: 2024-06-04
Payer: MEDICARE

## 2024-06-04 ENCOUNTER — CLINICAL SUPPORT (OUTPATIENT)
Dept: REHABILITATION | Facility: HOSPITAL | Age: 79
End: 2024-06-04
Payer: MEDICARE

## 2024-06-04 DIAGNOSIS — H90.3 SENSORINEURAL HEARING LOSS, BILATERAL: Primary | ICD-10-CM

## 2024-06-04 DIAGNOSIS — R26.89 BALANCE DISORDER: Primary | ICD-10-CM

## 2024-06-04 PROCEDURE — 92567 TYMPANOMETRY: CPT | Mod: HCNC,S$GLB,, | Performed by: AUDIOLOGIST

## 2024-06-04 PROCEDURE — 97112 NEUROMUSCULAR REEDUCATION: CPT | Mod: KX,HCNC,CQ

## 2024-06-04 PROCEDURE — 99499 UNLISTED E&M SERVICE: CPT | Mod: HCNC,S$GLB,, | Performed by: AUDIOLOGIST

## 2024-06-04 PROCEDURE — 92557 COMPREHENSIVE HEARING TEST: CPT | Mod: HCNC,S$GLB,, | Performed by: AUDIOLOGIST

## 2024-06-04 NOTE — PROGRESS NOTES
Chikis Epstein was seen today in the clinic for a hearing aid check.  She reported doing well with the hearing aids and not having any issues.  I cleaned both hearing aids and replaced the wax guards.  The listening check revealed good sound quality.  There was no significant change in her hearing from the audiogram completed today so no adjustments were made to the hearing aids.  She will return in one year or sooner if needed.    Right ear:  Serial number: [id number]  : Phonak  Model: Audeo L70-R  Type: YVETTE  Color: P7 graphite gray  Battery size: Rechargeable  Tube length: #1  Speaker power: med   Dome size and style: Josef [id number], [id number], warranty 7/16/23  Warranty expiration: 6/10/26  L and D expiration: 6/10/26     Left ear:  Serial number: [id number]  : Phonak   Model: Audeo L70-R  Type: YVETTE  Color:  P7 graphite gray  Battery size: rechargeable  Tube length: #1  Speaker power: med  Dome size and style: Josef [id number], [id number], warranty 7/16/23  Warranty expiration: 6/10/26  L and D expiration: 6/10/26

## 2024-06-04 NOTE — PROGRESS NOTES
"[facility]   Physical Therapy Treatment Note      Name: Chikis Epstein  Clinic Number: [id number]    Therapy Diagnosis:   Encounter Diagnosis   Name Primary?    Balance disorder Yes         Physician: Rosa Elena Gay NP    Visit Date: 6/4/2024    Physician Orders: PT Eval and Treat   Medical Diagnosis from Referral: R26.9 (ICD-10-CM) - Abnormality of gait and mobility   Evaluation Date: 5/13/2024  Authorization Period Expiration: 4/19/25  Plan of Care Expiration: 8/13/24  Progress Note Due: 6/13/24  Visit # / Visits authorized: 1/ 1, 6/10  FOTO: 2/ 3     Precautions:  [place]       PTA Visit #: 1/5     Time In: 145 pm  Time Out: 230 pm  Total Billable Time: 45  minutes     Subjective     Pt reports: she wasn't to good about doing the new exercises given to her because she had company over for the entire weekend.  She was compliant with home exercise program.  Response to previous treatment: no adverse affect  Functional change: progressing    Pain: 0/10  Location:  N/A    Objective      Objective Measures updated at progress report unless specified.     Treatment     Chikis received the treatments listed below:      therapeutic exercises to develop strength, endurance, ROM, flexibility, posture, and core stabilization for 7 minutes including:  []sidelying hip abd 2x10  [x] recumbent bike 6'   [x]seated march green band around feet 4x20"  [] gastroc wedge stretch 3x30" B   []  []  []    neuromuscular re-education activities to improve: Balance, Coordination, Kinesthetic, Sense, Proprioception, and Posture for 38 minutes. The following activities were included:  [x]SLS B 2x30s ANTELMO support  [x]NBOS 2x30s (high guard hands)  []Cone taps B 20x ea. On airex   [x]Tandem stance B 2x30s ANTELMO on airex   [x]DF green TB   []LAQ B 2# 3x10 3s  []step over airex 20x  []  []  [x]smooth pursuit horiz & vertical standing  [x] gaze stabilization horiz & vertical standing      therapeutic activities to improve " functional performance for 0  minutes, including:    []standing hip abd 2x10 B   [] lateral stepping with partner hold on wand x2 laps   []step ups lvl 2 B 20x ANTELMO  []STS 2x10   []        Patient Education and Home Exercises       Education provided:   - rest in between exercise sets  -HEP: [id number]; updated & reviewed   -    Written Home Exercises Provided: Patient instructed to cont prior HEP. Exercises were reviewed and Chikis was able to demonstrate them prior to the end of the session.  Chikis demonstrated good  understanding of the education provided. See EMR under Patient Instructions for exercises provided during therapy session.    Assessment   Pt able to demonstrate newly implemented exercises in sitting for improved balance d/t vestibular deficits. Cueing for patient to perform these new exercises in a slower manner. Reported dizziness smooth pursuit drill. Progressed gaze stabilization and smooth pursuit to standing with single UE support. Patient continues with memory challenges and requires VC and therapist demo to initiate.      Chikis Is progressing well towards her goals.   Pt prognosis is Good.     Pt will continue to benefit from skilled outpatient physical therapy to address the deficits listed in the problem list box on initial evaluation, provide pt/family education and to maximize pt's level of independence in the home and community environment.     Pt's spiritual, cultural and educational needs considered and pt agreeable to plan of care and goals.     Anticipated barriers to physical therapy: comorbidities     Goals:   Short Term Goals: 6 visits  1. Pt will be (I) /c HEP to supplement PT in order to improve functional tasks  2. Pt will improve B ankle MMTs to 4-/5 grossly to improve strength for functional activities  3. Pt will be able to ambulate up/down ramp with least restrictive AD Mod I         Long Term Goals: 12 visits   1. Pt will improve B hips  MMTs to 4-/5 grossly to improve strength  for functional activities  2. Pt will improve FOTO score to </= 52% to reduce perceived pain with mobility   3. Pt will improve SRIVASTAVA score to >/= 20, indicating reduced fall risk   4. Pt will ambulate 200' Mod I with least restrictive AD, with steady gait     Plan     Plan of care Certification: 5/13/2024 to 8/13/24.     Outpatient Physical Therapy 2 times weekly for 12 visits to include the following interventions: Gait Training, Manual Therapy, Moist Heat/ Ice, Neuromuscular Re-ed, Patient Education, Therapeutic Activities, and Therapeutic Exercise, E-stim and Dry Needling as needed.     PT/PTA met face to face to discuss pt's treatment plan and progress towards established goals. Pt will be seen by a physical therapist minimally every 6th visit or every 30 days.      Terrence Hurtado PTA

## 2024-06-04 NOTE — PROGRESS NOTES
Chikis Epstein was seen today in the clinic for an audiologic evaluation.  She reported a history of hearing loss and currently wears a pair of hearing aids purchased through [facility].  She reported doing well with her hearing aids.    Tympanometry revealed a Type As tympanogram for both ears.  Audiogram results revealed a mild to moderately severe sensorineural hearing loss bilaterally.  Speech reception thresholds were obtained at 55dB for the right ear and 60dB for the left ear.  Speech discrimination scores were 60% for the right ear and 76% for the left ear.      Recommendations:  Otologic evaluation  Annual evaluation  Continue use of hearing aids  Hearing protection in noise

## 2024-06-06 ENCOUNTER — CLINICAL SUPPORT (OUTPATIENT)
Dept: REHABILITATION | Facility: HOSPITAL | Age: 79
End: 2024-06-06
Payer: MEDICARE

## 2024-06-06 DIAGNOSIS — R26.89 BALANCE DISORDER: Primary | ICD-10-CM

## 2024-06-06 PROCEDURE — 97112 NEUROMUSCULAR REEDUCATION: CPT | Mod: KX,HCNC,CQ

## 2024-06-06 NOTE — PROGRESS NOTES
"[facility]   Physical Therapy Treatment Note      Name: Chikis Epstein  Clinic Number: [id number]    Therapy Diagnosis:   Encounter Diagnosis   Name Primary?    Balance disorder Yes           Physician: Rosa Elena Gay NP    Visit Date: 6/6/2024    Physician Orders: PT Eval and Treat   Medical Diagnosis from Referral: R26.9 (ICD-10-CM) - Abnormality of gait and mobility   Evaluation Date: 5/13/2024  Authorization Period Expiration: 4/19/25  Plan of Care Expiration: 8/13/24  Progress Note Due: 6/13/24  Visit # / Visits authorized: 1/ 1, 7/10  FOTO: 2/ 3     Precautions:  [place]       PTA Visit #: 2/5     Time In: 230 pm  Time Out: 315 pm  Total Billable Time: 45  minutes     Subjective     Pt reports: she still feels that she is always in a hurry to do things. She didn't sleep well last night due to restless legs.  She was compliant with home exercise program.  Response to previous treatment: no adverse affect  Functional change: progressing    Pain: 0/10  Location:  N/A    Objective      Objective Measures updated at progress report unless specified.     Treatment     Chikis received the treatments listed below:      therapeutic exercises to develop strength, endurance, ROM, flexibility, posture, and core stabilization for 7 minutes including:  []sidelying hip abd 2x10  [x] recumbent bike 6'   [x]seated march green band around feet 4x20"  [] gastroc wedge stretch 3x30" B   []  []  []    neuromuscular re-education activities to improve: Balance, Coordination, Kinesthetic, Sense, Proprioception, and Posture for 38 minutes. The following activities were included:  [x]SLS B 2x30s ANTELMO support  [x]NBOS 2x30s (high guard hands) 1x eyes close  []Cone taps B 20x ea. On airex   [x]staggered stance B 2x30s ANTELMO   [x]DF green TB   []LAQ B 2# 3x10 3s  [x]Airex marches 20x  []  [x]seated self ball toss   [x]smooth pursuit horiz & vertical standing  [x] gaze stabilization horiz & vertical " standing      therapeutic activities to improve functional performance for 0  minutes, including:    []standing hip abd 2x10 B   [] lateral stepping with partner hold on wand x2 laps   []step ups lvl 2 B 20x ANTELMO  []STS 2x10   []        Patient Education and Home Exercises       Education provided:   - rest in between exercise sets  -HEP: [id number]; updated & reviewed   -    Written Home Exercises Provided: Patient instructed to cont prior HEP. Exercises were reviewed and Chikis was able to demonstrate them prior to the end of the session.  Chikis demonstrated good  understanding of the education provided. See EMR under Patient Instructions for exercises provided during therapy session.    Assessment   Pt continues to require cueing to perform exercises more slowly. Progressed patient with NBOS eyes closed with out any issues. Incorporated seated ball toss with horizontal and vertical head movements. Continued with NMR drills to work on balance and LE proprioception.      Chikis Is progressing well towards her goals.   Pt prognosis is Good.     Pt will continue to benefit from skilled outpatient physical therapy to address the deficits listed in the problem list box on initial evaluation, provide pt/family education and to maximize pt's level of independence in the home and community environment.     Pt's spiritual, cultural and educational needs considered and pt agreeable to plan of care and goals.     Anticipated barriers to physical therapy: comorbidities     Goals:   Short Term Goals: 6 visits  1. Pt will be (I) /c HEP to supplement PT in order to improve functional tasks  2. Pt will improve B ankle MMTs to 4-/5 grossly to improve strength for functional activities  3. Pt will be able to ambulate up/down ramp with least restrictive AD Mod I         Long Term Goals: 12 visits   1. Pt will improve B hips  MMTs to 4-/5 grossly to improve strength for functional activities  2. Pt will improve FOTO score to </= 52% to  reduce perceived pain with mobility   3. Pt will improve SRIVASTAVA score to >/= 20, indicating reduced fall risk   4. Pt will ambulate 200' Mod I with least restrictive AD, with steady gait     Plan     Plan of care Certification: 5/13/2024 to 8/13/24.     Outpatient Physical Therapy 2 times weekly for 12 visits to include the following interventions: Gait Training, Manual Therapy, Moist Heat/ Ice, Neuromuscular Re-ed, Patient Education, Therapeutic Activities, and Therapeutic Exercise, E-stim and Dry Needling as needed.     PT/PTA met face to face to discuss pt's treatment plan and progress towards established goals. Pt will be seen by a physical therapist minimally every 6th visit or every 30 days.      Terrence Hurtado PTA

## 2024-06-11 ENCOUNTER — CLINICAL SUPPORT (OUTPATIENT)
Dept: REHABILITATION | Facility: HOSPITAL | Age: 79
End: 2024-06-11
Payer: MEDICARE

## 2024-06-11 DIAGNOSIS — R26.89 BALANCE DISORDER: ICD-10-CM

## 2024-06-11 DIAGNOSIS — R26.9 ABNORMALITY OF GAIT AND MOBILITY: Primary | ICD-10-CM

## 2024-06-11 PROCEDURE — 97112 NEUROMUSCULAR REEDUCATION: CPT | Mod: HCNC,CQ

## 2024-06-11 NOTE — PROGRESS NOTES
"[facility]   Physical Therapy Treatment Note      Name: Chikis Epstein  Clinic Number: [id number]    Therapy Diagnosis:   Encounter Diagnoses   Name Primary?    Abnormality of gait and mobility Yes    Balance disorder              Physician: Rosa Elena Gay NP    Visit Date: 6/11/2024    Physician Orders: PT Eval and Treat   Medical Diagnosis from Referral: R26.9 (ICD-10-CM) - Abnormality of gait and mobility   Evaluation Date: 5/13/2024  Authorization Period Expiration: 4/19/25  Plan of Care Expiration: 8/13/24  Progress Note Due: 6/13/24  Visit # / Visits authorized: 1/ 1, 7/10  FOTO: 2/ 3     Precautions:  [place]       PTA Visit #: 2/5     Time In: 145 pm  Time Out: 225 pm  Total Billable Time: 45  minutes     Subjective     Pt reports: she feels that she is doing better at home.  She was compliant with home exercise program.  Response to previous treatment: no adverse affect  Functional change: none    Pain: 0/10  Location:  N/A    Objective      Objective Measures updated at progress report unless specified.     Treatment     Chikis received the treatments listed below:      therapeutic exercises to develop strength, endurance, ROM, flexibility, posture, and core stabilization for 7 minutes including:  []sidelying hip abd 2x10  [x] recumbent bike 6'   [x]seated march green band around feet 4x20"  [] gastroc wedge stretch 3x30" B   []  []  []    neuromuscular re-education activities to improve: Balance, Coordination, Kinesthetic, Sense, Proprioception, and Posture for 38 minutes. The following activities were included:  [x]SLS B 2x30s ANTELMO support  [x]NBOS 2x30s (high guard hands) airex  []Cone taps B 20x ea. On airex   [x]staggered stance B 2x30s ANTELMO   [x]DF green TB   []LAQ B 2# 3x10 3s  [x]Airex marches 20x  [x]cabinet reaching 1# on airex 10x R, 10x L  [x]seated self ball toss 2x30"  [x]smooth pursuit horiz & vertical standing  [x] gaze stabilization horiz & vertical " standing      therapeutic activities to improve functional performance for 0  minutes, including:    []standing hip abd 2x10 B   [] lateral stepping with partner hold on wand x2 laps   []step ups lvl 2 B 20x ANTELMO  []STS 2x10   []        Patient Education and Home Exercises       Education provided:   - rest in between exercise sets  -HEP: [id number]; updated & reviewed   -    Written Home Exercises Provided: Patient instructed to cont prior HEP. Exercises were reviewed and Chikis was able to demonstrate them prior to the end of the session.  Chikis demonstrated good  understanding of the education provided. See EMR under Patient Instructions for exercises provided during therapy session.    Assessment   Pt continues to require cueing to remember PT activities d/t cognitive deficits. Patient with improved balance noted as she is requiring less support. Incorporated stability challenge with dynamic movements with good tolerance.      Chikis Is progressing well towards her goals.   Pt prognosis is Good.     Pt will continue to benefit from skilled outpatient physical therapy to address the deficits listed in the problem list box on initial evaluation, provide pt/family education and to maximize pt's level of independence in the home and community environment.     Pt's spiritual, cultural and educational needs considered and pt agreeable to plan of care and goals.     Anticipated barriers to physical therapy: comorbidities     Goals:   Short Term Goals: 6 visits  1. Pt will be (I) /c HEP to supplement PT in order to improve functional tasks  2. Pt will improve B ankle MMTs to 4-/5 grossly to improve strength for functional activities  3. Pt will be able to ambulate up/down ramp with least restrictive AD Mod I         Long Term Goals: 12 visits   1. Pt will improve B hips  MMTs to 4-/5 grossly to improve strength for functional activities  2. Pt will improve FOTO score to </= 52% to reduce perceived pain with mobility   3. Pt  will improve SRIVASTAVA score to >/= 20, indicating reduced fall risk   4. Pt will ambulate 200' Mod I with least restrictive AD, with steady gait     Plan     Plan of care Certification: 5/13/2024 to 8/13/24.     Outpatient Physical Therapy 2 times weekly for 12 visits to include the following interventions: Gait Training, Manual Therapy, Moist Heat/ Ice, Neuromuscular Re-ed, Patient Education, Therapeutic Activities, and Therapeutic Exercise, E-stim and Dry Needling as needed.     PT/PTA met face to face to discuss pt's treatment plan and progress towards established goals. Pt will be seen by a physical therapist minimally every 6th visit or every 30 days.      Terrence Hurtado, PTA

## 2024-06-13 ENCOUNTER — CLINICAL SUPPORT (OUTPATIENT)
Dept: REHABILITATION | Facility: HOSPITAL | Age: 79
End: 2024-06-13
Payer: MEDICARE

## 2024-06-13 DIAGNOSIS — R26.89 BALANCE DISORDER: Primary | ICD-10-CM

## 2024-06-13 PROCEDURE — 97112 NEUROMUSCULAR REEDUCATION: CPT | Mod: HCNC,CQ

## 2024-06-13 NOTE — PROGRESS NOTES
"[facility]   Physical Therapy Treatment Note      Name: Chikis Epstein  Clinic Number: [id number]    Therapy Diagnosis:   Encounter Diagnosis   Name Primary?    Balance disorder Yes               Physician: Rosa Elena Gay NP    Visit Date: 6/13/2024    Physician Orders: PT Eval and Treat   Medical Diagnosis from Referral: R26.9 (ICD-10-CM) - Abnormality of gait and mobility   Evaluation Date: 5/13/2024  Authorization Period Expiration: 4/19/25  Plan of Care Expiration: 8/13/24  Progress Note Due: 6/13/24  Visit # / Visits authorized: 1/ 1, 9/10  FOTO: 2/ 3     Precautions:  [place]       PTA Visit #: 4/5     Time In: 230 pm  Time Out: 315 pm  Total Billable Time: 45  minutes     Subjective     Pt reports: the neck is bothersome lately. She is doing neck exercises at home which does help.  She was compliant with home exercise program.  Response to previous treatment: no adverse affect  Functional change: none    Pain: 0/10  Location:  N/A    Objective      Objective Measures updated at progress report unless specified.     Treatment     Chikis received the treatments listed below:      therapeutic exercises to develop strength, endurance, ROM, flexibility, posture, and core stabilization for 7 minutes including:  []sidelying hip abd 2x10  [x] recumbent bike 5'   [x]seated march green band around feet 4x20"  [] gastroc wedge stretch 3x30" B   []  []  []    neuromuscular re-education activities to improve: Balance, Coordination, Kinesthetic, Sense, Proprioception, and Posture for 38 minutes. The following activities were included:  [x]SLS B 2x30s ANTELMO support  [x]NBOS 2x30s (high guard hands) airex  [x]Cone taps(tall cone) B 20x ea. On airex   [x]staggered stance B 2x30s ANTELMO   [x]DF green TB   [x]yellow hurdles 4 laps  [x]Airex marches 20x  []cabinet reaching 1# on airex 10x R, 10x L  [x]seated self ball toss 2x30"  [x]smooth pursuit horiz & vertical standing  [x] gaze stabilization horiz & vertical " standing  [x]ball toss CGA 1'x2  []  []  []  []  []    therapeutic activities to improve functional performance for   minutes, including:    []standing hip abd 2x10 B   [] lateral stepping with partner hold on wand x2 laps   []step ups lvl 2 B 20x ANTELMO  []STS 2x10   []        Patient Education and Home Exercises       Education provided:   - rest in between exercise sets  -HEP: [id number]; updated & reviewed   -    Written Home Exercises Provided: Patient instructed to cont prior HEP. Exercises were reviewed and Chikis was able to demonstrate them prior to the end of the session.  Chikis demonstrated good  understanding of the education provided. See EMR under Patient Instructions for exercises provided during therapy session.    Assessment   Pt continues to require cueing to remember PT activities d/t cognitive deficits. Pt with greater confidence with regards to balance deficits. Patient with improved balance noted as she is requiring less support. Incorporated low marie challenge for curb negotiation. Pt continues with poor LE proprioception as evidenced with cone taps and airex marches.      Chikis Is progressing well towards her goals.   Pt prognosis is Good.     Pt will continue to benefit from skilled outpatient physical therapy to address the deficits listed in the problem list box on initial evaluation, provide pt/family education and to maximize pt's level of independence in the home and community environment.     Pt's spiritual, cultural and educational needs considered and pt agreeable to plan of care and goals.     Anticipated barriers to physical therapy: comorbidities     Goals:   Short Term Goals: 6 visits  1. Pt will be (I) /c HEP to supplement PT in order to improve functional tasks  2. Pt will improve B ankle MMTs to 4-/5 grossly to improve strength for functional activities  3. Pt will be able to ambulate up/down ramp with least restrictive AD Mod I         Long Term Goals: 12 visits   1. Pt will improve  B hips  MMTs to 4-/5 grossly to improve strength for functional activities  2. Pt will improve FOTO score to </= 52% to reduce perceived pain with mobility   3. Pt will improve SRIVASTAVA score to >/= 20, indicating reduced fall risk   4. Pt will ambulate 200' Mod I with least restrictive AD, with steady gait     Plan     Plan of care Certification: 5/13/2024 to 8/13/24.     Outpatient Physical Therapy 2 times weekly for 12 visits to include the following interventions: Gait Training, Manual Therapy, Moist Heat/ Ice, Neuromuscular Re-ed, Patient Education, Therapeutic Activities, and Therapeutic Exercise, E-stim and Dry Needling as needed.     PT/PTA met face to face to discuss pt's treatment plan and progress towards established goals. Pt will be seen by a physical therapist minimally every 6th visit or every 30 days.      Terrence Hurtado, PTA

## 2024-06-18 ENCOUNTER — CLINICAL SUPPORT (OUTPATIENT)
Dept: REHABILITATION | Facility: HOSPITAL | Age: 79
End: 2024-06-18
Payer: MEDICARE

## 2024-06-18 DIAGNOSIS — R26.9 GAIT ABNORMALITY: ICD-10-CM

## 2024-06-18 DIAGNOSIS — R26.9 ABNORMALITY OF GAIT AND MOBILITY: ICD-10-CM

## 2024-06-18 DIAGNOSIS — R26.89 BALANCE DISORDER: Primary | ICD-10-CM

## 2024-06-18 PROCEDURE — 97530 THERAPEUTIC ACTIVITIES: CPT | Mod: HCNC

## 2024-06-18 PROCEDURE — 97110 THERAPEUTIC EXERCISES: CPT | Mod: HCNC

## 2024-06-18 PROCEDURE — 97112 NEUROMUSCULAR REEDUCATION: CPT | Mod: HCNC

## 2024-06-18 NOTE — PROGRESS NOTES
[facility]   Physical Therapy Treatment Note      Name: Chikis Epstein  Clinic Number: [id number]    Therapy Diagnosis:   Encounter Diagnoses   Name Primary?    Balance disorder Yes    Abnormality of gait and mobility     Gait abnormality        Physician: Rosa Elena Gay NP    Visit Date: 6/18/2024    Physician Orders: PT Eval and Treat   Medical Diagnosis from Referral: R26.9 (ICD-10-CM) - Abnormality of gait and mobility   Evaluation Date: 5/13/2024  Authorization Period Expiration: 4/19/25  Plan of Care Expiration: 8/13/24  Progress Note Due: 6/13/24  Visit # / Visits authorized: 1/ 1, 9/10  FOTO: 2/ 3         Precautions:  [place]       PTA Visit #: 4/5     Time In: 11:05am  Time Out:  12 pm  Total Billable Time: 55  minutes     Subjective     Pt reports: her neck is still hurting lately. She does feel her balance is starting to improve.   She was compliant with home exercise program.  Response to previous treatment: no adverse affect  Functional change: didn't need to hold on to pews while walking the aisle at [employer]     Pain: 0/10  Location:  N/A    Objective      6/18/24  Range of Motion/Strength:      Hip Right MMT Left MMT Pain/Dysfunction with Movement (pain=!)   AROM             flexion  WFL 4-/5  WFL 4-/5                   abduction  WFL 3+/5  WFL 3+/5     Internal rotation  WFL 4-/5  WFL 4-/5     External rotation  WFL 4-/5  WFL 4-/5        Knee Right MMT Left MMT Pain/Dysfunction with Movement (pain=!)   AROM             flexion  WFL 4-/5  WFL 4-/5     extension  WFL 4-/5  WFL 4-/5        Ankle Right MMT Left MMT Pain/Dysfunction with Movement (pain=!)   AROM             dorsiflexion  WFL 3+/5  WFL 3+/5     plantarflexion  WFL 3+/5  WFL 3+/5     inversion  WFL 4-/5  WFL 4-/5     eversion  WFL 4-/5  WFL 4-/5          SRIVASTAVA  BALANCE ASSESSMENT TOOL  1. Sitting to Standing   3 - able to stand independely using hands  2. Standing Unsupported   4 - able to stand safely 2 minutes  "without hold  3. Sitting Unsupported   4 - able to sit safely and securely 2 minutes  4. Standing to Sitting   4 - sits safely with minimal use of hands  5. Pivot Transfer   4 - able to trnasfer safely with minor use of hands  6. Standing with Eyes Closed   3 - able to stand 10 seconds with supervision  7. Standing with Feet Together   2 - able to place feet together independently but unable to hold for 30 seconds  8. Reaching Forward with Outstretched Arm   1 - reaches forward but needs supervision  9. Retrieving Object from Floor   3 - able to pick slipper but needs supervision  10. Turning to Look Behind   0 - needs assistance to keep from loosing balance or falling  11. Turning 360 Degrees   2 - able to turn 360 safely but slowly  12. Placing Alternate Foot on Step   2 - able to complete 4 steps without aid with supervision  13. Standing with One Foot in Front   0 - Looses balance while stepping or standing  14. Standing on One Foot   0 - unable to try or needs assistance to prevent fall    TOTAL SCORE: 32  Maximum: 56  Score:   0-20= high fall risk   21-40 moderate fall risk   41-56 low fall risk     Fall risk cut-off scores:   Elderly and History of falls: <51/56   Elderly and No history of falls: <42/56   CVA: <45/56   Treatment     Chikis received the treatments listed below:      therapeutic exercises to develop strength, endurance, ROM, flexibility, posture, and core stabilization for 20  minutes including:  []sidelying hip abd 2x10  [x] recumbent bike 8'  -for BLE endurance   []seated march green band around feet 4x20"  [] gastroc wedge stretch 3x30" B   []    Next  DF against wall   Heel raises   Ramp training    neuromuscular re-education activities to improve: Balance, Coordination, Kinesthetic, Sense, Proprioception, and Posture for 25 minutes. The following activities were included:    [x] functional balance re-assessed   [x]SLS B 2x30s ANTELMO support  []NBOS 2x30s (high guard hands) airex  []Cone " "taps(tall cone) B 20x ea. On airex   []staggered stance B 2x30s ANTELMO   []DF green TB   []yellow hurdles 4 laps  []Airex marches 20x  []cabinet reaching 1# on airex 10x R, 10x L  []seated self ball toss 2x30"  []smooth pursuit horiz & vertical standing  [x] gaze stabilization horiz & vertical seated   [x] standing on firm surface and on airex (in corner)  []ball toss CGA 1'x2  []  [x] diagonal reaching for cones with head turns B in rhomberg stance    therapeutic activities to improve functional performance for  10 minutes, including:    []standing hip abd 2x10 B   [] lateral stepping with partner hold on wand x2 laps   []step ups lvl 2 B 20x ANTELMO  []STS 2x10   [x] Standing march 2# [id number]  [x] standing hip ext 2x10 B     Patient Education and Home Exercises       Education provided:   - rest in between exercise sets  -HEP: [id number]; updated & reviewed   -    Written Home Exercises Provided: Patient instructed to cont prior HEP. Exercises were reviewed and Chikis was able to demonstrate them prior to the end of the session.  Chikis demonstrated good  understanding of the education provided. See EMR under Patient Instructions for exercises provided during therapy session.    Assessment   See updated plan of care       Chikis Is progressing well towards her goals.   Pt prognosis is Good.     Pt will continue to benefit from skilled outpatient physical therapy to address the deficits listed in the problem list box on initial evaluation, provide pt/family education and to maximize pt's level of independence in the home and community environment.     Pt's spiritual, cultural and educational needs considered and pt agreeable to plan of care and goals.     Anticipated barriers to physical therapy: comorbidities     Goals:   See updated plan of care     Plan     See updated plan of care       Suki Verma, PT                       "

## 2024-06-19 NOTE — PLAN OF CARE
[facility]  Physical Therapy Plan of Care Note     Name: Chikis Epstein  [facility] Number: [id number]    Therapy Diagnosis:   Encounter Diagnoses   Name Primary?    Balance disorder Yes    Abnormality of gait and mobility     Gait abnormality      Physician: Rosa Elena Gay NP    Visit Date: 6/18/2024    Physician Orders: PT Eval and Treat   Medical Diagnosis from Referral: R26.9 (ICD-10-CM) - Abnormality of gait and mobility   Evaluation Date: 5/13/2024  Authorization Period Expiration: 4/19/25  Plan of Care Expiration: 8/13/24  Progress Note Due: 6/13/24  Visit # / Visits authorized: 1/ 1, 10/10  FOTO: 2/ 3    Precautions:  [place]  Functional Level Prior to Evaluation:  Mod I    SUBJECTIVE     Update: pt reports her balance is improving, but she still has some neck pain particularly with her VOR exercises     OBJECTIVE     6/18/24  Update:   SRIVASTAVA  BALANCE ASSESSMENT TOOL  1. Sitting to Standing   3 - able to stand independely using hands  2. Standing Unsupported   4 - able to stand safely 2 minutes without hold  3. Sitting Unsupported   4 - able to sit safely and securely 2 minutes  4. Standing to Sitting   4 - sits safely with minimal use of hands  5. Pivot Transfer   4 - able to trnasfer safely with minor use of hands  6. Standing with Eyes Closed   3 - able to stand 10 seconds with supervision  7. Standing with Feet Together   2 - able to place feet together independently but unable to hold for 30 seconds  8. Reaching Forward with Outstretched Arm   1 - reaches forward but needs supervision  9. Retrieving Object from Floor   3 - able to pick slipper but needs supervision  10. Turning to Look Behind   0 - needs assistance to keep from loosing balance or falling  11. Turning 360 Degrees   2 - able to turn 360 safely but slowly  12. Placing Alternate Foot on Step   2 - able to complete 4 steps without aid with supervision  13. Standing with One Foot in Front   0 - Looses balance while stepping  or standing  14. Standing on One Foot   0 - unable to try or needs assistance to prevent fall    TOTAL SCORE: 32  Maximum: 56  Score:   0-20= high fall risk   21-40 moderate fall risk   41-56 low fall risk     Fall risk cut-off scores:   Elderly and History of falls: <51/56   Elderly and No history of falls: <42/56   CVA: <45/56       Range of Motion/Strength:      Hip Right MMT Left MMT Pain/Dysfunction with Movement (pain=!)   AROM             flexion  WFL 4-/5  WFL 4-/5                   abduction  WFL 3+/5  WFL 3+/5     Internal rotation  WFL 4-/5  WFL 4-/5     External rotation  WFL 4-/5  WFL 4-/5        Knee Right MMT Left MMT Pain/Dysfunction with Movement (pain=!)   AROM             flexion  WFL 4-/5  WFL 4-/5     extension  WFL 4-/5  WFL 4-/5        Ankle Right MMT Left MMT Pain/Dysfunction with Movement (pain=!)   AROM             dorsiflexion  WFL 3+/5  WFL 3+/5     plantarflexion  WFL 3+/5  WFL 3+/5     inversion  WFL 4-/5  WFL 4-/5     eversion  WFL 4-/5  WFL 4-/5          ASSESSMENT     Update: Pt demonstrates improving functional balance as measured by SRIVASTAVA assessment, but is still in the moderate risk category. Leg strength is improving, however, she continues to demonstrate unsteady gait on even surfaces, affected by lack of head/neck movement due to neck pain. Pt would benefit from further skilled PT to address these deficits in order to reduce fall risk. Progress is limited by hearing loss ( pt is forgetful about wearing hearing aids) and impaired memory.     Previous Short Term Goals Status:   progressing   New Short Term Goals Status:   per initial start of care   Long Term Goal Status: continue per initial plan of care.  Reasons for Recertification of Therapy:   extension of plan of care     GOALS    Goals:   Short Term Goals: 6 visits  1. Pt will be (I) /c HEP to supplement PT in order to improve functional tasks (progressing)  2. Pt will improve B ankle MMTs to 4-/5 grossly to improve  strength for functional activities MET  3. Pt will be able to ambulate up/down ramp with least restrictive AD Mod I (progressing)        Long Term Goals: 12 visits   1. Pt will improve B hips  MMTs to 4-/5 grossly to improve strength for functional activities (progressing)  2. Pt will improve FOTO score to </= 52% to reduce perceived pain with mobility  (progressing)  3. Pt will improve SRIVASTAVA score to >/= 41, indicating reduced fall risk (progressing)  4. Pt will ambulate 200' Mod I with least restrictive AD, with steady gait (progressing)    PLAN     Updated Certification Period: 6/18/24 to 9/18/24   Recommended Treatment Plan: 2 times per week for 5 weeks:  Gait Training, Manual Therapy, Moist Heat/ Ice, Neuromuscular Re-ed, Patient Education, Therapeutic Activities, and Therapeutic Exercise  Other Recommendations: n/a   PT/PTA met face to face to discuss pt's treatment plan and progress towards established goals. Pt will be seen by a physical therapist minimally every 6th visit or every 30 days.    Suki Verma, PT

## 2024-06-20 ENCOUNTER — CLINICAL SUPPORT (OUTPATIENT)
Dept: REHABILITATION | Facility: HOSPITAL | Age: 79
End: 2024-06-20
Payer: MEDICARE

## 2024-06-20 DIAGNOSIS — R26.89 BALANCE DISORDER: Primary | ICD-10-CM

## 2024-06-20 PROCEDURE — 97530 THERAPEUTIC ACTIVITIES: CPT | Mod: HCNC,CQ

## 2024-06-20 PROCEDURE — 97112 NEUROMUSCULAR REEDUCATION: CPT | Mod: HCNC,CQ

## 2024-06-20 NOTE — PROGRESS NOTES
[facility]   Physical Therapy Treatment Note      Name: Chikis Epstein  Clinic Number: [id number]    Therapy Diagnosis:   Encounter Diagnosis   Name Primary?    Balance disorder Yes         Physician: Rosa Elena Gay NP    Visit Date: 6/20/2024    Physician Orders: PT Eval and Treat   Medical Diagnosis from Referral: R26.9 (ICD-10-CM) - Abnormality of gait and mobility   Evaluation Date: 5/13/2024  Authorization Period Expiration: 4/19/25  Plan of Care Expiration: 8/13/24  Progress Note Due: 6/13/24  Visit # / Visits authorized: 1/ 1, 11/30  FOTO: 2/ 3         Precautions:  [place]       PTA Visit #: 4/5     Time In: 145 pm  Time Out: 225 pm  Total Billable Time: 40 minutes     Subjective     Pt reports: she isn't walking that much anymore since her dog passed.  She was compliant with home exercise program.  Response to previous treatment: no adverse affect  Functional change: didn't need to hold on to pews while walking the aisle at [employer]     Pain: 0/10  Location:  N/A    Objective      6/18/24  Range of Motion/Strength:      Hip Right MMT Left MMT Pain/Dysfunction with Movement (pain=!)   AROM             flexion  WFL 4-/5  WFL 4-/5                   abduction  WFL 3+/5  WFL 3+/5     Internal rotation  WFL 4-/5  WFL 4-/5     External rotation  WFL 4-/5  WFL 4-/5        Knee Right MMT Left MMT Pain/Dysfunction with Movement (pain=!)   AROM             flexion  WFL 4-/5  WFL 4-/5     extension  WFL 4-/5  WFL 4-/5        Ankle Right MMT Left MMT Pain/Dysfunction with Movement (pain=!)   AROM             dorsiflexion  WFL 3+/5  WFL 3+/5     plantarflexion  WFL 3+/5  WFL 3+/5     inversion  WFL 4-/5  WFL 4-/5     eversion  WFL 4-/5  WFL 4-/5          SRIVASTAVA  BALANCE ASSESSMENT TOOL  1. Sitting to Standing   3 - able to stand independely using hands  2. Standing Unsupported   4 - able to stand safely 2 minutes without hold  3. Sitting Unsupported   4 - able to sit safely and securely 2 minutes  4.  "Standing to Sitting   4 - sits safely with minimal use of hands  5. Pivot Transfer   4 - able to trnasfer safely with minor use of hands  6. Standing with Eyes Closed   3 - able to stand 10 seconds with supervision  7. Standing with Feet Together   2 - able to place feet together independently but unable to hold for 30 seconds  8. Reaching Forward with Outstretched Arm   1 - reaches forward but needs supervision  9. Retrieving Object from Floor   3 - able to pick slipper but needs supervision  10. Turning to Look Behind   0 - needs assistance to keep from loosing balance or falling  11. Turning 360 Degrees   2 - able to turn 360 safely but slowly  12. Placing Alternate Foot on Step   2 - able to complete 4 steps without aid with supervision  13. Standing with One Foot in Front   0 - Looses balance while stepping or standing  14. Standing on One Foot   0 - unable to try or needs assistance to prevent fall    TOTAL SCORE: 32  Maximum: 56  Score:   0-20= high fall risk   21-40 moderate fall risk   41-56 low fall risk     Fall risk cut-off scores:   Elderly and History of falls: <51/56   Elderly and No history of falls: <42/56   CVA: <45/56   Treatment     Chikis received the treatments listed below:      therapeutic exercises to develop strength, endurance, ROM, flexibility, posture, and core stabilization for 8  minutes including:  []sidelying hip abd 2x10  [x] recumbent bike 8'  -for BLE endurance   []seated march green band around feet 4x20"  [] gastroc wedge stretch 3x30" B   []    Next    Heel raises     neuromuscular re-education activities to improve: Balance, Coordination, Kinesthetic, Sense, Proprioception, and Posture for 14 minutes. The following activities were included:    [x]DF against wall 3x10  [x]SLS B 2x30s ANTELMO support  []NBOS 2x30s (high guard hands) airex  []Cone taps(tall cone) B 20x ea. On airex   []staggered stance B 2x30s ANTELMO   []DF green TB   []yellow hurdles 4 laps  []Airex marches " "20x  []cabinet reaching 1# on airex 10x R, 10x L  []seated self ball toss 2x30"  []smooth pursuit horiz & vertical standing  [] gaze stabilization horiz & vertical seated   [x] standing on firm surface and on airex (in corner) gaze and smooth pursuit vert/horz  []ball toss CGA 1'x2  []  [x] diagonal reaching for cones with head turns B in rhomberg stance    therapeutic activities to improve functional performance for 23 minutes, including:  [x]Ramp training: fwd CGA/bk CGA/lateral,Rail assist (in gym)  []standing hip abd 2x10 B   [] lateral stepping with partner hold on wand x2 laps   []step ups lvl 2 B 20x ANTELMO  []STS 2x10   [] Standing march 2# [id number]  [] standing hip ext 2x10 B     Patient Education and Home Exercises       Education provided:   - rest in between exercise sets  -HEP: [id number]; updated & reviewed   -    Written Home Exercises Provided: Patient instructed to cont prior HEP. Exercises were reviewed and Chikis was able to demonstrate them prior to the end of the session.  Chikis demonstrated good  understanding of the education provided. See EMR under Patient Instructions for exercises provided during therapy session.    Assessment   Pt progressed with ramp training since this is one of her biggest challenges. CGA needed with forward and backward ramp navigation. Lateral stepping on ramp required B UE hand rail assist and cueing to keep B feet forward and avoid ER of hip. Encouraged patient to scan environment when ambulating in public setting.      Chikis Is progressing well towards her goals.   Pt prognosis is Good.     Pt will continue to benefit from skilled outpatient physical therapy to address the deficits listed in the problem list box on initial evaluation, provide pt/family education and to maximize pt's level of independence in the home and community environment.     Pt's spiritual, cultural and educational needs considered and pt agreeable to plan of care and goals.     Anticipated barriers to " physical therapy: comorbidities     Goals:   Short Term Goals: 6 visits  1. Pt will be (I) /c HEP to supplement PT in order to improve functional tasks (progressing)  2. Pt will improve B ankle MMTs to 4-/5 grossly to improve strength for functional activities MET  3. Pt will be able to ambulate up/down ramp with least restrictive AD Mod I (progressing)        Long Term Goals: 12 visits   1. Pt will improve B hips  MMTs to 4-/5 grossly to improve strength for functional activities (progressing)  2. Pt will improve FOTO score to </= 52% to reduce perceived pain with mobility  (progressing)  3. Pt will improve SRIVASTAVA score to >/= 41, indicating reduced fall risk (progressing)  4. Pt will ambulate 200' Mod I with least restrictive AD, with steady gait (progressing)    Plan     Updated Certification Period: 6/18/24 to 9/18/24   Recommended Treatment Plan: 2 times per week for 5 weeks:  Gait Training, Manual Therapy, Moist Heat/ Ice, Neuromuscular Re-ed, Patient Education, Therapeutic Activities, and Therapeutic Exercise  Other Recommendations: n/a   PT/PTA met face to face to discuss pt's treatment plan and progress towards established goals. Pt will be seen by a physical therapist minimally every 6th visit or every 30 days.      Terrence Hurtado PTA

## 2024-06-24 ENCOUNTER — CLINICAL SUPPORT (OUTPATIENT)
Dept: REHABILITATION | Facility: HOSPITAL | Age: 79
End: 2024-06-24
Payer: MEDICARE

## 2024-06-24 DIAGNOSIS — R26.9 ABNORMALITY OF GAIT AND MOBILITY: ICD-10-CM

## 2024-06-24 DIAGNOSIS — R26.89 BALANCE DISORDER: Primary | ICD-10-CM

## 2024-06-24 DIAGNOSIS — R26.9 GAIT ABNORMALITY: ICD-10-CM

## 2024-06-24 PROCEDURE — 97112 NEUROMUSCULAR REEDUCATION: CPT | Mod: HCNC

## 2024-06-24 PROCEDURE — 97530 THERAPEUTIC ACTIVITIES: CPT | Mod: HCNC

## 2024-06-24 NOTE — PROGRESS NOTES
[facility]ERAP[facility] AND [facility]   Physical Therapy Treatment Note      Name: Chikis Epstein  Clinic Number: [id number]    Therapy Diagnosis:   Encounter Diagnoses   Name Primary?    Balance disorder Yes    Abnormality of gait and mobility     Gait abnormality          Physician: Rosa Elena Gay NP    Visit Date: 6/24/2024    Physician Orders: PT Eval and Treat   Medical Diagnosis from Referral: R26.9 (ICD-10-CM) - Abnormality of gait and mobility   Evaluation Date: 5/13/2024  Authorization Period Expiration: 4/19/25  Plan of Care Expiration: 8/13/24  Progress Note Due: 6/13/24  Visit # / Visits authorized: 1/ 1, 12/30  FOTO: 2/ 3         Precautions:  [place]       PTA Visit #: 4/5     Time In: 145 pm  Time Out: 230 pm  Total Billable Time: 45  minutes     Subjective     Pt reports: she is feeling ok today   She was compliant with home exercise program.  Response to previous treatment: no adverse affect  Functional change: didn't need to hold on to pews while walking the aisle at [employer]     Pain: 0/10  Location:  N/A    Objective      6/18/24  Range of Motion/Strength:      Hip Right MMT Left MMT Pain/Dysfunction with Movement (pain=!)   AROM             flexion  WFL 4-/5  WFL 4-/5                   abduction  WFL 3+/5  WFL 3+/5     Internal rotation  WFL 4-/5  WFL 4-/5     External rotation  WFL 4-/5  WFL 4-/5        Knee Right MMT Left MMT Pain/Dysfunction with Movement (pain=!)   AROM             flexion  WFL 4-/5  WFL 4-/5     extension  WFL 4-/5  WFL 4-/5        Ankle Right MMT Left MMT Pain/Dysfunction with Movement (pain=!)   AROM             dorsiflexion  WFL 3+/5  WFL 3+/5     plantarflexion  WFL 3+/5  WFL 3+/5     inversion  WFL 4-/5  WFL 4-/5     eversion  WFL 4-/5  WFL 4-/5          SRIVASTAVA  BALANCE ASSESSMENT TOOL  1. Sitting to Standing   3 - able to stand independely using hands  2. Standing Unsupported   4 - able to stand safely 2 minutes without hold  3. Sitting Unsupported   4 - able to sit safely  "and securely 2 minutes  4. Standing to Sitting   4 - sits safely with minimal use of hands  5. Pivot Transfer   4 - able to trnasfer safely with minor use of hands  6. Standing with Eyes Closed   3 - able to stand 10 seconds with supervision  7. Standing with Feet Together   2 - able to place feet together independently but unable to hold for 30 seconds  8. Reaching Forward with Outstretched Arm   1 - reaches forward but needs supervision  9. Retrieving Object from Floor   3 - able to pick slipper but needs supervision  10. Turning to Look Behind   0 - needs assistance to keep from loosing balance or falling  11. Turning 360 Degrees   2 - able to turn 360 safely but slowly  12. Placing Alternate Foot on Step   2 - able to complete 4 steps without aid with supervision  13. Standing with One Foot in Front   0 - Looses balance while stepping or standing  14. Standing on One Foot   0 - unable to try or needs assistance to prevent fall    TOTAL SCORE: 32  Maximum: 56  Score:   0-20= high fall risk   21-40 moderate fall risk   41-56 low fall risk     Fall risk cut-off scores:   Elderly and History of falls: <51/56   Elderly and No history of falls: <42/56   CVA: <45/56   Treatment     Chikis received the treatments listed below:      therapeutic exercises to develop strength, endurance, ROM, flexibility, posture, and core stabilization for 10 minutes including:  []sidelying hip abd 2x10  [] recumbent bike 8'  -for BLE endurance   [x]seated march green band around feet 4x20"  [] gastroc wedge stretch 3x30" B   [x] Nustep 8' for BLE endurance       neuromuscular re-education activities to improve: Balance, Coordination, Kinesthetic, Sense, Proprioception, and Posture for 12 minutes. The following activities were included:    [x]DF against wall 3x10  []SLS B 2x30s ANTELMO support  []NBOS 2x30s (high guard hands) airex  []Cone taps(tall cone) B 20x ea. On airex   []staggered stance B 2x30s ANTELMO   []DF green TB   []yellow hurdles 4 " "laps  []Airex marches 20x  []cabinet reaching 1# on airex 10x R, 10x L  []seated self ball toss 2x30"  []smooth pursuit horiz & vertical standing  [] gaze stabilization horiz & vertical seated   [] standing on firm surface and on airex (in corner) gaze and smooth pursuit vert/horz  []ball toss CGA 1'x2  [x] toe walking x4 laps (between mats)   [x] diagonal reaching for cones with head turns B in rhomberg stance on airex     therapeutic activities to improve functional performance for 23 minutes, including:  []Ramp training: fwd CGA/bk CGA/lateral,Rail assist (in gym)  [x]standing hip abd 3x10 B   [] lateral stepping with partner hold on wand x2 laps   [x]step ups lvl 3 B 10x BUE  []STS 2x10   [x] Standing march green TB x30 sec   [] standing hip ext 2x10 B   [x] SAPD green TB 2x10     Patient Education and Home Exercises       Education provided:   - rest in between exercise sets  -HEP: [id number]; updated & reviewed   -    Written Home Exercises Provided: Patient instructed to cont prior HEP. Exercises were reviewed and Chikis was able to demonstrate them prior to the end of the session.  Chikis demonstrated good  understanding of the education provided. See EMR under Patient Instructions for exercises provided during therapy session.    Assessment   Pt progressed coordination training with heel walking with SBA for safety and poor coordination noted today during this and standing march. Added SAPD for improved standing posture as pt required cues to reduced forward lumbar flexion with standing exercises today. Cues required throughout session to slow exercise pace, correct posture and for proper exercise form. Progress as tolerated.       Chikis Is progressing well towards her goals.   Pt prognosis is Good.     Pt will continue to benefit from skilled outpatient physical therapy to address the deficits listed in the problem list box on initial evaluation, provide pt/family education and to maximize pt's level of " independence in the home and community environment.     Pt's spiritual, cultural and educational needs considered and pt agreeable to plan of care and goals.     Anticipated barriers to physical therapy: comorbidities     Goals:   Short Term Goals: 6 visits  1. Pt will be (I) /c HEP to supplement PT in order to improve functional tasks (progressing)  2. Pt will improve B ankle MMTs to 4-/5 grossly to improve strength for functional activities MET  3. Pt will be able to ambulate up/down ramp with least restrictive AD Mod I (progressing)        Long Term Goals: 12 visits   1. Pt will improve B hips  MMTs to 4-/5 grossly to improve strength for functional activities (progressing)  2. Pt will improve FOTO score to </= 52% to reduce perceived pain with mobility  (progressing)  3. Pt will improve SRIVASTAVA score to >/= 41, indicating reduced fall risk (progressing)  4. Pt will ambulate 200' Mod I with least restrictive AD, with steady gait (progressing)    Plan     Updated Certification Period: 6/18/24 to 9/18/24   Recommended Treatment Plan: 2 times per week for 5 weeks:  Gait Training, Manual Therapy, Moist Heat/ Ice, Neuromuscular Re-ed, Patient Education, Therapeutic Activities, and Therapeutic Exercise  Other Recommendations: n/a   PT/PTA met face to face to discuss pt's treatment plan and progress towards established goals. Pt will be seen by a physical therapist minimally every 6th visit or every 30 days.      Suki Verma, PT

## 2024-06-28 ENCOUNTER — CLINICAL SUPPORT (OUTPATIENT)
Dept: REHABILITATION | Facility: HOSPITAL | Age: 79
End: 2024-06-28
Payer: MEDICARE

## 2024-06-28 DIAGNOSIS — R26.89 BALANCE DISORDER: Primary | ICD-10-CM

## 2024-06-28 PROCEDURE — 97112 NEUROMUSCULAR REEDUCATION: CPT | Mod: KX,HCNC,CQ

## 2024-06-28 PROCEDURE — 97530 THERAPEUTIC ACTIVITIES: CPT | Mod: KX,HCNC,CQ

## 2024-06-28 NOTE — PROGRESS NOTES
"[facility]   Physical Therapy Treatment Note      Name: Chikis Epstein  Clinic Number: [id number]    Therapy Diagnosis:   Encounter Diagnosis   Name Primary?    Balance disorder Yes           Physician: Rosa Elena Gay NP    Visit Date: 6/28/2024    Physician Orders: PT Eval and Treat   Medical Diagnosis from Referral: R26.9 (ICD-10-CM) - Abnormality of gait and mobility   Evaluation Date: 5/13/2024  Authorization Period Expiration: 4/19/25  Plan of Care Expiration: 8/13/24  Progress Note Due: 6/13/24  Visit # / Visits authorized: 1/ 1, 13/30  FOTO: 2/ 3         Precautions:  [place]       PTA Visit #: 4/5     Time In: 230 pm  Time Out: 310 pm  Total Billable Time: 40  minutes     Subjective     Pt reports: she did a lot of walking at the [employer] last two days and she did well. She noticed that she didn't get as tired that easily.  She was compliant with home exercise program.  Response to previous treatment: no adverse affect  Functional change: didn't need to hold on to pews while walking the aisle at [employer]     Pain: 0/10  Location:  N/A    Objective        Treatment     Chikis received the treatments listed below:      therapeutic exercises to develop strength, endurance, ROM, flexibility, posture, and core stabilization for 9 minutes including:  []sidelying hip abd 2x10  [] recumbent bike 8'  -for BLE endurance   [x]seated march green band around feet 4x20"  [] gastroc wedge stretch 3x30" B   [x] Nustep 8' for BLE endurance       neuromuscular re-education activities to improve: Balance, Coordination, Kinesthetic, Sense, Proprioception, and Posture for 8 minutes. The following activities were included:    [x]DF against wall 3x10  []SLS B 2x30s ANTELMO support  []NBOS 2x30s (high guard hands) airex  []Cone taps(tall cone) B 20x ea. On airex   []staggered stance B 2x30s ANTELMO   []DF green TB   []yellow hurdles 4 laps  []Airex marches 20x  []cabinet reaching 1# on airex 10x R, 10x L  []seated self " "ball toss 2x30"  []smooth pursuit horiz & vertical standing  [] gaze stabilization horiz & vertical seated   [] standing on firm surface and on airex (in corner) gaze and smooth pursuit vert/horz  []ball toss CGA 1'x2  [x] toe walking x4 laps (between mats)   [x] diagonal reaching for cones with head turns B in rhomberg stance on airex     therapeutic activities to improve functional performance for 23 minutes, including:  []Ramp training: fwd CGA/bk CGA/lateral,Rail assist (in gym)  [x]standing hip abd 3x10 B   [] lateral stepping with partner hold on wand x2 laps   [x]step ups lvl 3 B 10x BUE  []STS 2x10   [x] Standing march green TB x30 sec   [] standing hip ext 2x10 B   [x] SAPD green TB 2x10     Patient Education and Home Exercises       Education provided:   - rest in between exercise sets  -HEP: [id number]; updated & reviewed   -    Written Home Exercises Provided: Patient instructed to cont prior HEP. Exercises were reviewed and Chikis was able to demonstrate them prior to the end of the session.  Chikis demonstrated good  understanding of the education provided. See EMR under Patient Instructions for exercises provided during therapy session.    Assessment   Pt continued with NMR activities to reduce fall risk. Patient continues with high anxiety hindering form and confidence. Attempted tandem ambulation drill with CGA but held d/t level of difficulty. Progress as tolerated.       Chikis Is progressing well towards her goals.   Pt prognosis is Good.     Pt will continue to benefit from skilled outpatient physical therapy to address the deficits listed in the problem list box on initial evaluation, provide pt/family education and to maximize pt's level of independence in the home and community environment.     Pt's spiritual, cultural and educational needs considered and pt agreeable to plan of care and goals.     Anticipated barriers to physical therapy: comorbidities     Goals:   Short Term Goals: 6 visits  1. Pt " will be (I) /c HEP to supplement PT in order to improve functional tasks (progressing)  2. Pt will improve B ankle MMTs to 4-/5 grossly to improve strength for functional activities MET  3. Pt will be able to ambulate up/down ramp with least restrictive AD Mod I (progressing)        Long Term Goals: 12 visits   1. Pt will improve B hips  MMTs to 4-/5 grossly to improve strength for functional activities (progressing)  2. Pt will improve FOTO score to </= 52% to reduce perceived pain with mobility  (progressing)  3. Pt will improve SRIVASTAVA score to >/= 41, indicating reduced fall risk (progressing)  4. Pt will ambulate 200' Mod I with least restrictive AD, with steady gait (progressing)    Plan     Updated Certification Period: 6/18/24 to 9/18/24   Recommended Treatment Plan: 2 times per week for 5 weeks:  Gait Training, Manual Therapy, Moist Heat/ Ice, Neuromuscular Re-ed, Patient Education, Therapeutic Activities, and Therapeutic Exercise  Other Recommendations: n/a   PT/PTA met face to face to discuss pt's treatment plan and progress towards established goals. Pt will be seen by a physical therapist minimally every 6th visit or every 30 days.      Terrence Hurtado PTA

## 2024-07-01 ENCOUNTER — CLINICAL SUPPORT (OUTPATIENT)
Dept: REHABILITATION | Facility: HOSPITAL | Age: 79
End: 2024-07-01
Payer: MEDICARE

## 2024-07-01 DIAGNOSIS — R26.89 BALANCE DISORDER: Primary | ICD-10-CM

## 2024-07-01 DIAGNOSIS — R26.9 ABNORMALITY OF GAIT AND MOBILITY: ICD-10-CM

## 2024-07-01 PROCEDURE — 97530 THERAPEUTIC ACTIVITIES: CPT | Mod: HCNC,CQ

## 2024-07-01 PROCEDURE — 97112 NEUROMUSCULAR REEDUCATION: CPT | Mod: HCNC,CQ

## 2024-07-01 NOTE — PROGRESS NOTES
"[facility]   Physical Therapy Treatment Note      Name: Chikis Epstein  Clinic Number: [id number]    Therapy Diagnosis:   Encounter Diagnoses   Name Primary?    Balance disorder Yes    Abnormality of gait and mobility              Physician: Rosa Elena Gay NP    Visit Date: 7/1/2024    Physician Orders: PT Eval and Treat   Medical Diagnosis from Referral: R26.9 (ICD-10-CM) - Abnormality of gait and mobility   Evaluation Date: 5/13/2024  Authorization Period Expiration: 4/19/25  Plan of Care Expiration: 8/13/24  Progress Note Due: 6/13/24  Visit # / Visits authorized: 1/ 1, 14/30  FOTO: 2/ 3         Precautions:  [place]       PTA Visit #: 2/5     Time In: 100 pm  Time Out: 145 pm  Total Billable Time: 45 minutes     Subjective     Pt reports: she didn't do to much over the weekend. She noticed that her balance is better lately.  She was compliant with home exercise program.  Response to previous treatment: no adverse affect  Functional change: didn't need to hold on to pews while walking the aisle at [employer]     Pain: 0/10  Location:  N/A    Objective        Treatment     Chikis received the treatments listed below:      therapeutic exercises to develop strength, endurance, ROM, flexibility, posture, and core stabilization for 10 minutes including:  []sidelying hip abd 2x10  [x] recumbent bike 8'  -for BLE endurance   [x]seated march green band around feet 4x20"  [] gastroc wedge stretch 3x30" B   [] Nustep 8' for BLE endurance       neuromuscular re-education activities to improve: Balance, Coordination, Kinesthetic, Sense, Proprioception, and Posture for 8 minutes. The following activities were included:    [x]DF against wall 3x10  []SLS B 2x30s ANTELMO support  []NBOS 2x30s (high guard hands) airex  []Cone taps(tall cone) B 20x ea. On airex   []staggered stance B 2x30s ANTELMO   [x]DF green TB   []yellow hurdles 4 laps  []Airex marches 20x  []cabinet reaching 1# on airex 10x R, 10x L  []seated self " "ball toss 2x30"  []smooth pursuit horiz & vertical standing  [] gaze stabilization horiz & vertical seated   [] standing on firm surface and on airex (in corner) gaze and smooth pursuit vert/horz  [x]Fitter board balance 1'x2  [x] toe walking x4 laps (between mats)   [x] diagonal reaching for cones with head turns B in rhomberg stance on airex     therapeutic activities to improve functional performance for 25 minutes, including:  []Ramp training: fwd CGA/bk CGA/lateral,Rail assist (in gym)  [x]standing hip abd 3x10 B   [] lateral stepping with partner hold on wand x2 laps   [x]step ups lvl 3 B 10x BUE  [x]STS on airex 2x10   [x] Standing march green TB x30 sec   [] standing hip ext 2x10 B   [x] SAPD green TB 2x10     Patient Education and Home Exercises       Education provided:   - rest in between exercise sets  -HEP: [id number]; updated & reviewed   -    Written Home Exercises Provided: Patient instructed to cont prior HEP. Exercises were reviewed and Chikis was able to demonstrate them prior to the end of the session.  Chikis demonstrated good  understanding of the education provided. See EMR under Patient Instructions for exercises provided during therapy session.    Assessment   Pt continued with NMR activities to address balance deficits without any adverse reactions. Encouraged patient to decrease support normally used in clinic for standing cone transfer. Added fitter board balance for additional neuromuscular challenge. Progress as tolerated.       Chikis Is progressing well towards her goals.   Pt prognosis is Good.     Pt will continue to benefit from skilled outpatient physical therapy to address the deficits listed in the problem list box on initial evaluation, provide pt/family education and to maximize pt's level of independence in the home and community environment.     Pt's spiritual, cultural and educational needs considered and pt agreeable to plan of care and goals.     Anticipated barriers to physical " therapy: comorbidities     Goals:   Short Term Goals: 6 visits  1. Pt will be (I) /c HEP to supplement PT in order to improve functional tasks (progressing)  2. Pt will improve B ankle MMTs to 4-/5 grossly to improve strength for functional activities MET  3. Pt will be able to ambulate up/down ramp with least restrictive AD Mod I (progressing)        Long Term Goals: 12 visits   1. Pt will improve B hips  MMTs to 4-/5 grossly to improve strength for functional activities (progressing)  2. Pt will improve FOTO score to </= 52% to reduce perceived pain with mobility  (progressing)  3. Pt will improve SRIVASTAVA score to >/= 41, indicating reduced fall risk (progressing)  4. Pt will ambulate 200' Mod I with least restrictive AD, with steady gait (progressing)    Plan     Updated Certification Period: 6/18/24 to 9/18/24   Recommended Treatment Plan: 2 times per week for 5 weeks:  Gait Training, Manual Therapy, Moist Heat/ Ice, Neuromuscular Re-ed, Patient Education, Therapeutic Activities, and Therapeutic Exercise  Other Recommendations: n/a   PT/PTA met face to face to discuss pt's treatment plan and progress towards established goals. Pt will be seen by a physical therapist minimally every 6th visit or every 30 days.      Terrence Hurtado PTA

## 2024-07-02 ENCOUNTER — PATIENT MESSAGE (OUTPATIENT)
Dept: OTHER | Facility: OTHER | Age: 79
End: 2024-07-02
Payer: MEDICARE

## 2024-07-02 ENCOUNTER — TELEPHONE (OUTPATIENT)
Dept: INTERNAL MEDICINE | Facility: CLINIC | Age: 79
End: 2024-07-02
Payer: MEDICARE

## 2024-07-02 ENCOUNTER — OFFICE VISIT (OUTPATIENT)
Dept: INTERNAL MEDICINE | Facility: CLINIC | Age: 79
End: 2024-07-02
Payer: MEDICARE

## 2024-07-02 VITALS
OXYGEN SATURATION: 98 % | WEIGHT: 154.88 LBS | HEIGHT: 60 IN | SYSTOLIC BLOOD PRESSURE: 138 MMHG | BODY MASS INDEX: 30.41 KG/M2 | RESPIRATION RATE: 16 BRPM | DIASTOLIC BLOOD PRESSURE: 78 MMHG | HEART RATE: 78 BPM | TEMPERATURE: 98 F

## 2024-07-02 DIAGNOSIS — R73.03 PREDIABETES: ICD-10-CM

## 2024-07-02 DIAGNOSIS — I10 HYPERTENSION, UNSPECIFIED TYPE: ICD-10-CM

## 2024-07-02 DIAGNOSIS — I10 HYPERTENSION, UNSPECIFIED TYPE: Primary | ICD-10-CM

## 2024-07-02 DIAGNOSIS — Z12.31 ENCOUNTER FOR SCREENING MAMMOGRAM FOR BREAST CANCER: ICD-10-CM

## 2024-07-02 DIAGNOSIS — I70.0 AORTIC ATHEROSCLEROSIS: ICD-10-CM

## 2024-07-02 DIAGNOSIS — I70.8 AORTO-ILIAC ATHEROSCLEROSIS: ICD-10-CM

## 2024-07-02 DIAGNOSIS — E21.3 HYPERPARATHYROIDISM: ICD-10-CM

## 2024-07-02 DIAGNOSIS — F41.9 ANXIETY: ICD-10-CM

## 2024-07-02 DIAGNOSIS — E78.00 PURE HYPERCHOLESTEROLEMIA: ICD-10-CM

## 2024-07-02 DIAGNOSIS — I70.0 AORTO-ILIAC ATHEROSCLEROSIS: ICD-10-CM

## 2024-07-02 DIAGNOSIS — Z00.00 ANNUAL PHYSICAL EXAM: Primary | ICD-10-CM

## 2024-07-02 PROCEDURE — 1126F AMNT PAIN NOTED NONE PRSNT: CPT | Mod: HCNC,CPTII,S$GLB, | Performed by: INTERNAL MEDICINE

## 2024-07-02 PROCEDURE — 99999 PR PBB SHADOW E&M-EST. PATIENT-LVL IV: CPT | Mod: PBBFAC,HCNC,, | Performed by: INTERNAL MEDICINE

## 2024-07-02 PROCEDURE — 3075F SYST BP GE 130 - 139MM HG: CPT | Mod: HCNC,CPTII,S$GLB, | Performed by: INTERNAL MEDICINE

## 2024-07-02 PROCEDURE — 1101F PT FALLS ASSESS-DOCD LE1/YR: CPT | Mod: HCNC,CPTII,S$GLB, | Performed by: INTERNAL MEDICINE

## 2024-07-02 PROCEDURE — 1159F MED LIST DOCD IN RCRD: CPT | Mod: HCNC,CPTII,S$GLB, | Performed by: INTERNAL MEDICINE

## 2024-07-02 PROCEDURE — 3078F DIAST BP <80 MM HG: CPT | Mod: HCNC,CPTII,S$GLB, | Performed by: INTERNAL MEDICINE

## 2024-07-02 PROCEDURE — 3288F FALL RISK ASSESSMENT DOCD: CPT | Mod: HCNC,CPTII,S$GLB, | Performed by: INTERNAL MEDICINE

## 2024-07-02 PROCEDURE — 99397 PER PM REEVAL EST PAT 65+ YR: CPT | Mod: HCNC,S$GLB,, | Performed by: INTERNAL MEDICINE

## 2024-07-02 NOTE — PROGRESS NOTES
Subjective     Patient ID: Chikis Epstein is a 78 y.o. female.    Chief Complaint: Annual Exam    HPI  78 y.o. Female here for annual exam.      Vaccines: Influenza (); Tetanus (); Prevnar 20 (); Shingrix ()  Eye exam:   Mammogram: 10/22  Gyn exam: declined  Colonoscopy: (pt declining repeat)  DEXA: (NL)     Exercise: no  Diet: regular       Past Medical History:  No date: Anxiety  No date: Aortic atherosclerosis      Comment:  CXR 10/13/12  No date: Arthritis  No date: Back pain  No date: Cataract  2016: Hyperlipidemia  No date: Hypertension  No date: MDD (major depressive disorder)  No date: Menopause  2016: Non morbid obesity due to excess calories  No date: Personal history of kidney stones  No date: Type 2 diabetes mellitus without complication  Past Surgical History:  No date: CATARACT EXTRACTION, BILATERAL  No date:  SECTION  No date: CHOLECYSTECTOMY  : COLONOSCOPY      Comment:  normal  No date: EYE SURGERY  No date: HYSTERECTOMY  No date: KIDNEY STONE SURGERY  No date: TOTAL ABDOMINAL HYSTERECTOMY W/ BILATERAL SALPINGOOPHORECTOMY  Social History    Socioeconomic History      Marital status:       Spouse name: Not on file      Number of children: Not on file      Years of education: Not on file      Highest education level: Not on file    Occupational History      Occupation: Retired    Tobacco Use      Smoking status: Never Smoker      Smokeless tobacco: Never Used    Substance and Sexual Activity      Alcohol use: Yes        Alcohol/week: 0.0 standard drinks        Comment: social. one drink a week.      Drug use: No      Sexual activity: Yes        Partners: Male    Other Topics      Concerns:        Not on file    Social History Narrative      Not on file     Social Determinants of Health  Financial Resource Strain:       Difficulty of Paying Living Expenses:   Food Insecurity:       Worried About Running Out of Food in the Last Year:       Ran Out  of Food in the Last Year:   Transportation Needs:       Lack of Transportation (Medical):       Lack of Transportation (Non-Medical):   Physical Activity:       Days of Exercise per Week:       Minutes of Exercise per Session:   Stress:       Feeling of Stress :   Social Connections:       Frequency of Communication with Friends and Family:       Frequency of Social Gatherings with Friends and Family:       Attends [employer] Services:       Active Member of Clubs or Organizations:       Attends Club or Organization Meetings:       Marital Status:   Review of patient's allergies indicates:   -- Ace inhibitors     --  Other reaction(s): Swelling  Review of Systems   Constitutional:  Negative for activity change, appetite change, chills, diaphoresis, fatigue, fever and unexpected weight change.   HENT:  Negative for nasal congestion, mouth sores, postnasal drip, rhinorrhea, sinus pressure/congestion, sneezing, sore throat, trouble swallowing and voice change.    Eyes:  Negative for pain, discharge and visual disturbance.   Respiratory:  Negative for cough, shortness of breath and wheezing.    Cardiovascular:  Negative for chest pain, palpitations and leg swelling.   Gastrointestinal:  Negative for abdominal pain, blood in stool, constipation, diarrhea, nausea and vomiting.   Endocrine: Negative for cold intolerance and heat intolerance.   Genitourinary:  Negative for difficulty urinating, dysuria, frequency, hematuria and urgency.   Musculoskeletal:  Negative for arthralgias and myalgias.   Integumentary:  Negative for rash and wound.   Allergic/Immunologic: Negative for environmental allergies and food allergies.   Neurological:  Negative for dizziness, tremors, seizures, syncope, weakness, light-headedness and headaches.   Hematological:  Negative for adenopathy. Does not bruise/bleed easily.   Psychiatric/Behavioral:  Positive for sleep disturbance. Negative for confusion, self-injury and suicidal ideas. The patient  is nervous/anxious.           Objective     Physical Exam  Vitals and nursing note reviewed.   Constitutional:       General: She is not in acute distress.     Appearance: Normal appearance. She is well-developed. She is not diaphoretic.   HENT:      Head: Normocephalic and atraumatic.      Right Ear: External ear normal.      Left Ear: External ear normal.      Nose: Nose normal.      Mouth/Throat:      Pharynx: No oropharyngeal exudate.   Eyes:      General: No scleral icterus.        Right eye: No discharge.         Left eye: No discharge.      Conjunctiva/sclera: Conjunctivae normal.      Pupils: Pupils are equal, round, and reactive to light.   Neck:      Thyroid: No thyromegaly.      Vascular: No JVD.   Cardiovascular:      Rate and Rhythm: Normal rate and regular rhythm.      Pulses: Normal pulses.      Heart sounds: Normal heart sounds. No murmur heard.  Pulmonary:      Effort: Pulmonary effort is normal. No respiratory distress.      Breath sounds: Normal breath sounds. No wheezing, rhonchi or rales.   Chest:      Chest wall: No tenderness.   Abdominal:      General: Bowel sounds are normal. There is no distension.      Palpations: Abdomen is soft.      Tenderness: There is no abdominal tenderness. There is no guarding or rebound.   Musculoskeletal:      Cervical back: Neck supple.      Right lower leg: No edema.      Left lower leg: No edema.   Lymphadenopathy:      Cervical: No cervical adenopathy.   Skin:     General: Skin is warm and dry.      Coloration: Skin is not pale.      Findings: No rash.   Neurological:      General: No focal deficit present.      Mental Status: She is alert and oriented to person, place, and time.      Gait: Gait normal.   Psychiatric:         Behavior: Behavior normal.         Thought Content: Thought content normal.         Judgment: Judgment normal.            Assessment and Plan     1. Annual physical exam  -     CBC Auto Differential; Future; Expected date: 07/02/2024  -      Comprehensive Metabolic Panel; Future; Expected date: 07/02/2024  -     TSH; Future; Expected date: 07/02/2024  -     Lipid Panel; Future; Expected date: 07/02/2024  -     Urinalysis; Future  -     Hemoglobin A1C; Future; Expected date: 07/02/2024    2. Anxiety    3. Hypertension, unspecified type  -     CBC Auto Differential; Future; Expected date: 07/02/2024  -     Comprehensive Metabolic Panel; Future; Expected date: 07/02/2024  -     TSH; Future; Expected date: 07/02/2024  -     Lipid Panel; Future; Expected date: 07/02/2024  -     Urinalysis; Future  -     Hemoglobin A1C; Future; Expected date: 07/02/2024    4. Pure hypercholesterolemia  Overview:  Discussed aortic atherosclerosis in addition to pt hyperlipidemia  Started pravastatin therapy today pt f/u in 3 months.      Orders:  -     CBC Auto Differential; Future; Expected date: 07/02/2024  -     Comprehensive Metabolic Panel; Future; Expected date: 07/02/2024  -     TSH; Future; Expected date: 07/02/2024  -     Lipid Panel; Future; Expected date: 07/02/2024  -     Urinalysis; Future  -     Hemoglobin A1C; Future; Expected date: 07/02/2024    5. Aorto-iliac atherosclerosis    6. Aortic atherosclerosis    7. Prediabetes  -     CBC Auto Differential; Future; Expected date: 07/02/2024  -     Comprehensive Metabolic Panel; Future; Expected date: 07/02/2024  -     TSH; Future; Expected date: 07/02/2024  -     Lipid Panel; Future; Expected date: 07/02/2024  -     Urinalysis; Future  -     Hemoglobin A1C; Future; Expected date: 07/02/2024    8. Hyperparathyroidism    9. Encounter for screening mammogram for breast cancer  -     Mammo Digital Screening Bilat w/ Chin; Future; Expected date: 07/02/2024           Blood work ordered      Prediabetes- last A1C of 6.2(10/23)<--6.2(3/22)<--6.2(9/21)<--6.0(12/19)       Continue Metformin 500 mg qam      HTN- stable on current meds      HLD- continue statin      GERD- stable on PPI      Anxiety- stable on Wellbutrin XL  300 mg/Buspar 15 mg qd      Aortic atherosclerosis- on statin      Insomnia- trial of Xanax 0.25-0.5 mg qHS      Hyperparathyroidism- stable      F/u in 6 months

## 2024-07-03 ENCOUNTER — CLINICAL SUPPORT (OUTPATIENT)
Dept: REHABILITATION | Facility: HOSPITAL | Age: 79
End: 2024-07-03
Payer: MEDICARE

## 2024-07-03 ENCOUNTER — LAB VISIT (OUTPATIENT)
Dept: LAB | Facility: HOSPITAL | Age: 79
End: 2024-07-03
Attending: INTERNAL MEDICINE
Payer: MEDICARE

## 2024-07-03 DIAGNOSIS — R73.03 PREDIABETES: ICD-10-CM

## 2024-07-03 DIAGNOSIS — E78.00 PURE HYPERCHOLESTEROLEMIA: ICD-10-CM

## 2024-07-03 DIAGNOSIS — R26.81 GAIT INSTABILITY: Primary | ICD-10-CM

## 2024-07-03 DIAGNOSIS — I10 HYPERTENSION, UNSPECIFIED TYPE: ICD-10-CM

## 2024-07-03 DIAGNOSIS — Z00.00 ANNUAL PHYSICAL EXAM: ICD-10-CM

## 2024-07-03 LAB
BILIRUB UR QL STRIP: NEGATIVE
CLARITY UR REFRACT.AUTO: CLEAR
COLOR UR AUTO: YELLOW
GLUCOSE UR QL STRIP: NEGATIVE
HGB UR QL STRIP: NEGATIVE
KETONES UR QL STRIP: NEGATIVE
LEUKOCYTE ESTERASE UR QL STRIP: NEGATIVE
NITRITE UR QL STRIP: NEGATIVE
PH UR STRIP: 7 [PH] (ref 5–8)
PROT UR QL STRIP: ABNORMAL
SP GR UR STRIP: 1.02 (ref 1–1.03)
URN SPEC COLLECT METH UR: ABNORMAL

## 2024-07-03 PROCEDURE — 97112 NEUROMUSCULAR REEDUCATION: CPT | Mod: HCNC,CQ

## 2024-07-03 PROCEDURE — 97530 THERAPEUTIC ACTIVITIES: CPT | Mod: HCNC,CQ

## 2024-07-03 PROCEDURE — 81003 URINALYSIS AUTO W/O SCOPE: CPT | Mod: HCNC | Performed by: INTERNAL MEDICINE

## 2024-07-03 NOTE — PROGRESS NOTES
"[facility]   Physical Therapy Treatment Note      Name: Chikis Epstein  Clinic Number: [id number]    Therapy Diagnosis:   Encounter Diagnosis   Name Primary?    Gait instability Yes       Physician: Rosa Elena Gay NP    Visit Date: 7/3/2024    Physician Orders: PT Eval and Treat   Medical Diagnosis from Referral: R26.9 (ICD-10-CM) - Abnormality of gait and mobility   Evaluation Date: 5/13/2024  Authorization Period Expiration: 4/19/25  Plan of Care Expiration: 8/13/24  Progress Note Due: 6/13/24  Visit # / Visits authorized: 1/ 1, 15/30  FOTO: 2/ 3         Precautions:  [place]       PTA Visit #: 3/5     Time In: 1000 am  Time Out: 1045 am  Total Billable Time: 45 minutes     Subjective     Pt reports: she would like to take next week off and see how she does, as she has been on the go all the time and could use the break. If she does okay then she will discontinue PT.  She was compliant with home exercise program.  Response to previous treatment: no adverse affect  Functional change: didn't need to hold on to pews while walking the aisle at [employer]     Pain: 0/10  Location:  N/A    Objective        Treatment     Chikis received the treatments listed below:      therapeutic exercises to develop strength, endurance, ROM, flexibility, posture, and core stabilization for 10 minutes including:  []sidelying hip abd 2x10  [x] recumbent bike 8'  -for BLE endurance   [x]seated march green band around feet 4x20"  [] gastroc wedge stretch 3x30" B   [] Nustep 8' for BLE endurance       neuromuscular re-education activities to improve: Balance, Coordination, Kinesthetic, Sense, Proprioception, and Posture for 10 minutes. The following activities were included:    [x]DF against wall 3x10  []SLS B 2x30s ANTELMO support  []NBOS 2x30s (high guard hands) airex  []Cone taps(tall cone) B 20x ea. On airex   []staggered stance B 2x30s ANTELMO   [x]DF green TB   []yellow hurdles 4 laps  []Airex marches 20x  []cabinet reaching " "1# on airex 10x R, 10x L  []seated self ball toss 2x30"  []smooth pursuit horiz & vertical standing  [] gaze stabilization horiz & vertical seated   [] standing on firm surface and on airex (in corner) gaze and smooth pursuit vert/horz  []Fitter board balance 1'x2  [] toe walking x4 laps (between mats)   [x] diagonal reaching for cones with head turns B in rhomberg stance on airex     therapeutic activities to improve functional performance for 25 minutes, including:  [x]Ramp training: fwd CGA/bk CGA/lateral,Rail assist (in gym)  [x]standing hip abd 3x10 B   [] lateral stepping with partner hold on wand x2 laps   [x]step ups lvl 3 B 10x BUE  [x]STS on airex 2x10   [x] Standing march green TB x30 sec   [] standing hip ext 2x10 B   [x] SAPD green TB 2x10     Patient Education and Home Exercises       Education provided:   - rest in between exercise sets  -HEP: [id number]; updated & reviewed   -    Written Home Exercises Provided: Patient instructed to cont prior HEP. Exercises were reviewed and Chikis was able to demonstrate them prior to the end of the session.  Chikis demonstrated good  understanding of the education provided. See EMR under Patient Instructions for exercises provided during therapy session.    Assessment   Pt continued with NMR activities to address balance deficits without any adverse reactions. Pt with improved balance and gait today as noticed during ramp navigation. Fitter board proved challenging today constant cueing to avoid backwards sway.  Progress as tolerated.       Chikis Is progressing well towards her goals.   Pt prognosis is Good.     Pt will continue to benefit from skilled outpatient physical therapy to address the deficits listed in the problem list box on initial evaluation, provide pt/family education and to maximize pt's level of independence in the home and community environment.     Pt's spiritual, cultural and educational needs considered and pt agreeable to plan of care and goals.   "   Anticipated barriers to physical therapy: comorbidities     Goals:   Short Term Goals: 6 visits  1. Pt will be (I) /c HEP to supplement PT in order to improve functional tasks (progressing)  2. Pt will improve B ankle MMTs to 4-/5 grossly to improve strength for functional activities MET  3. Pt will be able to ambulate up/down ramp with least restrictive AD Mod I (progressing)        Long Term Goals: 12 visits   1. Pt will improve B hips  MMTs to 4-/5 grossly to improve strength for functional activities (progressing)  2. Pt will improve FOTO score to </= 52% to reduce perceived pain with mobility  (progressing)  3. Pt will improve SRIVASTAVA score to >/= 41, indicating reduced fall risk (progressing)  4. Pt will ambulate 200' Mod I with least restrictive AD, with steady gait (progressing)    Plan     Updated Certification Period: 6/18/24 to 9/18/24   Recommended Treatment Plan: 2 times per week for 5 weeks:  Gait Training, Manual Therapy, Moist Heat/ Ice, Neuromuscular Re-ed, Patient Education, Therapeutic Activities, and Therapeutic Exercise  Other Recommendations: n/a   PT/PTA met face to face to discuss pt's treatment plan and progress towards established goals. Pt will be seen by a physical therapist minimally every 6th visit or every 30 days.      Terrence Hurtado PTA                             [facility]   Physical Therapy Treatment Note      Name: Chikis HOFFMAN Epstein  [facility] Number: [id number]    Therapy Diagnosis:   Encounter Diagnosis   Name Primary?    Gait instability Yes               Physician: Rosa Elena Gay NP    Visit Date: 7/3/2024    Physician Orders: PT Eval and Treat   Medical Diagnosis from Referral: R26.9 (ICD-10-CM) - Abnormality of gait and mobility   Evaluation Date: 5/13/2024  Authorization Period Expiration: 4/19/25  Plan of Care Expiration: 8/13/24  Progress Note Due: 6/13/24  Visit # / Visits authorized: 1/ 1, 14/30  FOTO: 2/ 3         Precautions:  [place]       PTA Visit  "#: 2/5     Time In: 100 pm  Time Out: 145 pm  Total Billable Time: 45 minutes     Subjective     Pt reports: she didn't do to much over the weekend. She noticed that her balance is better lately.  She was compliant with home exercise program.  Response to previous treatment: no adverse affect  Functional change: didn't need to hold on to pews while walking the aisle at [employer]     Pain: 0/10  Location:  N/A    Objective        Treatment     Chikis received the treatments listed below:      therapeutic exercises to develop strength, endurance, ROM, flexibility, posture, and core stabilization for 10 minutes including:  []sidelying hip abd 2x10  [x] recumbent bike 8'  -for BLE endurance   [x]seated march green band around feet 4x20"  [] gastroc wedge stretch 3x30" B   [] Nustep 8' for BLE endurance       neuromuscular re-education activities to improve: Balance, Coordination, Kinesthetic, Sense, Proprioception, and Posture for 8 minutes. The following activities were included:    [x]DF against wall 3x10  []SLS B 2x30s ANTELMO support  []NBOS 2x30s (high guard hands) airex  []Cone taps(tall cone) B 20x ea. On airex   []staggered stance B 2x30s ANTELMO   [x]DF green TB   []yellow hurdles 4 laps  []Airex marches 20x  []cabinet reaching 1# on airex 10x R, 10x L  []seated self ball toss 2x30"  []smooth pursuit horiz & vertical standing  [] gaze stabilization horiz & vertical seated   [] standing on firm surface and on airex (in corner) gaze and smooth pursuit vert/horz  [x]Fitter board balance 1'x2  [x] toe walking x4 laps (between mats)   [x] diagonal reaching for cones with head turns B in rhomberg stance on airex     therapeutic activities to improve functional performance for 25 minutes, including:  [x]Ramp training: fwd CGA/bk CGA/lateral,Rail assist (in gym)  [x]standing hip abd 3x10 B   [] lateral stepping with partner hold on wand x2 laps   [x]step ups lvl 3 B 10x BUE  [x]STS on airex 2x10   [x] Standing march green TB x30 " sec   [] standing hip ext 2x10 B   [x] SAPD green TB 2x10     Patient Education and Home Exercises       Education provided:   - rest in between exercise sets  -HEP: [id number]; updated & reviewed   -    Written Home Exercises Provided: Patient instructed to cont prior HEP. Exercises were reviewed and Chikis was able to demonstrate them prior to the end of the session.  Chikis demonstrated good  understanding of the education provided. See EMR under Patient Instructions for exercises provided during therapy session.    Assessment   Pt continued with NMR activities to address balance deficits without any adverse reactions. Encouraged patient to decrease support normally used in clinic for standing cone transfer. Added fitter board balance for additional neuromuscular challenge. Progress as tolerated.       Chikis Is progressing well towards her goals.   Pt prognosis is Good.     Pt will continue to benefit from skilled outpatient physical therapy to address the deficits listed in the problem list box on initial evaluation, provide pt/family education and to maximize pt's level of independence in the home and community environment.     Pt's spiritual, cultural and educational needs considered and pt agreeable to plan of care and goals.     Anticipated barriers to physical therapy: comorbidities     Goals:   Short Term Goals: 6 visits  1. Pt will be (I) /c HEP to supplement PT in order to improve functional tasks (progressing)  2. Pt will improve B ankle MMTs to 4-/5 grossly to improve strength for functional activities MET  3. Pt will be able to ambulate up/down ramp with least restrictive AD Mod I (progressing)        Long Term Goals: 12 visits   1. Pt will improve B hips  MMTs to 4-/5 grossly to improve strength for functional activities (progressing)  2. Pt will improve FOTO score to </= 52% to reduce perceived pain with mobility  (progressing)  3. Pt will improve SRIVASTAVA score to >/= 41, indicating reduced fall risk  (progressing)  4. Pt will ambulate 200' Mod I with least restrictive AD, with steady gait (progressing)    Plan     Updated Certification Period: 6/18/24 to 9/18/24   Recommended Treatment Plan: 2 times per week for 5 weeks:  Gait Training, Manual Therapy, Moist Heat/ Ice, Neuromuscular Re-ed, Patient Education, Therapeutic Activities, and Therapeutic Exercise  Other Recommendations: n/a   PT/PTA met face to face to discuss pt's treatment plan and progress towards established goals. Pt will be seen by a physical therapist minimally every 6th visit or every 30 days.      Terernce Hurtado, PTA                                  Statement Selected

## 2024-07-11 ENCOUNTER — HOSPITAL ENCOUNTER (OUTPATIENT)
Dept: RADIOLOGY | Facility: HOSPITAL | Age: 79
Discharge: HOME | End: 2024-07-11
Attending: INTERNAL MEDICINE
Payer: MEDICARE

## 2024-07-11 DIAGNOSIS — Z12.31 ENCOUNTER FOR SCREENING MAMMOGRAM FOR BREAST CANCER: ICD-10-CM

## 2024-07-11 PROCEDURE — 77067 SCR MAMMO BI INCL CAD: CPT | Mod: TC,HCNC,PO

## 2024-07-16 ENCOUNTER — TELEPHONE (OUTPATIENT)
Dept: RADIOLOGY | Facility: HOSPITAL | Age: 79
End: 2024-07-16
Payer: MEDICARE

## 2024-07-16 ENCOUNTER — CLINICAL SUPPORT (OUTPATIENT)
Dept: REHABILITATION | Facility: HOSPITAL | Age: 79
End: 2024-07-16
Payer: MEDICARE

## 2024-07-16 DIAGNOSIS — R26.89 BALANCE DISORDER: ICD-10-CM

## 2024-07-16 DIAGNOSIS — R26.81 GAIT INSTABILITY: Primary | ICD-10-CM

## 2024-07-16 PROCEDURE — 97530 THERAPEUTIC ACTIVITIES: CPT | Mod: HCNC,CQ

## 2024-07-16 PROCEDURE — 97112 NEUROMUSCULAR REEDUCATION: CPT | Mod: HCNC,CQ

## 2024-07-16 NOTE — PROGRESS NOTES
"[facility]   Physical Therapy Treatment Note      Name: Chikis Epstein  Clinic Number: [id number]    Therapy Diagnosis:   Encounter Diagnoses   Name Primary?    Gait instability Yes    Balance disorder          Physician: Rosa Elena Gay NP    Visit Date: 7/16/2024    Physician Orders: PT Eval and Treat   Medical Diagnosis from Referral: R26.9 (ICD-10-CM) - Abnormality of gait and mobility   Evaluation Date: 5/13/2024  Authorization Period Expiration: 4/19/25  Plan of Care Expiration: 8/13/24  Progress Note Due: 6/13/24  Visit # / Visits authorized: 1/ 1, 16/30  FOTO: 3/ 3         Precautions:  [place]       PTA Visit #: 3/5     Time In: 100 pm  Time Out: 145 pm  Total Billable Time: 45 minutes     Subjective     Pt reports: the week off didn't go as good as she thought it would. She was compliant with the HEP and stayed active which did help.  She was compliant with home exercise program.  Response to previous treatment: no adverse affect  Functional change: improved posture with walking    Pain: 0/10  Location:  N/A    Objective        Treatment     Chikis received the treatments listed below:      therapeutic exercises to develop strength, endurance, ROM, flexibility, posture, and core stabilization for 10 minutes including:  []sidelying hip abd 2x10  [x] recumbent bike 8'  -for BLE endurance   [x]seated march green band around feet 4x20"  [] gastroc wedge stretch 3x30" B   [] Nustep 8' for BLE endurance       neuromuscular re-education activities to improve: Balance, Coordination, Kinesthetic, Sense, Proprioception, and Posture for 10 minutes. The following activities were included:    [x]DF against wall 3x10  []SLS B 2x30s ANTELMO support  []NBOS 2x30s (high guard hands) airex  []Cone taps(tall cone) B 20x ea. On airex   []staggered stance B 2x30s ANTELMO   [x]DF green TB   []yellow hurdles 4 laps  []Airex marches 20x  []cabinet reaching 1# on airex 10x R, 10x L  []seated self ball toss " "2x30"  []smooth pursuit horiz & vertical standing  [] gaze stabilization horiz & vertical seated   [] standing on firm surface and on airex (in corner) gaze and smooth pursuit vert/horz  []Fitter board balance 1'x2  [] toe walking x4 laps (between mats)   [] diagonal reaching for cones with head turns B in rhomberg stance on airex     therapeutic activities to improve functional performance for 25 minutes, including:  [x]Ramp training: fwd CGA/bk CGA/lateral,Rail assist (in gym)  [x]standing hip abd 3x10 B   [x] NBS on airex rotating RMB in between mats 15x  [x]step ups lvl 3 B 10x BUE  [x]STS on airex 2x10   [x] Standing march green TB x30 sec   [] standing hip ext 2x10 B   [x] SAPD green TB 2x10     Patient Education and Home Exercises       Education provided:   - rest in between exercise sets  -HEP: [id number]; updated & reviewed   -    Written Home Exercises Provided: Patient instructed to cont prior HEP. Exercises were reviewed and Chikis was able to demonstrate them prior to the end of the session.  Chikis demonstrated good  understanding of the education provided. See EMR under Patient Instructions for exercises provided during therapy session.    Assessment   Pt continued with established program with focus on on balance and gait. Patient with some improvement as noticed with improved standing and walking posture. Deferred 4th lap of toe walking d/t patient reporting dizziness. Patient planned discharge today and plans on continuing with HEP at home.  Chikis Is progressing well towards her goals.   Pt prognosis is Good.     Pt will continue to benefit from skilled outpatient physical therapy to address the deficits listed in the problem list box on initial evaluation, provide pt/family education and to maximize pt's level of independence in the home and community environment.     Pt's spiritual, cultural and educational needs considered and pt agreeable to plan of care and goals.     Anticipated barriers to " physical therapy: comorbidities     Goals:   Short Term Goals: 6 visits  1. Pt will be (I) /c HEP to supplement PT in order to improve functional tasks (progressing)  2. Pt will improve B ankle MMTs to 4-/5 grossly to improve strength for functional activities MET  3. Pt will be able to ambulate up/down ramp with least restrictive AD Mod I (progressing)        Long Term Goals: 12 visits   1. Pt will improve B hips  MMTs to 4-/5 grossly to improve strength for functional activities (progressing)  2. Pt will improve FOTO score to </= 52% to reduce perceived pain with mobility  (progressing)  3. Pt will improve SRIVASTAVA score to >/= 41, indicating reduced fall risk (progressing)  4. Pt will ambulate 200' Mod I with least restrictive AD, with steady gait (progressing)    Plan     Updated Certification Period: 6/18/24 to 9/18/24   Recommended Treatment Plan: 2 times per week for 5 weeks:  Gait Training, Manual Therapy, Moist Heat/ Ice, Neuromuscular Re-ed, Patient Education, Therapeutic Activities, and Therapeutic Exercise  Other Recommendations: n/a   PT/PTA met face to face to discuss pt's treatment plan and progress towards established goals. Pt will be seen by a physical therapist minimally every 6th visit or every 30 days.      Terrence Hurtado PTA                             [facility]   Physical Therapy Treatment Note      Name: Chikis HOFFMAN [facility] Number: [id number]    Therapy Diagnosis:   Encounter Diagnoses   Name Primary?    Gait instability Yes    Balance disorder                  Physician: Rosa Elena Gay NP    Visit Date: 7/16/2024    Physician Orders: PT Eval and Treat   Medical Diagnosis from Referral: R26.9 (ICD-10-CM) - Abnormality of gait and mobility   Evaluation Date: 5/13/2024  Authorization Period Expiration: 4/19/25  Plan of Care Expiration: 8/13/24  Progress Note Due: 6/13/24  Visit # / Visits authorized: 1/ 1, 16/30  FOTO: 2/ 3         Precautions:  [place]       PTA Visit  "#: 2/5     Time In: 100 pm  Time Out: 145 pm  Total Billable Time: 45 minutes     Subjective     Pt reports: being more active at home and outside of PT has helped her.  She was compliant with home exercise program.  Response to previous treatment: no adverse affect  Functional change: didn't need to hold on to pews while walking the aisle at [employer]     Pain: 0/10  Location:  N/A    Objective        Treatment     Chikis received the treatments listed below:      therapeutic exercises to develop strength, endurance, ROM, flexibility, posture, and core stabilization for 10 minutes including:  []sidelying hip abd 2x10  [x] recumbent bike 8'  -for BLE endurance   [x]seated march green band around feet 4x20"  [] gastroc wedge stretch 3x30" B   [] Nustep 8' for BLE endurance       neuromuscular re-education activities to improve: Balance, Coordination, Kinesthetic, Sense, Proprioception, and Posture for 8 minutes. The following activities were included:    [x]DF against wall 3x10  []SLS B 2x30s ANTELMO support  []NBOS 2x30s (high guard hands) airex  []Cone taps(tall cone) B 20x ea. On airex   []staggered stance B 2x30s ANTELMO   [x]DF green TB   []yellow hurdles 4 laps  []Airex marches 20x  []cabinet reaching 1# on airex 10x R, 10x L  []seated self ball toss 2x30"  []smooth pursuit horiz & vertical standing  [] gaze stabilization horiz & vertical seated   [] standing on firm surface and on airex (in corner) gaze and smooth pursuit vert/horz  [x]Fitter board balance 1'x2  [x] toe walking x4 laps (between mats)   [x] diagonal reaching for cones with head turns B in rhomberg stance on airex     therapeutic activities to improve functional performance for 25 minutes, including:  []Ramp training: fwd CGA/bk CGA/lateral,Rail assist (in gym)  [x]standing hip abd 3x10 B   [] lateral stepping with partner hold on wand x2 laps   [x]step ups lvl 3 B 10x BUE  [x]STS on airex 2x10   [] Standing march green TB x30 sec   [] standing hip ext 2x10 " B   [x] SAPD green TB 2x10     Patient Education and Home Exercises       Education provided:   - rest in between exercise sets  -HEP: [id number]; updated & reviewed   -    Written Home Exercises Provided: Patient instructed to cont prior HEP. Exercises were reviewed and Chikis was able to demonstrate them prior to the end of the session.  Chikis demonstrated good  understanding of the education provided. See EMR under Patient Instructions for exercises provided during therapy session.    Assessment   Pt continued with NMR activities to address balance deficits without any adverse reactions. Encouraged patient to decrease support normally used in clinic for standing cone transfer. Added fitter board balance for additional neuromuscular challenge. Progress as tolerated.       Chikis Is progressing well towards her goals.   Pt prognosis is Good.     Pt will continue to benefit from skilled outpatient physical therapy to address the deficits listed in the problem list box on initial evaluation, provide pt/family education and to maximize pt's level of independence in the home and community environment.     Pt's spiritual, cultural and educational needs considered and pt agreeable to plan of care and goals.     Anticipated barriers to physical therapy: comorbidities     Goals:   Short Term Goals: 6 visits  1. Pt will be (I) /c HEP to supplement PT in order to improve functional tasks (progressing)  2. Pt will improve B ankle MMTs to 4-/5 grossly to improve strength for functional activities MET  3. Pt will be able to ambulate up/down ramp with least restrictive AD Mod I (progressing)        Long Term Goals: 12 visits   1. Pt will improve B hips  MMTs to 4-/5 grossly to improve strength for functional activities (progressing)  2. Pt will improve FOTO score to </= 52% to reduce perceived pain with mobility  (progressing)  3. Pt will improve SRIVASTAVA score to >/= 41, indicating reduced fall risk (progressing)  4. Pt will ambulate 200'  Mod I with least restrictive AD, with steady gait (progressing)    Plan     Updated Certification Period: 6/18/24 to 9/18/24   Recommended Treatment Plan: 2 times per week for 5 weeks:  Gait Training, Manual Therapy, Moist Heat/ Ice, Neuromuscular Re-ed, Patient Education, Therapeutic Activities, and Therapeutic Exercise  Other Recommendations: n/a   PT/PTA met face to face to discuss pt's treatment plan and progress towards established goals. Pt will be seen by a physical therapist minimally every 6th visit or every 30 days.      Terrence Hurtado, PTA

## 2024-07-16 NOTE — TELEPHONE ENCOUNTER
Spoke with patient and explained mammogram findings.Patient expressed understanding of results. Patient scheduled abnormal mammogram follow up appointment at [facility] on 7/23/2024.

## 2024-07-23 ENCOUNTER — HOSPITAL ENCOUNTER (OUTPATIENT)
Dept: RADIOLOGY | Facility: HOSPITAL | Age: 79
Discharge: HOME | End: 2024-07-23
Attending: INTERNAL MEDICINE
Payer: MEDICARE

## 2024-07-23 DIAGNOSIS — R92.8 ABNORMAL FINDING ON BREAST IMAGING: ICD-10-CM

## 2024-07-23 PROCEDURE — 77061 BREAST TOMOSYNTHESIS UNI: CPT | Mod: TC,HCNC,LT

## 2024-07-23 PROCEDURE — 76642 ULTRASOUND BREAST LIMITED: CPT | Mod: TC,HCNC,LT

## 2024-07-23 PROCEDURE — 77065 DX MAMMO INCL CAD UNI: CPT | Mod: 26,HCNC,LT, | Performed by: RADIOLOGY

## 2024-07-23 PROCEDURE — 77061 BREAST TOMOSYNTHESIS UNI: CPT | Mod: 26,HCNC,LT, | Performed by: RADIOLOGY

## 2024-07-23 PROCEDURE — 76642 ULTRASOUND BREAST LIMITED: CPT | Mod: 26,HCNC,LT, | Performed by: RADIOLOGY

## 2024-07-23 PROCEDURE — 77065 DX MAMMO INCL CAD UNI: CPT | Mod: TC,HCNC,LT

## 2024-07-24 ENCOUNTER — TELEPHONE (OUTPATIENT)
Dept: RADIOLOGY | Facility: HOSPITAL | Age: 79
End: 2024-07-24
Payer: MEDICARE

## 2024-07-24 NOTE — TELEPHONE ENCOUNTER
Spoke with patient. Reviewed breast biopsy procedure and reviewed instructions for breast biopsy. Patient expressed understanding and all questions were answered. Provided patient with my phone number to call for any further concerns or questions.   Patient scheduled breast biopsy at the [facility] on 8/5/2024.

## 2024-08-01 RX ORDER — BUPROPION HYDROCHLORIDE 300 MG/1
300 TABLET ORAL
Qty: 90 TABLET | Refills: 3 | Status: SHIPPED | OUTPATIENT
Start: 2024-08-01

## 2024-08-01 NOTE — TELEPHONE ENCOUNTER
Refill Decision Note   Chikis Epstein  is requesting a refill authorization.  Brief Assessment and Rationale for Refill:  Approve     Medication Therapy Plan:         Alert overridden per protocol: Yes   Comments:     Note composed:7:53 AM 08/01/2024

## 2024-08-01 NOTE — TELEPHONE ENCOUNTER
No care due was identified.  [facility] Embedded Care Due Messages. Reference number: [id number].   8/01/2024 1:03:30 AM CDT

## 2024-08-05 ENCOUNTER — HOSPITAL ENCOUNTER (OUTPATIENT)
Dept: RADIOLOGY | Facility: HOSPITAL | Age: 79
Discharge: HOME | End: 2024-08-05
Attending: INTERNAL MEDICINE
Payer: MEDICARE

## 2024-08-05 DIAGNOSIS — R92.8 ABNORMAL FINDING ON BREAST IMAGING: ICD-10-CM

## 2024-08-05 PROCEDURE — 27200937 US BREAST BIOPSY WITH IMAGING 1ST SITE LEFT: Mod: HCNC

## 2024-08-05 PROCEDURE — 88305 TISSUE EXAM BY PATHOLOGIST: CPT | Mod: 26,HCNC,, | Performed by: STUDENT IN AN ORGANIZED HEALTH CARE EDUCATION/TRAINING PROGRAM

## 2024-08-05 PROCEDURE — 88305 TISSUE EXAM BY PATHOLOGIST: CPT | Mod: HCNC | Performed by: STUDENT IN AN ORGANIZED HEALTH CARE EDUCATION/TRAINING PROGRAM

## 2024-08-05 PROCEDURE — 77065 DX MAMMO INCL CAD UNI: CPT | Mod: TC,HCNC,LT

## 2024-08-05 PROCEDURE — 25000003 PHARM REV CODE 250: Mod: HCNC | Performed by: INTERNAL MEDICINE

## 2024-08-05 PROCEDURE — 19083 BX BREAST 1ST LESION US IMAG: CPT | Mod: HCNC,LT,, | Performed by: RADIOLOGY

## 2024-08-05 PROCEDURE — A4648 IMPLANTABLE TISSUE MARKER: HCPCS | Mod: HCNC

## 2024-08-05 PROCEDURE — 77065 DX MAMMO INCL CAD UNI: CPT | Mod: 26,HCNC,LT, | Performed by: RADIOLOGY

## 2024-08-05 RX ORDER — LIDOCAINE HYDROCHLORIDE 10 MG/ML
3 INJECTION, SOLUTION EPIDURAL; INFILTRATION; INTRACAUDAL; PERINEURAL ONCE
Status: COMPLETED | OUTPATIENT
Start: 2024-08-05 | End: 2024-08-05

## 2024-08-05 RX ORDER — LIDOCAINE HYDROCHLORIDE AND EPINEPHRINE 20; 10 MG/ML; UG/ML
10 INJECTION, SOLUTION INFILTRATION; PERINEURAL ONCE
Status: COMPLETED | OUTPATIENT
Start: 2024-08-05 | End: 2024-08-05

## 2024-08-05 RX ADMIN — LIDOCAINE HYDROCHLORIDE 3 ML: 10 INJECTION, SOLUTION EPIDURAL; INFILTRATION; INTRACAUDAL; PERINEURAL at 02:08

## 2024-08-05 RX ADMIN — LIDOCAINE HYDROCHLORIDE,EPINEPHRINE BITARTRATE 10 ML: 20; .01 INJECTION, SOLUTION INFILTRATION; PERINEURAL at 02:08

## 2024-08-06 LAB
FINAL PATHOLOGIC DIAGNOSIS: NORMAL
GROSS: NORMAL
Lab: NORMAL

## 2024-08-07 ENCOUNTER — TELEPHONE (OUTPATIENT)
Dept: SURGERY | Facility: CLINIC | Age: 79
End: 2024-08-07
Payer: MEDICARE

## 2024-08-13 RX ORDER — ALPRAZOLAM 0.25 MG/1
TABLET ORAL
Qty: 60 TABLET | Refills: 1 | Status: SHIPPED | OUTPATIENT
Start: 2024-08-13

## 2024-08-13 NOTE — TELEPHONE ENCOUNTER
No care due was identified.  [facility] Embedded Care Due Messages. Reference number: [id number].   8/13/2024 10:25:19 AM CDT

## 2024-08-17 DIAGNOSIS — I10 ESSENTIAL HYPERTENSION: ICD-10-CM

## 2024-08-17 RX ORDER — HYDROCHLOROTHIAZIDE 12.5 MG/1
12.5 TABLET ORAL
Qty: 90 TABLET | Refills: 3 | Status: SHIPPED | OUTPATIENT
Start: 2024-08-17

## 2024-08-17 NOTE — TELEPHONE ENCOUNTER
No care due was identified.  [facility] Embedded Care Due Messages. Reference number: [id number].   8/17/2024 1:35:50 AM CDT

## 2024-08-17 NOTE — TELEPHONE ENCOUNTER
Refill Decision Note   Chikis Epstein  is requesting a refill authorization.  Brief Assessment and Rationale for Refill:  Approve     Medication Therapy Plan:       Medication Reconciliation Completed: No   Comments:     No Care Gaps recommended.     Note composed:2:19 PM 08/17/2024

## 2024-09-18 RX ORDER — VALSARTAN 320 MG/1
TABLET ORAL
Qty: 90 TABLET | Refills: 3 | Status: SHIPPED | OUTPATIENT
Start: 2024-09-18

## 2024-09-18 RX ORDER — METFORMIN HYDROCHLORIDE 500 MG/1
TABLET, EXTENDED RELEASE ORAL
Qty: 90 TABLET | Refills: 1 | Status: SHIPPED | OUTPATIENT
Start: 2024-09-18

## 2024-09-18 NOTE — TELEPHONE ENCOUNTER
No care due was identified.  [facility] Embedded Care Due Messages. Reference number: [id number].   9/18/2024 1:53:43 AM CDT

## 2024-10-01 ENCOUNTER — PATIENT MESSAGE (OUTPATIENT)
Dept: INTERNAL MEDICINE | Facility: CLINIC | Age: 79
End: 2024-10-01
Payer: MEDICARE

## 2024-11-08 ENCOUNTER — PATIENT MESSAGE (OUTPATIENT)
Dept: ADMINISTRATIVE | Facility: OTHER | Age: 79
End: 2024-11-08
Payer: MEDICARE

## 2024-12-01 ENCOUNTER — OFFICE VISIT (OUTPATIENT)
Dept: URGENT CARE | Facility: CLINIC | Age: 79
End: 2024-12-01
Payer: MEDICARE

## 2024-12-01 VITALS
RESPIRATION RATE: 20 BRPM | SYSTOLIC BLOOD PRESSURE: 128 MMHG | DIASTOLIC BLOOD PRESSURE: 70 MMHG | BODY MASS INDEX: 30.23 KG/M2 | HEART RATE: 68 BPM | TEMPERATURE: 98 F | WEIGHT: 154 LBS | HEIGHT: 60 IN | OXYGEN SATURATION: 98 %

## 2024-12-01 DIAGNOSIS — N30.01 ACUTE CYSTITIS WITH HEMATURIA: Primary | ICD-10-CM

## 2024-12-01 DIAGNOSIS — R30.0 DYSURIA: ICD-10-CM

## 2024-12-01 LAB
BILIRUBIN, UA POC OHS: NEGATIVE
BLOOD, UA POC OHS: ABNORMAL
CLARITY, UA POC OHS: CLEAR
COLOR, UA POC OHS: YELLOW
GLUCOSE, UA POC OHS: NEGATIVE
KETONES, UA POC OHS: NEGATIVE
LEUKOCYTES, UA POC OHS: ABNORMAL
NITRITE, UA POC OHS: POSITIVE
PH, UA POC OHS: 6.5
PROTEIN, UA POC OHS: 100
SPECIFIC GRAVITY, UA POC OHS: 1
UROBILINOGEN, UA POC OHS: 0.2

## 2024-12-01 PROCEDURE — 87186 SC STD MICRODIL/AGAR DIL: CPT | Mod: HCNC | Performed by: PHYSICIAN ASSISTANT

## 2024-12-01 PROCEDURE — 99213 OFFICE O/P EST LOW 20 MIN: CPT | Mod: S$GLB,,, | Performed by: PHYSICIAN ASSISTANT

## 2024-12-01 PROCEDURE — 87088 URINE BACTERIA CULTURE: CPT | Mod: HCNC | Performed by: PHYSICIAN ASSISTANT

## 2024-12-01 PROCEDURE — 87086 URINE CULTURE/COLONY COUNT: CPT | Mod: HCNC | Performed by: PHYSICIAN ASSISTANT

## 2024-12-01 PROCEDURE — 81003 URINALYSIS AUTO W/O SCOPE: CPT | Mod: QW,S$GLB,, | Performed by: PHYSICIAN ASSISTANT

## 2024-12-01 RX ORDER — PHENAZOPYRIDINE HYDROCHLORIDE 200 MG/1
200 TABLET, FILM COATED ORAL 3 TIMES DAILY PRN
Qty: 6 TABLET | Refills: 0 | Status: CANCELLED | OUTPATIENT
Start: 2024-12-01 | End: 2024-12-03

## 2024-12-01 RX ORDER — SULFAMETHOXAZOLE AND TRIMETHOPRIM 800; 160 MG/1; MG/1
1 TABLET ORAL 2 TIMES DAILY
Qty: 14 TABLET | Refills: 0 | Status: SHIPPED | OUTPATIENT
Start: 2024-12-01 | End: 2024-12-08

## 2024-12-01 NOTE — LETTER
"  December 1, 2024      [facility] [place]  [address]  [place] [address]  Phone: [phone]  Fax: [phone]       Patient: Chikis Epstein   YOB: 1945  Date of Visit: 12/01/2024    To Whom It May Concern:    Kaur Epstein  was at [facility] on 12/01/2024. The patient may return to work/school on 12/1/24 with no restrictions. If you have any questions or concerns, or if I can be of further assistance, please do not hesitate to contact me.    Sincerely,        Eshachio Junior PA-C (Jackie)       "

## 2024-12-01 NOTE — PATIENT INSTRUCTIONS
Recommendations to help prevent UTIs  Cranberry products: 8-ounce (approximately 240 mL) glass of cranberry juice once or twice daily or cranberry concentrate tablets 500 mg to 1000 mg total daily dose  Postcoital voiding (urinating after sexual intercourse)  Hygiene: Wiping from front to back to avoid perineal contamination with fecal diane is routinely recommended as a prevention measure.   Topical estrogen for postmenopausal women   Drink plenty of fluids.      Urine culture was ordered if you have a history of recurrent UTIs, male, pediatrics, and elderly population. You will get a phone call within 3-5 days regarding urine culture results if there any concerns with antibiotic choice. All results will be available on [facility].   If you were prescribed antibiotics, please take them to completion.  If you were prescribed a fluoroquinolone antibiotic such as levofloxacin or ciprofloxacin, avoid heavy lifting for 1-2 weeks after completion of the antibiotic as these antibiotics have a rare complication of tendon rupture. Avoidance of heavy lifting or strenuous exercise reduces this risk.  If you were prescribed a narcotic medication, do not drive or operate heavy equipment or machinery while taking these medications.  Please drink plenty of fluids.  Please get plenty of rest.  If you were prescribed Pyridium (phenazopyridine), please be aware that if you wear contact lens that this medication may stain your contacts.  While taking this medication it is recommended that you do not wear your contacts until 24 hours after your last dose. If it not covered, you can  purchase Azo (phenazopyridine 99 mg) over the counter at drug stores.  If you  smoke, please stop smoking.  If not allergic, take Tylenol (Acetaminophen) 650 mg to  1 g every 6 hours as needed for fever and/or Motrin (Ibuprofen) 600 to 800 mg every 6 hours as needed for fever as directed for control of pain and/or fever      Please remember that you  have received care at an urgent care today. Urgent cares are not emergency rooms and are not equipped to handle life threatening emergencies and cannot rule in or out certain medical conditions and you may be released before all of your medical problems are known or treated. Please arrange follow up with your primary care physician or speciality clinic  within 2-5 days if your signs and symptoms have not resolved or worsen. Patient can call our Referral Hotline at [phone] to make an appointment.    Please return here or go to the Emergency Department for any concerns or worsening of condition.Patient was educated on signs/symptoms that would warrant emergent medical attention.   Signs of infection. These include a fever of 100.4°F (38°C) or higher, chills, back pain, nausea, throwing up, or bloody urine.  Signs come back after treatment ends  You notice more blood in your urine.  Your signs get worse or do not improve within 24 hours of starting treatment.  You are not able to urinate for more than 8 hours.  Your signs come back after treatment has stopped.

## 2024-12-01 NOTE — PROGRESS NOTES
Subjective:      Patient ID: Chikis Epstein is a 79 y.o. female.    Vitals:  height is 5' (1.524 m) and weight is 69.9 kg (154 lb). Her oral temperature is 98 °F (36.7 °C). Her blood pressure is 128/70 and her pulse is 68. Her respiration is 20 and oxygen saturation is 98%.     Chief Complaint: Dysuria    Chikis Epstein is a 79 y.o. female with hypertension, MDD, menopause, and DM II who complains of 2 day history of urinary frequency , urgency and dysuria but denies flank pain or fever. Patient has tried azo.     Dysuria   This is a new problem. The current episode started in the past 7 days. The problem occurs every urination. The problem has been gradually worsening. The quality of the pain is described as aching. The patient is experiencing no pain. There has been no fever. She is Sexually active. There is No history of pyelonephritis. Associated symptoms include frequency, hesitancy, urgency and withholding. Pertinent negatives include no behavior changes, chills, discharge, flank pain, hematuria, nausea, sweats, vomiting, weight loss, bubble bath use, constipation or rash. She has tried increased fluids for the symptoms. The treatment provided mild relief. Her past medical history is significant for hypertension. There is no history of kidney stones or recurrent UTIs.       Constitution: Negative for activity change, appetite change, chills, fatigue, fever and generalized weakness.   Cardiovascular:  Negative for chest trauma and chest pain.   Respiratory:  Negative for cough, sputum production, shortness of breath, wheezing and asthma.    Gastrointestinal:  Negative for abdominal pain, nausea, vomiting and constipation.   Genitourinary:  Positive for dysuria, frequency and urgency. Negative for flank pain, hematuria, history of kidney stones, vaginal pain, vaginal discharge, vaginal bleeding, vaginal odor, painful intercourse, genital sore and pelvic pain.   Musculoskeletal:  Negative for muscle cramps and muscle  ache.   Skin:  Negative for rash.   Allergic/Immunologic: Negative for asthma.   Psychiatric/Behavioral:  Negative for nervous/anxious. The patient is not nervous/anxious.       Objective:     Physical Exam   Constitutional: She is oriented to person, place, and time. She is cooperative. No distress.      Comments:Patient is awake and alert, sitting up in exam chair, speaking and answering in complete sentences   normal  HENT:   Head: Normocephalic and atraumatic.   Ears:   Right Ear: Tympanic membrane, external ear and ear canal normal.   Left Ear: Tympanic membrane, external ear and ear canal normal.   Nose: Nose normal.   Mouth/Throat: Uvula is midline, oropharynx is clear and moist and mucous membranes are normal. Mucous membranes are moist. Tonsils are 0 on the right. Tonsils are 0 on the left. No tonsillar exudate. Oropharynx is clear.   Eyes: Conjunctivae are normal. Pupils are equal, round, and reactive to light. Extraocular movement intact   Neck: Neck supple.   Cardiovascular: Normal rate, regular rhythm, normal heart sounds and normal pulses.   Pulmonary/Chest: Effort normal and breath sounds normal.   Abdominal: Normal appearance. She exhibits no distension and no mass. There is no abdominal tenderness. There is no rebound, no guarding, no left CVA tenderness and no right CVA tenderness. No hernia.   Musculoskeletal: Normal range of motion.         General: Normal range of motion.   Neurological: She is alert and oriented to person, place, and time.   Skin: Skin is warm. Capillary refill takes less than 2 seconds.   Psychiatric: Her behavior is normal. Mood, judgment and thought content normal.   Nursing note and vitals reviewed.      Assessment:     1. Acute cystitis with hematuria    2. Dysuria      Patient presents with clinical exam findings and history consistent with above.      On exam, patient is nontoxic appearing and vitals are stable.      Diagnostic testing results were reviewed and discussed  with patient/guardian.   Tests ordered in clinic:  Results for orders placed or performed in visit on 12/01/24   POCT Urinalysis(Instrument)    Collection Time: 12/01/24  9:07 AM   Result Value Ref Range    Color, POC UA Yellow Yellow, Straw, Colorless    Clarity, POC UA Clear Clear    Glucose, POC UA Negative Negative    Bilirubin, POC UA Negative Negative    Ketones, POC UA Negative Negative    Spec Grav POC UA 1.000 1.005 - 1.030    Blood, POC UA Large (A) Negative    pH, POC UA 6.5 5.0 - 8.0    Protein, POC  (A) Negative    Urobilinogen, POC UA 0.2 <=1.0    Nitrite, POC UA Positive (A) Negative    WBC, POC UA Large (A) Negative       Previous progress notes/admissions/labs and medications were reviewed.  Reviewed GFR >60 from CMP from 7/3/24.   Plan:       Acute cystitis with hematuria  -     CULTURE, URINE  -     sulfamethoxazole-trimethoprim 800-160mg (BACTRIM DS) 800-160 mg Tab; Take 1 tablet by mouth 2 (two) times daily. for 7 days  Dispense: 14 tablet; Refill: 0  -     Ambulatory referral/consult to Urogynecology    Dysuria  -     POCT Urinalysis(Instrument)  -     CULTURE, URINE                    1) See orders for this visit as documented in the electronic medical record.  2) Symptomatic therapy suggested: use acetaminophen/ibuprofen every 6-8 hours prn pain or fever, push fluids.   3) Call or return to clinic prn if these symptoms worsen or fail to improve as anticipated.    Discussed results/diagnosis/plan with patient in clinic.  We had shared decision making for patient's treatment. Patient verbalized understanding and in agreement with current treatment plan.     Patient was instructed to return for re-evaluation with urgent care or PCP for continued outpatient workup and management if symptoms do not improve/worsening symptoms. Strict ED versus clinic precautions given in depth.    Discharge and follow-up instructions given verbally/printed with the patient who expressed understanding. The  "instructions and results are also available on [facility].              Esha "Concepción" MARIPOSA Junior          Patient Instructions   Recommendations to help prevent UTIs  Cranberry products: 8-ounce (approximately 240 mL) glass of cranberry juice once or twice daily or cranberry concentrate tablets 500 mg to 1000 mg total daily dose  Postcoital voiding (urinating after sexual intercourse)  Hygiene: Wiping from front to back to avoid perineal contamination with fecal diane is routinely recommended as a prevention measure.   Topical estrogen for postmenopausal women   Drink plenty of fluids.      Urine culture was ordered if you have a history of recurrent UTIs, male, pediatrics, and elderly population. You will get a phone call within 3-5 days regarding urine culture results if there any concerns with antibiotic choice. All results will be available on [facility] MyChart.   If you were prescribed antibiotics, please take them to completion.  If you were prescribed a fluoroquinolone antibiotic such as levofloxacin or ciprofloxacin, avoid heavy lifting for 1-2 weeks after completion of the antibiotic as these antibiotics have a rare complication of tendon rupture. Avoidance of heavy lifting or strenuous exercise reduces this risk.  If you were prescribed a narcotic medication, do not drive or operate heavy equipment or machinery while taking these medications.  Please drink plenty of fluids.  Please get plenty of rest.  If you were prescribed Pyridium (phenazopyridine), please be aware that if you wear contact lens that this medication may stain your contacts.  While taking this medication it is recommended that you do not wear your contacts until 24 hours after your last dose. If it not covered, you can  purchase Azo (phenazopyridine 99 mg) over the counter at drug stores.  If you  smoke, please stop smoking.  If not allergic, take Tylenol (Acetaminophen) 650 mg to  1 g every 6 hours as needed for fever and/or Motrin " (Ibuprofen) 600 to 800 mg every 6 hours as needed for fever as directed for control of pain and/or fever      Please remember that you have received care at an urgent care today. Urgent cares are not emergency rooms and are not equipped to handle life threatening emergencies and cannot rule in or out certain medical conditions and you may be released before all of your medical problems are known or treated. Please arrange follow up with your primary care physician or speciality clinic  within 2-5 days if your signs and symptoms have not resolved or worsen. Patient can call our Referral Hotline at [phone] to make an appointment.    Please return here or go to the Emergency Department for any concerns or worsening of condition.Patient was educated on signs/symptoms that would warrant emergent medical attention.   Signs of infection. These include a fever of 100.4°F (38°C) or higher, chills, back pain, nausea, throwing up, or bloody urine.  Signs come back after treatment ends  You notice more blood in your urine.  Your signs get worse or do not improve within 24 hours of starting treatment.  You are not able to urinate for more than 8 hours.  Your signs come back after treatment has stopped.

## 2024-12-03 ENCOUNTER — OFFICE VISIT (OUTPATIENT)
Dept: CARDIOLOGY | Facility: CLINIC | Age: 79
End: 2024-12-03
Payer: MEDICARE

## 2024-12-03 VITALS
HEIGHT: 60 IN | SYSTOLIC BLOOD PRESSURE: 152 MMHG | HEART RATE: 72 BPM | BODY MASS INDEX: 30.61 KG/M2 | DIASTOLIC BLOOD PRESSURE: 68 MMHG | WEIGHT: 155.88 LBS

## 2024-12-03 DIAGNOSIS — M79.89 FOOT SWELLING: ICD-10-CM

## 2024-12-03 DIAGNOSIS — I77.810 ASCENDING AORTA DILATATION: ICD-10-CM

## 2024-12-03 DIAGNOSIS — I10 ESSENTIAL HYPERTENSION: Primary | ICD-10-CM

## 2024-12-03 DIAGNOSIS — I70.0 AORTIC ATHEROSCLEROSIS: ICD-10-CM

## 2024-12-03 DIAGNOSIS — E78.00 PURE HYPERCHOLESTEROLEMIA: ICD-10-CM

## 2024-12-03 PROCEDURE — 1159F MED LIST DOCD IN RCRD: CPT | Mod: HCNC,CPTII,S$GLB, | Performed by: PHYSICIAN ASSISTANT

## 2024-12-03 PROCEDURE — 1101F PT FALLS ASSESS-DOCD LE1/YR: CPT | Mod: HCNC,CPTII,S$GLB, | Performed by: PHYSICIAN ASSISTANT

## 2024-12-03 PROCEDURE — 99999 PR PBB SHADOW E&M-EST. PATIENT-LVL III: CPT | Mod: PBBFAC,HCNC,, | Performed by: PHYSICIAN ASSISTANT

## 2024-12-03 PROCEDURE — 99214 OFFICE O/P EST MOD 30 MIN: CPT | Mod: HCNC,S$GLB,, | Performed by: PHYSICIAN ASSISTANT

## 2024-12-03 PROCEDURE — 3077F SYST BP >= 140 MM HG: CPT | Mod: HCNC,CPTII,S$GLB, | Performed by: PHYSICIAN ASSISTANT

## 2024-12-03 PROCEDURE — 3288F FALL RISK ASSESSMENT DOCD: CPT | Mod: HCNC,CPTII,S$GLB, | Performed by: PHYSICIAN ASSISTANT

## 2024-12-03 PROCEDURE — 3078F DIAST BP <80 MM HG: CPT | Mod: HCNC,CPTII,S$GLB, | Performed by: PHYSICIAN ASSISTANT

## 2024-12-03 RX ORDER — AMLODIPINE BESYLATE 5 MG/1
5 TABLET ORAL DAILY
Qty: 90 TABLET | Refills: 3 | Status: SHIPPED | OUTPATIENT
Start: 2024-12-03 | End: 2025-12-03

## 2024-12-03 NOTE — PROGRESS NOTES
Cardiology Clinic Note  Reason for Visit: Annual visit, leg swelling   General Cardiologist: Dr. Chau     HPI:     Problem List and HPI:   Hypertension  Hypercholesterolemia  Ascending aorta - mildly enlarged      Chikis Epstein is a 79 y.o. F, who presents for annual visit, and with complaint of bilat leg swelling for the past month. She does not have any shortness of breath, PND or orthopnea. The swelling is isolated to the tops of her feet. It occurs almost daily. Worse towards the end of the day and resolved in the AM. I suspect amlodipine is contributing. BP is well controlled in digital HTN program. No chest pain or palpitations. Her two main health issues right now are neck pain and memory issues. She plans to discuss both with Dr. Garcia at her visit in .     ROS:    Pertinent ROS included in HPI and below.  PMH:     Past Medical History:   Diagnosis Date    Anxiety     Aortic atherosclerosis     CXR 10/13/12    Arthritis     Back pain     BRCA1 positive     BRCA2 positive     Cataract     Hyperlipidemia 2016    Hypertension     MDD (major depressive disorder)     Menopause     Non morbid obesity due to excess calories 2016    Personal history of kidney stones     Type 2 diabetes mellitus without complication      Past Surgical History:   Procedure Laterality Date    CATARACT EXTRACTION, BILATERAL       SECTION      CHOLECYSTECTOMY      COLONOSCOPY      normal    EYE SURGERY      HYSTERECTOMY      KIDNEY STONE SURGERY      TOTAL ABDOMINAL HYSTERECTOMY W/ BILATERAL SALPINGOOPHORECTOMY       Allergies:     Review of patient's allergies indicates:   Allergen Reactions    Ace inhibitors      Other reaction(s): Swelling     Medications:     Current Outpatient Medications on File Prior to Visit   Medication Sig Dispense Refill    acetaminophen (TYLENOL) 500 MG tablet Take 500 mg by mouth every 6 (six) hours as needed for Pain.      ALPRAZolam (XANAX) 0.25 MG tablet TAKE 1 TO 2  TABLETS BY MOUTH EVERY NIGHT AT BEDTIME AS NEEDED FOR INSOMNIA 60 tablet 1    buPROPion (WELLBUTRIN XL) 300 MG 24 hr tablet TAKE 1 TABLET EVERY DAY 90 tablet 3    busPIRone (BUSPAR) 15 MG tablet TAKE 1 TABLET TWICE DAILY 180 tablet 3    calcium-vitamin D3 (OS-ANDRA 500 + D3) 500 mg-5 mcg (200 unit) per tablet Take 1 tablet by mouth once daily.      cetirizine (ZYRTEC) 10 MG tablet Take 10 mg by mouth as needed for Allergies.      fluticasone propionate (FLONASE) 50 mcg/actuation nasal spray 1 spray (50 mcg total) by Each Nostril route once daily. 16 g 0    hydroCHLOROthiazide (HYDRODIURIL) 12.5 MG Tab TAKE 1 TABLET EVERY DAY 90 tablet 3    latanoprost 0.005 % ophthalmic solution INSTILL 1 DROP INTO BOTH EYES EVERY EVENING 7.5 mL 3    metFORMIN (GLUCOPHAGE-XR) 500 MG ER 24hr tablet TAKE 1 TABLET EVERY DAY WITH BREAKFAST 90 tablet 1    MULTIVIT-MINERALS/FOLIC ACID (CENTRUM MULTIGUMMIES ORAL) Take 2 each by mouth once daily.      pantoprazole (PROTONIX) 40 MG tablet Take 1 tablet (40 mg total) by mouth once daily. 90 tablet 3    pravastatin (PRAVACHOL) 40 MG tablet TAKE 1 TABLET EVERY DAY 90 tablet 0    sulfamethoxazole-trimethoprim 800-160mg (BACTRIM DS) 800-160 mg Tab Take 1 tablet by mouth 2 (two) times daily. for 7 days 14 tablet 0    valsartan (DIOVAN) 320 MG tablet TAKE 1 TABLET EVERY DAY 90 tablet 3     No current facility-administered medications on file prior to visit.     Social History:     Social History     Tobacco Use    Smoking status: Never    Smokeless tobacco: Never   Substance Use Topics    Alcohol use: Not Currently     Alcohol/week: 0.0 standard drinks of alcohol     Family History:     Family History   Problem Relation Name Age of Onset    Asthma Mother      Cancer Father 76         lymphoma    Lymphoma Father 76     Cancer Sister Jody Wick         breast    Breast cancer Sister Jody Wick     Depression Son      Heart disease Sister      Depression Son      Diabetes Son      Prostate  cancer Son      Ovarian cancer Other niece      Physical Exam:   BP (!) 152/68 (BP Location: Right arm, Patient Position: Sitting)   Pulse 72   Ht 5' (1.524 m)   Wt 70.7 kg (155 lb 13.8 oz)   BMI 30.44 kg/m²      Physical Exam  Vitals and nursing note reviewed.   Constitutional:       Appearance: Normal appearance.   HENT:      Head: Normocephalic and atraumatic.   Neck:      Vascular: No carotid bruit or JVD.   Cardiovascular:      Rate and Rhythm: Normal rate and regular rhythm.      Chest Wall: PMI is not displaced.      Pulses:           Radial pulses are 2+ on the right side and 2+ on the left side.        Dorsalis pedis pulses are 2+ on the right side and 2+ on the left side.        Posterior tibial pulses are 2+ on the right side and 2+ on the left side.      Heart sounds: No murmur heard.  Pulmonary:      Effort: Pulmonary effort is normal.      Breath sounds: Normal breath sounds. No wheezing, rhonchi or rales.   Abdominal:      General: Bowel sounds are normal. There is no abdominal bruit.      Palpations: Abdomen is soft. There is no pulsatile mass.      Tenderness: There is no abdominal tenderness.   Musculoskeletal:      Right lower leg: No edema.      Left lower leg: No edema.      Right foot: Swelling present.      Left foot: Swelling present.      Comments: Trace edema tops of both feet, does not extend above the ankle    Feet:      Right foot:      Skin integrity: Skin integrity normal.      Left foot:      Skin integrity: Skin integrity normal.   Skin:     Capillary Refill: Capillary refill takes less than 2 seconds.   Neurological:      General: No focal deficit present.      Mental Status: She is alert.   Psychiatric:         Mood and Affect: Mood and affect normal.         Speech: Speech normal.         Behavior: Behavior is cooperative.         Thought Content: Thought content normal.          Labs:     Blood Tests:  Lab Results   Component Value Date    BNP 18 01/04/2024      07/03/2024    K 4.1 07/03/2024     07/03/2024    CO2 24 07/03/2024    BUN 13 07/03/2024    CREATININE 0.9 07/03/2024     (H) 07/03/2024    HGBA1C 6.4 (H) 07/03/2024    AST 24 07/03/2024    ALT 18 07/03/2024    ALBUMIN 4.1 07/03/2024    PROT 7.0 07/03/2024    BILITOT 0.4 07/03/2024    WBC 6.50 07/03/2024    HGB 13.1 07/03/2024    HCT 40.1 07/03/2024    MCV 90 07/03/2024     07/03/2024    INR 1.0 04/07/2012    TSH 3.013 07/03/2024       Lab Results   Component Value Date    CHOL 190 07/03/2024    HDL 69 07/03/2024    TRIG 101 07/03/2024       Lab Results   Component Value Date    LDLCALC 100.8 07/03/2024         Imaging:     Echocardiogram  TTE 9/19/2022  The left ventricle is normal in size with concentric remodeling and normal systolic function. The estimated ejection fraction is 60%.  Normal right ventricular size with normal right ventricular systolic function.  Normal left ventricular diastolic function.  The estimated PA systolic pressure is 31 mmHg.  Normal central venous pressure (3 mmHg).    Stress testing  DSE 11/21/2023    Left Ventricle: The left ventricle is normal in size. Normal wall thickness. Normal wall motion. There is normal systolic function with a visually estimated ejection fraction of 60 - 65%. There is normal diastolic function.    Right Ventricle: Normal right ventricular cavity size. Wall thickness is normal. Right ventricle wall motion  is normal. Systolic function is normal.    IVC/SVC: Normal venous pressure at 3 mmHg.    Stress Protocol: The patient reported no symptoms during the stress test. The test was stopped because the end of the protocol was reached.    Baseline ECG: The Baseline ECG reveals sinus rhythm. The axis is normal. The baseline ECG has non-specific ST-T wave changes.    Stress ECG: There was minimal accentuation of the baseline ST abnormality with stress, which is a nonspecific finding. There are no arrhythmias during stress. There is normal blood  pressure response with stress.    ECG Conclusion: The ECG portion of the study is negative for ischemia.    Post-stress Impression: The study is negative with no echocardiographic evidence of stress induced ischemia.    Overall, this study is negative for myocardial ischemia.    Cath Lab  None    Other  None    [id number]  Normal sinus rhythm with marked artifact   Low voltage QRS   Nonspecific ST and T wave abnormality     Assessment:     1. Essential hypertension    2. Foot swelling    3. Pure hypercholesterolemia    4. Aortic atherosclerosis    5. Ascending aorta dilatation        Plan:     Essential hypertension  Foot swelling  Recommend reducing amlodipine to 5 mg qD and strongly encouraged salt avoidance  Can also elevate and use compression stockings  Continue valsartan 320 mg qD  Continue HCTZ 12.5 mg qD  I will check digital BP log in two weeks and consider increase of HCTZ if BP becomes elevated following reduction of amlodipine  Patient to contact clinic if swelling is unimproved.     Pure hypercholesterolemia  Continue pravastatin 40 mg qD  Last FLP 7/3/2024: .8    Aortic atherosclerosis  Continue statin     Ascending aorta dilatation  Stable and mild, does not require further imaging   Ascending aorta 3.27 cm per stress echo 2023      Signed:  Samanta Méndez PA-C  Cardiology     2024 12:59 PM    Follow-up:     Future Appointments   Date Time Provider Department Center   2025  2:00 PM Cornell Garcia DO [facility] [place]

## 2024-12-06 LAB
BACTERIA UR CULT: ABNORMAL
BACTERIA UR CULT: ABNORMAL

## 2024-12-06 RX ORDER — CIPROFLOXACIN 500 MG/1
500 TABLET ORAL 2 TIMES DAILY
Qty: 6 TABLET | Refills: 0 | Status: SHIPPED | OUTPATIENT
Start: 2024-12-06 | End: 2024-12-09

## 2024-12-09 ENCOUNTER — PATIENT MESSAGE (OUTPATIENT)
Dept: ADMINISTRATIVE | Facility: OTHER | Age: 79
End: 2024-12-09
Payer: MEDICARE

## 2024-12-16 DIAGNOSIS — K21.9 GASTROESOPHAGEAL REFLUX DISEASE, UNSPECIFIED WHETHER ESOPHAGITIS PRESENT: ICD-10-CM

## 2024-12-16 NOTE — TELEPHONE ENCOUNTER
Care Due:                  Date            Visit Type   Department     Provider  --------------------------------------------------------------------------------                                EP -                              PRIMARY      MET INTERNAL  Last Visit: 07-      CARE ([facility])   MEDICINE       Cornell Garcia                              EP -                              PRIMARY      [facility] INTERNAL  Next Visit: 01-      CARE ([facility])   MEDICINE       Cornell Garcia                                                            Last  Test          Frequency    Reason                     Performed    Due Date  --------------------------------------------------------------------------------    HBA1C.......  6 months...  metFORMIN................  07- 12-    [facility] Embedded Care Due Messages. Reference number: [id number].   12/16/2024 10:13:22 AM CST

## 2024-12-17 RX ORDER — PANTOPRAZOLE SODIUM 40 MG/1
40 TABLET, DELAYED RELEASE ORAL
Qty: 90 TABLET | Refills: 1 | Status: SHIPPED | OUTPATIENT
Start: 2024-12-17

## 2024-12-17 NOTE — TELEPHONE ENCOUNTER
Provider Staff:  Action required for this patient    Requires labs      Please see care gap opportunities below in Care Due Message.    Thanks!  [facility]     Appointments      Date Provider   Last Visit   7/2/2024 Cornell Garcia, DO   Next Visit   1/9/2025 Cornell Garcia, DO     Refill Decision Note   Chikis Epstein  is requesting a refill authorization.  Brief Assessment and Rationale for Refill:  Approve     Medication Therapy Plan:         Comments:     Note composed:5:13 AM 12/17/2024

## 2024-12-31 ENCOUNTER — TELEPHONE (OUTPATIENT)
Dept: CARDIOLOGY | Facility: CLINIC | Age: 79
End: 2024-12-31
Payer: MEDICARE

## 2024-12-31 DIAGNOSIS — I10 ESSENTIAL HYPERTENSION: Primary | ICD-10-CM

## 2024-12-31 NOTE — TELEPHONE ENCOUNTER
Pt states swelling has improved, agrees to increase to HCTZ 25mg daily. Lab appointment scheduled for 1/9/25 per pt's request.

## 2024-12-31 NOTE — TELEPHONE ENCOUNTER
----- Message from Samanta Méndez PA-C sent at 12/30/2024 10:05 AM CST -----  Please let the patient know it looks like her BP has been running higher since we decreased her amlodipine. I'd like to know if her leg swelling has gotten better, and suggest that she increase her dose of HCTZ to 25 mg daily. If she is agreeable to that please schedule her for BMP two weeks after increase.     Thanks,   Samanta  ----- Message -----  From: Samanta Méndez PA-C  Sent: 12/17/2024  12:00 AM CST  To: Samanta Méndez PA-C    Check digital log after reduction of amlodipine. Consider increasing HCTZ if necessary.

## 2025-01-07 ENCOUNTER — PATIENT OUTREACH (OUTPATIENT)
Dept: ADMINISTRATIVE | Facility: HOSPITAL | Age: 80
End: 2025-01-07

## 2025-01-07 VITALS — DIASTOLIC BLOOD PRESSURE: 68 MMHG | SYSTOLIC BLOOD PRESSURE: 135 MMHG

## 2025-01-09 ENCOUNTER — LAB VISIT (OUTPATIENT)
Dept: LAB | Facility: HOSPITAL | Age: 80
End: 2025-01-09
Attending: PHYSICIAN ASSISTANT
Payer: MEDICARE

## 2025-01-09 ENCOUNTER — OFFICE VISIT (OUTPATIENT)
Dept: INTERNAL MEDICINE | Facility: CLINIC | Age: 80
End: 2025-01-09
Payer: MEDICARE

## 2025-01-09 VITALS
BODY MASS INDEX: 29.67 KG/M2 | RESPIRATION RATE: 18 BRPM | HEIGHT: 60 IN | WEIGHT: 151.13 LBS | OXYGEN SATURATION: 98 % | SYSTOLIC BLOOD PRESSURE: 138 MMHG | TEMPERATURE: 98 F | DIASTOLIC BLOOD PRESSURE: 74 MMHG | HEART RATE: 84 BPM

## 2025-01-09 DIAGNOSIS — I70.0 AORTIC ATHEROSCLEROSIS: ICD-10-CM

## 2025-01-09 DIAGNOSIS — I10 ESSENTIAL HYPERTENSION: ICD-10-CM

## 2025-01-09 DIAGNOSIS — K21.9 GASTROESOPHAGEAL REFLUX DISEASE, UNSPECIFIED WHETHER ESOPHAGITIS PRESENT: ICD-10-CM

## 2025-01-09 DIAGNOSIS — E21.3 HYPERPARATHYROIDISM: ICD-10-CM

## 2025-01-09 DIAGNOSIS — G47.00 INSOMNIA, UNSPECIFIED TYPE: ICD-10-CM

## 2025-01-09 DIAGNOSIS — M47.812 FACET ARTHRITIS OF CERVICAL REGION: ICD-10-CM

## 2025-01-09 DIAGNOSIS — E78.00 PURE HYPERCHOLESTEROLEMIA: ICD-10-CM

## 2025-01-09 DIAGNOSIS — R73.03 PREDIABETES: ICD-10-CM

## 2025-01-09 DIAGNOSIS — F32.5 MAJOR DEPRESSIVE DISORDER, SINGLE EPISODE, IN FULL REMISSION: ICD-10-CM

## 2025-01-09 DIAGNOSIS — Z23 NEED FOR VACCINATION: ICD-10-CM

## 2025-01-09 DIAGNOSIS — F41.9 ANXIETY: ICD-10-CM

## 2025-01-09 DIAGNOSIS — M54.2 NECK PAIN ON RIGHT SIDE: ICD-10-CM

## 2025-01-09 DIAGNOSIS — I70.8 AORTO-ILIAC ATHEROSCLEROSIS: ICD-10-CM

## 2025-01-09 DIAGNOSIS — I70.0 AORTO-ILIAC ATHEROSCLEROSIS: ICD-10-CM

## 2025-01-09 DIAGNOSIS — M50.30 DDD (DEGENERATIVE DISC DISEASE), CERVICAL: Primary | ICD-10-CM

## 2025-01-09 DIAGNOSIS — I10 HYPERTENSION, UNSPECIFIED TYPE: ICD-10-CM

## 2025-01-09 PROBLEM — E66.09 CLASS 1 OBESITY DUE TO EXCESS CALORIES WITH SERIOUS COMORBIDITY IN ADULT: Status: RESOLVED | Noted: 2019-01-15 | Resolved: 2025-01-09

## 2025-01-09 PROBLEM — E66.811 CLASS 1 OBESITY DUE TO EXCESS CALORIES WITH SERIOUS COMORBIDITY IN ADULT: Status: RESOLVED | Noted: 2019-01-15 | Resolved: 2025-01-09

## 2025-01-09 LAB
ANION GAP SERPL CALC-SCNC: 9 MMOL/L (ref 8–16)
BUN SERPL-MCNC: 10 MG/DL (ref 8–23)
CALCIUM SERPL-MCNC: 10.5 MG/DL (ref 8.7–10.5)
CHLORIDE SERPL-SCNC: 99 MMOL/L (ref 95–110)
CO2 SERPL-SCNC: 27 MMOL/L (ref 23–29)
CREAT SERPL-MCNC: 0.9 MG/DL (ref 0.5–1.4)
EST. GFR  (NO RACE VARIABLE): >60 ML/MIN/1.73 M^2
GLUCOSE SERPL-MCNC: 103 MG/DL (ref 70–110)
POTASSIUM SERPL-SCNC: 3.6 MMOL/L (ref 3.5–5.1)
SODIUM SERPL-SCNC: 135 MMOL/L (ref 136–145)

## 2025-01-09 PROCEDURE — 3288F FALL RISK ASSESSMENT DOCD: CPT | Mod: HCNC,CPTII,S$GLB, | Performed by: INTERNAL MEDICINE

## 2025-01-09 PROCEDURE — 80048 BASIC METABOLIC PNL TOTAL CA: CPT | Mod: HCNC,XB | Performed by: PHYSICIAN ASSISTANT

## 2025-01-09 PROCEDURE — 99214 OFFICE O/P EST MOD 30 MIN: CPT | Mod: HCNC,S$GLB,, | Performed by: INTERNAL MEDICINE

## 2025-01-09 PROCEDURE — 90653 IIV ADJUVANT VACCINE IM: CPT | Mod: HCNC,S$GLB,, | Performed by: INTERNAL MEDICINE

## 2025-01-09 PROCEDURE — G0008 ADMIN INFLUENZA VIRUS VAC: HCPCS | Mod: HCNC,S$GLB,, | Performed by: INTERNAL MEDICINE

## 2025-01-09 PROCEDURE — 36415 COLL VENOUS BLD VENIPUNCTURE: CPT | Mod: HCNC,PO | Performed by: PHYSICIAN ASSISTANT

## 2025-01-09 PROCEDURE — G2211 COMPLEX E/M VISIT ADD ON: HCPCS | Mod: HCNC,S$GLB,, | Performed by: INTERNAL MEDICINE

## 2025-01-09 PROCEDURE — 1126F AMNT PAIN NOTED NONE PRSNT: CPT | Mod: HCNC,CPTII,S$GLB, | Performed by: INTERNAL MEDICINE

## 2025-01-09 PROCEDURE — 1101F PT FALLS ASSESS-DOCD LE1/YR: CPT | Mod: HCNC,CPTII,S$GLB, | Performed by: INTERNAL MEDICINE

## 2025-01-09 PROCEDURE — 1159F MED LIST DOCD IN RCRD: CPT | Mod: HCNC,CPTII,S$GLB, | Performed by: INTERNAL MEDICINE

## 2025-01-09 PROCEDURE — 1160F RVW MEDS BY RX/DR IN RCRD: CPT | Mod: HCNC,CPTII,S$GLB, | Performed by: INTERNAL MEDICINE

## 2025-01-09 PROCEDURE — 3078F DIAST BP <80 MM HG: CPT | Mod: HCNC,CPTII,S$GLB, | Performed by: INTERNAL MEDICINE

## 2025-01-09 PROCEDURE — 99999 PR PBB SHADOW E&M-EST. PATIENT-LVL IV: CPT | Mod: PBBFAC,HCNC,, | Performed by: INTERNAL MEDICINE

## 2025-01-09 PROCEDURE — 3075F SYST BP GE 130 - 139MM HG: CPT | Mod: HCNC,CPTII,S$GLB, | Performed by: INTERNAL MEDICINE

## 2025-01-09 RX ORDER — ALPRAZOLAM 0.25 MG/1
TABLET ORAL
Qty: 60 TABLET | Refills: 1 | Status: SHIPPED | OUTPATIENT
Start: 2025-01-09 | End: 2025-01-22 | Stop reason: SDUPTHER

## 2025-01-09 NOTE — PROGRESS NOTES
Patient ID: Chikis Epstein is a 79 y.o. female.    Chief Complaint: Follow-up    History of Present Illness    CHIEF COMPLAINT:  Chikis presents today for follow-up regarding hypertension and neck pain.    HYPERTENSION:  She reports variable home blood pressure readings with recent systolic measurements around 135 and diastolic between 60-68, decreased from previous baseline diastolic of 90. She expresses concern about the accuracy of her home monitoring device. Recent medication adjustments include increased hydrochlorothiazide dosage and decreased amlodipine from 10 to 5 mg. She continues valsartan, amlodipine, and hydrochlorothiazide for management.    NECK PAIN AND HEADACHES:  She reports right-sided neck pain radiating upward causing headaches. The pain affects her sleep, keeping her awake until 1-2 AM. She has completed physical therapy previously. She uses Tylenol and occasionally Aleve for breakthrough pain.    MENTAL HEALTH:  She continues Wellbutrin and Buspar for anxiety and depression, reporting that Buspar is not very effective. She occasionally uses Xanax to aid with sleep.    OTHER MEDICAL CONDITIONS:  She continues pravastatin for hyperlipidemia, pantoprazole for reflux, and metformin.         Physical Exam    Vitals: Blood pressure: 140/70 to 145.  General: No acute distress. Well-developed. Well-nourished.  Eyes: EOMI. Sclerae anicteric.  HENT: Normocephalic. Atraumatic. Nares patent. Moist oral mucosa.  Ears: Bilateral TMs clear. Bilateral EACs clear.  Cardiovascular: Regular rate. Regular rhythm. No murmurs. No rubs. No gallops. Normal S1, S2.  Respiratory: Normal respiratory effort. Clear to auscultation bilaterally. No rales. No rhonchi. No wheezing.  Abdomen: Soft. Non-tender. Non-distended. Normoactive bowel sounds.  Musculoskeletal: No  obvious deformity.  Extremities: No lower extremity edema.  Neurological: Alert & oriented x3. No slurred speech. Normal gait.  Psychiatric: Normal mood. Normal  affect. Good insight. Good judgment.  Skin: Warm. Dry. No rash.         Assessment & Plan    IMPRESSION:  - Assessed hypertension management, considering recent increase in hydrochlorothiazide to 25mg daily  - Evaluated neck pain, likely due to degenerative changes and poor posture  - Reviewed anxiety management, noting minimal benefit from buspirone  - Considered sleep issues and appropriate use of Xanax for breakthrough pain and insomnia  - Assessed urinary symptoms, determined no immediate follow-up with Dr. Lewis necessary based on patient's reported improvement    HYPERTENSION:  - Monitored the patient's blood pressure, noting a difference with the bottom number dropping to 60-68.  - Measured blood pressure in office, recording 140/70 or 145/70.  - Assessed that the patient's goal blood pressure should be less than 130/80, with some higher readings being acceptable.  - Continued hydrochlorothiazide 25mg daily, valsartan 320mg daily, and amlodipine 5mg daily for hypertension management.  - Discussed the effects of stress on blood pressure and the importance of relaxation techniques.  - Advised the patient to follow up within 1 week to assess the effectiveness of the medication change.  - Considered increasing amlodipine dose if blood pressure does not improve.  - Instructed the patient to contact the office if blood pressure does not improve or if amlodipine dosage adjustment is needed.    NECK PAIN:  - Evaluated the patient's condition, noting right-sided neck pain radiating to the shoulder and up to the head, causing pressure and headaches.  - Assessed the condition as likely secondary to arthritic changes and posture issues.  - Emphasized the importance of proper posture to reduce neck pain and headaches.  - Educated the patient on strengthening upper back muscles to improve posture.  - Instructed the patient to improve posture, especially when using electronic devices.  - Prescribed as-needed alprazolam for  severe neck pain and sleep issues, limited to approximately once per week.  - Recommend acetaminophen as primary pain relief, up to 1000mg 3 times daily, not to exceed 4000mg per day.  - Advised naproxen for breakthrough pain, to be used sparingly.  - Suggested using ice or heat on the neck, whichever provides more relief.  - Referred the patient to physical therapy for neck pain management.    ANXIETY:  - Monitored the patient's anxiety symptoms, noting reports of feeling nervous and unable to calm down.  - Clarified that alprazolam acts as a sedative, similar to having a cocktail.  - Recommend implementing relaxation techniques to manage stress and anxiety.  - Continued bupropion 300mg in the morning for anxiety management.  - Continued buspirone twice daily for anxiety, acknowledging minimal perceived benefit.  - Prescribed alprazolam as needed, especially for sleep and severe neck pain, limited to once a week use.    GASTROESOPHAGEAL REFLUX DISEASE (GERD):  - Inquired about the effectiveness of proton pump inhibitors for reflux management.  - Continued pantoprazole 40mg daily for reflux management.    HYPERLIPIDEMIA:  - Continued pravastatin for hyperlipidemia management, to be taken in the morning for better compliance.    EXERCISE RECOMMENDATIONS:  - Recommend engaging in regular exercise, including gym visits 5 days a week.  - Chikis to focus on strengthening upper back muscles through exercises like lat pull-downs and rowing machine.    MEDICATIONS/SUPPLEMENTS:  - Continued metformin.    LABS:  - Urine test ordered.  - Blood work ordered.    FOLLOW UP:  - Follow up in 6 months for annual exam w  ith labs to be completed prior to visit.            This note was generated with the assistance of ambient listening technology. Verbal consent was obtained by the patient and accompanying visitor(s) for the recording of patient appointment to facilitate this note. I attest to having reviewed and edited the generated  note for accuracy, though some syntax or spelling errors may persist. Please contact the author of this note for any clarification.     0 = independent

## 2025-01-10 ENCOUNTER — TELEPHONE (OUTPATIENT)
Dept: INTERNAL MEDICINE | Facility: CLINIC | Age: 80
End: 2025-01-10
Payer: MEDICARE

## 2025-01-10 DIAGNOSIS — M46.92 UNSPECIFIED INFLAMMATORY SPONDYLOPATHY, CERVICAL REGION: ICD-10-CM

## 2025-01-10 DIAGNOSIS — Z00.00 ANNUAL PHYSICAL EXAM: Primary | ICD-10-CM

## 2025-01-10 DIAGNOSIS — E78.00 HYPERCHOLESTEREMIA: ICD-10-CM

## 2025-01-10 DIAGNOSIS — E21.3 HYPERPARATHYROIDISM: ICD-10-CM

## 2025-01-10 DIAGNOSIS — R53.83 FATIGUE, UNSPECIFIED TYPE: ICD-10-CM

## 2025-01-10 DIAGNOSIS — R73.03 PREDIABETES: ICD-10-CM

## 2025-01-22 DIAGNOSIS — E83.52 HYPERCALCEMIA: Primary | ICD-10-CM

## 2025-01-22 DIAGNOSIS — G47.00 INSOMNIA, UNSPECIFIED TYPE: ICD-10-CM

## 2025-01-23 RX ORDER — ALPRAZOLAM 0.25 MG/1
TABLET ORAL
Qty: 60 TABLET | Refills: 1 | Status: SHIPPED | OUTPATIENT
Start: 2025-01-23

## 2025-01-23 NOTE — TELEPHONE ENCOUNTER
No care due was identified.  [facility] Embedded Care Due Messages. Reference number: [id number].   1/22/2025 9:51:34 PM CST

## 2025-02-07 ENCOUNTER — TELEPHONE (OUTPATIENT)
Dept: RADIOLOGY | Facility: HOSPITAL | Age: 80
End: 2025-02-07
Payer: MEDICARE

## 2025-02-07 NOTE — TELEPHONE ENCOUNTER
----- Message from Laura sent at 2/7/2025  1:46 PM CST -----  Type:  Patient Returning Call    Who Called:Ms Diop   Who Left Message for Patient:Kb   Does the patient know what this is regarding?:yes   Would the patient rather a call back or a response via [facility]? Call   Best Call Back Number: [phone]  Additional Information: please call

## 2025-02-07 NOTE — TELEPHONE ENCOUNTER
----- Message from Laura sent at 2/6/2025  3:00 PM CST -----  Type:  Appointment Request    Caller is requesting an appointment.   Name of Caller:Ms Diop   When is the first available appointment?none     Would the patient rather a call back or a response via [facility]? Call   Best Call Back Number: [phone]  Additional Information: she received a letter in the mail please call

## 2025-02-17 ENCOUNTER — HOSPITAL ENCOUNTER (OUTPATIENT)
Dept: RADIOLOGY | Facility: HOSPITAL | Age: 80
Discharge: HOME | End: 2025-02-17
Attending: INTERNAL MEDICINE
Payer: MEDICARE

## 2025-02-17 DIAGNOSIS — R92.8 ABNORMAL FINDING ON BREAST IMAGING: ICD-10-CM

## 2025-02-17 PROCEDURE — 77065 DX MAMMO INCL CAD UNI: CPT | Mod: TC,HCNC,LT

## 2025-02-21 DIAGNOSIS — H40.1231 LOW-TENSION GLAUCOMA OF BOTH EYES, MILD STAGE: ICD-10-CM

## 2025-02-21 RX ORDER — LATANOPROST 50 UG/ML
1 SOLUTION/ DROPS OPHTHALMIC
Qty: 7.5 ML | Refills: 3 | Status: SHIPPED | OUTPATIENT
Start: 2025-02-21

## 2025-04-16 DIAGNOSIS — I10 ESSENTIAL HYPERTENSION: ICD-10-CM

## 2025-04-16 RX ORDER — HYDROCHLOROTHIAZIDE 25 MG/1
25 TABLET ORAL
Qty: 90 TABLET | Refills: 2 | Status: SHIPPED | OUTPATIENT
Start: 2025-04-16

## 2025-04-16 NOTE — TELEPHONE ENCOUNTER
Refill Decision Note   Chikis Epstein  is requesting a refill authorization.  Brief Assessment and Rationale for Refill:  Approve     Medication Therapy Plan:  FLOS      Comments:     Note composed:2:34 PM 04/16/2025

## 2025-04-16 NOTE — TELEPHONE ENCOUNTER
Care Due:                  Date            Visit Type   Department     Provider  --------------------------------------------------------------------------------                                EP -                              PRIMARY      [facility] INTERNAL  Last Visit: 01-      CARE ([facility])   [facility]       Cornell Garcia                               -                              PRIMARY      [facility] INTERNAL  Next Visit: 07-      [facility] ([facility])   [facility]       Cornell Garcia                                                            Last  Test          Frequency    Reason                     Performed    Due Date  --------------------------------------------------------------------------------    HBA1C.......  6 months...  metFORMIN................  01-   07-    Lipid Panel.  12 months..  pravastatin..............  07- 06-    [facility] Embedded Care Due Messages. Reference number: [id number].   4/16/2025 10:31:39 AM [place]

## 2025-04-17 DIAGNOSIS — I70.0 ATHEROSCLEROSIS OF AORTA: ICD-10-CM

## 2025-04-17 RX ORDER — PRAVASTATIN SODIUM 40 MG/1
40 TABLET ORAL
Qty: 90 TABLET | Refills: 0 | Status: SHIPPED | OUTPATIENT
Start: 2025-04-17

## 2025-04-17 NOTE — TELEPHONE ENCOUNTER
Refill Decision Note   Chikis Epstein  is requesting a refill authorization.  Brief Assessment and Rationale for Refill:  Approve     Medication Therapy Plan:         Comments:     Note composed:4:21 PM 04/17/2025

## 2025-04-17 NOTE — TELEPHONE ENCOUNTER
No care due was identified.  [facility] Embedded Care Due Messages. Reference number: [id number].   4/17/2025 11:12:15 AM CDT

## 2025-05-20 DIAGNOSIS — K21.9 GASTROESOPHAGEAL REFLUX DISEASE, UNSPECIFIED WHETHER ESOPHAGITIS PRESENT: ICD-10-CM

## 2025-05-20 RX ORDER — PANTOPRAZOLE SODIUM 40 MG/1
40 TABLET, DELAYED RELEASE ORAL
Qty: 90 TABLET | Refills: 2 | Status: SHIPPED | OUTPATIENT
Start: 2025-05-20

## 2025-05-20 NOTE — TELEPHONE ENCOUNTER
No care due was identified.  [facility] Embedded Care Due Messages. Reference number: [id number].   5/20/2025 10:00:09 AM CDT

## 2025-05-20 NOTE — TELEPHONE ENCOUNTER
Refill Decision Note   Chikis Epstein  is requesting a refill authorization.  Brief Assessment and Rationale for Refill:  Approve     Medication Therapy Plan:         Comments:     Note composed:2:05 PM 05/20/2025

## 2025-05-21 DIAGNOSIS — Z78.0 MENOPAUSE: ICD-10-CM

## 2025-05-21 RX ORDER — BUPROPION HYDROCHLORIDE 300 MG/1
300 TABLET ORAL
Qty: 90 TABLET | Refills: 2 | Status: SHIPPED | OUTPATIENT
Start: 2025-05-21

## 2025-05-21 NOTE — TELEPHONE ENCOUNTER
No care due was identified.  [facility] Embedded Care Due Messages. Reference number: [id number].   5/21/2025 3:16:17 AM CDT

## 2025-05-21 NOTE — TELEPHONE ENCOUNTER
Refill Decision Note   Chikis Epstein  is requesting a refill authorization.  Brief Assessment and Rationale for Refill:  Approve     Medication Therapy Plan:        Comments:     Note composed:7:15 AM 05/21/2025

## 2025-06-08 ENCOUNTER — PATIENT MESSAGE (OUTPATIENT)
Dept: ADMINISTRATIVE | Facility: OTHER | Age: 80
End: 2025-06-08
Payer: MEDICARE

## 2025-06-17 ENCOUNTER — APPOINTMENT (OUTPATIENT)
Dept: RADIOLOGY | Facility: CLINIC | Age: 80
End: 2025-06-17
Attending: INTERNAL MEDICINE
Payer: MEDICARE

## 2025-06-17 DIAGNOSIS — Z78.0 MENOPAUSE: ICD-10-CM

## 2025-06-17 PROCEDURE — 77080 DXA BONE DENSITY AXIAL: CPT | Mod: 26,HCNC,, | Performed by: INTERNAL MEDICINE

## 2025-06-17 PROCEDURE — 77080 DXA BONE DENSITY AXIAL: CPT | Mod: TC,HCNC,PO

## 2025-06-23 ENCOUNTER — RESULTS FOLLOW-UP (OUTPATIENT)
Dept: INTERNAL MEDICINE | Facility: CLINIC | Age: 80
End: 2025-06-23

## 2025-06-23 ENCOUNTER — PATIENT MESSAGE (OUTPATIENT)
Dept: GASTROENTEROLOGY | Facility: CLINIC | Age: 80
End: 2025-06-23
Payer: MEDICARE

## 2025-06-25 ENCOUNTER — TELEPHONE (OUTPATIENT)
Dept: INTERNAL MEDICINE | Facility: CLINIC | Age: 80
End: 2025-06-25
Payer: MEDICARE

## 2025-06-25 NOTE — TELEPHONE ENCOUNTER
Called pt and let her know that I am not sure who called her. She said she spoke to someone about her BP med but she got it handled. She went to pharmacy and they gave her 3 pills until her mail order comes in.  I let her know that her bone density was normal as well   She verbalized understanding

## 2025-06-25 NOTE — TELEPHONE ENCOUNTER
Copied from CRM #[id number]. Topic: Medications - Medication Refill  >> Jun 25, 2025 11:32 AM Yany wrote:  Requesting an RX refill or new RX.    Is this a refill or new RX:     RX name and strength (copy/paste from chart):  valsartan (DIOVAN) 320 MG tablet    Is this a 30 day or 90 day RX:     Pharmacy name and phone # (copy/paste from chart):      [id number] - [place] [facility]  AT [facility] [address]  [address] [place] [address]  Phone: [phone] Fax: [phone]      Who called and call back number:    The doctors have asked that we provide their patients with the following 2 reminders -- prescription refills can take up to 72 hours, and a friendly reminder that in the future you can use your [facility] account to request refills:     Comments: Pt states that she is out of meds and has been out for 3 days.

## 2025-06-25 NOTE — TELEPHONE ENCOUNTER
Copied from CRM #[id number]. Topic: Medications - Medication Refill  >> Jun 25, 2025  2:14 PM Archana wrote:      Requesting an RX refill or new RX.    Is this a refill or new RX: shortfill due to pt still waiting for the Rx to come from [facility]. Pt states she has been without her b/p medication for 3 days    RX name and strength (copy/paste from chart):  valsartan (DIOVAN) 320 MG tablet    Is this a 30 day or 90 day RX: shortfill    Pharmacy name and phone # (copy/paste from chart): [facility] #[id number] - [place] - [address]  AT [facility] [address]  [address] [place] [address]  Phone: [phone] Fax: [phone]  Hours: Open 24 hours         Who called and call back number: Patient [phone]    The doctors have asked that we provide their patients with the following 2 reminders -- prescription refills can take up to 72 hours, and a friendly reminder that in the future you can use your [facility] account to request refills: n

## 2025-06-25 NOTE — TELEPHONE ENCOUNTER
Copied from CRM #[id number]. Topic: General Inquiry - Patient Advice  >> Jun 25, 2025  3:58 PM Sonia wrote:  Type: Returning a call    Who left a message? Unsure    When did the practice call? today    Does patient know what this is regarding: possible results    Who called and best call back number: Pt  at [phone] (H)    Would the patient rather a call back or a response via [facility]? Call back    Comments:

## 2025-07-01 ENCOUNTER — LAB VISIT (OUTPATIENT)
Dept: LAB | Facility: HOSPITAL | Age: 80
End: 2025-07-01
Attending: INTERNAL MEDICINE
Payer: MEDICARE

## 2025-07-01 DIAGNOSIS — Z00.00 ANNUAL PHYSICAL EXAM: ICD-10-CM

## 2025-07-01 DIAGNOSIS — E83.52 HYPERCALCEMIA: ICD-10-CM

## 2025-07-01 DIAGNOSIS — E21.3 HYPERPARATHYROIDISM: ICD-10-CM

## 2025-07-01 DIAGNOSIS — M46.92 UNSPECIFIED INFLAMMATORY SPONDYLOPATHY, CERVICAL REGION: ICD-10-CM

## 2025-07-01 DIAGNOSIS — R53.83 FATIGUE, UNSPECIFIED TYPE: ICD-10-CM

## 2025-07-01 DIAGNOSIS — R73.03 PREDIABETES: ICD-10-CM

## 2025-07-01 DIAGNOSIS — E78.00 HYPERCHOLESTEREMIA: ICD-10-CM

## 2025-07-01 LAB
25(OH)D3+25(OH)D2 SERPL-MCNC: 50 NG/ML (ref 30–96)
ABSOLUTE EOSINOPHIL (OHS): 0.3 K/UL
ABSOLUTE MONOCYTE (OHS): 0.62 K/UL (ref 0.3–1)
ABSOLUTE NEUTROPHIL COUNT (OHS): 5.01 K/UL (ref 1.8–7.7)
ALBUMIN SERPL BCP-MCNC: 4.2 G/DL (ref 3.5–5.2)
ALP SERPL-CCNC: 72 UNIT/L (ref 40–150)
ALT SERPL W/O P-5'-P-CCNC: 17 UNIT/L (ref 10–44)
ANION GAP (OHS): 11 MMOL/L (ref 8–16)
AST SERPL-CCNC: 22 UNIT/L (ref 11–45)
BASOPHILS # BLD AUTO: 0.08 K/UL
BASOPHILS NFR BLD AUTO: 1 %
BILIRUB SERPL-MCNC: 0.4 MG/DL (ref 0.1–1)
BUN SERPL-MCNC: 14 MG/DL (ref 8–23)
CA-I BLD-SCNC: 1.26 MMOL/L (ref 1.06–1.42)
CALCIUM SERPL-MCNC: 10.2 MG/DL (ref 8.7–10.5)
CHLORIDE SERPL-SCNC: 99 MMOL/L (ref 95–110)
CHOLEST SERPL-MCNC: 180 MG/DL (ref 120–199)
CHOLEST/HDLC SERPL: 2.8 {RATIO} (ref 2–5)
CO2 SERPL-SCNC: 24 MMOL/L (ref 23–29)
CREAT SERPL-MCNC: 1 MG/DL (ref 0.5–1.4)
EAG (OHS): 137 MG/DL (ref 68–131)
ERYTHROCYTE [DISTWIDTH] IN BLOOD BY AUTOMATED COUNT: 13.1 % (ref 11.5–14.5)
GFR SERPLBLD CREATININE-BSD FMLA CKD-EPI: 57 ML/MIN/1.73/M2
GLUCOSE SERPL-MCNC: 134 MG/DL (ref 70–110)
HBA1C MFR BLD: 6.4 % (ref 4–5.6)
HCT VFR BLD AUTO: 40 % (ref 37–48.5)
HDLC SERPL-MCNC: 65 MG/DL (ref 40–75)
HDLC SERPL: 36.1 % (ref 20–50)
HGB BLD-MCNC: 13.1 GM/DL (ref 12–16)
IMM GRANULOCYTES # BLD AUTO: 0.02 K/UL (ref 0–0.04)
IMM GRANULOCYTES NFR BLD AUTO: 0.3 % (ref 0–0.5)
LDLC SERPL CALC-MCNC: 93.8 MG/DL (ref 63–159)
LYMPHOCYTES # BLD AUTO: 1.9 K/UL (ref 1–4.8)
MCH RBC QN AUTO: 29 PG (ref 27–31)
MCHC RBC AUTO-ENTMCNC: 32.8 G/DL (ref 32–36)
MCV RBC AUTO: 89 FL (ref 82–98)
NONHDLC SERPL-MCNC: 115 MG/DL
NUCLEATED RBC (/100WBC) (OHS): 0 /100 WBC
PLATELET # BLD AUTO: 321 K/UL (ref 150–450)
PMV BLD AUTO: 10.1 FL (ref 9.2–12.9)
POTASSIUM SERPL-SCNC: 4 MMOL/L (ref 3.5–5.1)
PROT SERPL-MCNC: 7.1 GM/DL (ref 6–8.4)
PTH-INTACT SERPL-MCNC: 100.1 PG/ML (ref 9–77)
RBC # BLD AUTO: 4.52 M/UL (ref 4–5.4)
RELATIVE EOSINOPHIL (OHS): 3.8 %
RELATIVE LYMPHOCYTE (OHS): 24 % (ref 18–48)
RELATIVE MONOCYTE (OHS): 7.8 % (ref 4–15)
RELATIVE NEUTROPHIL (OHS): 63.1 % (ref 38–73)
SODIUM SERPL-SCNC: 134 MMOL/L (ref 136–145)
TRIGL SERPL-MCNC: 106 MG/DL (ref 30–150)
TSH SERPL-ACNC: 2.68 UIU/ML (ref 0.4–4)
WBC # BLD AUTO: 7.93 K/UL (ref 3.9–12.7)

## 2025-07-01 PROCEDURE — 85025 COMPLETE CBC W/AUTO DIFF WBC: CPT | Mod: HCNC

## 2025-07-01 PROCEDURE — 82310 ASSAY OF CALCIUM: CPT | Mod: HCNC

## 2025-07-01 PROCEDURE — 83718 ASSAY OF LIPOPROTEIN: CPT | Mod: HCNC

## 2025-07-01 PROCEDURE — 82306 VITAMIN D 25 HYDROXY: CPT | Mod: HCNC

## 2025-07-01 PROCEDURE — 83036 HEMOGLOBIN GLYCOSYLATED A1C: CPT | Mod: HCNC

## 2025-07-01 PROCEDURE — 82374 ASSAY BLOOD CARBON DIOXIDE: CPT | Mod: HCNC

## 2025-07-01 PROCEDURE — 83970 ASSAY OF PARATHORMONE: CPT | Mod: HCNC

## 2025-07-01 PROCEDURE — 82330 ASSAY OF CALCIUM: CPT | Mod: HCNC

## 2025-07-01 PROCEDURE — 84443 ASSAY THYROID STIM HORMONE: CPT | Mod: HCNC

## 2025-07-01 PROCEDURE — 36415 COLL VENOUS BLD VENIPUNCTURE: CPT | Mod: HCNC,PO

## 2025-07-02 DIAGNOSIS — I70.0 ATHEROSCLEROSIS OF AORTA: ICD-10-CM

## 2025-07-02 RX ORDER — PRAVASTATIN SODIUM 40 MG/1
40 TABLET ORAL
Qty: 90 TABLET | Refills: 1 | Status: SHIPPED | OUTPATIENT
Start: 2025-07-02

## 2025-07-02 NOTE — TELEPHONE ENCOUNTER
No care due was identified.  [facility] Embedded Care Due Messages. Reference number: [id number].   7/02/2025 11:42:19 AM CDT

## 2025-07-02 NOTE — TELEPHONE ENCOUNTER
Chikis Epstein  is requesting a refill authorization.  Brief Assessment and Rationale for Refill:  Approve     Medication Therapy Plan:         Comments:     Note composed:12:25 PM 07/02/2025

## 2025-07-07 ENCOUNTER — LAB VISIT (OUTPATIENT)
Dept: LAB | Facility: HOSPITAL | Age: 80
End: 2025-07-07
Attending: INTERNAL MEDICINE
Payer: MEDICARE

## 2025-07-07 ENCOUNTER — OFFICE VISIT (OUTPATIENT)
Dept: INTERNAL MEDICINE | Facility: CLINIC | Age: 80
End: 2025-07-07
Payer: MEDICARE

## 2025-07-07 VITALS
DIASTOLIC BLOOD PRESSURE: 64 MMHG | SYSTOLIC BLOOD PRESSURE: 122 MMHG | WEIGHT: 155.19 LBS | OXYGEN SATURATION: 95 % | RESPIRATION RATE: 17 BRPM | HEART RATE: 81 BPM | HEIGHT: 60 IN | BODY MASS INDEX: 30.47 KG/M2 | TEMPERATURE: 97 F

## 2025-07-07 DIAGNOSIS — Z00.00 ANNUAL PHYSICAL EXAM: Primary | ICD-10-CM

## 2025-07-07 DIAGNOSIS — I70.8 AORTO-ILIAC ATHEROSCLEROSIS: ICD-10-CM

## 2025-07-07 DIAGNOSIS — F41.9 ANXIETY: ICD-10-CM

## 2025-07-07 DIAGNOSIS — R73.03 PREDIABETES: ICD-10-CM

## 2025-07-07 DIAGNOSIS — E78.49 OTHER HYPERLIPIDEMIA: ICD-10-CM

## 2025-07-07 DIAGNOSIS — R30.0 DYSURIA: ICD-10-CM

## 2025-07-07 DIAGNOSIS — G47.00 INSOMNIA, UNSPECIFIED TYPE: ICD-10-CM

## 2025-07-07 DIAGNOSIS — E21.3 HYPERPARATHYROIDISM: ICD-10-CM

## 2025-07-07 DIAGNOSIS — I70.0 AORTIC ATHEROSCLEROSIS: ICD-10-CM

## 2025-07-07 DIAGNOSIS — K21.9 GASTROESOPHAGEAL REFLUX DISEASE, UNSPECIFIED WHETHER ESOPHAGITIS PRESENT: ICD-10-CM

## 2025-07-07 DIAGNOSIS — I70.0 AORTO-ILIAC ATHEROSCLEROSIS: ICD-10-CM

## 2025-07-07 DIAGNOSIS — E78.00 PURE HYPERCHOLESTEROLEMIA: ICD-10-CM

## 2025-07-07 DIAGNOSIS — I10 HYPERTENSION, UNSPECIFIED TYPE: ICD-10-CM

## 2025-07-07 DIAGNOSIS — F32.5 MAJOR DEPRESSIVE DISORDER, SINGLE EPISODE, IN FULL REMISSION: ICD-10-CM

## 2025-07-07 LAB
BACTERIA #/AREA URNS AUTO: NORMAL /HPF
BILIRUB UR QL STRIP.AUTO: NEGATIVE
CLARITY UR: CLEAR
COLOR UR AUTO: YELLOW
GLUCOSE UR QL STRIP: NEGATIVE
HGB UR QL STRIP: NEGATIVE
HYALINE CASTS UR QL AUTO: 0 /LPF (ref 0–1)
KETONES UR QL STRIP: NEGATIVE
LEUKOCYTE ESTERASE UR QL STRIP: NEGATIVE
MICROSCOPIC COMMENT: NORMAL
NITRITE UR QL STRIP: NEGATIVE
PH UR STRIP: 7 [PH]
PROT UR QL STRIP: NEGATIVE
RBC #/AREA URNS AUTO: 0 /HPF (ref 0–4)
SP GR UR STRIP: 1.01
UROBILINOGEN UR STRIP-ACNC: NEGATIVE EU/DL
WBC #/AREA URNS AUTO: 1 /HPF (ref 0–5)
YEAST UR QL AUTO: NORMAL /HPF

## 2025-07-07 PROCEDURE — 81001 URINALYSIS AUTO W/SCOPE: CPT | Mod: HCNC

## 2025-07-07 PROCEDURE — 99999 PR PBB SHADOW E&M-EST. PATIENT-LVL III: CPT | Mod: PBBFAC,HCNC,, | Performed by: INTERNAL MEDICINE

## 2025-07-07 PROCEDURE — 87181 SC STD AGAR DILUTION PER AGT: CPT | Mod: HCNC

## 2025-07-07 NOTE — PROGRESS NOTES
Subjective     Patient ID: Chikis Epstein is a 79 y.o. female.    Chief Complaint: Annual Exam    HPI  79 y.o. Female here for annual exam.      Vaccines: Influenza (); Tetanus (); Prevnar 20 (); Shingrix ()  Eye exam:   Mammogram:   Gyn exam: declined  Colonoscopy: (pt declining repeat)  DEXA: (NL)     Exercise: no  Diet: regular      Past Medical History:  No date: Anxiety  No date: Aortic atherosclerosis      Comment:  CXR 10/13/12  No date: Arthritis  No date: Back pain  No date: BRCA1 positive  No date: BRCA2 positive  No date: Cataract  2016: Hyperlipidemia  No date: Hypertension  2025: Insomnia  No date: MDD (major depressive disorder)  No date: Menopause  2016: Non morbid obesity due to excess calories  No date: Personal history of kidney stones  No date: Type 2 diabetes mellitus without complication  Past Surgical History:  No date: CATARACT EXTRACTION, BILATERAL  No date:  SECTION  No date: CHOLECYSTECTOMY  : COLONOSCOPY      Comment:  normal  No date: EYE SURGERY  No date: HYSTERECTOMY  No date: KIDNEY STONE SURGERY  No date: TOTAL ABDOMINAL HYSTERECTOMY W/ BILATERAL SALPINGOOPHORECTOMY  Social History    Socioeconomic History      Marital status:     Occupational History      Occupation: Retired    Tobacco Use      Smoking status: Never      Smokeless tobacco: Never    Substance and Sexual Activity      Alcohol use: Not Currently        Alcohol/week: 0.0 standard drinks of alcohol      Drug use: No      Sexual activity: Yes        Partners: Male    Social Drivers of Health  Financial Resource Strain: Low Risk  (2023)      Overall Financial Resource Strain (CARDIA)          Difficulty of Paying Living Expenses: Not hard at all  Food Insecurity: No Food Insecurity (2024)      Hunger Vital Sign          Worried About Running Out of Food in the Last Year: Never true          Ran Out of Food in the Last Year: Never true  Transportation  Needs: No Transportation Needs (1/11/2024)      PRAPARE - Transportation          Lack of Transportation (Medical): No          Lack of Transportation (Non-Medical): No  Physical Activity: Inactive (1/11/2024)      Exercise Vital Sign          Days of Exercise per Week: 0 days          Minutes of Exercise per Session: 0 min  Stress: No Stress Concern Present (1/11/2024)      [employer] - Occupational Stress Questionnaire          Feeling of Stress : Not at all  Housing Stability: Unknown (1/11/2024)      Housing Stability Vital Sign          Unable to Pay for Housing in the Last Year: No          Unstable Housing in the Last Year: No  Review of patient's allergies indicates:   -- Ace inhibitors     --  Other reaction(s): Yady Epstein had no medications administered during this visit.  Review of Systems   Constitutional:  Negative for activity change, appetite change, chills, diaphoresis, fatigue, fever and unexpected weight change.   HENT:  Negative for nasal congestion, mouth sores, postnasal drip, rhinorrhea, sinus pressure/congestion, sneezing, sore throat, trouble swallowing and voice change.    Eyes:  Negative for pain, discharge and visual disturbance.   Respiratory:  Negative for cough, shortness of breath and wheezing.    Cardiovascular:  Negative for chest pain, palpitations and leg swelling.   Gastrointestinal:  Negative for abdominal pain, blood in stool, constipation, diarrhea, nausea and vomiting.   Endocrine: Negative for cold intolerance and heat intolerance.   Genitourinary:  Positive for dysuria and frequency. Negative for difficulty urinating, hematuria and urgency.   Musculoskeletal:  Negative for arthralgias and myalgias.   Integumentary:  Negative for rash and wound.   Allergic/Immunologic: Negative for environmental allergies and food allergies.   Neurological:  Negative for dizziness, tremors, seizures, syncope, weakness, light-headedness and headaches.    Hematological:  Negative for adenopathy. Does not bruise/bleed easily.   Psychiatric/Behavioral:  Positive for dysphoric mood and sleep disturbance. Negative for confusion, depressed mood, self-injury and suicidal ideas. The patient is nervous/anxious.           Objective     Physical Exam  Vitals and nursing note reviewed.   Constitutional:       General: She is not in acute distress.     Appearance: Normal appearance. She is well-developed. She is not diaphoretic.   HENT:      Head: Normocephalic and atraumatic.      Right Ear: External ear normal.      Left Ear: External ear normal.      Nose: Nose normal.      Mouth/Throat:      Pharynx: No oropharyngeal exudate.   Eyes:      General: No scleral icterus.        Right eye: No discharge.         Left eye: No discharge.      Conjunctiva/sclera: Conjunctivae normal.      Pupils: Pupils are equal, round, and reactive to light.   Neck:      Thyroid: No thyromegaly.      Vascular: No JVD.   Cardiovascular:      Rate and Rhythm: Normal rate and regular rhythm.      Pulses: Normal pulses.      Heart sounds: Normal heart sounds. No murmur heard.  Pulmonary:      Effort: Pulmonary effort is normal. No respiratory distress.      Breath sounds: Normal breath sounds. No wheezing, rhonchi or rales.   Chest:      Chest wall: No tenderness.   Abdominal:      General: Bowel sounds are normal. There is no distension.      Palpations: Abdomen is soft.      Tenderness: There is no abdominal tenderness. There is no guarding or rebound.   Musculoskeletal:      Cervical back: Neck supple.      Right lower leg: No edema.      Left lower leg: No edema.   Lymphadenopathy:      Cervical: No cervical adenopathy.   Skin:     General: Skin is warm and dry.      Coloration: Skin is not pale.      Findings: No rash.   Neurological:      General: No focal deficit present.      Mental Status: She is alert and oriented to person, place, and time.      Gait: Gait normal.   Psychiatric:          Behavior: Behavior normal.         Thought Content: Thought content normal.         Judgment: Judgment normal.            Assessment and Plan     1. Annual physical exam    2. Dysuria  -     Urinalysis; Future; Expected date: 07/07/2025  -     Urinalysis Microscopic; Future; Expected date: 07/07/2025  -     Urine Culture High Risk ($$); Future; Expected date: 07/07/2025    3. Hypertension, unspecified type    4. Pure hypercholesterolemia  Overview:  Discussed aortic atherosclerosis in addition to pt hyperlipidemia  Started pravastatin therapy today pt f/u in 3 months.        5. Other hyperlipidemia    6. Aortic atherosclerosis    7. Aorto-iliac atherosclerosis    8. Anxiety    9. Gastroesophageal reflux disease, unspecified whether esophagitis present    10. Insomnia, unspecified type    11. Prediabetes    12. Hyperparathyroidism    13. Major depressive disorder, single episode, in full remission         Blood work reviewed with pt      Prediabetes- controlled on Metformin 500 mg qam      HTN- stable on current meds      HLD- continue statin      GERD- stable on PPI      Anxiety/MDD- stable on Wellbutrin  mg/Buspar 15 mg qd      Aortic atherosclerosis- stable on statin      Insomnia- trial of Xanax 0.25-0.5 mg qHS      Hyperparathyroidism- stable     Intermittent dysuria- urine testing today      F/u in 6 months

## 2025-07-12 LAB — BACTERIA UR CULT: ABNORMAL

## 2025-07-24 DIAGNOSIS — F41.9 ANXIETY: ICD-10-CM

## 2025-07-24 RX ORDER — BUSPIRONE HYDROCHLORIDE 15 MG/1
15 TABLET ORAL 2 TIMES DAILY
Qty: 180 TABLET | Refills: 5 | Status: SHIPPED | OUTPATIENT
Start: 2025-07-24

## 2025-07-24 NOTE — TELEPHONE ENCOUNTER
No care due was identified.  [facility] Embedded Care Due Messages. Reference number: [id number].   7/24/2025 1:45:06 PM CDT

## 2025-07-28 ENCOUNTER — TELEPHONE (OUTPATIENT)
Dept: OTOLARYNGOLOGY | Facility: CLINIC | Age: 80
End: 2025-07-28
Payer: MEDICARE

## 2025-07-29 ENCOUNTER — CLINICAL SUPPORT (OUTPATIENT)
Dept: OTOLARYNGOLOGY | Facility: CLINIC | Age: 80
End: 2025-07-29
Payer: MEDICARE

## 2025-07-29 ENCOUNTER — OFFICE VISIT (OUTPATIENT)
Dept: OTOLARYNGOLOGY | Facility: CLINIC | Age: 80
End: 2025-07-29
Payer: MEDICARE

## 2025-07-29 VITALS
SYSTOLIC BLOOD PRESSURE: 147 MMHG | HEIGHT: 60 IN | DIASTOLIC BLOOD PRESSURE: 69 MMHG | HEART RATE: 69 BPM | WEIGHT: 154.31 LBS | BODY MASS INDEX: 30.29 KG/M2

## 2025-07-29 DIAGNOSIS — H90.3 SENSORINEURAL HEARING LOSS, BILATERAL: Primary | ICD-10-CM

## 2025-07-29 DIAGNOSIS — J30.9 CHRONIC ALLERGIC RHINITIS: Chronic | ICD-10-CM

## 2025-07-29 DIAGNOSIS — H90.3 SENSORINEURAL HEARING LOSS, BILATERAL: Primary | Chronic | ICD-10-CM

## 2025-07-29 PROCEDURE — 3288F FALL RISK ASSESSMENT DOCD: CPT | Mod: CPTII,HCNC,S$GLB, | Performed by: OTOLARYNGOLOGY

## 2025-07-29 PROCEDURE — 99999 PR PBB SHADOW E&M-EST. PATIENT-LVL III: CPT | Mod: PBBFAC,HCNC,, | Performed by: OTOLARYNGOLOGY

## 2025-07-29 PROCEDURE — 1159F MED LIST DOCD IN RCRD: CPT | Mod: CPTII,HCNC,S$GLB, | Performed by: OTOLARYNGOLOGY

## 2025-07-29 PROCEDURE — 1126F AMNT PAIN NOTED NONE PRSNT: CPT | Mod: CPTII,HCNC,S$GLB, | Performed by: OTOLARYNGOLOGY

## 2025-07-29 PROCEDURE — 3078F DIAST BP <80 MM HG: CPT | Mod: CPTII,HCNC,S$GLB, | Performed by: OTOLARYNGOLOGY

## 2025-07-29 PROCEDURE — 99204 OFFICE O/P NEW MOD 45 MIN: CPT | Mod: HCNC,S$GLB,, | Performed by: OTOLARYNGOLOGY

## 2025-07-29 PROCEDURE — 3077F SYST BP >= 140 MM HG: CPT | Mod: CPTII,HCNC,S$GLB, | Performed by: OTOLARYNGOLOGY

## 2025-07-29 PROCEDURE — 1101F PT FALLS ASSESS-DOCD LE1/YR: CPT | Mod: CPTII,HCNC,S$GLB, | Performed by: OTOLARYNGOLOGY

## 2025-07-29 RX ORDER — AZELASTINE 1 MG/ML
1 SPRAY, METERED NASAL 2 TIMES DAILY
Qty: 30 ML | Refills: 3 | Status: SHIPPED | OUTPATIENT
Start: 2025-07-29 | End: 2026-07-29

## 2025-07-29 NOTE — PROGRESS NOTES
Chikis Epstein, a 79 y.o. female, was seen today in the clinic for an annual audiologic evaluation.  Patients main complaint was bilateral hearing loss that has been progressing over the years. She uses close caption on the TV and she has been wearing bilateral Phonak hearing aids for about 2 years with success. She denies ear pain, ear drainage or tinnitus.  She does report a family history of hearing loss (mother) but denies a history of significant noise exposure.    Audiogram results revealed a bilateral mild to moderate sensorineural hearing loss. Speech reception thresholds were noted at 55 dB in the right ear and 55 dB in the left ear.  Speech discrimination scores were 68% in the right ear and 76% in the left ear.  Tympanometry revealed Type A in the right ear and Type As in the left ear with negative pressure noted. The results of the audiogram were reviewed with the patient and the benefits of amplification were discussed.    Recommendations:  Otologic evaluation  Annual audiogram  Hearing protection when in noise  Continued and consistent use of bilateral amplification

## 2025-07-29 NOTE — PROGRESS NOTES
Chief Complaint   Patient presents with    Follow-up     Pt stated she has some trouble hearing   .       History of Present Illness    CHIEF COMPLAINT:  - Patient presents for a follow-up hearing evaluation    HPI:  Patient reports longstanding history of bilateral hearing loss at least since  when she had initial audiogram.  She notes that the hearing is affecting both ears and has been relatively stable.  She has been  wearing hearing aids and notes a significant improvement in her hearing with them.  She denies tinnitus.  She denies history of noise exposure.  She has a family history of hearing loss affecting her mother.  She has a history of frequent ear infections during childhood, which required regular penicillin shots. She recalls having an ear infection approximately a year ago or possibly longer, for which she was treated with antibiotics at an urgent care facility. She expresses uncertainty about the exact timing due to memory issues. She is currently using her second pair of hearing aids and is scheduled to be evaluated by Sonia, her audiologist, next week for follow-up.  She denies any recent ear infections or antibiotic treatments.         Past Medical History:   Diagnosis Date    Anxiety     Aortic atherosclerosis     CXR 10/13/12    Arthritis     Back pain     BRCA1 positive     BRCA2 positive     Cataract     Hyperlipidemia 2016    Hypertension     Insomnia 2025    MDD (major depressive disorder)     Menopause     Non morbid obesity due to excess calories 2016    Personal history of kidney stones     Type 2 diabetes mellitus without complication      Social History[1]  Past Surgical History:   Procedure Laterality Date    CATARACT EXTRACTION, BILATERAL       SECTION      CHOLECYSTECTOMY      COLONOSCOPY      normal    EYE SURGERY      HYSTERECTOMY      KIDNEY STONE SURGERY      TOTAL ABDOMINAL HYSTERECTOMY W/ BILATERAL SALPINGOOPHORECTOMY       Family History    Problem Relation Name Age of Onset    Asthma Mother      Cancer Father 76         lymphoma    Lymphoma Father 76     Cancer Sister Jody Wick         breast    Breast cancer Sister Jody Wick     Depression Son      Heart disease Sister      Depression Son      Diabetes Son      Prostate cancer Son      Ovarian cancer Other niece          Review of Systems  General: negative for chills, fever or weight loss  Psychological: negative for mood changes or depression  Ophthalmic: negative for blurry vision, photophobia or eye pain  ENT: see HPI  Respiratory: no cough, shortness of breath, or wheezing  Cardiovascular: no chest pain or dyspnea on exertion  Gastrointestinal: no abdominal pain, change in bowel habits, or black/ bloody stools  Musculoskeletal: negative for gait disturbance or muscular weakness  Neurological: no syncope or seizures; no ataxia  Dermatological: negative for puritis,  rash and jaundice  Hematologic/lymphatic: no easy bruising, no new lumps or bumps      Physical Exam:    Vitals:    07/29/25 0929   BP: (!) 147/69   Pulse: 69       Constitutional: Well appearing / communicating without difficutly.  NAD.  Eyes: EOM I Bilaterally  Head/Face: Normocephalic.  Negative paranasal sinus pressure/tenderness.  Salivary glands WNL.  House Brackmann I Bilaterally.    Right Ear: Auricle normal appearance. External Auditory Canal within normal limits no lesions or masses,TM w/o masses/lesions/perforations. TM mobility noted.   Left Ear: Auricle normal appearance. External Auditory Canal within normal limits no lesions or masses,TM w/o masses/lesions/perforations. TM mobility noted.  Nose: No gross nasal septal deviation. Inferior Turbinates 3+ bilaterally. No septal perforation. No masses/lesions. External nasal skin appears normal without masses/lesions.  Oral Cavity: Gingiva/lips within normal limits.  Dentition/gingiva healthy appearing. Mucus membranes moist. Floor of mouth soft, no masses  palpated. Oral Tongue mobile. Hard Palate appears normal.    Oropharynx: Base of tongue appears normal. No masses/lesions noted. Tonsillar fossa/pharyngeal wall without lesions. Posterior oropharynx WNL.  Soft palate without masses. Midline uvula.   Neck/Lymphatic: No LAD I-VI bilaterally.  No thyromegaly.  No masses noted on exam.      Diagnostic studies:  Audiogram interpreted personally by me and discussed in detail with the patient today.     Tympanometry revealed a Type As tympanogram for the left ear and type A for the right ear.  Audiogram results revealed a mild to moderately severe sensorineural hearing loss bilaterally.         Assessment:    ICD-10-CM ICD-9-CM    1. Sensorineural hearing loss, bilateral  H90.3 389.18       2. Chronic allergic rhinitis  J30.9 477.9         The primary encounter diagnosis was Sensorineural hearing loss, bilateral. A diagnosis of Chronic allergic rhinitis was also pertinent to this visit.      Plan:  No orders of the defined types were placed in this encounter.      We reviewed the patient's recent audiogram and hearing loss in detail.  We also discussed that she is a good candidate for hearing aids and should continue binaural amplification.   We also discussed the use hearing protection when exposed to loud noise, including lawn equipment. Recommend audiogram in 1 year.     Continue zyrtec 10mg PO daily  Start Astelin 1 spray per nostril BID    Thank you kindly for allowing me to participate in the patient's care.       Lexie Cunningham MD               [1]   Social History  Socioeconomic History    Marital status:    Occupational History    Occupation: Retired   Tobacco Use    Smoking status: Never    Smokeless tobacco: Never   Substance and Sexual Activity    Alcohol use: Not Currently     Alcohol/week: 0.0 standard drinks of alcohol    Drug use: No    Sexual activity: Yes     Partners: Male     Social Drivers of Health     Financial Resource Strain: Low  Risk  (2/9/2023)    Overall Financial Resource Strain (CARDIA)     Difficulty of Paying Living Expenses: Not hard at all   Food Insecurity: No Food Insecurity (1/11/2024)    Hunger Vital Sign     Worried About Running Out of Food in the Last Year: Never true     Ran Out of Food in the Last Year: Never true   Transportation Needs: No Transportation Needs (1/11/2024)    PRAPARE - Transportation     Lack of Transportation (Medical): No     Lack of Transportation (Non-Medical): No   Physical Activity: Inactive (1/11/2024)    Exercise Vital Sign     Days of Exercise per Week: 0 days     Minutes of Exercise per Session: 0 min   Stress: No Stress Concern Present (1/11/2024)    [employer] [place] [employer] - Occupational Stress Questionnaire     Feeling of Stress : Not at all   Housing Stability: Unknown (1/11/2024)    Housing Stability Vital Sign     Unable to Pay for Housing in the Last Year: No     Unstable Housing in the Last Year: No

## 2025-08-05 ENCOUNTER — CLINICAL SUPPORT (OUTPATIENT)
Dept: OTOLARYNGOLOGY | Facility: CLINIC | Age: 80
End: 2025-08-05
Payer: MEDICARE

## 2025-08-05 DIAGNOSIS — H90.3 SENSORINEURAL HEARING LOSS, BILATERAL: Primary | ICD-10-CM

## 2025-08-05 NOTE — PROGRESS NOTES
Chikis Epstein was seen today in the clinic for a hearing aid follow up.  She reported doing very with the hearing aids.  She had an audiogram completed last week and there was no significant change in her hearing so adjustments were not needed today.  I cleaned both hearing aids and replaced the wax guards.  The listening check revealed good sound quality.  I advised her to schedule her follow up appointment in May 2026 so that we can send in her hearing aids to [facility] prior to her warranty ending 6/10/26.  She will contact me prior to that with any questions or concerns.    Right ear:  Serial number: [id number]  : [facility]  Model: Audeo L70-R  Type: YVETTE  Color: P7 graphite gray  Battery size: Rechargeable  Tube length: #1  Speaker power: med   Dome size and style: [id number], [id number], warranty 7/16/23  Warranty expiration: 6/10/26  L and D expiration: 6/10/26     Left ear:  Serial number: [id number]  : [facility]   Model: Audeo L70-R  Type: YVETTE  Color:  P7 graphite gray  Battery size: rechargeable  Tube length: #1  Speaker power: med  Dome size and style: Josef [id number], [id number], warranty 7/16/23  Warranty expiration: 6/10/26  L and D expiration: 6/10/26